# Patient Record
Sex: FEMALE | Race: WHITE | Employment: PART TIME | URBAN - METROPOLITAN AREA
[De-identification: names, ages, dates, MRNs, and addresses within clinical notes are randomized per-mention and may not be internally consistent; named-entity substitution may affect disease eponyms.]

---

## 2022-05-19 ENCOUNTER — APPOINTMENT (OUTPATIENT)
Dept: GENERAL RADIOLOGY | Age: 37
End: 2022-05-19
Payer: MEDICAID

## 2022-05-19 ENCOUNTER — HOSPITAL ENCOUNTER (EMERGENCY)
Age: 37
Discharge: LEFT BEFORE DISCHARGE | End: 2022-05-19
Payer: MEDICAID

## 2022-05-19 VITALS
OXYGEN SATURATION: 96 % | SYSTOLIC BLOOD PRESSURE: 149 MMHG | HEART RATE: 81 BPM | WEIGHT: 144.4 LBS | TEMPERATURE: 98.1 F | RESPIRATION RATE: 18 BRPM | DIASTOLIC BLOOD PRESSURE: 101 MMHG

## 2022-05-19 DIAGNOSIS — R11.2 NON-INTRACTABLE VOMITING WITH NAUSEA, UNSPECIFIED VOMITING TYPE: Primary | ICD-10-CM

## 2022-05-19 DIAGNOSIS — K22.89 IRRITABLE ESOPHAGUS: ICD-10-CM

## 2022-05-19 LAB
EKG ATRIAL RATE: 72 BPM
EKG DIAGNOSIS: NORMAL
EKG P AXIS: 55 DEGREES
EKG P-R INTERVAL: 168 MS
EKG Q-T INTERVAL: 386 MS
EKG QRS DURATION: 86 MS
EKG QTC CALCULATION (BAZETT): 422 MS
EKG R AXIS: 61 DEGREES
EKG T AXIS: 33 DEGREES
EKG VENTRICULAR RATE: 72 BPM
RAPID INFLUENZA  B AGN: NEGATIVE
RAPID INFLUENZA A AGN: NEGATIVE
S PYO AG THROAT QL: NEGATIVE
SARS-COV-2, NAAT: NOT DETECTED

## 2022-05-19 PROCEDURE — 87635 SARS-COV-2 COVID-19 AMP PRB: CPT

## 2022-05-19 PROCEDURE — 96372 THER/PROPH/DIAG INJ SC/IM: CPT

## 2022-05-19 PROCEDURE — 99285 EMERGENCY DEPT VISIT HI MDM: CPT

## 2022-05-19 PROCEDURE — 93010 ELECTROCARDIOGRAM REPORT: CPT | Performed by: INTERNAL MEDICINE

## 2022-05-19 PROCEDURE — 6370000000 HC RX 637 (ALT 250 FOR IP): Performed by: NURSE PRACTITIONER

## 2022-05-19 PROCEDURE — 87081 CULTURE SCREEN ONLY: CPT

## 2022-05-19 PROCEDURE — 87804 INFLUENZA ASSAY W/OPTIC: CPT

## 2022-05-19 PROCEDURE — 87880 STREP A ASSAY W/OPTIC: CPT

## 2022-05-19 PROCEDURE — 71046 X-RAY EXAM CHEST 2 VIEWS: CPT

## 2022-05-19 PROCEDURE — 93005 ELECTROCARDIOGRAM TRACING: CPT | Performed by: NURSE PRACTITIONER

## 2022-05-19 PROCEDURE — 87077 CULTURE AEROBIC IDENTIFY: CPT

## 2022-05-19 PROCEDURE — 6360000002 HC RX W HCPCS: Performed by: NURSE PRACTITIONER

## 2022-05-19 RX ORDER — DEXAMETHASONE SODIUM PHOSPHATE 10 MG/ML
10 INJECTION, SOLUTION INTRAMUSCULAR; INTRAVENOUS ONCE
Status: COMPLETED | OUTPATIENT
Start: 2022-05-19 | End: 2022-05-19

## 2022-05-19 RX ORDER — DROPERIDOL 2.5 MG/ML
1.25 INJECTION, SOLUTION INTRAMUSCULAR; INTRAVENOUS EVERY 6 HOURS PRN
Status: DISCONTINUED | OUTPATIENT
Start: 2022-05-19 | End: 2022-05-19 | Stop reason: HOSPADM

## 2022-05-19 RX ORDER — FAMOTIDINE 20 MG/1
20 TABLET, FILM COATED ORAL 2 TIMES DAILY
Qty: 60 TABLET | Refills: 0 | OUTPATIENT
Start: 2022-05-19 | End: 2022-05-26

## 2022-05-19 RX ORDER — SUCRALFATE 1 G/1
1 TABLET ORAL ONCE
Status: COMPLETED | OUTPATIENT
Start: 2022-05-19 | End: 2022-05-19

## 2022-05-19 RX ORDER — FAMOTIDINE 20 MG/1
20 TABLET, FILM COATED ORAL ONCE
Status: COMPLETED | OUTPATIENT
Start: 2022-05-19 | End: 2022-05-19

## 2022-05-19 RX ORDER — ONDANSETRON 4 MG/1
4-8 TABLET, ORALLY DISINTEGRATING ORAL EVERY 12 HOURS PRN
Qty: 20 TABLET | Refills: 0 | Status: SHIPPED | OUTPATIENT
Start: 2022-05-19

## 2022-05-19 RX ORDER — SUCRALFATE ORAL 1 G/10ML
1 SUSPENSION ORAL 4 TIMES DAILY
Qty: 1200 ML | Refills: 0 | Status: SHIPPED | OUTPATIENT
Start: 2022-05-19

## 2022-05-19 RX ADMIN — SUCRALFATE 1 G: 1 TABLET ORAL at 10:08

## 2022-05-19 RX ADMIN — DEXAMETHASONE SODIUM PHOSPHATE 10 MG: 10 INJECTION INTRAMUSCULAR; INTRAVENOUS at 10:06

## 2022-05-19 RX ADMIN — FAMOTIDINE 20 MG: 20 TABLET ORAL at 10:07

## 2022-05-19 RX ADMIN — DROPERIDOL 1.25 MG: 2.5 INJECTION, SOLUTION INTRAMUSCULAR; INTRAVENOUS at 10:08

## 2022-05-19 ASSESSMENT — ENCOUNTER SYMPTOMS
EYE PAIN: 0
DIARRHEA: 0
WHEEZING: 0
SORE THROAT: 0
NAUSEA: 1
ABDOMINAL PAIN: 0
SHORTNESS OF BREATH: 0
CONSTIPATION: 0
BACK PAIN: 0
RESPIRATORY NEGATIVE: 1
CHEST TIGHTNESS: 0
VOMITING: 1
VOICE CHANGE: 0
COUGH: 0
TROUBLE SWALLOWING: 0

## 2022-05-19 ASSESSMENT — PAIN - FUNCTIONAL ASSESSMENT: PAIN_FUNCTIONAL_ASSESSMENT: NONE - DENIES PAIN

## 2022-05-19 NOTE — Clinical Note
Delmi Miller was seen and treated in our emergency department on 5/19/2022. She may return to work on 05/21/2022. If you have any questions or concerns, please don't hesitate to call.       Monica Ge, PETER - CNP

## 2022-05-19 NOTE — ED PROVIDER NOTES
I did not perform an evaluation of Karli Corbett but was asked to review her EKG. EKG interpreted by myself. Rate: normal  Rhythm: NSR  Axis: normal  Intervals: within normal limits  ST Segments: nonspecific ST changes  T waves: no acute abnormality  Comparison: No prior EKG on file.   Impression: NSR with no acute abnormality         Nicholas Deng MD  05/19/22 7683

## 2022-05-19 NOTE — ED PROVIDER NOTES
[address] [facility]        Pt Name: Katharine Gutierrez  MRN: [id number]  [place] 1985  Date of evaluation: 5/19/2022  Provider: PETER Chang CNP  PCP: No primary care provider on file. Note Started: 10:02 AM EDT       SHILA. I have evaluated this patient. My supervising physician was available for consultation. CHIEF COMPLAINT       Chief Complaint   Patient presents with    Emesis     for 5 days        HISTORY OF PRESENT ILLNESS   (Location, Timing/Onset, Context/Setting, Quality, Duration, Modifying Factors, Severity, Associated Signs and Symptoms)  Note limiting factors. Chief Complaint: Nausea and vomiting    Katharine Gutierrez is a 31 y.o. female who presents to the emergency department with nausea and vomiting. She is in increased belching. She states that her son has similar symptoms. They have been ongoing for the past 5 days. She states that she feels like something is stuck, in the distal part of her throat. She is still able to swallow, and drink fluids without any difficulty. Not drooling. No phonation changes. She states that she has not trialed any over-the-counter medications other than Pepto-Bismol. She states that \"I am drinking that like water. \"  Denies any chest pain, shortness of breath, does endorse anxiety history. She is feeling very anxious about the increased burping and the foreign body sensation. She came to the ED for further evaluation and treatment. Nursing Notes were all reviewed and agreed with or any disagreements were addressed in the HPI. REVIEW OF SYSTEMS    (2-9 systems for level 4, 10 or more for level 5)     Review of Systems   Constitutional: Negative for chills, fatigue and fever. HENT: Negative for congestion, drooling, sore throat, trouble swallowing and voice change. Increased belching   Eyes: Negative for pain and visual disturbance. Respiratory: Negative. Negative for cough, chest tightness, shortness of breath and wheezing. Cardiovascular: Negative. Negative for chest pain, palpitations and leg swelling. Gastrointestinal: Positive for nausea and vomiting. Negative for abdominal pain, constipation and diarrhea. Genitourinary: Negative for dysuria, flank pain and hematuria. Musculoskeletal: Negative for back pain, myalgias, neck pain and neck stiffness. Skin: Negative for rash and wound. Allergic/Immunologic: Negative for immunocompromised state. Neurological: Negative for dizziness and light-headedness. Positives and Pertinent negatives as per HPI. Except as noted above in the ROS, all other systems were reviewed and negative. PAST MEDICAL HISTORY     Past Medical History:   Diagnosis Date    Anxiety     Gastroparesis     Scoliosis          SURGICAL HISTORY   History reviewed. No pertinent surgical history. CURRENTMEDICATIONS       Previous Medications    No medications on file         ALLERGIES     Patient has no known allergies. FAMILYHISTORY     History reviewed. No pertinent family history. SOCIAL HISTORY       Social History     Tobacco Use    Smoking status: Current Every Day Smoker     Packs/day: 1.00     Types: Cigarettes    Smokeless tobacco: Never Used   Substance Use Topics    Alcohol use: Yes    Drug use: Yes     Comment: methadone       SCREENINGS    [place] Coma Scale  Eye Opening: Spontaneous  Best Verbal Response: Oriented  Best Motor Response: Obeys commands  [place] Coma Scale Score: 15        PHYSICAL EXAM    (up to 7 for level 4, 8 or more for level 5)     ED Triage Vitals [05/19/22 0950]   BP Temp Temp Source Pulse Resp SpO2 Height Weight   (!) 149/101 98.1 °F (36.7 °C) Oral 81 18 96 % -- 144 lb 6.4 oz (65.5 kg)       Physical Exam  Vitals and nursing note reviewed. Constitutional:       General: She is not in acute distress. Appearance: Normal appearance.  She is not ill-appearing, toxic-appearing or diaphoretic. HENT:      Head: Normocephalic and atraumatic. No raccoon eyes, Rod's sign, abrasion, contusion, masses, right periorbital erythema, left periorbital erythema or laceration. Hair is normal.      Right Ear: Hearing, tympanic membrane and external ear normal. No decreased hearing noted. No mastoid tenderness. Left Ear: Hearing, tympanic membrane and external ear normal. No decreased hearing noted. No mastoid tenderness. Nose: Nose normal. No nasal deformity, signs of injury, laceration, nasal tenderness, mucosal edema, congestion or rhinorrhea. Right Nostril: No foreign body or epistaxis. Left Nostril: No foreign body or epistaxis. Mouth/Throat:      Lips: Pink. No lesions. Mouth: Mucous membranes are moist. No injury, lacerations, oral lesions or angioedema. Tongue: No lesions. Tongue does not deviate from midline. Palate: No mass. Pharynx: Oropharynx is clear. Uvula midline. No pharyngeal swelling, oropharyngeal exudate, posterior oropharyngeal erythema or uvula swelling. Tonsils: No tonsillar exudate or tonsillar abscesses. Eyes:      General: Lids are normal.         Right eye: No discharge. Left eye: No discharge. Extraocular Movements: Extraocular movements intact. Conjunctiva/sclera: Conjunctivae normal.      Pupils: Pupils are equal, round, and reactive to light. Neck:      Vascular: No carotid bruit. Trachea: Trachea and phonation normal. No tracheal tenderness, abnormal tracheal secretions or tracheal deviation. Comments: No meningismus   Cardiovascular:      Rate and Rhythm: Normal rate and regular rhythm. Pulses: Normal pulses. Heart sounds: Normal heart sounds. No murmur heard. No friction rub. No gallop. Pulmonary:      Effort: Pulmonary effort is normal. No respiratory distress. Breath sounds: Normal breath sounds. No stridor. No wheezing, rhonchi or rales. Abdominal:      General: Bowel sounds are normal. There is no distension. Palpations: Abdomen is soft. Tenderness: There is no abdominal tenderness. There is no guarding or rebound. Musculoskeletal:         General: Normal range of motion. Cervical back: Full passive range of motion without pain, normal range of motion and neck supple. No edema, erythema, signs of trauma, rigidity or tenderness. No spinous process tenderness or muscular tenderness. Right lower leg: No edema. Left lower leg: No edema. Lymphadenopathy:      Cervical: No cervical adenopathy. Skin:     General: Skin is warm and dry. Capillary Refill: Capillary refill takes less than 2 seconds. Neurological:      General: No focal deficit present. Mental Status: She is alert and oriented to person, place, and time. GCS: GCS eye subscore is 4. GCS verbal subscore is 5. GCS motor subscore is 6. Sensory: Sensation is intact. Motor: Motor function is intact. Gait: Gait is intact. Psychiatric:         Attention and Perception: Attention and perception normal.         Mood and Affect: Mood is anxious. Speech: Speech is rapid and pressured. Behavior: Behavior normal. Behavior is cooperative. Thought Content: Thought content normal.         Cognition and Memory: Cognition and memory normal.         DIAGNOSTIC RESULTS   LABS:    Labs Reviewed   RAPID INFLUENZA A/B ANTIGENS   COVID-19, RAPID   STREP SCREEN GROUP A THROAT   CULTURE, BETA STREP CONFIRM PLATES       When ordered only abnormal lab results are displayed. All other labs were within normal range or not returned as of this dictation. EKG: When ordered, EKG's are interpreted by the Emergency Department Physician in the absence of a cardiologist.  Please see their note for interpretation of EKG.     RADIOLOGY:   Non-plain film images such as CT, Ultrasound and MRI are read by the radiologist. Plain radiographic images are visualized and preliminarily interpreted by the ED Provider with the below findings:        Interpretation per the Radiologist below, if available at the time of this note:    XR CHEST (2 VW)   Final Result   No acute process. No results found. PROCEDURES   Unless otherwise noted below, none     Procedures    CRITICAL CARE TIME   CRITICAL CARE NOTE:  There was a high probability of clinically significant life-threatening deterioration of the patient's condition requiring my urgent intervention. Total critical care time was at least 20 minutes. This includes vital sign monitoring, pulse oximetry monitoring, telemetry monitoring, clinical response to the IV medications, reviewing the nursing notes, consultation time, dictation/documentation time, and interpretation of the labwork. This excludes any separately billable procedures performed.       CONSULTS:  None      EMERGENCY DEPARTMENT COURSE and DIFFERENTIAL DIAGNOSIS/MDM:   Vitals:    Vitals:    05/19/22 0950   BP: (!) 149/101   Pulse: 81   Resp: 18   Temp: 98.1 °F (36.7 °C)   TempSrc: Oral   SpO2: 96%   Weight: 144 lb 6.4 oz (65.5 kg)       Patient was given the following medications:  Medications   droperidol (INAPSINE) injection 1.25 mg (1.25 mg IntraMUSCular Given 5/19/22 1008)   aluminum & magnesium hydroxide-simethicone (MAALOX) 30 mL, lidocaine viscous hcl (XYLOCAINE) 5 mL (GI COCKTAIL) (has no administration in time range)   famotidine (PEPCID) tablet 20 mg (20 mg Oral Given 5/19/22 1007)   sucralfate (CARAFATE) tablet 1 g (1 g Oral Given 5/19/22 1008)   dexamethasone (PF) (DECADRON) injection 10 mg (10 mg Oral Given 5/19/22 1006)           MDM: See HPI and above for full presentation and physical exam.  Differential diagnoses include globus sensation, viral illness, bronchitis, URI, pneumonia, esophagitis, gastritis, gastroenteritis, food bolus, other    Patient has no stridor, posterior oropharyngeal inspection is unremarkable with an open airway with no tonsillar hypertrophy, erythema or exudate noted. She is not drooling. No phonation changes. Is still able to tolerate her own secretions as well as swallow multiple medications and liquids here in the emergency department. I have a low suspicion of index for any lodged food bolus at this time. Given her symptoms of nausea and vomiting, that are similar to what her son has she most likely has a viral illness, in conjunction with esophagitis which is further confirmed with her increased belching. I think a lot of her symptoms are also exacerbated by her anxiety. Plain films as read by the radiologist as above. EKG officially interpreted per my attending, sinus rhythm with no acute abnormalities. Pt is PERC negative. Wells criteria negative. No SOB. And her symptoms are very atypical for any ACS involvement    Rapid flu and COVID negative. Rapid strep negative. Given her work-up is grossly unremarkable with the unremarkable physical exam I do believe that patient is stable for discharge home. She is remained afebrile and hemodynamically stable throughout her entire ED course. Again she likely has a viral illness leading to some GERD and esophagitis. I will give her a primary care provider for referral for follow-up. Before I was able to discuss her test results and the discharge plan of care patient had eloped from the emergency department. I did E scribe hers symptomatic medications to the pharmacy on file. Gave her a new PCP referral.    I estimate there is LOW risk for a PULMONARY EMBOLISM, ACUTE CORONARY SYNDROME, OR THORACIC AORTIC DISSECTION, ANAPHYLAXIS, DEEP SPACE INFECTION (e.g., HUMBERTOS ANGINA OR RETROPHARYNGEAL ABSCESS), EPIGLOTTITIS, MENINGITIS, or AIRWAY COMPROMISE, thus I consider the discharge disposition reasonable. Also, there is no evidence or peritonitis, sepsis, or toxicity.  Merline Sheffield and I have discussed the diagnosis and risks, and we agree with discharging home to follow-up with their primary doctor. We also discussed returning to the Emergency Department immediately if new or worsening symptoms occur. We have discussed the symptoms which are most concerning (e.g., changing or worsening pain, trouble swallowing or breathing, neck stiffness or fever) that necessitate immediate return. FINAL IMPRESSION      1. Non-intractable vomiting with nausea, unspecified vomiting type    2. Irritable esophagus          DISPOSITION/PLAN   DISPOSITION Eloped - Left Before Treatment Complete 05/19/2022 12:50:02 PM      PATIENT REFERRED TO:  Alejandro Gould  [phone]  Schedule an appointment as soon as possible for a visit in 3 days      [facility] Emergency Department  [address] [address]  [phone]  Go to   As needed, If symptoms worsen      DISCHARGE MEDICATIONS:  New Prescriptions    FAMOTIDINE (PEPCID) 20 MG TABLET    Take 1 tablet by mouth 2 times daily    ONDANSETRON (ZOFRAN ODT) 4 MG DISINTEGRATING TABLET    Take 1-2 tablets by mouth every 12 hours as needed for Nausea or Vomiting May Sub regular tablet (non-ODT) if insurance does not cover ODT.     SUCRALFATE (CARAFATE) 1 GM/10ML SUSPENSION    Take 10 mLs by mouth 4 times daily       DISCONTINUED MEDICATIONS:  Discontinued Medications    No medications on file              (Please note that portions of this note were completed with a voice recognition program.  Efforts were made to edit the dictations but occasionally words are mis-transcribed.)    PETER Rodriguez CNP (electronically signed)         PETER Rodriguez CNP  05/19/22 1516

## 2022-05-21 LAB
ORGANISM: ABNORMAL
S PYO THROAT QL CULT: ABNORMAL
S PYO THROAT QL CULT: ABNORMAL

## 2022-05-26 ENCOUNTER — HOSPITAL ENCOUNTER (EMERGENCY)
Age: 37
Discharge: HOME | End: 2022-05-26
Admitting: NURSE PRACTITIONER
Payer: MEDICAID

## 2022-05-26 ENCOUNTER — APPOINTMENT (OUTPATIENT)
Dept: CT IMAGING | Age: 37
End: 2022-05-26
Payer: MEDICAID

## 2022-05-26 VITALS
RESPIRATION RATE: 21 BRPM | HEART RATE: 77 BPM | SYSTOLIC BLOOD PRESSURE: 127 MMHG | WEIGHT: 146.16 LBS | TEMPERATURE: 97.7 F | DIASTOLIC BLOOD PRESSURE: 103 MMHG | BODY MASS INDEX: 22.15 KG/M2 | OXYGEN SATURATION: 98 % | HEIGHT: 68 IN

## 2022-05-26 DIAGNOSIS — K22.89 IRRITABLE ESOPHAGUS: Primary | ICD-10-CM

## 2022-05-26 DIAGNOSIS — K52.9 COLITIS WITHOUT COMPLICATION: ICD-10-CM

## 2022-05-26 DIAGNOSIS — R07.9 CHEST PAIN, UNSPECIFIED TYPE: ICD-10-CM

## 2022-05-26 PROBLEM — K70.30 ALCOHOLIC CIRRHOSIS (HCC): Status: ACTIVE | Noted: 2021-04-09

## 2022-05-26 LAB
ALBUMIN SERPL-MCNC: 3.9 G/DL (ref 3.4–5)
ALP BLD-CCNC: 102 U/L (ref 40–129)
ALT SERPL-CCNC: 68 U/L (ref 10–40)
ANION GAP SERPL CALCULATED.3IONS-SCNC: 15 MMOL/L (ref 3–16)
AST SERPL-CCNC: 134 U/L (ref 15–37)
BASOPHILS ABSOLUTE: 0 K/UL (ref 0–0.2)
BASOPHILS RELATIVE PERCENT: 0.4 %
BILIRUB SERPL-MCNC: 0.8 MG/DL (ref 0–1)
BILIRUBIN DIRECT: 0.3 MG/DL (ref 0–0.3)
BILIRUBIN, INDIRECT: 0.5 MG/DL (ref 0–1)
BUN BLDV-MCNC: 5 MG/DL (ref 7–20)
CALCIUM SERPL-MCNC: 9.2 MG/DL (ref 8.3–10.6)
CHLORIDE BLD-SCNC: 98 MMOL/L (ref 99–110)
CO2: 23 MMOL/L (ref 21–32)
CREAT SERPL-MCNC: <0.5 MG/DL (ref 0.6–1.1)
EOSINOPHILS ABSOLUTE: 0.1 K/UL (ref 0–0.6)
EOSINOPHILS RELATIVE PERCENT: 0.5 %
GFR AFRICAN AMERICAN: >60
GFR NON-AFRICAN AMERICAN: >60
GLUCOSE BLD-MCNC: 102 MG/DL (ref 70–99)
HCG QUALITATIVE: NEGATIVE
HCT VFR BLD CALC: 39.5 % (ref 36–48)
HEMOGLOBIN: 13.5 G/DL (ref 12–16)
LIPASE: 52 U/L (ref 13–60)
LYMPHOCYTES ABSOLUTE: 1.6 K/UL (ref 1–5.1)
LYMPHOCYTES RELATIVE PERCENT: 13.9 %
MCH RBC QN AUTO: 31.7 PG (ref 26–34)
MCHC RBC AUTO-ENTMCNC: 34.1 G/DL (ref 31–36)
MCV RBC AUTO: 92.9 FL (ref 80–100)
MONOCYTES ABSOLUTE: 0.6 K/UL (ref 0–1.3)
MONOCYTES RELATIVE PERCENT: 5.5 %
NEUTROPHILS ABSOLUTE: 8.9 K/UL (ref 1.7–7.7)
NEUTROPHILS RELATIVE PERCENT: 79.7 %
PDW BLD-RTO: 15.9 % (ref 12.4–15.4)
PLATELET # BLD: 197 K/UL (ref 135–450)
PMV BLD AUTO: 8.2 FL (ref 5–10.5)
POTASSIUM REFLEX MAGNESIUM: 4.1 MMOL/L (ref 3.5–5.1)
PRO-BNP: 40 PG/ML (ref 0–124)
RBC # BLD: 4.25 M/UL (ref 4–5.2)
SODIUM BLD-SCNC: 136 MMOL/L (ref 136–145)
TOTAL PROTEIN: 7.3 G/DL (ref 6.4–8.2)
TROPONIN: <0.01 NG/ML
WBC # BLD: 11.1 K/UL (ref 4–11)

## 2022-05-26 PROCEDURE — 84484 ASSAY OF TROPONIN QUANT: CPT

## 2022-05-26 PROCEDURE — 6360000004 HC RX CONTRAST MEDICATION: Performed by: NURSE PRACTITIONER

## 2022-05-26 PROCEDURE — 84703 CHORIONIC GONADOTROPIN ASSAY: CPT

## 2022-05-26 PROCEDURE — 36415 COLL VENOUS BLD VENIPUNCTURE: CPT

## 2022-05-26 PROCEDURE — 83880 ASSAY OF NATRIURETIC PEPTIDE: CPT

## 2022-05-26 PROCEDURE — 80048 BASIC METABOLIC PNL TOTAL CA: CPT

## 2022-05-26 PROCEDURE — 80076 HEPATIC FUNCTION PANEL: CPT

## 2022-05-26 PROCEDURE — 74177 CT ABD & PELVIS W/CONTRAST: CPT

## 2022-05-26 PROCEDURE — 99285 EMERGENCY DEPT VISIT HI MDM: CPT

## 2022-05-26 PROCEDURE — 83690 ASSAY OF LIPASE: CPT

## 2022-05-26 PROCEDURE — 85025 COMPLETE CBC W/AUTO DIFF WBC: CPT

## 2022-05-26 PROCEDURE — 6370000000 HC RX 637 (ALT 250 FOR IP): Performed by: NURSE PRACTITIONER

## 2022-05-26 PROCEDURE — 93005 ELECTROCARDIOGRAM TRACING: CPT | Performed by: NURSE PRACTITIONER

## 2022-05-26 PROCEDURE — 71260 CT THORAX DX C+: CPT

## 2022-05-26 RX ORDER — PANTOPRAZOLE SODIUM 20 MG/1
20 TABLET, DELAYED RELEASE ORAL 2 TIMES DAILY
Qty: 30 TABLET | Refills: 0 | Status: SHIPPED | OUTPATIENT
Start: 2022-05-26

## 2022-05-26 RX ADMIN — IOPAMIDOL 75 ML: 755 INJECTION, SOLUTION INTRAVENOUS at 13:38

## 2022-05-26 RX ADMIN — ALUMINA, MAGNESIA, AND SIMETHICONE ORAL SUSPENSION REGULAR STRENGTH: 1200; 1200; 120 SUSPENSION ORAL at 11:55

## 2022-05-26 ASSESSMENT — ENCOUNTER SYMPTOMS
TROUBLE SWALLOWING: 0
WHEEZING: 0
VOICE CHANGE: 0
CONSTIPATION: 0
NAUSEA: 0
COUGH: 0
DIARRHEA: 0
CHEST TIGHTNESS: 0
SHORTNESS OF BREATH: 0
BACK PAIN: 0
EYE PAIN: 0
SORE THROAT: 0
ABDOMINAL PAIN: 0
VOMITING: 0

## 2022-05-26 ASSESSMENT — HEART SCORE: ECG: 1

## 2022-05-26 NOTE — ED PROVIDER NOTES
I did not perform an evaluation of Frank Quinn but was asked to review her EKG. EKG interpreted by myself.    Rate: normal  Rhythm: NSR  Axis: normal  Intervals: within normal limits  ST Segments: no acute abnormality  T waves: nonspecific T wave abnormality  Comparison: Compared to 5/19/22, there is no significant change  Impression: NSR with nonspecific T wave abnormality      Dulce Levine MD  05/26/22 8932

## 2022-05-26 NOTE — ED PROVIDER NOTES
[facility]        Pt Name: Ike Robles  MRN: [id number]  [place] 1985  Date of evaluation: 5/26/2022  Provider: PETER Byrd CNP  PCP: No primary care provider on file. Note Started: 11:24 AM EDT       SHILA. I have evaluated this patient. My supervising physician was available for consultation. CHIEF COMPLAINT       Chief Complaint   Patient presents with    Gastroesophageal Reflux     Patient arrives with c/o GERD like symptoms. States hurts in her chest up into \"esophogus\". States that this has been going on continuously for \"a while\" and was seen for this a week ago. Not improved. HISTORY OF PRESENT ILLNESS   (Location, Timing/Onset, Context/Setting, Quality, Duration, Modifying Factors, Severity, Associated Signs and Symptoms)  Note limiting factors. Chief Complaint: \"It hurts to swallow and I feel like things are getting stuck in my chest\"    Ike Robles is a 31 y.o. female who presents to the emergency department. I saw her last week for the same symptoms. She states that they were progressively worsening. Has been taking the medications that I prescribed her: Pepcid, Carafate and as needed Zofran. She states that these has not been helping. Had followed instructions and called her gastroenterologist.  Has an appointment in 2 weeks. She states that symptoms are worsening and she is very anxious that something else is going on. She denies any further nausea vomiting that she originally was seen for as well. She states that in that aspect she is feeling much better. She states that she feels like food gets caught in the distal end of her esophagus, with pain then in her chest that radiates to her back. Is only when she eats. Is still able to swallow the medications that she was getting. She is not having any difficulty drinking fluids or having any phonation changes.   She is not drooling. She does have a history of esophageal varices. She came to the emergency department for further evaluation and treatment. Nursing Notes were all reviewed and agreed with or any disagreements were addressed in the HPI. REVIEW OF SYSTEMS    (2-9 systems for level 4, 10 or more for level 5)     Review of Systems   Constitutional: Negative for chills, fatigue and fever. HENT: Negative for congestion, drooling, sore throat, trouble swallowing and voice change. Not having any overt trouble swallowing but she feels like \"things get stuck in my chest.\"   Eyes: Negative for pain and visual disturbance. Respiratory: Negative for cough, chest tightness, shortness of breath and wheezing. Cardiovascular: Positive for chest pain. Negative for palpitations and leg swelling. Gastrointestinal: Negative for abdominal pain, constipation, diarrhea, nausea and vomiting. Genitourinary: Negative for dysuria, flank pain and hematuria. Musculoskeletal: Negative for back pain, myalgias, neck pain and neck stiffness. Skin: Negative for rash and wound. Allergic/Immunologic: Negative for immunocompromised state. Neurological: Negative for dizziness and light-headedness. All other systems reviewed and are negative. Positives and Pertinent negatives as per HPI. Except as noted above in the ROS, all other systems were reviewed and negative. PAST MEDICAL HISTORY     Past Medical History:   Diagnosis Date    Anxiety     Gastroparesis     Scoliosis          SURGICAL HISTORY   History reviewed. No pertinent surgical history. CURRENTMEDICATIONS       Previous Medications    ONDANSETRON (ZOFRAN ODT) 4 MG DISINTEGRATING TABLET    Take 1-2 tablets by mouth every 12 hours as needed for Nausea or Vomiting May Sub regular tablet (non-ODT) if insurance does not cover ODT.     SUCRALFATE (CARAFATE) 1 GM/10ML SUSPENSION    Take 10 mLs by mouth 4 times daily         ALLERGIES Iodine    FAMILYHISTORY     History reviewed. No pertinent family history. SOCIAL HISTORY       Social History     Tobacco Use    Smoking status: Current Every Day Smoker     Packs/day: 1.00     Types: Cigarettes    Smokeless tobacco: Never Used   Substance Use Topics    Alcohol use: Yes    Drug use: Yes     Comment: methadone       SCREENINGS    [place] Coma Scale  Eye Opening: Spontaneous  Best Verbal Response: Oriented  Best Motor Response: Obeys commands  Lukas Coma Scale Score: 15        PHYSICAL EXAM    (up to 7 for level 4, 8 or more for level 5)     ED Triage Vitals [05/26/22 1019]   BP Temp Temp src Heart Rate Resp SpO2 Height Weight   (!) 149/93 97.7 °F (36.5 °C) -- 82 16 98 % 5' 8\" (1.727 m) 146 lb 2.6 oz (66.3 kg)       Physical Exam  Vitals and nursing note reviewed. Constitutional:       General: She is not in acute distress. Appearance: Normal appearance. She is not ill-appearing, toxic-appearing or diaphoretic. HENT:      Head: Normocephalic and atraumatic. No raccoon eyes, Rod's sign, abrasion, contusion, masses, right periorbital erythema, left periorbital erythema or laceration. Hair is normal.      Right Ear: Hearing and external ear normal. No decreased hearing noted. Left Ear: Hearing and external ear normal. No decreased hearing noted. Nose: Nose normal. No nasal deformity, signs of injury, laceration, nasal tenderness, mucosal edema, congestion or rhinorrhea. Right Nostril: No foreign body or epistaxis. Left Nostril: No foreign body or epistaxis. Mouth/Throat:      Lips: Pink. No lesions. Mouth: Mucous membranes are moist. No injury, lacerations, oral lesions or angioedema. Tongue: No lesions. Tongue does not deviate from midline. Palate: No mass. Pharynx: Oropharynx is clear. Uvula midline. No pharyngeal swelling, oropharyngeal exudate, posterior oropharyngeal erythema or uvula swelling.       Tonsils: No tonsillar exudate or tonsillar abscesses. Eyes:      General: Lids are normal.         Right eye: No discharge. Left eye: No discharge. Extraocular Movements: Extraocular movements intact. Conjunctiva/sclera: Conjunctivae normal.      Pupils: Pupils are equal, round, and reactive to light. Neck:      Trachea: Phonation normal. No abnormal tracheal secretions or tracheal deviation. Comments: No meningismus   Cardiovascular:      Rate and Rhythm: Normal rate and regular rhythm. Pulses: Normal pulses. Heart sounds: Normal heart sounds. No murmur heard. No friction rub. No gallop. Pulmonary:      Effort: Pulmonary effort is normal. No respiratory distress. Breath sounds: Normal breath sounds. No stridor. No wheezing, rhonchi or rales. Abdominal:      General: Bowel sounds are normal. There is no distension. Palpations: Abdomen is soft. There is no mass. Tenderness: There is no abdominal tenderness. There is no right CVA tenderness, left CVA tenderness, guarding or rebound. Hernia: No hernia is present. Musculoskeletal:         General: Normal range of motion. Cervical back: Full passive range of motion without pain, normal range of motion and neck supple. No rigidity. No spinous process tenderness or muscular tenderness. Lymphadenopathy:      Cervical: No cervical adenopathy. Skin:     General: Skin is warm and dry. Capillary Refill: Capillary refill takes less than 2 seconds. Neurological:      General: No focal deficit present. Mental Status: She is alert and oriented to person, place, and time. GCS: GCS eye subscore is 4. GCS verbal subscore is 5. GCS motor subscore is 6. Cranial Nerves: Cranial nerves are intact. No cranial nerve deficit. Sensory: Sensation is intact. No sensory deficit. Motor: Motor function is intact. No weakness. Coordination: Romberg sign negative. Coordination normal.      Gait: Gait is intact.  Gait normal.   Psychiatric:         Attention and Perception: Attention and perception normal.         Mood and Affect: Mood is anxious. Speech: Speech is rapid and pressured. Behavior: Behavior normal. Behavior is cooperative. Thought Content: Thought content normal.         Cognition and Memory: Cognition and memory normal.         DIAGNOSTIC RESULTS   LABS:    Labs Reviewed   CBC WITH AUTO DIFFERENTIAL - Abnormal; Notable for the following components:       Result Value    WBC 11.1 (*)     RDW 15.9 (*)     Neutrophils Absolute 8.9 (*)     All other components within normal limits   BASIC METABOLIC PANEL W/ REFLEX TO MG FOR LOW K - Abnormal; Notable for the following components:    Chloride 98 (*)     Glucose 102 (*)     BUN 5 (*)     CREATININE <0.5 (*)     All other components within normal limits   HEPATIC FUNCTION PANEL - Abnormal; Notable for the following components:    ALT 68 (*)      (*)     All other components within normal limits   LIPASE   TROPONIN   BRAIN NATRIURETIC PEPTIDE   HCG, SERUM, QUALITATIVE       When ordered only abnormal lab results are displayed. All other labs were within normal range or not returned as of this dictation. EKG: When ordered, EKG's are interpreted by the Emergency Department Physician in the absence of a cardiologist.  Please see their note for interpretation of EKG. RADIOLOGY:   Non-plain film images such as CT, Ultrasound and MRI are read by the radiologist. Plain radiographic images are visualized and preliminarily interpreted by the ED Provider with the below findings:        Interpretation per the Radiologist below, if available at the time of this note:    CT CHEST PULMONARY EMBOLISM W CONTRAST   Final Result   No evidence of pulmonary embolism or acute pulmonary abnormality. RECOMMENDATIONS:   Unavailable         CT ABDOMEN PELVIS W IV CONTRAST Additional Contrast? None   Final Result   1. No evidence of acute appendicitis.   Mild colitis involving the left colon. 2. Fatty liver but no focal disease. No results found. PROCEDURES   Unless otherwise noted below, none     Procedures    CRITICAL CARE TIME   CRITICAL CARE NOTE:  There was a high probability of clinically significant life-threatening deterioration of the patient's condition requiring my urgent intervention. Total critical care time was at least 30 minutes. This includes vital sign monitoring, pulse oximetry monitoring, telemetry monitoring, clinical response to the IV medications, reviewing the nursing notes, consultation time, dictation/documentation time, and interpretation of the labwork. This excludes any separately billable procedures performed. CONSULTS:  None      EMERGENCY DEPARTMENT COURSE and DIFFERENTIAL DIAGNOSIS/MDM:   Vitals:    Vitals:    05/26/22 1145 05/26/22 1200 05/26/22 1300 05/26/22 1415   BP: (!) 129/90  (!) 131/90 (!) 127/103   Pulse:  75 80 77   Resp:  19 24 21   Temp:       SpO2: 98% 97% 97% 98%   Weight:       Height:           Patient was given the following medications:  Medications   aluminum & magnesium hydroxide-simethicone (MAALOX) 30 mL, lidocaine viscous hcl (XYLOCAINE) 5 mL (GI COCKTAIL) ( Oral Given 5/26/22 1155)   iopamidol (ISOVUE-370) 76 % injection 75 mL (75 mLs IntraVENous Given 5/26/22 1338)           MDM: See HPI and above for full presentation and physical exam.  Differential diagnoses include Marshall's esophagus, esophageal varices, dyspepsia, PUD, gastritis, gastroenteritis, malignancy, anxiety, esophagitis, other    Patient has no airway obstruction. Is able to swallow, no drooling or phonation changes. No evidence for deep space infection. She is afebrile, hemodynamically stable and nontoxic in appearance. Blood work shows only a very mild leukocytosis of 11.1. No anemia. No significant electrolyte derangements or RAMONITA. LFTs other than ALT and AST are unremarkable. Lipase within defined limits. hCG qualitative negative. EKG officially interpreted per my attending. Sinus rhythm. Troponin negative. BNP unremarkable. CTs as read by the radiologist as above. Patient is PERC negative, and is low risk for pulmonary embolism: age is <50, HR <100, O2 Sat on RA >95%, no prior history of venous thromboembolism, no trauma or surgery within the past 4 weeks, no hemoptysis, no exogenous estrogen use, and no unilateral leg swelling. Patient is also in the low risk group per the [facility]' Criteria for Pulmonary Embolism: no clinical signs or symptoms of DVT, PE is not the #1 most likely diagnosis, heart rate <100, patient not immobilized for 3 days, no surgery the previous 4 weeks, no previously diagnosed PE or DVT, no hemoptysis, no treated malignancy within 6 months, not in palliative care for malignancy. Patient's HEART score is 2    Reevaluation: Patient is resting comfortably. I believe the patient is safe for discharge at this time. Personal risk evaluation performed together with the patient. I explained to the patient that with an unremarkable EKG and a negative (with s/sx onset >72 hours PTA) troponin, the patient's chest pain is classified as \"low risk\". I explained to the patient that of all \"low-risk\" chest pain patients discharged from the ED, 1 in 100 have a heart attack or heart complication within 30 days. Patient verbalized understanding and is comfortable with discharge. Again I do believe largely patient's complaints are likely anxiety related. She does have a increase risk due to her alcohol abuse for esophageal varices but she is not having any nausea or vomiting or hematemesis at this time. No evidence to suggest any perforated varices. Symptoms are also explained by some GERD/dyspepsia. We will switch her from the Pepcid to the Protonix twice daily.   She again has a follow-up appointment in 2 weeks with her gastroenterologist.  I did explain to her that the colitis is likely inflammatory and not infectious. We will defer treatment for GI recommendations on an outpatient basis. She verbalized understanding of all the above without any further questions or concerns. Remained afebrile and hemodynamically stable throughout her entire ED course and will be discharged home in stable condition    I estimate there is LOW risk for ACUTE APPENDICITIS, BOWEL OBSTRUCTION, CHOLECYSTITIS, DIVERTICULITIS, INCARCERATED HERNIA, PANCREATITIS, or PERFORATED BOWEL or ULCER, PULMONARY EMBOLISM, ACUTE CORONARY SYNDROME, OR THORACIC AORTIC DISSECTION, thus I consider the discharge disposition reasonable. Janice Maza and I have discussed the diagnosis and risks, and we agree with discharging home to follow-up with their primary doctor. We also discussed returning to the Emergency Department immediately if new or worsening symptoms occur. We have discussed the symptoms which are most concerning (e.g., bloody sputum, fever, worsening pain or shortness of breath, vomiting) that necessitate immediate return. FINAL IMPRESSION      1. Irritable esophagus    2. Chest pain, unspecified type    3.  Colitis without complication          DISPOSITION/PLAN   DISPOSITION Decision To Discharge 05/26/2022 02:32:27 PM      PATIENT REFERRED TO:  GI    Go in 2 weeks  as scheduled    Alejandro Garciamoivan  [phone]  Schedule an appointment as soon as possible for a visit in 3 days      [facility] Emergency Department  [address] [address]  [phone]  Go to   As needed, If symptoms worsen      DISCHARGE MEDICATIONS:  New Prescriptions    PANTOPRAZOLE (PROTONIX) 20 MG TABLET    Take 1 tablet by mouth 2 times daily       DISCONTINUED MEDICATIONS:  Discontinued Medications    FAMOTIDINE (PEPCID) 20 MG TABLET    Take 1 tablet by mouth 2 times daily              (Please note that portions of this note were completed with a voice recognition program.  Efforts were made to edit the dictations but occasionally words are mis-transcribed.)    Rossie Cabot, APRN - CNP (electronically signed)            Rossie Cabot, APRN - CNP  05/26/22 2108

## 2022-05-27 LAB
EKG ATRIAL RATE: 76 BPM
EKG DIAGNOSIS: NORMAL
EKG P AXIS: 39 DEGREES
EKG P-R INTERVAL: 166 MS
EKG Q-T INTERVAL: 350 MS
EKG QRS DURATION: 86 MS
EKG QTC CALCULATION (BAZETT): 393 MS
EKG R AXIS: 50 DEGREES
EKG T AXIS: 11 DEGREES
EKG VENTRICULAR RATE: 76 BPM

## 2022-05-27 PROCEDURE — 93010 ELECTROCARDIOGRAM REPORT: CPT | Performed by: INTERNAL MEDICINE

## 2022-12-30 ENCOUNTER — APPOINTMENT (OUTPATIENT)
Dept: CT IMAGING | Age: 37
End: 2022-12-30
Payer: MEDICAID

## 2022-12-30 ENCOUNTER — HOSPITAL ENCOUNTER (INPATIENT)
Age: 37
LOS: 7 days | Discharge: HOME | End: 2023-01-06
Attending: EMERGENCY MEDICINE | Admitting: INTERNAL MEDICINE
Payer: MEDICAID

## 2022-12-30 DIAGNOSIS — K85.20 ALCOHOL-INDUCED ACUTE PANCREATITIS, UNSPECIFIED COMPLICATION STATUS: Primary | ICD-10-CM

## 2022-12-30 PROBLEM — D72.829 LEUKOCYTOSIS: Status: ACTIVE | Noted: 2022-12-30

## 2022-12-30 PROBLEM — K85.80 OTHER ACUTE PANCREATITIS, UNSPECIFIED COMPLICATION STATUS: Status: ACTIVE | Noted: 2022-12-30

## 2022-12-30 PROBLEM — K21.9 GERD (GASTROESOPHAGEAL REFLUX DISEASE): Status: ACTIVE | Noted: 2022-12-30

## 2022-12-30 PROBLEM — F41.9 ANXIETY: Status: ACTIVE | Noted: 2022-12-30

## 2022-12-30 PROBLEM — K31.84 GASTROPARESIS: Status: ACTIVE | Noted: 2022-12-30

## 2022-12-30 PROBLEM — K85.90 ACUTE PANCREATITIS WITHOUT INFECTION OR NECROSIS: Status: ACTIVE | Noted: 2022-12-30

## 2022-12-30 LAB
A/G RATIO: 0.8 (ref 1.1–2.2)
ALBUMIN SERPL-MCNC: 3.9 G/DL (ref 3.4–5)
ALP BLD-CCNC: 143 U/L (ref 40–129)
ALT SERPL-CCNC: 30 U/L (ref 10–40)
AMORPHOUS: ABNORMAL /HPF
ANION GAP SERPL CALCULATED.3IONS-SCNC: 13 MMOL/L (ref 3–16)
AST SERPL-CCNC: 56 U/L (ref 15–37)
BACTERIA: ABNORMAL /HPF
BANDED NEUTROPHILS RELATIVE PERCENT: 5 % (ref 0–7)
BASOPHILS ABSOLUTE: 0.2 K/UL (ref 0–0.2)
BASOPHILS RELATIVE PERCENT: 1 %
BILIRUB SERPL-MCNC: 1 MG/DL (ref 0–1)
BILIRUBIN URINE: ABNORMAL
BLOOD, URINE: NEGATIVE
BUN BLDV-MCNC: 7 MG/DL (ref 7–20)
CALCIUM SERPL-MCNC: 9.6 MG/DL (ref 8.3–10.6)
CHLORIDE BLD-SCNC: 95 MMOL/L (ref 99–110)
CLARITY: CLEAR
CO2: 26 MMOL/L (ref 21–32)
COLOR: YELLOW
CREAT SERPL-MCNC: <0.5 MG/DL (ref 0.6–1.1)
EOSINOPHILS ABSOLUTE: 0.4 K/UL (ref 0–0.6)
EOSINOPHILS RELATIVE PERCENT: 2 %
EPITHELIAL CELLS, UA: ABNORMAL /HPF (ref 0–5)
GFR SERPL CREATININE-BSD FRML MDRD: >60 ML/MIN/{1.73_M2}
GLUCOSE BLD-MCNC: 105 MG/DL (ref 70–99)
GLUCOSE URINE: NEGATIVE MG/DL
HCG(URINE) PREGNANCY TEST: NEGATIVE
HCT VFR BLD CALC: 40.9 % (ref 36–48)
HEMOGLOBIN: 14.1 G/DL (ref 12–16)
KETONES, URINE: ABNORMAL MG/DL
LACTIC ACID: 1.3 MMOL/L (ref 0.4–2)
LEUKOCYTE ESTERASE, URINE: ABNORMAL
LIPASE: 222 U/L (ref 13–60)
LYMPHOCYTES ABSOLUTE: 1.8 K/UL (ref 1–5.1)
LYMPHOCYTES RELATIVE PERCENT: 10 %
MCH RBC QN AUTO: 32.6 PG (ref 26–34)
MCHC RBC AUTO-ENTMCNC: 34.4 G/DL (ref 31–36)
MCV RBC AUTO: 94.8 FL (ref 80–100)
MICROSCOPIC EXAMINATION: YES
MONOCYTES ABSOLUTE: 0.9 K/UL (ref 0–1.3)
MONOCYTES RELATIVE PERCENT: 5 %
MUCUS: ABNORMAL /LPF
NEUTROPHILS ABSOLUTE: 15.1 K/UL (ref 1.7–7.7)
NEUTROPHILS RELATIVE PERCENT: 77 %
NITRITE, URINE: NEGATIVE
PDW BLD-RTO: 15.1 % (ref 12.4–15.4)
PH UA: 7 (ref 5–8)
PLATELET # BLD: 240 K/UL (ref 135–450)
PMV BLD AUTO: 8.9 FL (ref 5–10.5)
POTASSIUM REFLEX MAGNESIUM: 3.9 MMOL/L (ref 3.5–5.1)
PROTEIN UA: ABNORMAL MG/DL
RBC # BLD: 4.31 M/UL (ref 4–5.2)
RBC UA: ABNORMAL /HPF (ref 0–4)
SODIUM BLD-SCNC: 134 MMOL/L (ref 136–145)
SPECIFIC GRAVITY UA: 1.01 (ref 1–1.03)
TOTAL PROTEIN: 8.7 G/DL (ref 6.4–8.2)
TRICHOMONAS: ABNORMAL /HPF
URINE REFLEX TO CULTURE: ABNORMAL
URINE TYPE: ABNORMAL
UROBILINOGEN, URINE: 2 E.U./DL
WBC # BLD: 18.4 K/UL (ref 4–11)
WBC UA: ABNORMAL /HPF (ref 0–5)

## 2022-12-30 PROCEDURE — 99285 EMERGENCY DEPT VISIT HI MDM: CPT

## 2022-12-30 PROCEDURE — 81001 URINALYSIS AUTO W/SCOPE: CPT

## 2022-12-30 PROCEDURE — 96361 HYDRATE IV INFUSION ADD-ON: CPT

## 2022-12-30 PROCEDURE — 96375 TX/PRO/DX INJ NEW DRUG ADDON: CPT

## 2022-12-30 PROCEDURE — 80053 COMPREHEN METABOLIC PANEL: CPT

## 2022-12-30 PROCEDURE — 74176 CT ABD & PELVIS W/O CONTRAST: CPT

## 2022-12-30 PROCEDURE — 6360000002 HC RX W HCPCS: Performed by: INTERNAL MEDICINE

## 2022-12-30 PROCEDURE — 36415 COLL VENOUS BLD VENIPUNCTURE: CPT

## 2022-12-30 PROCEDURE — 84703 CHORIONIC GONADOTROPIN ASSAY: CPT

## 2022-12-30 PROCEDURE — 85025 COMPLETE CBC W/AUTO DIFF WBC: CPT

## 2022-12-30 PROCEDURE — 83690 ASSAY OF LIPASE: CPT

## 2022-12-30 PROCEDURE — 6360000002 HC RX W HCPCS: Performed by: EMERGENCY MEDICINE

## 2022-12-30 PROCEDURE — 2580000003 HC RX 258: Performed by: INTERNAL MEDICINE

## 2022-12-30 PROCEDURE — 96374 THER/PROPH/DIAG INJ IV PUSH: CPT

## 2022-12-30 PROCEDURE — 1200000000 HC SEMI PRIVATE

## 2022-12-30 PROCEDURE — 83605 ASSAY OF LACTIC ACID: CPT

## 2022-12-30 PROCEDURE — 2580000003 HC RX 258: Performed by: EMERGENCY MEDICINE

## 2022-12-30 RX ORDER — SODIUM CHLORIDE, SODIUM LACTATE, POTASSIUM CHLORIDE, CALCIUM CHLORIDE 600; 310; 30; 20 MG/100ML; MG/100ML; MG/100ML; MG/100ML
INJECTION, SOLUTION INTRAVENOUS CONTINUOUS
Status: DISCONTINUED | OUTPATIENT
Start: 2022-12-30 | End: 2022-12-30

## 2022-12-30 RX ORDER — MORPHINE SULFATE 4 MG/ML
4 INJECTION, SOLUTION INTRAMUSCULAR; INTRAVENOUS ONCE
Status: COMPLETED | OUTPATIENT
Start: 2022-12-30 | End: 2022-12-30

## 2022-12-30 RX ORDER — ONDANSETRON 4 MG/1
4 TABLET, ORALLY DISINTEGRATING ORAL EVERY 8 HOURS PRN
Status: DISCONTINUED | OUTPATIENT
Start: 2022-12-30 | End: 2023-01-06 | Stop reason: HOSPADM

## 2022-12-30 RX ORDER — ENOXAPARIN SODIUM 100 MG/ML
40 INJECTION SUBCUTANEOUS DAILY
Status: DISCONTINUED | OUTPATIENT
Start: 2022-12-30 | End: 2023-01-06 | Stop reason: HOSPADM

## 2022-12-30 RX ORDER — SODIUM CHLORIDE 9 MG/ML
INJECTION, SOLUTION INTRAVENOUS PRN
Status: DISCONTINUED | OUTPATIENT
Start: 2022-12-30 | End: 2023-01-06 | Stop reason: HOSPADM

## 2022-12-30 RX ORDER — TRAZODONE HYDROCHLORIDE 100 MG/1
200 TABLET ORAL NIGHTLY
COMMUNITY

## 2022-12-30 RX ORDER — MORPHINE SULFATE 4 MG/ML
4 INJECTION, SOLUTION INTRAMUSCULAR; INTRAVENOUS
Status: DISCONTINUED | OUTPATIENT
Start: 2022-12-30 | End: 2022-12-31

## 2022-12-30 RX ORDER — KETOROLAC TROMETHAMINE 30 MG/ML
30 INJECTION, SOLUTION INTRAMUSCULAR; INTRAVENOUS ONCE
Status: DISCONTINUED | OUTPATIENT
Start: 2022-12-30 | End: 2022-12-30

## 2022-12-30 RX ORDER — MORPHINE SULFATE 4 MG/ML
4 INJECTION, SOLUTION INTRAMUSCULAR; INTRAVENOUS EVERY 4 HOURS PRN
Status: DISCONTINUED | OUTPATIENT
Start: 2022-12-30 | End: 2022-12-30

## 2022-12-30 RX ORDER — KETOROLAC TROMETHAMINE 30 MG/ML
30 INJECTION, SOLUTION INTRAMUSCULAR; INTRAVENOUS ONCE
Status: COMPLETED | OUTPATIENT
Start: 2022-12-30 | End: 2022-12-30

## 2022-12-30 RX ORDER — SODIUM CHLORIDE, SODIUM LACTATE, POTASSIUM CHLORIDE, CALCIUM CHLORIDE 600; 310; 30; 20 MG/100ML; MG/100ML; MG/100ML; MG/100ML
INJECTION, SOLUTION INTRAVENOUS CONTINUOUS
Status: DISCONTINUED | OUTPATIENT
Start: 2022-12-31 | End: 2022-12-30

## 2022-12-30 RX ORDER — 0.9 % SODIUM CHLORIDE 0.9 %
1000 INTRAVENOUS SOLUTION INTRAVENOUS ONCE
Status: DISCONTINUED | OUTPATIENT
Start: 2022-12-30 | End: 2022-12-30

## 2022-12-30 RX ORDER — MORPHINE SULFATE 4 MG/ML
4 INJECTION, SOLUTION INTRAMUSCULAR; INTRAVENOUS ONCE
Status: DISCONTINUED | OUTPATIENT
Start: 2022-12-30 | End: 2022-12-30

## 2022-12-30 RX ORDER — SODIUM CHLORIDE 0.9 % (FLUSH) 0.9 %
5-40 SYRINGE (ML) INJECTION PRN
Status: DISCONTINUED | OUTPATIENT
Start: 2022-12-30 | End: 2023-01-06 | Stop reason: HOSPADM

## 2022-12-30 RX ORDER — 0.9 % SODIUM CHLORIDE 0.9 %
1000 INTRAVENOUS SOLUTION INTRAVENOUS ONCE
Status: COMPLETED | OUTPATIENT
Start: 2022-12-30 | End: 2022-12-30

## 2022-12-30 RX ORDER — ONDANSETRON 2 MG/ML
4 INJECTION INTRAMUSCULAR; INTRAVENOUS ONCE
Status: DISCONTINUED | OUTPATIENT
Start: 2022-12-30 | End: 2022-12-30

## 2022-12-30 RX ORDER — ONDANSETRON 2 MG/ML
4 INJECTION INTRAMUSCULAR; INTRAVENOUS EVERY 6 HOURS PRN
Status: DISCONTINUED | OUTPATIENT
Start: 2022-12-30 | End: 2023-01-06 | Stop reason: HOSPADM

## 2022-12-30 RX ORDER — MORPHINE SULFATE 2 MG/ML
2 INJECTION, SOLUTION INTRAMUSCULAR; INTRAVENOUS
Status: DISCONTINUED | OUTPATIENT
Start: 2022-12-30 | End: 2022-12-31

## 2022-12-30 RX ORDER — ONDANSETRON 2 MG/ML
4 INJECTION INTRAMUSCULAR; INTRAVENOUS ONCE
Status: COMPLETED | OUTPATIENT
Start: 2022-12-30 | End: 2022-12-30

## 2022-12-30 RX ORDER — SODIUM CHLORIDE 0.9 % (FLUSH) 0.9 %
5-40 SYRINGE (ML) INJECTION EVERY 12 HOURS SCHEDULED
Status: DISCONTINUED | OUTPATIENT
Start: 2022-12-30 | End: 2023-01-06 | Stop reason: HOSPADM

## 2022-12-30 RX ORDER — SODIUM CHLORIDE, SODIUM LACTATE, POTASSIUM CHLORIDE, CALCIUM CHLORIDE 600; 310; 30; 20 MG/100ML; MG/100ML; MG/100ML; MG/100ML
INJECTION, SOLUTION INTRAVENOUS CONTINUOUS
Status: ACTIVE | OUTPATIENT
Start: 2022-12-30 | End: 2022-12-31

## 2022-12-30 RX ADMIN — MORPHINE SULFATE 4 MG: 4 INJECTION, SOLUTION INTRAMUSCULAR; INTRAVENOUS at 23:58

## 2022-12-30 RX ADMIN — ENOXAPARIN SODIUM 40 MG: 100 INJECTION SUBCUTANEOUS at 19:01

## 2022-12-30 RX ADMIN — MORPHINE SULFATE 4 MG: 4 INJECTION, SOLUTION INTRAMUSCULAR; INTRAVENOUS at 19:01

## 2022-12-30 RX ADMIN — SODIUM CHLORIDE 1000 ML: 9 INJECTION, SOLUTION INTRAVENOUS at 13:18

## 2022-12-30 RX ADMIN — KETOROLAC TROMETHAMINE 30 MG: 30 INJECTION, SOLUTION INTRAMUSCULAR at 13:20

## 2022-12-30 RX ADMIN — MORPHINE SULFATE 4 MG: 4 INJECTION, SOLUTION INTRAMUSCULAR; INTRAVENOUS at 15:36

## 2022-12-30 RX ADMIN — SODIUM CHLORIDE, POTASSIUM CHLORIDE, SODIUM LACTATE AND CALCIUM CHLORIDE: 600; 310; 30; 20 INJECTION, SOLUTION INTRAVENOUS at 21:17

## 2022-12-30 RX ADMIN — ONDANSETRON 4 MG: 2 INJECTION INTRAMUSCULAR; INTRAVENOUS at 13:18

## 2022-12-30 RX ADMIN — SODIUM CHLORIDE, POTASSIUM CHLORIDE, SODIUM LACTATE AND CALCIUM CHLORIDE: 600; 310; 30; 20 INJECTION, SOLUTION INTRAVENOUS at 18:57

## 2022-12-30 RX ADMIN — SODIUM CHLORIDE 1000 ML: 9 INJECTION, SOLUTION INTRAVENOUS at 15:30

## 2022-12-30 ASSESSMENT — LIFESTYLE VARIABLES
HOW OFTEN DO YOU HAVE A DRINK CONTAINING ALCOHOL: 4 OR MORE TIMES A WEEK
HOW MANY STANDARD DRINKS CONTAINING ALCOHOL DO YOU HAVE ON A TYPICAL DAY: 5 OR 6

## 2022-12-30 ASSESSMENT — PAIN SCALES - GENERAL
PAINLEVEL_OUTOF10: 10
PAINLEVEL_OUTOF10: 8
PAINLEVEL_OUTOF10: 10

## 2022-12-30 ASSESSMENT — PAIN - FUNCTIONAL ASSESSMENT
PAIN_FUNCTIONAL_ASSESSMENT: PREVENTS OR INTERFERES WITH MANY ACTIVE NOT PASSIVE ACTIVITIES
PAIN_FUNCTIONAL_ASSESSMENT: PREVENTS OR INTERFERES SOME ACTIVE ACTIVITIES AND ADLS
PAIN_FUNCTIONAL_ASSESSMENT: PREVENTS OR INTERFERES WITH MANY ACTIVE NOT PASSIVE ACTIVITIES

## 2022-12-30 ASSESSMENT — PAIN DESCRIPTION - ONSET: ONSET: ON-GOING

## 2022-12-30 ASSESSMENT — PAIN DESCRIPTION - FREQUENCY: FREQUENCY: CONTINUOUS

## 2022-12-30 ASSESSMENT — PAIN DESCRIPTION - LOCATION
LOCATION: ABDOMEN

## 2022-12-30 ASSESSMENT — PAIN DESCRIPTION - DESCRIPTORS
DESCRIPTORS: ACHING;DISCOMFORT
DESCRIPTORS: SHARP
DESCRIPTORS: ACHING;DISCOMFORT

## 2022-12-30 ASSESSMENT — PAIN DESCRIPTION - PAIN TYPE
TYPE: ACUTE PAIN

## 2022-12-30 ASSESSMENT — PAIN DESCRIPTION - ORIENTATION: ORIENTATION: LEFT;MID

## 2022-12-30 NOTE — ED NOTES
Pt now states her mom will pick her up in 30 minutes. Remains NPO.         Dana Snow RN  12/30/22 8539

## 2022-12-30 NOTE — LETTER
[address]  Phone: [phone]             January 6, 2023    Patient: Nestor Reddy   YOB: 1985   Date of Visit: 12/30/2022       To Whom It May Concern:    Nestor Reddy was seen and treated in our facility  beginning 12/30/2022 until 1/6/2023 . She may return to work on 01/08/2023.       Sincerely,       Carmencita Urrutia, LEIDA         Signature:__________________________________

## 2022-12-30 NOTE — ED NOTES
Pain down to 5. Still wants to go to [address] [place] [address],[address] [facility] by private car-mom will drive her there.      Ana Paula Rodriguez RN  12/30/22 6349

## 2022-12-30 NOTE — PROGRESS NOTES
Patient arrived to unit. Call light within reach, bed in lowest position. Will get IV placed. Admission started. Will continue to monitor.

## 2022-12-30 NOTE — ED NOTES
Pt's mom here to  pt and take her to [address] at pt request.   abd pain at 7. No vomiting. Belongings with pt. This RN wheeled pt out to her mom's car.    Then called 4th floor desk at [address] to inform that pt has left QC (jeniffer requested this)     Conchis Conner RN  12/30/22 9988

## 2022-12-30 NOTE — ED NOTES
Report called to Jesenia at [facility] 4th floor.    Pt called her mom to pick her up     Shay Morocho RN  12/30/22 1640

## 2022-12-30 NOTE — ED NOTES
Feels better. Pain down to 8. Lights dimmed. Watching tv and talking on phone. No vomiting here in ED.      Talisha Villafuerte RN  12/30/22 1638

## 2022-12-30 NOTE — ED NOTES
Aware and agreeable to admission to [address] [place] [address],[address].   abd pain still at 8.   Pt wants her mom to [facility] because she wants to smoke and go home to see her kids first.  EMD aware of this plan     Mary Pritchard RN  12/30/22 9962

## 2022-12-30 NOTE — ED NOTES
Called Charity Parra RN on 4th floor at [facility].    Charity Parra will call back for report when she is able. Aware that pt is leaving very soon and coming to [address] [place] [address],[address] by private car.      Conrad Sanders RN  12/30/22 9495

## 2022-12-30 NOTE — ED PROVIDER NOTES
Triage Chief Complaint:   Abdominal Pain and Emesis (Epigastric and LUQ abd pain since 12/25/22. Initially had vomiting and then abd pain started 12/28. Pain now constant and a \"15\" on 1 to 10 scale. Vomited once this am.  Drinking water, coke out in lobby.  )      [place]:  Nohelia Mcdermott is a 30 y.o. female that presents to the emergency department with epigastric and left upper quadrant abdominal pain. She states that she feels like this pain in her epigastric region is going straight through to her mid upper back. She has never had pain quite like this before. She states she is an alcoholic but \"was doing good for a while\". She states that about 5 or 6 days ago she started drinking again for 2 days. Since that time her pain has gotten worse. She vomited once this morning. She denies fever chills, chest pain. No previous abdominal surgeries. She states she tried to drink a shot of liquor last night \"to help me sleep\" but it made the pain worse. Susan Pierce Past Medical History:   Diagnosis Date    Anxiety     Gastroparesis     GERD (gastroesophageal reflux disease)     Scoliosis      History reviewed. No pertinent surgical history. History reviewed. No pertinent family history.   Social History     Socioeconomic History    Marital status: Single     Spouse name: Not on file    Number of children: Not on file    Years of education: Not on file    Highest education level: Not on file   Occupational History    Not on file   Tobacco Use    Smoking status: Every Day     Packs/day: 1.00     Types: Cigarettes    Smokeless tobacco: Never   Substance and Sexual Activity    Alcohol use: Yes     Comment: 12/30/22 drank fifth of vodka daily (last time 12/25/22)    Drug use: Yes     Types: Marijuana Champ Cue)     Comment: medical marijuana card    Sexual activity: Not on file   Other Topics Concern    Not on file   Social History Narrative    Not on file     Social Determinants of Health     Financial Resource Strain: Not on file   Food Insecurity: Not on file   Transportation Needs: Not on file   Physical Activity: Not on file   Stress: Not on file   Social Connections: Not on file   Intimate Partner Violence: Not on file   Housing Stability: Not on file     Current Facility-Administered Medications   Medication Dose Route Frequency Provider Last Rate Last Admin    0.9 % sodium chloride bolus  1,000 mL IntraVENous Once Travis Ma .6 mL/hr at 12/30/22 1530 1,000 mL at 12/30/22 1530     Current Outpatient Medications   Medication Sig Dispense Refill    TRAZODONE HCL PO Take 200 mg by mouth nightly      METHADONE HCL PO Take 87 mg by mouth daily       Allergies   Allergen Reactions    Iodine Rash     Nursing Notes Reviewed    ROS:  At least 10 systems reviewed and otherwise negative except as in the [address] Physical Exam:  ED Triage Vitals   Enc Vitals Group      BP 12/30/22 1220 (!) 135/100      Heart Rate 12/30/22 1220 (!) 104      Resp 12/30/22 1220 22      Temp 12/30/22 1220 98.2 °F (36.8 °C)      Temp Source 12/30/22 1220 Oral      SpO2 12/30/22 1220 100 %      Weight 12/30/22 1210 145 lb 11.6 oz (66.1 kg)      Height 12/30/22 1210 5' 8\" (1.727 m)      Head Circumference --       Peak Flow --       Pain Score --       Pain Loc --       Pain Edu? --       Excl. in [address]? --      My pulse oximetry interpretation is which is within the normal range    GENERAL APPEARANCE: Awake and alert. Cooperative. No acute distress. HEAD:  Atraumatic. EYES: EOM's grossly intact. ENT: Mucous membranes are moist.  No trismus. NECK:  Trachea midline. HEART: Regular rate and rhythm  LUNGS: Respirations unlabored. CTAB  ABDOMEN: Soft. Left upper quadrant abdominal tenderness to palpation. No guarding or rebound. EXTREMITIES: No acute deformities. SKIN: Warm and dry. NEUROLOGICAL: Moves all 4 extremities spontaneously. PSYCHIATRIC: Normal mood.     I have reviewed and interpreted all of the currently available lab results from this visit (if applicable):  Results for orders placed or performed during the hospital encounter of 12/30/22   Urinalysis with Reflex to Culture    Specimen: Urine   Result Value Ref Range    Color, UA Yellow Straw/Yellow    Clarity, UA Clear Clear    Glucose, Ur Negative Negative mg/dL    Bilirubin Urine SMALL (A) Negative    Ketones, Urine TRACE (A) Negative mg/dL    Specific Gravity, UA 1.015 1.005 - 1.030    Blood, Urine Negative Negative    pH, UA 7.0 5.0 - 8.0    Protein, UA TRACE (A) Negative mg/dL    Urobilinogen, Urine 2.0 (A) <2.0 E.U./dL    Nitrite, Urine Negative Negative    Leukocyte Esterase, Urine TRACE (A) Negative    Microscopic Examination YES     Urine Type Cleancatch     Urine Reflex to Culture Not Indicated    Pregnancy, Urine   Result Value Ref Range    HCG(Urine) Pregnancy Test Negative Detects HCG level >20 MIU/mL   CMP w/ Reflex to MG   Result Value Ref Range    Sodium 134 (L) 136 - 145 mmol/L    Potassium reflex Magnesium 3.9 3.5 - 5.1 mmol/L    Chloride 95 (L) 99 - 110 mmol/L    CO2 26 21 - 32 mmol/L    Anion Gap 13 3 - 16    Glucose 105 (H) 70 - 99 mg/dL    BUN 7 7 - 20 mg/dL    Creatinine <0.5 (L) 0.6 - 1.1 mg/dL    Est, Glom Filt Rate >60 >60    Calcium 9.6 8.3 - 10.6 mg/dL    Total Protein 8.7 (H) 6.4 - 8.2 g/dL    Albumin 3.9 3.4 - 5.0 g/dL    Albumin/Globulin Ratio 0.8 (L) 1.1 - 2.2    Total Bilirubin 1.0 0.0 - 1.0 mg/dL    Alkaline Phosphatase 143 (H) 40 - 129 U/L    ALT 30 10 - 40 U/L    AST 56 (H) 15 - 37 U/L   CBC with Auto Differential   Result Value Ref Range    WBC 18.4 (H) 4.0 - 11.0 K/uL    RBC 4.31 4.00 - 5.20 M/uL    Hemoglobin 14.1 12.0 - 16.0 g/dL    Hematocrit 40.9 36.0 - 48.0 %    MCV 94.8 80.0 - 100.0 fL    MCH 32.6 26.0 - 34.0 pg    MCHC 34.4 31.0 - 36.0 g/dL    RDW 15.1 12.4 - 15.4 %    Platelets 637 788 - 708 K/uL    MPV 8.9 5.0 - 10.5 fL    Neutrophils % 77.0 %    Lymphocytes % 10.0 %    Monocytes % 5.0 %    Eosinophils % 2.0 %    Basophils % 1.0 %    Neutrophils Absolute 15.1 (H) 1.7 - 7.7 K/uL    Lymphocytes Absolute 1.8 1.0 - 5.1 K/uL    Monocytes Absolute 0.9 0.0 - 1.3 K/uL    Eosinophils Absolute 0.4 0.0 - 0.6 K/uL    Basophils Absolute 0.2 0.0 - 0.2 K/uL    Bands Relative 5 0 - 7 %   Lipase   Result Value Ref Range    Lipase 222.0 (H) 13.0 - 60.0 U/L   Lactic Acid   Result Value Ref Range    Lactic Acid 1.3 0.4 - 2.0 mmol/L   Microscopic Urinalysis   Result Value Ref Range    Mucus, UA Rare (A) None Seen /LPF    WBC, UA 3-5 0 - 5 /HPF    RBC, UA 0-2 0 - 4 /HPF    Epithelial Cells, UA 6-10 (A) 0 - 5 /HPF    Bacteria, UA 1+ (A) None Seen /HPF    Amorphous, UA 1+ /HPF    Trichomonas, UA None Seen None Seen /HPF          EKG: (All EKG's are interpreted by myself in the absence of a cardiologist)      MDM:  Patient's vital signs show that she is hypertensive. Her white count is 18.4. Her chemistry is unremarkable. Her LFTs show an AST of 56 and an alkaline phosphatase of 143. Her lipase is 222. Her lactic acid is normal.  CT of the abdomen pelvis shows pancreatitis with stranding and a small amount of fluid adjacent. Patient feeling better on repeat evaluation though she is still having pain and nausea. I did give her second liter of IV fluid, IV pain medication and IV nausea medication. I discussed with patient admission versus discharge. Patient states she still does not feel well and therefore I have spoken with the hospitalist at [facility] for admission. Clinical Impression:  1. Alcohol-induced acute pancreatitis, unspecified complication status        Disposition Vitals:  [unfilled], [unfilled], [unfilled], [unfilled]    Disposition referral (if applicable):  No follow-up provider specified.     Disposition medications (if applicable):  New Prescriptions    No medications on file         (Please note that portions of this note may have been completed with a voice recognition program. Efforts were made to edit the dictations but occasionally words are mis-transcribed.)    MD Devorah Sanchez MD  12/30/22 [id number]

## 2022-12-31 ENCOUNTER — APPOINTMENT (OUTPATIENT)
Dept: ULTRASOUND IMAGING | Age: 37
End: 2022-12-31
Payer: MEDICAID

## 2022-12-31 LAB
A/G RATIO: 0.9 (ref 1.1–2.2)
ALBUMIN SERPL-MCNC: 3.5 G/DL (ref 3.4–5)
ALP BLD-CCNC: 137 U/L (ref 40–129)
ALT SERPL-CCNC: 30 U/L (ref 10–40)
ANION GAP SERPL CALCULATED.3IONS-SCNC: 12 MMOL/L (ref 3–16)
AST SERPL-CCNC: 61 U/L (ref 15–37)
BASOPHILS ABSOLUTE: 0 K/UL (ref 0–0.2)
BASOPHILS RELATIVE PERCENT: 0.4 %
BILIRUB SERPL-MCNC: 0.8 MG/DL (ref 0–1)
BUN BLDV-MCNC: 6 MG/DL (ref 7–20)
CALCIUM SERPL-MCNC: 9.4 MG/DL (ref 8.3–10.6)
CHLORIDE BLD-SCNC: 99 MMOL/L (ref 99–110)
CO2: 27 MMOL/L (ref 21–32)
CREAT SERPL-MCNC: 0.6 MG/DL (ref 0.6–1.1)
EOSINOPHILS ABSOLUTE: 0.1 K/UL (ref 0–0.6)
EOSINOPHILS RELATIVE PERCENT: 1.3 %
GFR SERPL CREATININE-BSD FRML MDRD: >60 ML/MIN/{1.73_M2}
GLUCOSE BLD-MCNC: 73 MG/DL (ref 70–99)
HCT VFR BLD CALC: 39.8 % (ref 36–48)
HEMOGLOBIN: 13.4 G/DL (ref 12–16)
LIPASE: 164 U/L (ref 13–60)
LYMPHOCYTES ABSOLUTE: 2 K/UL (ref 1–5.1)
LYMPHOCYTES RELATIVE PERCENT: 19.6 %
MAGNESIUM: 1.8 MG/DL (ref 1.8–2.4)
MCH RBC QN AUTO: 32.7 PG (ref 26–34)
MCHC RBC AUTO-ENTMCNC: 33.6 G/DL (ref 31–36)
MCV RBC AUTO: 97.5 FL (ref 80–100)
MONOCYTES ABSOLUTE: 0.9 K/UL (ref 0–1.3)
MONOCYTES RELATIVE PERCENT: 8.5 %
NEUTROPHILS ABSOLUTE: 7.2 K/UL (ref 1.7–7.7)
NEUTROPHILS RELATIVE PERCENT: 70.2 %
PDW BLD-RTO: 15.4 % (ref 12.4–15.4)
PHOSPHORUS: 3.3 MG/DL (ref 2.5–4.9)
PLATELET # BLD: 188 K/UL (ref 135–450)
PMV BLD AUTO: 9.6 FL (ref 5–10.5)
POTASSIUM SERPL-SCNC: 4.4 MMOL/L (ref 3.5–5.1)
RBC # BLD: 4.08 M/UL (ref 4–5.2)
SODIUM BLD-SCNC: 138 MMOL/L (ref 136–145)
TOTAL PROTEIN: 7.5 G/DL (ref 6.4–8.2)
TRIGL SERPL-MCNC: 251 MG/DL (ref 0–150)
WBC # BLD: 10.3 K/UL (ref 4–11)

## 2022-12-31 PROCEDURE — 84478 ASSAY OF TRIGLYCERIDES: CPT

## 2022-12-31 PROCEDURE — 6370000000 HC RX 637 (ALT 250 FOR IP): Performed by: NURSE PRACTITIONER

## 2022-12-31 PROCEDURE — 36415 COLL VENOUS BLD VENIPUNCTURE: CPT

## 2022-12-31 PROCEDURE — 83735 ASSAY OF MAGNESIUM: CPT

## 2022-12-31 PROCEDURE — 80053 COMPREHEN METABOLIC PANEL: CPT

## 2022-12-31 PROCEDURE — 76705 ECHO EXAM OF ABDOMEN: CPT

## 2022-12-31 PROCEDURE — 94760 N-INVAS EAR/PLS OXIMETRY 1: CPT

## 2022-12-31 PROCEDURE — 6360000002 HC RX W HCPCS: Performed by: INTERNAL MEDICINE

## 2022-12-31 PROCEDURE — 85025 COMPLETE CBC W/AUTO DIFF WBC: CPT

## 2022-12-31 PROCEDURE — 1200000000 HC SEMI PRIVATE

## 2022-12-31 PROCEDURE — 6370000000 HC RX 637 (ALT 250 FOR IP): Performed by: INTERNAL MEDICINE

## 2022-12-31 PROCEDURE — 2580000003 HC RX 258: Performed by: INTERNAL MEDICINE

## 2022-12-31 PROCEDURE — 84100 ASSAY OF PHOSPHORUS: CPT

## 2022-12-31 PROCEDURE — 83690 ASSAY OF LIPASE: CPT

## 2022-12-31 RX ORDER — SODIUM CHLORIDE 0.9 % (FLUSH) 0.9 %
10 SYRINGE (ML) INJECTION EVERY 12 HOURS SCHEDULED
Status: DISCONTINUED | OUTPATIENT
Start: 2022-12-31 | End: 2023-01-06 | Stop reason: HOSPADM

## 2022-12-31 RX ORDER — PANTOPRAZOLE SODIUM 20 MG/1
20 TABLET, DELAYED RELEASE ORAL
Status: DISCONTINUED | OUTPATIENT
Start: 2022-12-31 | End: 2023-01-06 | Stop reason: HOSPADM

## 2022-12-31 RX ORDER — LORAZEPAM 2 MG/ML
4 INJECTION INTRAMUSCULAR
Status: DISCONTINUED | OUTPATIENT
Start: 2022-12-31 | End: 2023-01-06 | Stop reason: HOSPADM

## 2022-12-31 RX ORDER — SODIUM CHLORIDE 9 MG/ML
INJECTION, SOLUTION INTRAVENOUS PRN
Status: DISCONTINUED | OUTPATIENT
Start: 2022-12-31 | End: 2023-01-06 | Stop reason: HOSPADM

## 2022-12-31 RX ORDER — LORAZEPAM 1 MG/1
3 TABLET ORAL
Status: DISCONTINUED | OUTPATIENT
Start: 2022-12-31 | End: 2023-01-06 | Stop reason: HOSPADM

## 2022-12-31 RX ORDER — MORPHINE SULFATE 4 MG/ML
4 INJECTION, SOLUTION INTRAMUSCULAR; INTRAVENOUS
Status: DISCONTINUED | OUTPATIENT
Start: 2022-12-31 | End: 2023-01-01

## 2022-12-31 RX ORDER — SODIUM CHLORIDE 0.9 % (FLUSH) 0.9 %
10 SYRINGE (ML) INJECTION PRN
Status: DISCONTINUED | OUTPATIENT
Start: 2022-12-31 | End: 2023-01-06 | Stop reason: HOSPADM

## 2022-12-31 RX ORDER — METHADONE HYDROCHLORIDE 10 MG/1
85 TABLET ORAL DAILY
Status: DISCONTINUED | OUTPATIENT
Start: 2023-01-01 | End: 2022-12-31 | Stop reason: DRUGHIGH

## 2022-12-31 RX ORDER — LORAZEPAM 2 MG/ML
1 INJECTION INTRAMUSCULAR
Status: DISCONTINUED | OUTPATIENT
Start: 2022-12-31 | End: 2023-01-06 | Stop reason: HOSPADM

## 2022-12-31 RX ORDER — GAUZE BANDAGE 2" X 2"
100 BANDAGE TOPICAL DAILY
Status: DISCONTINUED | OUTPATIENT
Start: 2022-12-31 | End: 2023-01-06 | Stop reason: HOSPADM

## 2022-12-31 RX ORDER — LORAZEPAM 1 MG/1
2 TABLET ORAL
Status: DISCONTINUED | OUTPATIENT
Start: 2022-12-31 | End: 2023-01-06 | Stop reason: HOSPADM

## 2022-12-31 RX ORDER — PANTOPRAZOLE SODIUM 20 MG/1
20 TABLET, DELAYED RELEASE ORAL 2 TIMES DAILY
COMMUNITY

## 2022-12-31 RX ORDER — SODIUM CHLORIDE, SODIUM LACTATE, POTASSIUM CHLORIDE, CALCIUM CHLORIDE 600; 310; 30; 20 MG/100ML; MG/100ML; MG/100ML; MG/100ML
INJECTION, SOLUTION INTRAVENOUS CONTINUOUS
Status: ACTIVE | OUTPATIENT
Start: 2022-12-31 | End: 2023-01-02

## 2022-12-31 RX ORDER — METHADONE HYDROCHLORIDE 10 MG/ML
87 CONCENTRATE ORAL DAILY
Status: DISCONTINUED | OUTPATIENT
Start: 2023-01-01 | End: 2023-01-03

## 2022-12-31 RX ORDER — LORAZEPAM 2 MG/ML
2 INJECTION INTRAMUSCULAR
Status: DISCONTINUED | OUTPATIENT
Start: 2022-12-31 | End: 2023-01-06 | Stop reason: HOSPADM

## 2022-12-31 RX ORDER — MORPHINE SULFATE 4 MG/ML
3 INJECTION, SOLUTION INTRAMUSCULAR; INTRAVENOUS
Status: DISCONTINUED | OUTPATIENT
Start: 2022-12-31 | End: 2023-01-01

## 2022-12-31 RX ORDER — LORAZEPAM 2 MG/ML
3 INJECTION INTRAMUSCULAR
Status: DISCONTINUED | OUTPATIENT
Start: 2022-12-31 | End: 2023-01-06 | Stop reason: HOSPADM

## 2022-12-31 RX ORDER — LORAZEPAM 1 MG/1
4 TABLET ORAL
Status: DISCONTINUED | OUTPATIENT
Start: 2022-12-31 | End: 2023-01-06 | Stop reason: HOSPADM

## 2022-12-31 RX ORDER — TRAZODONE HYDROCHLORIDE 100 MG/1
200 TABLET ORAL NIGHTLY
Status: DISCONTINUED | OUTPATIENT
Start: 2022-12-31 | End: 2023-01-06 | Stop reason: HOSPADM

## 2022-12-31 RX ORDER — HYDRALAZINE HYDROCHLORIDE 20 MG/ML
10 INJECTION INTRAMUSCULAR; INTRAVENOUS EVERY 6 HOURS PRN
Status: DISCONTINUED | OUTPATIENT
Start: 2022-12-31 | End: 2023-01-06 | Stop reason: HOSPADM

## 2022-12-31 RX ORDER — LORAZEPAM 1 MG/1
1 TABLET ORAL
Status: DISCONTINUED | OUTPATIENT
Start: 2022-12-31 | End: 2023-01-06 | Stop reason: HOSPADM

## 2022-12-31 RX ADMIN — SODIUM CHLORIDE, POTASSIUM CHLORIDE, SODIUM LACTATE AND CALCIUM CHLORIDE: 600; 310; 30; 20 INJECTION, SOLUTION INTRAVENOUS at 00:01

## 2022-12-31 RX ADMIN — SODIUM CHLORIDE, POTASSIUM CHLORIDE, SODIUM LACTATE AND CALCIUM CHLORIDE: 600; 310; 30; 20 INJECTION, SOLUTION INTRAVENOUS at 09:49

## 2022-12-31 RX ADMIN — MORPHINE SULFATE 4 MG: 4 INJECTION, SOLUTION INTRAMUSCULAR; INTRAVENOUS at 04:46

## 2022-12-31 RX ADMIN — MORPHINE SULFATE 4 MG: 4 INJECTION, SOLUTION INTRAMUSCULAR; INTRAVENOUS at 15:47

## 2022-12-31 RX ADMIN — Medication 100 MG: at 19:01

## 2022-12-31 RX ADMIN — MORPHINE SULFATE 4 MG: 4 INJECTION, SOLUTION INTRAMUSCULAR; INTRAVENOUS at 08:24

## 2022-12-31 RX ADMIN — MORPHINE SULFATE 4 MG: 4 INJECTION, SOLUTION INTRAMUSCULAR; INTRAVENOUS at 12:29

## 2022-12-31 RX ADMIN — TRAZODONE HYDROCHLORIDE 200 MG: 100 TABLET ORAL at 20:24

## 2022-12-31 RX ADMIN — MORPHINE SULFATE 4 MG: 4 INJECTION, SOLUTION INTRAMUSCULAR; INTRAVENOUS at 20:24

## 2022-12-31 RX ADMIN — SODIUM CHLORIDE, PRESERVATIVE FREE 10 ML: 5 INJECTION INTRAVENOUS at 20:24

## 2022-12-31 RX ADMIN — PANTOPRAZOLE SODIUM 20 MG: 20 TABLET, DELAYED RELEASE ORAL at 15:48

## 2022-12-31 RX ADMIN — SODIUM CHLORIDE, PRESERVATIVE FREE 10 ML: 5 INJECTION INTRAVENOUS at 09:32

## 2022-12-31 RX ADMIN — TRAZODONE HYDROCHLORIDE 200 MG: 100 TABLET ORAL at 01:10

## 2022-12-31 RX ADMIN — HYDRALAZINE HYDROCHLORIDE 10 MG: 20 INJECTION INTRAMUSCULAR; INTRAVENOUS at 19:01

## 2022-12-31 RX ADMIN — ENOXAPARIN SODIUM 40 MG: 100 INJECTION SUBCUTANEOUS at 09:32

## 2022-12-31 RX ADMIN — SODIUM CHLORIDE, POTASSIUM CHLORIDE, SODIUM LACTATE AND CALCIUM CHLORIDE: 600; 310; 30; 20 INJECTION, SOLUTION INTRAVENOUS at 16:37

## 2022-12-31 ASSESSMENT — PAIN DESCRIPTION - LOCATION
LOCATION: ABDOMEN;BACK
LOCATION: ABDOMEN;BACK
LOCATION: ABDOMEN
LOCATION: ABDOMEN;BACK
LOCATION: ABDOMEN
LOCATION: ABDOMEN;BACK
LOCATION: ABDOMEN

## 2022-12-31 ASSESSMENT — PAIN SCALES - GENERAL
PAINLEVEL_OUTOF10: 9
PAINLEVEL_OUTOF10: 8
PAINLEVEL_OUTOF10: 9
PAINLEVEL_OUTOF10: 10
PAINLEVEL_OUTOF10: 9
PAINLEVEL_OUTOF10: 9
PAINLEVEL_OUTOF10: 8
PAINLEVEL_OUTOF10: 10
PAINLEVEL_OUTOF10: 9
PAINLEVEL_OUTOF10: 9
PAINLEVEL_OUTOF10: 10
PAINLEVEL_OUTOF10: 9

## 2022-12-31 ASSESSMENT — PAIN DESCRIPTION - ONSET
ONSET: ON-GOING

## 2022-12-31 ASSESSMENT — PAIN DESCRIPTION - PAIN TYPE
TYPE: ACUTE PAIN

## 2022-12-31 ASSESSMENT — PAIN DESCRIPTION - DESCRIPTORS
DESCRIPTORS: SQUEEZING;PRESSURE
DESCRIPTORS: ACHING;DISCOMFORT
DESCRIPTORS: ACHING;DISCOMFORT
DESCRIPTORS: DISCOMFORT
DESCRIPTORS: SQUEEZING
DESCRIPTORS: DISCOMFORT
DESCRIPTORS: SHARP;SQUEEZING
DESCRIPTORS: DISCOMFORT
DESCRIPTORS: SHARP
DESCRIPTORS: SHARP;SQUEEZING
DESCRIPTORS: SHARP;SQUEEZING
DESCRIPTORS: SQUEEZING;SHARP

## 2022-12-31 ASSESSMENT — PAIN DESCRIPTION - FREQUENCY
FREQUENCY: CONTINUOUS
FREQUENCY: CONTINUOUS
FREQUENCY: INTERMITTENT
FREQUENCY: INTERMITTENT
FREQUENCY: CONTINUOUS
FREQUENCY: INTERMITTENT
FREQUENCY: CONTINUOUS

## 2022-12-31 ASSESSMENT — PAIN DESCRIPTION - ORIENTATION
ORIENTATION: MID
ORIENTATION: MID
ORIENTATION: RIGHT;LEFT
ORIENTATION: MID

## 2022-12-31 ASSESSMENT — PAIN - FUNCTIONAL ASSESSMENT
PAIN_FUNCTIONAL_ASSESSMENT: ACTIVITIES ARE NOT PREVENTED
PAIN_FUNCTIONAL_ASSESSMENT: PREVENTS OR INTERFERES SOME ACTIVE ACTIVITIES AND ADLS
PAIN_FUNCTIONAL_ASSESSMENT: ACTIVITIES ARE NOT PREVENTED
PAIN_FUNCTIONAL_ASSESSMENT: PREVENTS OR INTERFERES SOME ACTIVE ACTIVITIES AND ADLS
PAIN_FUNCTIONAL_ASSESSMENT: ACTIVITIES ARE NOT PREVENTED
PAIN_FUNCTIONAL_ASSESSMENT: PREVENTS OR INTERFERES SOME ACTIVE ACTIVITIES AND ADLS

## 2022-12-31 NOTE — PROGRESS NOTES
Hospitalist Progress Note      PCP: No primary care provider on file. Chief Complaint. Presented to hospital for Abd pain    Date of Admission: 12/30/2022    Subjective:   denies chest pain, nausea, vomiting, shortness of breath, fever or chills. mention feels overall better    Medications:  Reviewed    Infusion Medications    lactated ringers 150 mL/hr at 12/31/22 0949    sodium chloride       Scheduled Medications    traZODone  200 mg Oral Nightly    [START ON 1/1/2023] methadone  85 mg Oral Daily    pantoprazole  20 mg Oral BID AC    sodium chloride flush  5-40 mL IntraVENous 2 times per day    enoxaparin  40 mg SubCUTAneous Daily     PRN Meds: morphine **OR** morphine, sodium chloride flush, sodium chloride, ondansetron **OR** ondansetron      Intake/Output Summary (Last 24 hours) at 12/31/2022 1535  Last data filed at 12/31/2022 1500  Gross per 24 hour   Intake 320 ml   Output --   Net 320 ml       Physical Exam Performed:    BP (!) 162/106   Pulse 77   Temp 98.1 °F (36.7 °C) (Oral)   Resp 18   Ht 5' 8\" (1.727 m)   Wt 153 lb 3.5 oz (69.5 kg)   SpO2 98%   BMI 23.30 kg/m²     General appearance: No apparent distress,   HEENT:  Conjunctivae/corneas clear. Neck: Supple, with full range of motion. Respiratory:  Normal respiratory effort. Clear to auscultation, bilaterally without Rales/Wheezes/Rhonchi. Cardiovascular: Regular rate and rhythm with normal S1/S2 without murmurs or rubs  Abdomen: Soft, non-tender, non-distended, normal bowel sounds. Musculoskeletal: No cyanosis or edema bilaterally  Neurologic:  without any focal sensory/motor deficits.  grossly non-focal.  Psychiatric: Alert and oriented, Normal mood  Peripheral Pulses: +2 palpable, equal bilaterally       Labs:   Recent Labs     12/30/22  1230 12/31/22  0853   WBC 18.4* 10.3   HGB 14.1 13.4   HCT 40.9 39.8    188     Recent Labs     12/30/22  1230 12/31/22  0853   * 138   K 3.9 4.4   CL 95* 99   CO2 26 27   BUN 7 6* CREATININE <0.5* 0.6   CALCIUM 9.6 9.4   PHOS  --  3.3     Recent Labs     12/30/22  1230 12/31/22  0853   AST 56* 61*   ALT 30 30   BILITOT 1.0 0.8   ALKPHOS 143* 137*     No results for input(s): INR in the last 72 hours. No results for input(s): Kalbrandyn Hope in the last 72 hours. Urinalysis:      Lab Results   Component Value Date/Time    NITRU Negative 12/30/2022 12:15 PM    [address] 12/30/2022 12:15 PM    BACTERIA 1+ 12/30/2022 12:15 PM    RBCUA 0-2 12/30/2022 12:15 PM    BLOODU Negative 12/30/2022 12:15 PM    SPECGRAV 1.015 12/30/2022 12:15 PM    GLUCOSEU Negative 12/30/2022 12:15 PM       Radiology:  US GALLBLADDER RUQ   Final Result   1. Hepatic steatosis. 2. Limited evaluation of the pancreas with no obvious inflammation on current   study. CT ABDOMEN PELVIS WO CONTRAST Additional Contrast? None   Final Result   Findings are compatible with pancreatitis, with moderate peripancreatic   stranding and small amount of adjacent fluid. Pancreatic ductal dilatation   is seen within the distal pancreatic body for which an obstructing pancreatic   lesion cannot be excluded. After treatment and resolution of any acute   symptoms, pancreatic protocol CT is suggested for further evaluation. Fatty liver. No obstructive uropathy. 2 mm right lower lobe pulmonary nodule, similar to prior. Follow-up   recommendations are as below. RECOMMENDATIONS:   Guidelines for follow-up and management of pulmonary nodules found on abdomen   CT:      <6 mm - No follow up recommend on the basis of the estimated low risk of   malignancy. Radiology 2017 http://pubs. rsna.org/doi/full/10.1148/radiol. 0136469417               Assessment/Plan:    Active Hospital Problems    Diagnosis     Acute pancreatitis without infection or necrosis [K85.90]      Priority: Medium    Anxiety [F41.9]      Priority: Medium    Gastroparesis [K31.84]      Priority: Medium    GERD (gastroesophageal reflux disease) [K21.9]      Priority: Medium    Leukocytosis [D72.829]      Priority: Medium    Alcoholic cirrhosis (HCC) [G66.18]      Priority: Medium     Acute alcohol induced pancreatitis  Leukocytosis  Likely reactive  History of alcohol abuse  Alcoholic liver cirrhosis  Abnormal CT of the abdomen  Pancreatic ductal dilatation concerning for obstructive pancreatic mass  Anxiety disorder  Gastroparesis  GERD        PLAN:    Start on Clear liquid diet  Continue IV fluids  Pain control  GI consulted  IV morphine PRN for pain control  Repeat labs in the morning  CIWA assessment with PRN ativan  Consult GI for evaluation of pancreatic mass, pancreatitis and liver cirrhosis  She will need CT pancreatic protocol in the near future once acute pancreatitis improved  Daily labs ordered. Trend lipase level  Diet: ADULT DIET;  Clear Liquid  Code Status: Full Code    PT/OT Eval Status: ordered    Dispo/Plan of care - advance diet    Paula Landers MD

## 2022-12-31 NOTE — PROGRESS NOTES
Patient had Methadone oral (liquid) 3 bottle but 1 empty and 2 have approx. 10 ml each from home. Return empty bottle to patient. Called pharmacy and notified. Pharmacy tech picked up the 2 bottle from the floor.

## 2022-12-31 NOTE — PROGRESS NOTES
Pharmacy Medication Reconciliation Note     List of medications patient is currently taking is complete. Source of information:   1. Conversation with patient   2. EMR    Notes regarding home medications:   1. Patient taking methadone 87 mg daily which she gets from [employer]. Patient had empty medication bottle in her purse which I was able to confirm dose from. Bottle dated with yesterday's date.    2. Patient states she is also taking pantoprazole 20 mg BID - added to list      Julissa Elizondo PharmELBA  12/31/2022 2:24 PM

## 2022-12-31 NOTE — PROGRESS NOTES
Pt with h/o ETOH abuse with prior h/o pancreatitis, apparently presented to the ER with c/o abd pain/nausea/vomiting, felt like it was one her pancreatitis spell. She stated she cut back ETOH, drank for hiwot and was sober, but had a shot of vodka yesterday and presented with pancreatitis. CT abd with suspected pancreatic ductal dilatation.  GI consulted

## 2022-12-31 NOTE — H&P
Hospital Medicine History & Physical      PCP: No primary care provider on file. Date of Admission: 12/30/2022    Date of Service: Pt seen/examined on 12/30/22 and Admitted to Inpatient with expected LOS greater than two midnights due to medical therapy. Chief Complaint: Abdominal pain      History Of Present Illness:      Nestor Reddy is 30 y.o. female with history of alcohol abuse and alcoholic cirrhosis  She now presents to the ER with complaint of epigastric and left upper quadrant abdominal pain . Abdominal pain radiates to the back, constant and better and worse with nothing  She also has nausea and vomiting and unable to tolerate oral intake  It felt it was one of her pancreatitis. She has not been drinking for a while but restarted drinking about 5 to 6 days ago  She had a shot of vodka yesterday and since then her abdominal pain has intensified  In the ED lipase is elevated  CT of the abdomen with suspected pancreatitis ductal dilation  She is admitted for further management of acute alcohol induced pancreatitis      Past Medical History:          Diagnosis Date    Anxiety     Gastroparesis     GERD (gastroesophageal reflux disease)     Scoliosis        Past Surgical History: She denies past surgical history    Medications Prior to Admission:      Prior to Admission medications    Medication Sig Start Date End Date Taking? Authorizing Provider   TRAZODONE HCL PO Take 200 mg by mouth nightly   Yes Historical Provider, MD   METHADONE HCL PO Take 87 mg by mouth daily   Yes Historical Provider, MD   famotidine (PEPCID) 20 MG tablet Take 1 tablet by mouth 2 times daily 5/19/22 5/26/22  PETER Quach - CNP       Allergies:  Iodine    Social History:      The patient currently lives at home    TOBACCO:   reports that she has been smoking cigarettes. She has been smoking an average of 1 pack per day.  She has never used smokeless tobacco.  ETOH:   reports current alcohol use.  E-cigarette/Vaping       Questions Responses    E-cigarette/Vaping Use     Start Date     Passive Exposure     Quit Date     Counseling Given     Comments               Family History:      Reviewed and negative in regards to presenting illness/complaint. REVIEW OF SYSTEMS COMPLETED:   Pertinent positives as noted in the HPI. All other systems reviewed and negative. PHYSICAL EXAM PERFORMED:    BP (!) 149/96   Pulse 81   Temp 98.6 °F (37 °C) (Oral)   Resp 16   Ht 5' 8\" (1.727 m)   Wt 147 lb 11.3 oz (67 kg)   SpO2 96%   BMI 22.46 kg/m²     General appearance:  No apparent distress, appears stated age and cooperative. HEENT:  Normal cephalic, atraumatic without obvious deformity. Pupils equal, round, and reactive to light. Extra ocular muscles intact. Conjunctivae/corneas clear. Neck: Supple, with full range of motion. No jugular venous distention. Trachea midline. Respiratory:  Normal respiratory effort. Clear to auscultation, bilaterally without Rales/Wheezes/Rhonchi. Cardiovascular:  Regular rate and rhythm with normal S1/S2 without murmurs, rubs or gallops. Abdomen: Soft, non-tender, non-distended with normal bowel sounds. Musculoskeletal:  No clubbing, cyanosis or edema bilaterally. Full range of motion without deformity. Skin: Skin color, texture, turgor normal.  No rashes or lesions. Neurologic:  Neurovascularly intact without any focal sensory/motor deficits.  Cranial nerves: II-XII intact, grossly non-focal.  Psychiatric:  Alert and oriented, thought content appropriate, normal insight  Capillary Refill: Brisk,3 seconds, normal  Peripheral Pulses: +2 palpable, equal bilaterally       Labs:     Recent Labs     12/30/22  1230   WBC 18.4*   HGB 14.1   HCT 40.9        Recent Labs     12/30/22  1230   *   K 3.9   CL 95*   CO2 26   BUN 7   CREATININE <0.5*   CALCIUM 9.6     Recent Labs     12/30/22  1230   AST 56*   ALT 30   BILITOT 1.0   ALKPHOS 143*     No results for input(s): INR in the last 72 hours. No results for input(s): Beba Lorenzo in the last 72 hours. Urinalysis:      Lab Results   Component Value Date/Time    NITRU Negative 12/30/2022 12:15 PM    [address] 12/30/2022 12:15 PM    BACTERIA 1+ 12/30/2022 12:15 PM    RBCUA 0-2 12/30/2022 12:15 PM    BLOODU Negative 12/30/2022 12:15 PM    SPECGRAV 1.015 12/30/2022 12:15 PM    GLUCOSEU Negative 12/30/2022 12:15 PM       Radiology:     CXR: I have reviewed the CXR with the following interpretation:   EKG:  I have reviewed the EKG with the following interpretation:     CT ABDOMEN PELVIS WO CONTRAST Additional Contrast? None   Final Result   Findings are compatible with pancreatitis, with moderate peripancreatic   stranding and small amount of adjacent fluid. Pancreatic ductal dilatation   is seen within the distal pancreatic body for which an obstructing pancreatic   lesion cannot be excluded. After treatment and resolution of any acute   symptoms, pancreatic protocol CT is suggested for further evaluation. Fatty liver. No obstructive uropathy. 2 mm right lower lobe pulmonary nodule, similar to prior. Follow-up   recommendations are as below. RECOMMENDATIONS:   Guidelines for follow-up and management of pulmonary nodules found on abdomen   CT:      <6 mm - No follow up recommend on the basis of the estimated low risk of   malignancy. Radiology 2017 http://pubs. rsna.org/doi/full/10.1148/radiol. 0469525719         YO GALLBLADDER RUQ    (Results Pending)       Consults:    IP CONSULT TO GI    ASSESSMENT:    Acute alcohol induced pancreatitis  Leukocytosis  Likely reactive  History of alcohol abuse  Alcoholic liver cirrhosis  Abnormal CT of the abdomen  Pancreatic ductal dilatation concerning for obstructive pancreatic mass  Anxiety disorder  Gastroparesis  GERD      PLAN:    [facility]  NPO for bowel rest  IV lactated Ringer infusion at 125 mL/h for reliable hydration  IV morphine PRN for pain control  Repeat labs in the morning  Consult GI for evaluation of pancreatic mass, pancreatitis and liver cirrhosis  She will need CT pancreatic protocol in the near future once acute pancreatitis improved  Daily labs ordered. Trend lipase level    DVT Prophylaxis: Lovenox  Diet: Diet NPO Exceptions are: Ice Chips  Code Status: Full Code    PT/OT Eval Status: PT/OT consult is not ordered    Dispo -admit as inpatient       Mike Foley MD    Thank you No primary care provider on file. for the opportunity to be involved in this patient's care. If you have any questions or concerns please feel free to contact me at [phone].

## 2022-12-31 NOTE — CONSULTS
GASTROENTEROLOGY INPATIENT CONSULTATION:        IDENTIFYING DATA/REASON FOR CONSULTATION   PATIENT:  Dione Ulrich  MRN:  [id number]  ADMIT DATE: 12/30/2022  TIME OF EVALUATION: 12/31/2022 10:40 AM  HOSPITAL STAY:   LOS: 1 day     REASON FOR CONSULTATION: Abdominal pain, acute pancreatitis    HISTORY OF PRESENT ILLNESS   Dione Ulrich is a 30 y.o. female with alcohol use disorder, gastroparesis, GERD, anxiety, who presented with sudden onset abdominal pain starting 2 days ago. Symptoms are associated with nausea and vomiting\, and a general fullness in the abdomen. She notes a history of alcohol use, and drinks about 1/5 of vodka daily. Her last drink was on Wednesday. She had trouble keeping down liquids at this time. She denies tobacco use or other illicit drugs. She reports having had endoscopy in the past at which time she was told she had a lesion on her pancreas. It seems she had this done in May 2021 at [facility], with a 1.3 cm pancreatic body lesion seen on MRI. She had an EGD during that admission with mild gastritis and esophagitis. There is no evidence of H. pylori. PAST MEDICAL, SURGICAL, FAMILY, and SOCIAL HISTORY     Past Medical History:   Diagnosis Date    Anxiety     Gastroparesis     GERD (gastroesophageal reflux disease)     Scoliosis      History reviewed. No pertinent surgical history. History reviewed. No pertinent family history.   Social History     Socioeconomic History    Marital status: Single     Spouse name: None    Number of children: None    Years of education: None    Highest education level: None   Tobacco Use    Smoking status: Every Day     Packs/day: 1.00     Types: Cigarettes    Smokeless tobacco: Never   Substance and Sexual Activity    Alcohol use: Yes     Comment: 12/30/22 drank fifth of vodka daily (last time 12/25/22)    Drug use: Yes     Types: Marijuana Ko Lio)     Comment: medical marijuana card       MEDICATIONS   SCHEDULED:  traZODone, 200 mg, Nightly  sodium chloride flush, 5-40 mL, 2 times per day  enoxaparin, 40 mg, Daily      FLUIDS/DRIPS:     lactated ringers 150 mL/hr at 12/31/22 0949    sodium chloride       PRNs: sodium chloride flush, 5-40 mL, PRN  sodium chloride, , PRN  ondansetron, 4 mg, Q8H PRN   Or  ondansetron, 4 mg, Q6H PRN  morphine, 2 mg, Q2H PRN   Or  morphine, 4 mg, Q2H PRN      ALLERGIES:    Allergies   Allergen Reactions    Iodine Rash         REVIEW OF SYSTEMS   A full 12 pt ROS is negative other than noted in HPI    PHYSICAL EXAM     Wt Readings from Last 3 Encounters:   12/31/22 153 lb 3.5 oz (69.5 kg)   05/26/22 146 lb 2.6 oz (66.3 kg)   05/19/22 144 lb 6.4 oz (65.5 kg)     Temp Readings from Last 3 Encounters:   12/31/22 98.1 °F (36.7 °C) (Oral)   05/26/22 97.7 °F (36.5 °C)   05/19/22 98.1 °F (36.7 °C) (Oral)     BP Readings from Last 3 Encounters:   12/31/22 (!) 177/116   05/26/22 (!) 127/103   05/19/22 (!) 149/101     Pulse Readings from Last 3 Encounters:   12/31/22 81   05/26/22 77   05/19/22 81      No intake/output data recorded. Physical Exam:  Gen: Alert, well appearing, well nourished, appears stated age, and in no acute distress. HEENT: Normocephalic, atraumatic, no scleral icterus   CV: Regular rate and rhythm, no murmurs, rubs, or gallops. Pul: clear to auscultation bilaterally, without wheezes, rhonchi, or rales    Abd:, Soft, tender to palpation diffusely especially in the epigastrium, nondistended. Ext: No edema, moves all 4 extremities. Neuro: Moves all four extremities, no gross deficits, follows commands.  Gait is normal.   Skin: No jaundice, spider angiomas, palmar erythema    LABS AND IMAGING     Recent Results (from the past 24 hour(s))   Urinalysis with Reflex to Culture    Collection Time: 12/30/22 12:15 PM    Specimen: Urine   Result Value Ref Range    Color, UA Yellow Straw/Yellow    Clarity, UA Clear Clear    Glucose, Ur Negative Negative mg/dL    Bilirubin Urine SMALL (A) Negative    Ketones, Urine TRACE (A) Negative mg/dL    Specific Gravity, UA 1.015 1.005 - 1.030    Blood, Urine Negative Negative    pH, UA 7.0 5.0 - 8.0    Protein, UA TRACE (A) Negative mg/dL    Urobilinogen, Urine 2.0 (A) <2.0 E.U./dL    Nitrite, Urine Negative Negative    Leukocyte Esterase, Urine TRACE (A) Negative    Microscopic Examination YES     Urine Type Cleancatch     Urine Reflex to Culture Not Indicated    Pregnancy, Urine    Collection Time: 12/30/22 12:15 PM   Result Value Ref Range    HCG(Urine) Pregnancy Test Negative Detects HCG level >20 MIU/mL   Microscopic Urinalysis    Collection Time: 12/30/22 12:15 PM   Result Value Ref Range    Mucus, UA Rare (A) None Seen /LPF    WBC, UA 3-5 0 - 5 /HPF    RBC, UA 0-2 0 - 4 /HPF    Epithelial Cells, UA 6-10 (A) 0 - 5 /HPF    Bacteria, UA 1+ (A) None Seen /HPF    Amorphous, UA 1+ /HPF    Trichomonas, UA None Seen None Seen /HPF   CMP w/ Reflex to MG    Collection Time: 12/30/22 12:30 PM   Result Value Ref Range    Sodium 134 (L) 136 - 145 mmol/L    Potassium reflex Magnesium 3.9 3.5 - 5.1 mmol/L    Chloride 95 (L) 99 - 110 mmol/L    CO2 26 21 - 32 mmol/L    Anion Gap 13 3 - 16    Glucose 105 (H) 70 - 99 mg/dL    BUN 7 7 - 20 mg/dL    Creatinine <0.5 (L) 0.6 - 1.1 mg/dL    Est, Glom Filt Rate >60 >60    Calcium 9.6 8.3 - 10.6 mg/dL    Total Protein 8.7 (H) 6.4 - 8.2 g/dL    Albumin 3.9 3.4 - 5.0 g/dL    Albumin/Globulin Ratio 0.8 (L) 1.1 - 2.2    Total Bilirubin 1.0 0.0 - 1.0 mg/dL    Alkaline Phosphatase 143 (H) 40 - 129 U/L    ALT 30 10 - 40 U/L    AST 56 (H) 15 - 37 U/L   CBC with Auto Differential    Collection Time: 12/30/22 12:30 PM   Result Value Ref Range    WBC 18.4 (H) 4.0 - 11.0 K/uL    RBC 4.31 4.00 - 5.20 M/uL    Hemoglobin 14.1 12.0 - 16.0 g/dL    Hematocrit 40.9 36.0 - 48.0 %    MCV 94.8 80.0 - 100.0 fL    MCH 32.6 26.0 - 34.0 pg    MCHC 34.4 31.0 - 36.0 g/dL    RDW 15.1 12.4 - 15.4 %    Platelets 267 185 - 656 K/uL    MPV 8.9 5.0 - 10.5 fL    Neutrophils % 77.0 % Lymphocytes % 10.0 %    Monocytes % 5.0 %    Eosinophils % 2.0 %    Basophils % 1.0 %    Neutrophils Absolute 15.1 (H) 1.7 - 7.7 K/uL    Lymphocytes Absolute 1.8 1.0 - 5.1 K/uL    Monocytes Absolute 0.9 0.0 - 1.3 K/uL    Eosinophils Absolute 0.4 0.0 - 0.6 K/uL    Basophils Absolute 0.2 0.0 - 0.2 K/uL    Bands Relative 5 0 - 7 %   Lipase    Collection Time: 12/30/22 12:30 PM   Result Value Ref Range    Lipase 222.0 (H) 13.0 - 60.0 U/L   Lactic Acid    Collection Time: 12/30/22 12:30 PM   Result Value Ref Range    Lactic Acid 1.3 0.4 - 2.0 mmol/L   Triglyceride    Collection Time: 12/31/22  8:53 AM   Result Value Ref Range    Triglycerides 251 (H) 0 - 150 mg/dL   Lipase    Collection Time: 12/31/22  8:53 AM   Result Value Ref Range    Lipase 164.0 (H) 13.0 - 60.0 U/L   CBC with Auto Differential    Collection Time: 12/31/22  8:53 AM   Result Value Ref Range    WBC 10.3 4.0 - 11.0 K/uL    RBC 4.08 4.00 - 5.20 M/uL    Hemoglobin 13.4 12.0 - 16.0 g/dL    Hematocrit 39.8 36.0 - 48.0 %    MCV 97.5 80.0 - 100.0 fL    MCH 32.7 26.0 - 34.0 pg    MCHC 33.6 31.0 - 36.0 g/dL    RDW 15.4 12.4 - 15.4 %    Platelets 343 761 - 562 K/uL    MPV 9.6 5.0 - 10.5 fL    Neutrophils % 70.2 %    Lymphocytes % 19.6 %    Monocytes % 8.5 %    Eosinophils % 1.3 %    Basophils % 0.4 %    Neutrophils Absolute 7.2 1.7 - 7.7 K/uL    Lymphocytes Absolute 2.0 1.0 - 5.1 K/uL    Monocytes Absolute 0.9 0.0 - 1.3 K/uL    Eosinophils Absolute 0.1 0.0 - 0.6 K/uL    Basophils Absolute 0.0 0.0 - 0.2 K/uL   Comprehensive Metabolic Panel    Collection Time: 12/31/22  8:53 AM   Result Value Ref Range    Sodium 138 136 - 145 mmol/L    Chloride 99 99 - 110 mmol/L    CO2 27 21 - 32 mmol/L    Anion Gap 12 3 - 16    Glucose 73 70 - 99 mg/dL    BUN 6 (L) 7 - 20 mg/dL    Creatinine 0.6 0.6 - 1.1 mg/dL    Est, Glom Filt Rate >60 >60    Calcium 9.4 8.3 - 10.6 mg/dL    Potassium 4.4 3.5 - 5.1 mmol/L    Total Protein 7.5 6.4 - 8.2 g/dL    Albumin 3.5 3.4 - 5.0 g/dL Albumin/Globulin Ratio 0.9 (L) 1.1 - 2.2    Total Bilirubin 0.8 0.0 - 1.0 mg/dL    Alkaline Phosphatase 137 (H) 40 - 129 U/L    ALT 30 10 - 40 U/L    AST 61 (H) 15 - 37 U/L   Magnesium    Collection Time: 12/31/22  8:53 AM   Result Value Ref Range    Magnesium 1.80 1.80 - 2.40 mg/dL   Phosphorus    Collection Time: 12/31/22  8:53 AM   Result Value Ref Range    Phosphorus 3.3 2.5 - 4.9 mg/dL     Other Labs      Imaging  US GALLBLADDER RUQ   Final Result   1. Hepatic steatosis. 2. Limited evaluation of the pancreas with no obvious inflammation on current   study. CT ABDOMEN PELVIS WO CONTRAST Additional Contrast? None   Final Result   Findings are compatible with pancreatitis, with moderate peripancreatic   stranding and small amount of adjacent fluid. Pancreatic ductal dilatation   is seen within the distal pancreatic body for which an obstructing pancreatic   lesion cannot be excluded. After treatment and resolution of any acute   symptoms, pancreatic protocol CT is suggested for further evaluation. Fatty liver. No obstructive uropathy. 2 mm right lower lobe pulmonary nodule, similar to prior. Follow-up   recommendations are as below. RECOMMENDATIONS:   Guidelines for follow-up and management of pulmonary nodules found on abdomen   CT:      <6 mm - No follow up recommend on the basis of the estimated low risk of   malignancy. Radiology 2017 http://pubs. rsna.org/doi/full/10.1148/radiol. 4289761044              ASSESSMENT AND RECOMMENDATIONS   Izetta Lombard is a 30 y.o. female w alcohol use disorder, gastroparesis, GERD, anxiety, who presented with sudden onset abdominal pain starting 2 days ago. IMPRESSION:    Acute pancreatitis. With peripancreatic inflammation on CT scan, and question of a fullness in the pancreatic body and mild dilation of the pancreatic duct. She will need follow-up imaging of this lesion, either with MRI or pancreatic protocol CT.   Lipase is up to 222 at admission. Suspect that her current episode of acute pancreatitis is probably due to alcohol given her heavy alcohol use. We will check triglycerides and calcium, and continue with supportive therapy. Alcohol use disorder. Drinks about 1/5 of alcohol/vodka daily, last drink was Wednesday. Hepatic steatosis likely due to alcohol use    RECOMMENDATIONS:    Continue supportive care  IV fluid with lactated Ringer's at 150 mL/h  Pain and nausea control per primary team  Advise alcohol abstinence  She will need repeat imaging with either pancreas protocol CT or MRI after resolution of pancreatitis, or we will arrange for endoscopic ultrasound after pancreatitis resolves. If you have any questions or need any further information, please feel free to contact our consult team.  Thank you for allowing us to participate in the care of [facility].     Landon Hicks MD  [id number] [facility]

## 2022-12-31 NOTE — PLAN OF CARE
Problem: Discharge Planning  Goal: Discharge to home or other facility with appropriate resources  Outcome: Progressing  Flowsheets (Taken 12/30/2022 1955)  Discharge to home or other facility with appropriate resources:   Identify barriers to discharge with patient and caregiver   Arrange for needed discharge resources and transportation as appropriate   Identify discharge learning needs (meds, wound care, etc)     Problem: Pain  Goal: Verbalizes/displays adequate comfort level or baseline comfort level  Outcome: Progressing  Flowsheets (Taken 12/30/2022 1955)  Verbalizes/displays adequate comfort level or baseline comfort level:   Encourage patient to monitor pain and request assistance   Assess pain using appropriate pain scale   Administer analgesics based on type and severity of pain and evaluate response   Implement non-pharmacological measures as appropriate and evaluate response   Consider cultural and social influences on pain and pain management     Problem: Gastrointestinal - Adult  Goal: Minimal or absence of nausea and vomiting  Outcome: Progressing  Flowsheets (Taken 12/30/2022 1955)  Minimal or absence of nausea and vomiting:   Administer IV fluids as ordered to ensure adequate hydration   Maintain NPO status until nausea and vomiting are resolved   Nasogastric tube to low intermittent suction as ordered   Provide nonpharmacologic comfort measures as appropriate   Administer ordered antiemetic medications as needed   Advance diet as tolerated, if ordered  Goal: Maintains adequate nutritional intake  Outcome: Progressing  Flowsheets (Taken 12/30/2022 1955)  Maintains adequate nutritional intake:   Monitor percentage of each meal consumed   Identify factors contributing to decreased intake, treat as appropriate   Assist with meals as needed   Monitor intake and output, weight and lab values     Problem: Metabolic/Fluid and Electrolytes - Adult  Goal: Electrolytes maintained within normal limits  Outcome: Progressing  Flowsheets (Taken 12/30/2022 1955)  Electrolytes maintained within normal limits:   Monitor labs and assess patient for signs and symptoms of electrolyte imbalances   Administer electrolyte replacement as ordered   Monitor response to electrolyte replacements, including repeat lab results as appropriate  Goal: Hemodynamic stability and optimal renal function maintained  Outcome: Progressing  Flowsheets (Taken 12/30/2022 1955)  Hemodynamic stability and optimal renal function maintained:   Monitor labs and assess for signs and symptoms of volume excess or deficit   Monitor intake, output and patient weight   Monitor urine specific gravity, serum osmolarity and serum sodium as indicated or ordered

## 2023-01-01 LAB
A/G RATIO: 0.9 (ref 1.1–2.2)
ALBUMIN SERPL-MCNC: 3.3 G/DL (ref 3.4–5)
ALP BLD-CCNC: 119 U/L (ref 40–129)
ALT SERPL-CCNC: 28 U/L (ref 10–40)
ANION GAP SERPL CALCULATED.3IONS-SCNC: 7 MMOL/L (ref 3–16)
AST SERPL-CCNC: 55 U/L (ref 15–37)
BASOPHILS ABSOLUTE: 0.1 K/UL (ref 0–0.2)
BASOPHILS RELATIVE PERCENT: 0.7 %
BILIRUB SERPL-MCNC: 0.7 MG/DL (ref 0–1)
BUN BLDV-MCNC: 5 MG/DL (ref 7–20)
CALCIUM SERPL-MCNC: 8.8 MG/DL (ref 8.3–10.6)
CHLORIDE BLD-SCNC: 97 MMOL/L (ref 99–110)
CO2: 29 MMOL/L (ref 21–32)
CREAT SERPL-MCNC: <0.5 MG/DL (ref 0.6–1.1)
EOSINOPHILS ABSOLUTE: 0.1 K/UL (ref 0–0.6)
EOSINOPHILS RELATIVE PERCENT: 1.4 %
GFR SERPL CREATININE-BSD FRML MDRD: >60 ML/MIN/{1.73_M2}
GLUCOSE BLD-MCNC: 86 MG/DL (ref 70–99)
HCT VFR BLD CALC: 36.1 % (ref 36–48)
HEMOGLOBIN: 12.2 G/DL (ref 12–16)
LIPASE: 103 U/L (ref 13–60)
LYMPHOCYTES ABSOLUTE: 2.1 K/UL (ref 1–5.1)
LYMPHOCYTES RELATIVE PERCENT: 23 %
MAGNESIUM: 1.6 MG/DL (ref 1.8–2.4)
MCH RBC QN AUTO: 33 PG (ref 26–34)
MCHC RBC AUTO-ENTMCNC: 33.9 G/DL (ref 31–36)
MCV RBC AUTO: 97.2 FL (ref 80–100)
MONOCYTES ABSOLUTE: 0.9 K/UL (ref 0–1.3)
MONOCYTES RELATIVE PERCENT: 9.7 %
NEUTROPHILS ABSOLUTE: 5.8 K/UL (ref 1.7–7.7)
NEUTROPHILS RELATIVE PERCENT: 65.2 %
PDW BLD-RTO: 15.2 % (ref 12.4–15.4)
PLATELET # BLD: 165 K/UL (ref 135–450)
PMV BLD AUTO: 9.4 FL (ref 5–10.5)
POTASSIUM SERPL-SCNC: 3.8 MMOL/L (ref 3.5–5.1)
RBC # BLD: 3.71 M/UL (ref 4–5.2)
SODIUM BLD-SCNC: 133 MMOL/L (ref 136–145)
TOTAL PROTEIN: 6.9 G/DL (ref 6.4–8.2)
WBC # BLD: 8.9 K/UL (ref 4–11)

## 2023-01-01 PROCEDURE — 6360000002 HC RX W HCPCS: Performed by: INTERNAL MEDICINE

## 2023-01-01 PROCEDURE — 36415 COLL VENOUS BLD VENIPUNCTURE: CPT

## 2023-01-01 PROCEDURE — 2500000003 HC RX 250 WO HCPCS: Performed by: INTERNAL MEDICINE

## 2023-01-01 PROCEDURE — 83735 ASSAY OF MAGNESIUM: CPT

## 2023-01-01 PROCEDURE — 6370000000 HC RX 637 (ALT 250 FOR IP): Performed by: NURSE PRACTITIONER

## 2023-01-01 PROCEDURE — 2580000003 HC RX 258: Performed by: INTERNAL MEDICINE

## 2023-01-01 PROCEDURE — 83690 ASSAY OF LIPASE: CPT

## 2023-01-01 PROCEDURE — 1200000000 HC SEMI PRIVATE

## 2023-01-01 PROCEDURE — 80053 COMPREHEN METABOLIC PANEL: CPT

## 2023-01-01 PROCEDURE — 85025 COMPLETE CBC W/AUTO DIFF WBC: CPT

## 2023-01-01 PROCEDURE — 6370000000 HC RX 637 (ALT 250 FOR IP): Performed by: INTERNAL MEDICINE

## 2023-01-01 RX ORDER — POLYETHYLENE GLYCOL 3350 17 G/17G
17 POWDER, FOR SOLUTION ORAL DAILY
Status: DISCONTINUED | OUTPATIENT
Start: 2023-01-01 | End: 2023-01-06 | Stop reason: HOSPADM

## 2023-01-01 RX ORDER — LABETALOL HYDROCHLORIDE 5 MG/ML
10 INJECTION, SOLUTION INTRAVENOUS EVERY 4 HOURS PRN
Status: DISCONTINUED | OUTPATIENT
Start: 2023-01-01 | End: 2023-01-06 | Stop reason: HOSPADM

## 2023-01-01 RX ADMIN — SODIUM CHLORIDE, POTASSIUM CHLORIDE, SODIUM LACTATE AND CALCIUM CHLORIDE: 600; 310; 30; 20 INJECTION, SOLUTION INTRAVENOUS at 15:21

## 2023-01-01 RX ADMIN — SODIUM CHLORIDE, POTASSIUM CHLORIDE, SODIUM LACTATE AND CALCIUM CHLORIDE: 600; 310; 30; 20 INJECTION, SOLUTION INTRAVENOUS at 22:22

## 2023-01-01 RX ADMIN — ENOXAPARIN SODIUM 40 MG: 100 INJECTION SUBCUTANEOUS at 08:32

## 2023-01-01 RX ADMIN — MORPHINE SULFATE 4 MG: 4 INJECTION, SOLUTION INTRAMUSCULAR; INTRAVENOUS at 00:13

## 2023-01-01 RX ADMIN — SODIUM CHLORIDE, PRESERVATIVE FREE 10 ML: 5 INJECTION INTRAVENOUS at 20:47

## 2023-01-01 RX ADMIN — HYDRALAZINE HYDROCHLORIDE 10 MG: 20 INJECTION INTRAMUSCULAR; INTRAVENOUS at 08:32

## 2023-01-01 RX ADMIN — POLYETHYLENE GLYCOL 3350 17 G: 17 POWDER, FOR SOLUTION ORAL at 14:30

## 2023-01-01 RX ADMIN — HYDROMORPHONE HYDROCHLORIDE 0.5 MG: 1 INJECTION, SOLUTION INTRAMUSCULAR; INTRAVENOUS; SUBCUTANEOUS at 13:23

## 2023-01-01 RX ADMIN — PANTOPRAZOLE SODIUM 20 MG: 20 TABLET, DELAYED RELEASE ORAL at 08:32

## 2023-01-01 RX ADMIN — PANTOPRAZOLE SODIUM 20 MG: 20 TABLET, DELAYED RELEASE ORAL at 16:36

## 2023-01-01 RX ADMIN — MORPHINE SULFATE 4 MG: 4 INJECTION, SOLUTION INTRAMUSCULAR; INTRAVENOUS at 08:32

## 2023-01-01 RX ADMIN — TRAZODONE HYDROCHLORIDE 200 MG: 100 TABLET ORAL at 20:47

## 2023-01-01 RX ADMIN — SODIUM CHLORIDE, PRESERVATIVE FREE 10 ML: 5 INJECTION INTRAVENOUS at 08:39

## 2023-01-01 RX ADMIN — HYDROMORPHONE HYDROCHLORIDE 0.5 MG: 1 INJECTION, SOLUTION INTRAMUSCULAR; INTRAVENOUS; SUBCUTANEOUS at 18:20

## 2023-01-01 RX ADMIN — LABETALOL HYDROCHLORIDE 10 MG: 5 INJECTION INTRAVENOUS at 20:48

## 2023-01-01 RX ADMIN — Medication 100 MG: at 08:32

## 2023-01-01 RX ADMIN — METHADONE HYDROCHLORIDE 87 MG: 10 CONCENTRATE ORAL at 08:53

## 2023-01-01 RX ADMIN — HYDROMORPHONE HYDROCHLORIDE 0.5 MG: 1 INJECTION, SOLUTION INTRAMUSCULAR; INTRAVENOUS; SUBCUTANEOUS at 22:22

## 2023-01-01 RX ADMIN — SODIUM CHLORIDE, POTASSIUM CHLORIDE, SODIUM LACTATE AND CALCIUM CHLORIDE: 600; 310; 30; 20 INJECTION, SOLUTION INTRAVENOUS at 00:11

## 2023-01-01 RX ADMIN — SODIUM CHLORIDE, POTASSIUM CHLORIDE, SODIUM LACTATE AND CALCIUM CHLORIDE: 600; 310; 30; 20 INJECTION, SOLUTION INTRAVENOUS at 08:35

## 2023-01-01 ASSESSMENT — PAIN DESCRIPTION - LOCATION
LOCATION: ABDOMEN

## 2023-01-01 ASSESSMENT — PAIN DESCRIPTION - DESCRIPTORS
DESCRIPTORS: DISCOMFORT
DESCRIPTORS: DISCOMFORT

## 2023-01-01 ASSESSMENT — PAIN DESCRIPTION - PAIN TYPE
TYPE: ACUTE PAIN

## 2023-01-01 ASSESSMENT — PAIN SCALES - GENERAL
PAINLEVEL_OUTOF10: 0
PAINLEVEL_OUTOF10: 9
PAINLEVEL_OUTOF10: 10
PAINLEVEL_OUTOF10: 9
PAINLEVEL_OUTOF10: 9
PAINLEVEL_OUTOF10: 8
PAINLEVEL_OUTOF10: 9

## 2023-01-01 ASSESSMENT — PAIN DESCRIPTION - ONSET
ONSET: ON-GOING

## 2023-01-01 ASSESSMENT — PAIN DESCRIPTION - FREQUENCY
FREQUENCY: CONTINUOUS
FREQUENCY: CONTINUOUS

## 2023-01-01 ASSESSMENT — PAIN DESCRIPTION - ORIENTATION: ORIENTATION: MID

## 2023-01-01 ASSESSMENT — PAIN - FUNCTIONAL ASSESSMENT
PAIN_FUNCTIONAL_ASSESSMENT: PREVENTS OR INTERFERES SOME ACTIVE ACTIVITIES AND ADLS
PAIN_FUNCTIONAL_ASSESSMENT: ACTIVITIES ARE NOT PREVENTED

## 2023-01-01 NOTE — PROGRESS NOTES
INPATIENT CONSULTATION:    IDENTIFYING DATA/REASON FOR CONSULTATION   PATIENT:  Ulysses Hennessy  MRN:  [id number]  ADMIT DATE: 12/30/2022  TIME OF EVALUATION: 1/1/2023 8:20 AM  HOSPITAL STAY:   LOS: 2 days   CONSULTING PHYSICIAN: Tristin Walker MD    REASON FOR CONSULTATION acute pancreatitis     Subjective:    No acute events overnight  Continues to have abdominal pain. She was barely able to tolerate clear liquids yesterday. MEDICATIONS   SCHEDULED:  traZODone, 200 mg, Nightly  pantoprazole, 20 mg, BID AC  methadone, 87 mg, Daily  sodium chloride flush, 10 mL, 2 times per day  thiamine, 100 mg, Daily  sodium chloride flush, 5-40 mL, 2 times per day  enoxaparin, 40 mg, Daily    FLUIDS/DRIPS:     lactated ringers 150 mL/hr at 01/01/23 0011    sodium chloride      sodium chloride       PRNs: morphine, 3 mg, Q2H PRN   Or  morphine, 4 mg, Q2H PRN  hydrALAZINE, 10 mg, Q6H PRN  sodium chloride flush, 10 mL, PRN  sodium chloride, , PRN  LORazepam, 1 mg, Q1H PRN   Or  LORazepam, 1 mg, Q1H PRN   Or  LORazepam, 2 mg, Q1H PRN   Or  LORazepam, 2 mg, Q1H PRN   Or  LORazepam, 3 mg, Q1H PRN   Or  LORazepam, 3 mg, Q1H PRN   Or  LORazepam, 4 mg, Q1H PRN   Or  LORazepam, 4 mg, Q1H PRN  sodium chloride flush, 5-40 mL, PRN  sodium chloride, , PRN  ondansetron, 4 mg, Q8H PRN   Or  ondansetron, 4 mg, Q6H PRN    ALLERGIES:  She [unfilled]      PHYSICAL EXAM     Wt Readings from Last 3 Encounters:   01/01/23 152 lb 8.9 oz (69.2 kg)   05/26/22 146 lb 2.6 oz (66.3 kg)   05/19/22 144 lb 6.4 oz (65.5 kg)     Temp Readings from Last 3 Encounters:   01/01/23 98.2 °F (36.8 °C) (Oral)   05/26/22 97.7 °F (36.5 °C)   05/19/22 98.1 °F (36.7 °C) (Oral)     BP Readings from Last 3 Encounters:   01/01/23 (!) 158/101   05/26/22 (!) 127/103   05/19/22 (!) 149/101     Pulse Readings from Last 3 Encounters:   01/01/23 77   05/26/22 77   05/19/22 81      I/O last 3 completed shifts:   In: 320 [P.O.:320]  Out: -     Physical Exam:  Gen: Resting in bed, NAD CV: RRR no MRG   Pul: CTAB   Abd; Soft, TTP diffusely. Non distended.    Ext: No edema   Neuro: Normal speech  Skin: No jaundice, spider angiomas, ruiz erythema     LABS AND IMAGING     Recent Results (from the past 24 hour(s))   Triglyceride    Collection Time: 12/31/22  8:53 AM   Result Value Ref Range    Triglycerides 251 (H) 0 - 150 mg/dL   Lipase    Collection Time: 12/31/22  8:53 AM   Result Value Ref Range    Lipase 164.0 (H) 13.0 - 60.0 U/L   CBC with Auto Differential    Collection Time: 12/31/22  8:53 AM   Result Value Ref Range    WBC 10.3 4.0 - 11.0 K/uL    RBC 4.08 4.00 - 5.20 M/uL    Hemoglobin 13.4 12.0 - 16.0 g/dL    Hematocrit 39.8 36.0 - 48.0 %    MCV 97.5 80.0 - 100.0 fL    MCH 32.7 26.0 - 34.0 pg    MCHC 33.6 31.0 - 36.0 g/dL    RDW 15.4 12.4 - 15.4 %    Platelets 032 368 - 422 K/uL    MPV 9.6 5.0 - 10.5 fL    Neutrophils % 70.2 %    Lymphocytes % 19.6 %    Monocytes % 8.5 %    Eosinophils % 1.3 %    Basophils % 0.4 %    Neutrophils Absolute 7.2 1.7 - 7.7 K/uL    Lymphocytes Absolute 2.0 1.0 - 5.1 K/uL    Monocytes Absolute 0.9 0.0 - 1.3 K/uL    Eosinophils Absolute 0.1 0.0 - 0.6 K/uL    Basophils Absolute 0.0 0.0 - 0.2 K/uL   Comprehensive Metabolic Panel    Collection Time: 12/31/22  8:53 AM   Result Value Ref Range    Sodium 138 136 - 145 mmol/L    Chloride 99 99 - 110 mmol/L    CO2 27 21 - 32 mmol/L    Anion Gap 12 3 - 16    Glucose 73 70 - 99 mg/dL    BUN 6 (L) 7 - 20 mg/dL    Creatinine 0.6 0.6 - 1.1 mg/dL    Est, Glom Filt Rate >60 >60    Calcium 9.4 8.3 - 10.6 mg/dL    Potassium 4.4 3.5 - 5.1 mmol/L    Total Protein 7.5 6.4 - 8.2 g/dL    Albumin 3.5 3.4 - 5.0 g/dL    Albumin/Globulin Ratio 0.9 (L) 1.1 - 2.2    Total Bilirubin 0.8 0.0 - 1.0 mg/dL    Alkaline Phosphatase 137 (H) 40 - 129 U/L    ALT 30 10 - 40 U/L    AST 61 (H) 15 - 37 U/L   Magnesium    Collection Time: 12/31/22  8:53 AM   Result Value Ref Range    Magnesium 1.80 1.80 - 2.40 mg/dL   Phosphorus    Collection Time: 12/31/22  8:53 AM   Result Value Ref Range    Phosphorus 3.3 2.5 - 4.9 mg/dL   Lipase    Collection Time: 01/01/23  5:59 AM   Result Value Ref Range    Lipase 103.0 (H) 13.0 - 60.0 U/L   CBC with Auto Differential    Collection Time: 01/01/23  5:59 AM   Result Value Ref Range    WBC 8.9 4.0 - 11.0 K/uL    RBC 3.71 (L) 4.00 - 5.20 M/uL    Hemoglobin 12.2 12.0 - 16.0 g/dL    Hematocrit 36.1 36.0 - 48.0 %    MCV 97.2 80.0 - 100.0 fL    MCH 33.0 26.0 - 34.0 pg    MCHC 33.9 31.0 - 36.0 g/dL    RDW 15.2 12.4 - 15.4 %    Platelets 747 795 - 148 K/uL    MPV 9.4 5.0 - 10.5 fL    Neutrophils % 65.2 %    Lymphocytes % 23.0 %    Monocytes % 9.7 %    Eosinophils % 1.4 %    Basophils % 0.7 %    Neutrophils Absolute 5.8 1.7 - 7.7 K/uL    Lymphocytes Absolute 2.1 1.0 - 5.1 K/uL    Monocytes Absolute 0.9 0.0 - 1.3 K/uL    Eosinophils Absolute 0.1 0.0 - 0.6 K/uL    Basophils Absolute 0.1 0.0 - 0.2 K/uL   Comprehensive Metabolic Panel    Collection Time: 01/01/23  5:59 AM   Result Value Ref Range    Sodium 133 (L) 136 - 145 mmol/L    Potassium 3.8 3.5 - 5.1 mmol/L    Chloride 97 (L) 99 - 110 mmol/L    CO2 29 21 - 32 mmol/L    Anion Gap 7 3 - 16    Glucose 86 70 - 99 mg/dL    BUN 5 (L) 7 - 20 mg/dL    Creatinine <0.5 (L) 0.6 - 1.1 mg/dL    Est, Glom Filt Rate >60 >60    Calcium 8.8 8.3 - 10.6 mg/dL    Total Protein 6.9 6.4 - 8.2 g/dL    Albumin 3.3 (L) 3.4 - 5.0 g/dL    Albumin/Globulin Ratio 0.9 (L) 1.1 - 2.2    Total Bilirubin 0.7 0.0 - 1.0 mg/dL    Alkaline Phosphatase 119 40 - 129 U/L    ALT 28 10 - 40 U/L    AST 55 (H) 15 - 37 U/L   Magnesium    Collection Time: 01/01/23  5:59 AM   Result Value Ref Range    Magnesium 1.60 (L) 1.80 - 2.40 mg/dL     Other Labs    Imaging  US GALLBLADDER RUQ   Final Result   1. Hepatic steatosis. 2. Limited evaluation of the pancreas with no obvious inflammation on current   study.          CT ABDOMEN PELVIS WO CONTRAST Additional Contrast? None   Final Result   Findings are compatible with pancreatitis, with moderate peripancreatic   stranding and small amount of adjacent fluid. Pancreatic ductal dilatation   is seen within the distal pancreatic body for which an obstructing pancreatic   lesion cannot be excluded. After treatment and resolution of any acute   symptoms, pancreatic protocol CT is suggested for further evaluation. Fatty liver. No obstructive uropathy. 2 mm right lower lobe pulmonary nodule, similar to prior. Follow-up   recommendations are as below. RECOMMENDATIONS:   Guidelines for follow-up and management of pulmonary nodules found on abdomen   CT:      <6 mm - No follow up recommend on the basis of the estimated low risk of   malignancy. Radiology 2017 http://pubs. rsna.org/doi/full/10.1148/radiol. [id number]            ASSESSMENT AND RECOMMENDATIONS   Chong Vogt is a 30 y.o. female with alcohol use disorder, gastroparesis, GERD, anxiety, who presented with sudden onset abdominal pain starting 2 days ago. IMPRESSION:     Acute pancreatitis. With peripancreatic inflammation on CT scan, and question of a fullness in the pancreatic body and mild dilation of the pancreatic duct. She will need follow-up imaging of this lesion, either with MRI or pancreatic protocol CT. Lipase is up to 222 at admission. Suspect that her current episode of acute pancreatitis is probably due to alcohol given her heavy alcohol use. TG of 251. Calcium WNL. No e/o cholelithiasis. Alcohol use disorder. Drinks about 1/5 of alcohol/vodka daily, last drink was Wednesday. Hepatic steatosis likely due to alcohol use  Acute liver injury. Minimal elevation of AST to 55, ALT of 28, ALP and bili are normal. Consistent with very mild alcohol induced liver injury. RECOMMENDATIONS:    Continue supportive care  IV fluid with lactated Ringer's at 150 mL/h  Pain and nausea control per primary team  Advise alcohol abstinence  Advance to low fat diet as toleraeted.   She will need repeat imaging with either pancreas protocol CT or MRI after resolution of pancreatitis, or we will arrange for endoscopic ultrasound after pancreatitis resolves. If pain is still persistent tomorrow would consider repeat CT to assess for interval change. If you have any questions or need any further information, please feel free to contact anyone on our consult team.  Thank you for allowing us to participate in the care of [facility].     Zenia Benavidez MD   [id number] [facility]

## 2023-01-01 NOTE — PLAN OF CARE
Problem: Discharge Planning  Goal: Discharge to home or other facility with appropriate resources  12/31/2022 1941 by Lawrence Mejia RN  Outcome: Progressing  12/31/2022 0807 by Mike Gonzales RN  Outcome: Progressing  Flowsheets (Taken 12/30/2022 1955)  Discharge to home or other facility with appropriate resources:   Identify barriers to discharge with patient and caregiver   Arrange for needed discharge resources and transportation as appropriate   Identify discharge learning needs (meds, wound care, etc)     Problem: Pain  Goal: Verbalizes/displays adequate comfort level or baseline comfort level  12/31/2022 1941 by Lawrence Mejia RN  Outcome: Progressing  12/31/2022 0807 by Mike Gonzales RN  Outcome: Progressing  Flowsheets (Taken 12/30/2022 1955)  Verbalizes/displays adequate comfort level or baseline comfort level:   Encourage patient to monitor pain and request assistance   Assess pain using appropriate pain scale   Administer analgesics based on type and severity of pain and evaluate response   Implement non-pharmacological measures as appropriate and evaluate response   Consider cultural and social influences on pain and pain management     Problem: Gastrointestinal - Adult  Goal: Minimal or absence of nausea and vomiting  12/31/2022 1941 by Lawrence Mejia RN  Outcome: Progressing  12/31/2022 0807 by Mike Gonzales RN  Outcome: Progressing  Flowsheets (Taken 12/30/2022 1955)  Minimal or absence of nausea and vomiting:   Administer IV fluids as ordered to ensure adequate hydration   Maintain NPO status until nausea and vomiting are resolved   Nasogastric tube to low intermittent suction as ordered   Provide nonpharmacologic comfort measures as appropriate   Administer ordered antiemetic medications as needed   Advance diet as tolerated, if ordered  Goal: Maintains adequate nutritional intake  12/31/2022 1941 by Lawrence Mejia RN  Outcome: Progressing  12/31/2022 0807 by Mike Gonzales RN  Outcome: Progressing  Flowsheets (Taken 12/30/2022 1955)  Maintains adequate nutritional intake:   Monitor percentage of each meal consumed   Identify factors contributing to decreased intake, treat as appropriate   Assist with meals as needed   Monitor intake and output, weight and lab values     Problem: Metabolic/Fluid and Electrolytes - Adult  Goal: Electrolytes maintained within normal limits  12/31/2022 1941 by Ratna Senior RN  Outcome: Progressing  12/31/2022 0807 by Asad Mcneill RN  Outcome: Progressing  4 H Harmeet Alvarez (Taken 12/30/2022 1955)  Electrolytes maintained within normal limits:   Monitor labs and assess patient for signs and symptoms of electrolyte imbalances   Administer electrolyte replacement as ordered   Monitor response to electrolyte replacements, including repeat lab results as appropriate  Goal: Hemodynamic stability and optimal renal function maintained  12/31/2022 1941 by Ratna Senior RN  Outcome: Progressing  12/31/2022 0807 by Asad Mcneill RN  Outcome: Progressing  Flowsheets (Taken 12/30/2022 1955)  Hemodynamic stability and optimal renal function maintained:   Monitor labs and assess for signs and symptoms of volume excess or deficit   Monitor intake, output and patient weight   Monitor urine specific gravity, serum osmolarity and serum sodium as indicated or ordered

## 2023-01-01 NOTE — PROGRESS NOTES
Pt noted to have a diffuse rash on upper extremities, abdomen and back . Possibly from Hydalazine as it is the only new medication introduced.

## 2023-01-01 NOTE — PROGRESS NOTES
Hospitalist Progress Note      PCP: No primary care provider on file. Chief Complaint. Presented to hospital for Abd pain    Date of Admission: 12/30/2022    Subjective:   complains of abdominal pain,asking for dilaudid for pain but tolerating diet and no N/V, suspect drug seeking behavior, denies chest pain, nausea, vomiting, shortness of breath, fever or chills. mention feels overall better    Medications:  Reviewed    Infusion Medications    lactated ringers 150 mL/hr at 01/01/23 1521    sodium chloride      sodium chloride       Scheduled Medications    polyethylene glycol  17 g Oral Daily    traZODone  200 mg Oral Nightly    pantoprazole  20 mg Oral BID AC    methadone  87 mg Oral Daily    sodium chloride flush  10 mL IntraVENous 2 times per day    thiamine  100 mg Oral Daily    sodium chloride flush  5-40 mL IntraVENous 2 times per day    enoxaparin  40 mg SubCUTAneous Daily     PRN Meds: HYDROmorphone, hydrALAZINE, sodium chloride flush, sodium chloride, LORazepam **OR** LORazepam **OR** LORazepam **OR** LORazepam **OR** LORazepam **OR** LORazepam **OR** LORazepam **OR** LORazepam, sodium chloride flush, sodium chloride, ondansetron **OR** ondansetron      Intake/Output Summary (Last 24 hours) at 1/1/2023 1730  Last data filed at 1/1/2023 1323  Gross per 24 hour   Intake 1160 ml   Output --   Net 1160 ml         Physical Exam Performed:    BP (!) 159/103   Pulse 62   Temp 98.2 °F (36.8 °C) (Oral)   Resp 18   Ht 5' 8\" (1.727 m)   Wt 152 lb 8.9 oz (69.2 kg)   SpO2 98%   BMI 23.20 kg/m²     General appearance: NAD  HEENT:  Conjunctivae/corneas clear. Neck: Supple, with full range of motion. Respiratory:  Normal respiratory effort. Clear to auscultation, bilaterally without Rales/Wheezes/Rhonchi. Cardiovascular: Regular rate and rhythm with normal S1/S2 without murmurs or rubs  Abdomen: Soft, non-tender, non-distended, normal bowel sounds.   Musculoskeletal: No cyanosis or edema bilaterally  Neurologic:  without any focal sensory/motor deficits. grossly non-focal.  Psychiatric: Alert and oriented, Normal mood  Peripheral Pulses: +2 palpable, equal bilaterally       Labs:   Recent Labs     12/30/22  1230 12/31/22  0853 01/01/23  0559   WBC 18.4* 10.3 8.9   HGB 14.1 13.4 12.2   HCT 40.9 39.8 36.1    188 165       Recent Labs     12/30/22  1230 12/31/22  0853 01/01/23  0559   * 138 133*   K 3.9 4.4 3.8   CL 95* 99 97*   CO2 26 27 29   BUN 7 6* 5*   CREATININE <0.5* 0.6 <0.5*   CALCIUM 9.6 9.4 8.8   PHOS  --  3.3  --        Recent Labs     12/30/22  1230 12/31/22  0853 01/01/23  0559   AST 56* 61* 55*   ALT 30 30 28   BILITOT 1.0 0.8 0.7   ALKPHOS 143* 137* 119       No results for input(s): INR in the last 72 hours. No results for input(s): [place] in the last 72 hours. Urinalysis:      Lab Results   Component Value Date/Time    NITRU Negative 12/30/2022 12:15 PM    [address] 12/30/2022 12:15 PM    BACTERIA 1+ 12/30/2022 12:15 PM    RBCUA 0-2 12/30/2022 12:15 PM    BLOODU Negative 12/30/2022 12:15 PM    SPECGRAV 1.015 12/30/2022 12:15 PM    GLUCOSEU Negative 12/30/2022 12:15 PM       Radiology:  US GALLBLADDER RUQ   Final Result   1. Hepatic steatosis. 2. Limited evaluation of the pancreas with no obvious inflammation on current   study. CT ABDOMEN PELVIS WO CONTRAST Additional Contrast? None   Final Result   Findings are compatible with pancreatitis, with moderate peripancreatic   stranding and small amount of adjacent fluid. Pancreatic ductal dilatation   is seen within the distal pancreatic body for which an obstructing pancreatic   lesion cannot be excluded. After treatment and resolution of any acute   symptoms, pancreatic protocol CT is suggested for further evaluation. Fatty liver. No obstructive uropathy. 2 mm right lower lobe pulmonary nodule, similar to prior. Follow-up   recommendations are as below.       RECOMMENDATIONS: Guidelines for follow-up and management of pulmonary nodules found on abdomen   CT:      <6 mm - No follow up recommend on the basis of the estimated low risk of   malignancy. Radiology 2017 http://pubs. rsna.org/doi/full/10.1148/radiol. 1513645597               Assessment/Plan:    Active Hospital Problems    Diagnosis     Acute pancreatitis without infection or necrosis [K85.90]      Priority: Medium    Anxiety [F41.9]      Priority: Medium    Gastroparesis [K31.84]      Priority: Medium    GERD (gastroesophageal reflux disease) [K21.9]      Priority: Medium    Leukocytosis [D72.829]      Priority: Medium    Alcoholic cirrhosis ([address] [Z87.31]      Priority: Medium     Acute alcohol induced pancreatitis  Leukocytosis  Likely reactive  History of alcohol abuse  Alcoholic liver cirrhosis  Abnormal CT of the abdomen  Pancreatic ductal dilatation concerning for obstructive pancreatic mass  Anxiety disorder  Gastroparesis  GERD        PLAN:  Advance diet to FLD  Continue IV fluids  Pain control  GI consulted - possible CT abd tomorrow  IV morphine PRN for pain control  Repeat labs in the morning  CIWA assessment with PRN ativan  Consult GI for evaluation of pancreatic mass, pancreatitis and liver cirrhosis  She will need CT pancreatic protocol in the near future once acute pancreatitis improved  Daily labs ordered. Trend lipase level  Diet: ADULT DIET;  Clear Liquid  Code Status: Full Code    PT/OT Eval Status: ordered    Dispo/Plan of care - advance diet, f/u with GI    Niki Calvert MD

## 2023-01-01 NOTE — PLAN OF CARE
Problem: Discharge Planning  Goal: Discharge to home or other facility with appropriate resources  1/1/2023 0258 by Eder Campbell RN  Outcome: Progressing  Flowsheets (Taken 12/31/2022 1950)  Discharge to home or other facility with appropriate resources:   Identify barriers to discharge with patient and caregiver   Arrange for needed discharge resources and transportation as appropriate   Identify discharge learning needs (meds, wound care, etc)  12/31/2022 1941 by Lincoln White RN  Outcome: Progressing     Problem: Pain  Goal: Verbalizes/displays adequate comfort level or baseline comfort level  1/1/2023 0258 by Eder Campbell RN  Outcome: Progressing  12/31/2022 1941 by Lincoln White RN  Outcome: Progressing     Problem: Gastrointestinal - Adult  Goal: Minimal or absence of nausea and vomiting  1/1/2023 0258 by Eder Campbell RN  Outcome: Progressing  Flowsheets (Taken 12/31/2022 1950)  Minimal or absence of nausea and vomiting:   Administer IV fluids as ordered to ensure adequate hydration   Administer ordered antiemetic medications as needed   Provide nonpharmacologic comfort measures as appropriate   Advance diet as tolerated, if ordered  12/31/2022 1941 by Lincoln White RN  Outcome: Progressing  Goal: Maintains adequate nutritional intake  1/1/2023 0258 by Eder Campbell RN  Outcome: Progressing  Flowsheets (Taken 12/31/2022 1950)  Maintains adequate nutritional intake: Monitor percentage of each meal consumed  12/31/2022 1941 by Lincoln White RN  Outcome: Progressing     Problem: Metabolic/Fluid and Electrolytes - Adult  Goal: Electrolytes maintained within normal limits  1/1/2023 0258 by Eder Campbell RN  Outcome: Progressing  Flowsheets (Taken 12/31/2022 1950)  Electrolytes maintained within normal limits:   Administer electrolyte replacement as ordered   Monitor labs and assess patient for signs and symptoms of electrolyte imbalances   Monitor response to electrolyte replacements, including repeat lab results as appropriate  12/31/2022 1941 by Barbara Mercer RN  Outcome: Progressing  Goal: Hemodynamic stability and optimal renal function maintained  1/1/2023 0258 by Kanu Luna RN  Outcome: Progressing  Flowsheets (Taken 12/31/2022 1950)  Hemodynamic stability and optimal renal function maintained:   Monitor labs and assess for signs and symptoms of volume excess or deficit   Monitor intake, output and patient weight  12/31/2022 1941 by Barbara Mercer RN  Outcome: Progressing

## 2023-01-01 NOTE — PLAN OF CARE
Problem: Discharge Planning  Goal: Discharge to home or other facility with appropriate resources  1/1/2023 1408 by Lalo Traore RN  Outcome: Progressing     Problem: Pain  Goal: Verbalizes/displays adequate comfort level or baseline comfort level  1/1/2023 1408 by Lalo Traore RN  Outcome: Progressing     Problem: Gastrointestinal - Adult  Goal: Minimal or absence of nausea and vomiting  1/1/2023 1408 by Lalo Traore RN  Outcome: Progressing     Problem: Gastrointestinal - Adult  Goal: Maintains adequate nutritional intake  1/1/2023 1408 by Lalo Traore RN  Outcome: Progressing

## 2023-01-02 LAB
A/G RATIO: 0.8 (ref 1.1–2.2)
ALBUMIN SERPL-MCNC: 3.1 G/DL (ref 3.4–5)
ALP BLD-CCNC: 119 U/L (ref 40–129)
ALT SERPL-CCNC: 35 U/L (ref 10–40)
ANION GAP SERPL CALCULATED.3IONS-SCNC: 10 MMOL/L (ref 3–16)
AST SERPL-CCNC: 74 U/L (ref 15–37)
BASOPHILS ABSOLUTE: 0 K/UL (ref 0–0.2)
BASOPHILS RELATIVE PERCENT: 0.4 %
BILIRUB SERPL-MCNC: 0.6 MG/DL (ref 0–1)
BUN BLDV-MCNC: 4 MG/DL (ref 7–20)
CALCIUM SERPL-MCNC: 9.1 MG/DL (ref 8.3–10.6)
CHLORIDE BLD-SCNC: 100 MMOL/L (ref 99–110)
CO2: 26 MMOL/L (ref 21–32)
CREAT SERPL-MCNC: 0.6 MG/DL (ref 0.6–1.1)
EOSINOPHILS ABSOLUTE: 0.1 K/UL (ref 0–0.6)
EOSINOPHILS RELATIVE PERCENT: 1.3 %
GFR SERPL CREATININE-BSD FRML MDRD: >60 ML/MIN/{1.73_M2}
GLUCOSE BLD-MCNC: 114 MG/DL (ref 70–99)
HCT VFR BLD CALC: 36 % (ref 36–48)
HEMOGLOBIN: 11.9 G/DL (ref 12–16)
LIPASE: 93 U/L (ref 13–60)
LYMPHOCYTES ABSOLUTE: 1.4 K/UL (ref 1–5.1)
LYMPHOCYTES RELATIVE PERCENT: 21 %
MAGNESIUM: 1.7 MG/DL (ref 1.8–2.4)
MCH RBC QN AUTO: 32.4 PG (ref 26–34)
MCHC RBC AUTO-ENTMCNC: 33 G/DL (ref 31–36)
MCV RBC AUTO: 98.2 FL (ref 80–100)
MONOCYTES ABSOLUTE: 0.6 K/UL (ref 0–1.3)
MONOCYTES RELATIVE PERCENT: 9.2 %
NEUTROPHILS ABSOLUTE: 4.6 K/UL (ref 1.7–7.7)
NEUTROPHILS RELATIVE PERCENT: 68.1 %
PDW BLD-RTO: 15.2 % (ref 12.4–15.4)
PLATELET # BLD: 154 K/UL (ref 135–450)
PMV BLD AUTO: 9.3 FL (ref 5–10.5)
POTASSIUM SERPL-SCNC: 4.3 MMOL/L (ref 3.5–5.1)
RBC # BLD: 3.66 M/UL (ref 4–5.2)
SODIUM BLD-SCNC: 136 MMOL/L (ref 136–145)
TOTAL PROTEIN: 6.8 G/DL (ref 6.4–8.2)
WBC # BLD: 6.7 K/UL (ref 4–11)

## 2023-01-02 PROCEDURE — 94760 N-INVAS EAR/PLS OXIMETRY 1: CPT

## 2023-01-02 PROCEDURE — 85025 COMPLETE CBC W/AUTO DIFF WBC: CPT

## 2023-01-02 PROCEDURE — 2500000003 HC RX 250 WO HCPCS: Performed by: INTERNAL MEDICINE

## 2023-01-02 PROCEDURE — 80053 COMPREHEN METABOLIC PANEL: CPT

## 2023-01-02 PROCEDURE — 6370000000 HC RX 637 (ALT 250 FOR IP): Performed by: NURSE PRACTITIONER

## 2023-01-02 PROCEDURE — 1200000000 HC SEMI PRIVATE

## 2023-01-02 PROCEDURE — 6360000002 HC RX W HCPCS: Performed by: INTERNAL MEDICINE

## 2023-01-02 PROCEDURE — 6370000000 HC RX 637 (ALT 250 FOR IP): Performed by: INTERNAL MEDICINE

## 2023-01-02 PROCEDURE — 83690 ASSAY OF LIPASE: CPT

## 2023-01-02 PROCEDURE — 2580000003 HC RX 258: Performed by: INTERNAL MEDICINE

## 2023-01-02 PROCEDURE — 36415 COLL VENOUS BLD VENIPUNCTURE: CPT

## 2023-01-02 PROCEDURE — 83735 ASSAY OF MAGNESIUM: CPT

## 2023-01-02 RX ADMIN — PANTOPRAZOLE SODIUM 20 MG: 20 TABLET, DELAYED RELEASE ORAL at 06:18

## 2023-01-02 RX ADMIN — SODIUM CHLORIDE, PRESERVATIVE FREE 10 ML: 5 INJECTION INTRAVENOUS at 22:32

## 2023-01-02 RX ADMIN — HYDROMORPHONE HYDROCHLORIDE 0.5 MG: 1 INJECTION, SOLUTION INTRAMUSCULAR; INTRAVENOUS; SUBCUTANEOUS at 22:32

## 2023-01-02 RX ADMIN — HYDROMORPHONE HYDROCHLORIDE 0.5 MG: 1 INJECTION, SOLUTION INTRAMUSCULAR; INTRAVENOUS; SUBCUTANEOUS at 10:53

## 2023-01-02 RX ADMIN — HYDROMORPHONE HYDROCHLORIDE 0.5 MG: 1 INJECTION, SOLUTION INTRAMUSCULAR; INTRAVENOUS; SUBCUTANEOUS at 19:38

## 2023-01-02 RX ADMIN — METHADONE HYDROCHLORIDE 87 MG: 10 CONCENTRATE ORAL at 09:43

## 2023-01-02 RX ADMIN — LABETALOL HYDROCHLORIDE 10 MG: 5 INJECTION INTRAVENOUS at 09:44

## 2023-01-02 RX ADMIN — SODIUM CHLORIDE, POTASSIUM CHLORIDE, SODIUM LACTATE AND CALCIUM CHLORIDE: 600; 310; 30; 20 INJECTION, SOLUTION INTRAVENOUS at 06:19

## 2023-01-02 RX ADMIN — Medication 100 MG: at 09:43

## 2023-01-02 RX ADMIN — TRAZODONE HYDROCHLORIDE 200 MG: 100 TABLET ORAL at 21:42

## 2023-01-02 RX ADMIN — PANTOPRAZOLE SODIUM 20 MG: 20 TABLET, DELAYED RELEASE ORAL at 15:28

## 2023-01-02 RX ADMIN — HYDROMORPHONE HYDROCHLORIDE 0.5 MG: 1 INJECTION, SOLUTION INTRAMUSCULAR; INTRAVENOUS; SUBCUTANEOUS at 06:20

## 2023-01-02 RX ADMIN — HYDROMORPHONE HYDROCHLORIDE 0.5 MG: 1 INJECTION, SOLUTION INTRAMUSCULAR; INTRAVENOUS; SUBCUTANEOUS at 15:28

## 2023-01-02 RX ADMIN — ENOXAPARIN SODIUM 40 MG: 100 INJECTION SUBCUTANEOUS at 09:43

## 2023-01-02 RX ADMIN — HYDROMORPHONE HYDROCHLORIDE 0.5 MG: 1 INJECTION, SOLUTION INTRAMUSCULAR; INTRAVENOUS; SUBCUTANEOUS at 02:26

## 2023-01-02 ASSESSMENT — PAIN - FUNCTIONAL ASSESSMENT
PAIN_FUNCTIONAL_ASSESSMENT: ACTIVITIES ARE NOT PREVENTED
PAIN_FUNCTIONAL_ASSESSMENT: ACTIVITIES ARE NOT PREVENTED

## 2023-01-02 ASSESSMENT — PAIN SCALES - GENERAL
PAINLEVEL_OUTOF10: 9

## 2023-01-02 ASSESSMENT — PAIN DESCRIPTION - ORIENTATION
ORIENTATION: LEFT
ORIENTATION: LOWER;MID
ORIENTATION: LOWER

## 2023-01-02 ASSESSMENT — PAIN DESCRIPTION - LOCATION
LOCATION: ABDOMEN

## 2023-01-02 ASSESSMENT — PAIN DESCRIPTION - DESCRIPTORS
DESCRIPTORS: ACHING;DISCOMFORT;THROBBING
DESCRIPTORS: SHARP;DISCOMFORT

## 2023-01-02 NOTE — PROGRESS NOTES
Patient requested pain medication. Patient stated 9 out of 10 abdomen pain. Followed PRN pain management protocol. See MAR.  Electronically signed by Gordon Trejo RN on 1/2/2023 at 2:31 AM

## 2023-01-02 NOTE — PROGRESS NOTES
Patient alert and oriented X4. Patients blood pressure was 162/107. Followed PRN Labetalol protocol. See MAR. Patient tolerated nightly medications well. Patient verbalizes understanding of education. Patient vital signs recorded. Fall precautions in place. Bed in lowest position, side rails X2. Bed locks on. Non slip socks on. Needed items within reach. Call light within reach.  Electronically signed by Dawit Elizabeth RN on 1/1/2023 at 8:54 PM

## 2023-01-02 NOTE — PROGRESS NOTES
Patient requested pain medicine. Patient sweating, bent over in bed in pain. Patient stated 9 out of 10 abdomen pain. Followed PRN pain management protocol. See MAR.  Electronically signed by Domingo Obrien RN on 1/2/2023 at 6:31 AM

## 2023-01-02 NOTE — PLAN OF CARE
Problem: Discharge Planning  Goal: Discharge to home or other facility with appropriate resources  1/1/2023 2100 by Chloe Gunderson RN  Outcome: Progressing     Problem: Pain  Goal: Verbalizes/displays adequate comfort level or baseline comfort level  1/1/2023 2100 by Chloe Gunderson RN  Outcome: Progressing     Problem: Gastrointestinal - Adult  Goal: Minimal or absence of nausea and vomiting  1/1/2023 2100 by Chloe Gunderson RN  Outcome: Progressing  Flowsheets (Taken 1/1/2023 2017)  Minimal or absence of nausea and vomiting: Administer IV fluids as ordered to ensure adequate hydration     Problem: Metabolic/Fluid and Electrolytes - Adult  Goal: Electrolytes maintained within normal limits  1/1/2023 2100 by Chloe Gunderson RN  Outcome: Progressing  Flowsheets (Taken 1/1/2023 2017)  Electrolytes maintained within normal limits: Monitor labs and assess patient for signs and symptoms of electrolyte imbalances     Problem: Metabolic/Fluid and Electrolytes - Adult  Goal: Hemodynamic stability and optimal renal function maintained  1/1/2023 2100 by Chloe Gunderson RN  Outcome: Progressing  Flowsheets (Taken 1/1/2023 2017)  Hemodynamic stability and optimal renal function maintained: Monitor labs and assess for signs and symptoms of volume excess or deficit

## 2023-01-02 NOTE — PROGRESS NOTES
Patient requested pain medication. Patient stated 9 out of 10 abdomen pain. Followed PRN pain management protocol. See MAR. Re-evaluated patient blood pressure after PRN labetalol. Blood pressure now 156/96.  Electronically signed by Nikki Aquino RN on 1/1/2023 at 10:29 PM

## 2023-01-02 NOTE — PLAN OF CARE
Problem: Discharge Planning  Goal: Discharge to home or other facility with appropriate resources  Outcome: Progressing     Problem: Pain  Goal: Verbalizes/displays adequate comfort level or baseline comfort level  Outcome: Progressing     Problem: Gastrointestinal - Adult  Goal: Minimal or absence of nausea and vomiting  Outcome: Progressing  Goal: Maintains adequate nutritional intake  Outcome: Progressing     Problem: Metabolic/Fluid and Electrolytes - Adult  Goal: Electrolytes maintained within normal limits  Outcome: Progressing  Goal: Hemodynamic stability and optimal renal function maintained  Outcome: Progressing

## 2023-01-02 NOTE — CARE COORDINATION
INITIAL CASE MANAGEMENT ASSESSMENT    Reviewed chart, met with patient to assess possible discharge needs. Explained Case Management role/services. Patient is not interested in ETOH Rehab at this time. Living Situation: Patient lives with her 2 sons (12 and 25) in a 2 family with 7 steps toenter. ADLs: Independent     DME: None    PT/OT Recs: None     Active Services: Goes to [employer]: Drives/Mother or Uncle can transport     Medications: [facility] on [address]/No barriers    PCP: No PCP/gave PCP list      HD/PD: N/A    PLAN/COMMENTS: Plan is home with sons. SW/CM provided contact information for patient or family to call with any questions. SW/CM will follow and assist as needed.    Electronically signed by Ca Plunkett RN on 1/2/2023 at 11:37 AM

## 2023-01-02 NOTE — PROGRESS NOTES
Cardiac tele monitor placed and confirmed with [facility].  Electronically signed by Queta John RN on 1/1/2023 at 8:54 PM

## 2023-01-02 NOTE — PROGRESS NOTES
INPATIENT CONSULTATION:    IDENTIFYING DATA/REASON FOR CONSULTATION   PATIENT:  Nestor Reddy  MRN:  [id number]  ADMIT DATE: 12/30/2022  TIME OF EVALUATION: 1/2/2023 11:48 AM  HOSPITAL STAY:   LOS: 3 days   CONSULTING PHYSICIAN: Clayton Latham MD    REASON FOR CONSULTATION acute pancreatitis     Subjective:    No acute events overnight  Continues to have abdominal pain. Tolerated liquids. MEDICATIONS   SCHEDULED:  polyethylene glycol, 17 g, Daily  traZODone, 200 mg, Nightly  pantoprazole, 20 mg, BID AC  methadone, 87 mg, Daily  sodium chloride flush, 10 mL, 2 times per day  thiamine, 100 mg, Daily  sodium chloride flush, 5-40 mL, 2 times per day  enoxaparin, 40 mg, Daily    FLUIDS/DRIPS:     sodium chloride      sodium chloride       PRNs: HYDROmorphone, 0.5 mg, Q4H PRN  labetalol, 10 mg, Q4H PRN  hydrALAZINE, 10 mg, Q6H PRN  sodium chloride flush, 10 mL, PRN  sodium chloride, , PRN  LORazepam, 1 mg, Q1H PRN   Or  LORazepam, 1 mg, Q1H PRN   Or  LORazepam, 2 mg, Q1H PRN   Or  LORazepam, 2 mg, Q1H PRN   Or  LORazepam, 3 mg, Q1H PRN   Or  LORazepam, 3 mg, Q1H PRN   Or  LORazepam, 4 mg, Q1H PRN   Or  LORazepam, 4 mg, Q1H PRN  sodium chloride flush, 5-40 mL, PRN  sodium chloride, , PRN  ondansetron, 4 mg, Q8H PRN   Or  ondansetron, 4 mg, Q6H PRN    ALLERGIES:  She [unfilled]      PHYSICAL EXAM     Wt Readings from Last 3 Encounters:   01/02/23 152 lb 1.6 oz (69 kg)   05/26/22 146 lb 2.6 oz (66.3 kg)   05/19/22 144 lb 6.4 oz (65.5 kg)     Temp Readings from Last 3 Encounters:   01/02/23 98 °F (36.7 °C) (Oral)   05/26/22 97.7 °F (36.5 °C)   05/19/22 98.1 °F (36.7 °C) (Oral)     BP Readings from Last 3 Encounters:   01/02/23 (!) 153/99   05/26/22 (!) 127/103   05/19/22 (!) 149/101     Pulse Readings from Last 3 Encounters:   01/02/23 78   05/26/22 77   05/19/22 81      I/O last 3 completed shifts: In: 7090.5 [P.O.:1160;  I.V.:5930.5]  Out: -     Physical Exam:  Gen: Resting in bed, NAD   CV: RRR no MRG   Pul: CTAB Abd; Soft, TTP diffusely. Non distended. Ext: No edema   Neuro: Normal speech  Skin: No jaundice, spider angiomas, ruiz erythema     LABS AND IMAGING     Recent Results (from the past 24 hour(s))   Lipase    Collection Time: 01/02/23  8:07 AM   Result Value Ref Range    Lipase 93.0 (H) 13.0 - 60.0 U/L   CBC with Auto Differential    Collection Time: 01/02/23  8:07 AM   Result Value Ref Range    WBC 6.7 4.0 - 11.0 K/uL    RBC 3.66 (L) 4.00 - 5.20 M/uL    Hemoglobin 11.9 (L) 12.0 - 16.0 g/dL    Hematocrit 36.0 36.0 - 48.0 %    MCV 98.2 80.0 - 100.0 fL    MCH 32.4 26.0 - 34.0 pg    MCHC 33.0 31.0 - 36.0 g/dL    RDW 15.2 12.4 - 15.4 %    Platelets 043 941 - 930 K/uL    MPV 9.3 5.0 - 10.5 fL    Neutrophils % 68.1 %    Lymphocytes % 21.0 %    Monocytes % 9.2 %    Eosinophils % 1.3 %    Basophils % 0.4 %    Neutrophils Absolute 4.6 1.7 - 7.7 K/uL    Lymphocytes Absolute 1.4 1.0 - 5.1 K/uL    Monocytes Absolute 0.6 0.0 - 1.3 K/uL    Eosinophils Absolute 0.1 0.0 - 0.6 K/uL    Basophils Absolute 0.0 0.0 - 0.2 K/uL   Comprehensive Metabolic Panel    Collection Time: 01/02/23  8:07 AM   Result Value Ref Range    Sodium 136 136 - 145 mmol/L    Potassium 4.3 3.5 - 5.1 mmol/L    Chloride 100 99 - 110 mmol/L    CO2 26 21 - 32 mmol/L    Anion Gap 10 3 - 16    Glucose 114 (H) 70 - 99 mg/dL    BUN 4 (L) 7 - 20 mg/dL    Creatinine 0.6 0.6 - 1.1 mg/dL    Est, Glom Filt Rate >60 >60    Calcium 9.1 8.3 - 10.6 mg/dL    Total Protein 6.8 6.4 - 8.2 g/dL    Albumin 3.1 (L) 3.4 - 5.0 g/dL    Albumin/Globulin Ratio 0.8 (L) 1.1 - 2.2    Total Bilirubin 0.6 0.0 - 1.0 mg/dL    Alkaline Phosphatase 119 40 - 129 U/L    ALT 35 10 - 40 U/L    AST 74 (H) 15 - 37 U/L   Magnesium    Collection Time: 01/02/23  8:07 AM   Result Value Ref Range    Magnesium 1.70 (L) 1.80 - 2.40 mg/dL     Other Labs    Imaging  US GALLBLADDER RUQ   Final Result   1. Hepatic steatosis.    2. Limited evaluation of the pancreas with no obvious inflammation on current study.         CT ABDOMEN PELVIS WO CONTRAST Additional Contrast? None   Final Result   Findings are compatible with pancreatitis, with moderate peripancreatic   stranding and small amount of adjacent fluid. Pancreatic ductal dilatation   is seen within the distal pancreatic body for which an obstructing pancreatic   lesion cannot be excluded. After treatment and resolution of any acute   symptoms, pancreatic protocol CT is suggested for further evaluation. Fatty liver. No obstructive uropathy. 2 mm right lower lobe pulmonary nodule, similar to prior. Follow-up   recommendations are as below. RECOMMENDATIONS:   Guidelines for follow-up and management of pulmonary nodules found on abdomen   CT:      <6 mm - No follow up recommend on the basis of the estimated low risk of   malignancy. Radiology 2017 http://pubs. rsna.org/doi/full/10.1148/radiol. 4238481143         MRI ABDOMEN W WO CONTRAST    (Results Pending)      ASSESSMENT AND RECOMMENDATIONS   Karely Harrison is a 30 y.o. female with alcohol use disorder, gastroparesis, GERD, anxiety, who presented with sudden onset abdominal pain starting 2 days ago. IMPRESSION:     Acute pancreatitis. With peripancreatic inflammation on CT scan, and question of a fullness in the pancreatic body and mild dilation of the pancreatic duct. She will need follow-up imaging of this lesion, either with MRI or pancreatic protocol CT. Lipase is up to 222 at admission. Suspect that her current episode of acute pancreatitis is probably due to alcohol given her heavy alcohol use. TG of 251. Calcium WNL. No e/o cholelithiasis. Alcohol use disorder. Drinks about 1/5 of alcohol/vodka daily, last drink was Wednesday. Hepatic steatosis likely due to alcohol use  Acute liver injury. Minimal elevation of AST to 55, ALT of 28, ALP and bili are normal. Consistent with very mild alcohol induced liver injury.       RECOMMENDATIONS:    Continue supportive care  IV fluid with lactated Ringer's at 150 mL/h  Pain and nausea control per primary team  Advise alcohol abstinence  Advance to low fat diet as toleraeted. [facility] protocol   MVI, thiamine, folic acid. As she is having ongoing abdominal pain and not really tolerating diet well, will obtain MRI to further evaluate pancreas with contrast. She has tolerated MRI with contrast previously (noted contrast allergy) in 2021. She will need repeat imaging with either pancreas protocol CT or MRI after resolution of pancreatitis, or we will arrange for endoscopic ultrasound after pancreatitis resolves. If you have any questions or need any further information, please feel free to contact anyone on our consult team.  Thank you for allowing us to participate in the care of [facility].     Virgil Nam MD   [id number] [facility]

## 2023-01-02 NOTE — PROGRESS NOTES
Hospitalist Progress Note      PCP: No primary care provider on file. Chief Complaint. Presented to hospital for Abd pain    Date of Admission: 12/30/2022    Subjective:  complains of abdominal pain, asking for dilaudid for pain but tolerating diet and no N/V, suspect drug seeking behavior, denies chest pain, nausea, vomiting, shortness of breath, fever or chills. Medications:  Reviewed    Infusion Medications    sodium chloride      sodium chloride       Scheduled Medications    polyethylene glycol  17 g Oral Daily    traZODone  200 mg Oral Nightly    pantoprazole  20 mg Oral BID AC    methadone  87 mg Oral Daily    sodium chloride flush  10 mL IntraVENous 2 times per day    thiamine  100 mg Oral Daily    sodium chloride flush  5-40 mL IntraVENous 2 times per day    enoxaparin  40 mg SubCUTAneous Daily     PRN Meds: HYDROmorphone, labetalol, hydrALAZINE, sodium chloride flush, sodium chloride, LORazepam **OR** LORazepam **OR** LORazepam **OR** LORazepam **OR** LORazepam **OR** LORazepam **OR** LORazepam **OR** LORazepam, sodium chloride flush, sodium chloride, ondansetron **OR** ondansetron      Intake/Output Summary (Last 24 hours) at 1/2/2023 1730  Last data filed at 1/2/2023 0857  Gross per 24 hour   Intake 6490.53 ml   Output --   Net 6490.53 ml         Physical Exam Performed:    BP (!) 155/100   Pulse 79   Temp 97.6 °F (36.4 °C) (Oral)   Resp 17   Ht 5' 8\" (1.727 m)   Wt 152 lb 1.6 oz (69 kg)   SpO2 98%   BMI 23.13 kg/m²     General appearance: NAD, on RA  HEENT:  Conjunctivae/corneas clear. Neck: Supple, with full range of motion. Respiratory:  Normal respiratory effort. Clear to auscultation, bilaterally without Rales/Wheezes/Rhonchi. Cardiovascular: Regular rate and rhythm with normal S1/S2 without murmurs or rubs  Abdomen: Soft, non-tender, non-distended, normal bowel sounds.   Musculoskeletal: No cyanosis or edema bilaterally  Neurologic:  without any focal sensory/motor deficits. grossly non-focal.  Psychiatric: Alert and oriented, Normal mood  Peripheral Pulses: +2 palpable, equal bilaterally       Labs:   Recent Labs     12/31/22  0853 01/01/23  0559 01/02/23  0807   WBC 10.3 8.9 6.7   HGB 13.4 12.2 11.9*   HCT 39.8 36.1 36.0    165 154       Recent Labs     12/31/22  0853 01/01/23  0559 01/02/23  0807    133* 136   K 4.4 3.8 4.3   CL 99 97* 100   CO2 27 29 26   BUN 6* 5* 4*   CREATININE 0.6 <0.5* 0.6   CALCIUM 9.4 8.8 9.1   PHOS 3.3  --   --        Recent Labs     12/31/22  0853 01/01/23  0559 01/02/23  0807   AST 61* 55* 74*   ALT 30 28 35   BILITOT 0.8 0.7 0.6   ALKPHOS 137* 119 119       No results for input(s): INR in the last 72 hours. No results for input(s): Nanette Pizarro in the last 72 hours. Urinalysis:      Lab Results   Component Value Date/Time    NITRU Negative 12/30/2022 12:15 PM    [address] 12/30/2022 12:15 PM    BACTERIA 1+ 12/30/2022 12:15 PM    RBCUA 0-2 12/30/2022 12:15 PM    BLOODU Negative 12/30/2022 12:15 PM    SPECGRAV 1.015 12/30/2022 12:15 PM    GLUCOSEU Negative 12/30/2022 12:15 PM       Radiology:  US GALLBLADDER RUQ   Final Result   1. Hepatic steatosis. 2. Limited evaluation of the pancreas with no obvious inflammation on current   study. CT ABDOMEN PELVIS WO CONTRAST Additional Contrast? None   Final Result   Findings are compatible with pancreatitis, with moderate peripancreatic   stranding and small amount of adjacent fluid. Pancreatic ductal dilatation   is seen within the distal pancreatic body for which an obstructing pancreatic   lesion cannot be excluded. After treatment and resolution of any acute   symptoms, pancreatic protocol CT is suggested for further evaluation. Fatty liver. No obstructive uropathy. 2 mm right lower lobe pulmonary nodule, similar to prior. Follow-up   recommendations are as below.       RECOMMENDATIONS:   Guidelines for follow-up and management of pulmonary nodules found on abdomen   CT:      <6 mm - No follow up recommend on the basis of the estimated low risk of   malignancy. Radiology 2017 http://pubs. rsna.org/doi/full/10.1148/radiol. 4608267882         MRI ABDOMEN W WO CONTRAST    (Results Pending)         Assessment/Plan:    Active Hospital Problems    Diagnosis     Acute pancreatitis without infection or necrosis [K85.90]      Priority: Medium    Anxiety [F41.9]      Priority: Medium    Gastroparesis [K31.84]      Priority: Medium    GERD (gastroesophageal reflux disease) [K21.9]      Priority: Medium    Leukocytosis [D72.829]      Priority: Medium    Alcoholic cirrhosis ([address] [V29.62]      Priority: Medium     Acute alcohol induced pancreatitis  Leukocytosis  Likely reactive  History of alcohol abuse  Alcoholic liver cirrhosis  Abnormal CT of the abdomen  Pancreatic ductal dilatation concerning for obstructive pancreatic mass  Anxiety disorder  Gastroparesis  GERD        PLAN:  Advance diet to FLD  Continue IV fluids  Pain control  GI consulted - possible CT abd tomorrow  IV morphine PRN for pain control  Repeat labs in the morning  CIWA assessment with PRN ativan  Consult GI for evaluation of pancreatic mass, pancreatitis and liver cirrhosis  She will need CT pancreatic protocol in the near future once acute pancreatitis improved  Daily labs ordered. Trend lipase level  Diet: Diet NPO Exceptions are: Ice Chips  ADULT DIET;  Regular; Low Fat (less than or equal to 50 gm/day)  Code Status: Full Code    PT/OT Eval Status: ordered    Dispo/Plan of care - advance diet, f/u with GI, MRI abd per GI, continue iV fluids, Diet advanced to regular diet    Yair Santos MD

## 2023-01-03 ENCOUNTER — APPOINTMENT (OUTPATIENT)
Dept: MRI IMAGING | Age: 38
End: 2023-01-03
Payer: MEDICAID

## 2023-01-03 PROCEDURE — 6360000002 HC RX W HCPCS: Performed by: INTERNAL MEDICINE

## 2023-01-03 PROCEDURE — 6360000004 HC RX CONTRAST MEDICATION: Performed by: INTERNAL MEDICINE

## 2023-01-03 PROCEDURE — 1200000000 HC SEMI PRIVATE

## 2023-01-03 PROCEDURE — 2580000003 HC RX 258: Performed by: INTERNAL MEDICINE

## 2023-01-03 PROCEDURE — 6370000000 HC RX 637 (ALT 250 FOR IP): Performed by: INTERNAL MEDICINE

## 2023-01-03 PROCEDURE — 6370000000 HC RX 637 (ALT 250 FOR IP): Performed by: NURSE PRACTITIONER

## 2023-01-03 PROCEDURE — A9577 INJ MULTIHANCE: HCPCS | Performed by: INTERNAL MEDICINE

## 2023-01-03 PROCEDURE — 74183 MRI ABD W/O CNTR FLWD CNTR: CPT

## 2023-01-03 RX ORDER — METHADONE HYDROCHLORIDE 5 MG/1
7.5 TABLET ORAL DAILY
Status: DISCONTINUED | OUTPATIENT
Start: 2023-01-03 | End: 2023-01-06 | Stop reason: HOSPADM

## 2023-01-03 RX ORDER — METHADONE HYDROCHLORIDE 10 MG/1
80 TABLET ORAL DAILY
Status: DISCONTINUED | OUTPATIENT
Start: 2023-01-03 | End: 2023-01-06 | Stop reason: HOSPADM

## 2023-01-03 RX ADMIN — METHADONE HYDROCHLORIDE 80 MG: 10 TABLET ORAL at 12:35

## 2023-01-03 RX ADMIN — PANTOPRAZOLE SODIUM 20 MG: 20 TABLET, DELAYED RELEASE ORAL at 15:01

## 2023-01-03 RX ADMIN — HYDROMORPHONE HYDROCHLORIDE 0.5 MG: 1 INJECTION, SOLUTION INTRAMUSCULAR; INTRAVENOUS; SUBCUTANEOUS at 07:12

## 2023-01-03 RX ADMIN — POLYETHYLENE GLYCOL 3350 17 G: 17 POWDER, FOR SOLUTION ORAL at 14:29

## 2023-01-03 RX ADMIN — METHADONE HYDROCHLORIDE 7.5 MG: 5 TABLET ORAL at 12:36

## 2023-01-03 RX ADMIN — ENOXAPARIN SODIUM 40 MG: 100 INJECTION SUBCUTANEOUS at 10:59

## 2023-01-03 RX ADMIN — PANTOPRAZOLE SODIUM 20 MG: 20 TABLET, DELAYED RELEASE ORAL at 07:02

## 2023-01-03 RX ADMIN — HYDROMORPHONE HYDROCHLORIDE 0.5 MG: 1 INJECTION, SOLUTION INTRAMUSCULAR; INTRAVENOUS; SUBCUTANEOUS at 20:47

## 2023-01-03 RX ADMIN — GADOBENATE DIMEGLUMINE 14 ML: 529 INJECTION, SOLUTION INTRAVENOUS at 10:18

## 2023-01-03 RX ADMIN — SODIUM CHLORIDE, PRESERVATIVE FREE 10 ML: 5 INJECTION INTRAVENOUS at 11:00

## 2023-01-03 RX ADMIN — HYDROMORPHONE HYDROCHLORIDE 0.5 MG: 1 INJECTION, SOLUTION INTRAMUSCULAR; INTRAVENOUS; SUBCUTANEOUS at 10:59

## 2023-01-03 RX ADMIN — Medication 100 MG: at 10:46

## 2023-01-03 RX ADMIN — HYDROMORPHONE HYDROCHLORIDE 0.5 MG: 1 INJECTION, SOLUTION INTRAMUSCULAR; INTRAVENOUS; SUBCUTANEOUS at 01:25

## 2023-01-03 RX ADMIN — HYDROMORPHONE HYDROCHLORIDE 0.5 MG: 1 INJECTION, SOLUTION INTRAMUSCULAR; INTRAVENOUS; SUBCUTANEOUS at 14:20

## 2023-01-03 RX ADMIN — SODIUM CHLORIDE, PRESERVATIVE FREE 10 ML: 5 INJECTION INTRAVENOUS at 20:47

## 2023-01-03 RX ADMIN — HYDROMORPHONE HYDROCHLORIDE 0.5 MG: 1 INJECTION, SOLUTION INTRAMUSCULAR; INTRAVENOUS; SUBCUTANEOUS at 17:22

## 2023-01-03 RX ADMIN — TRAZODONE HYDROCHLORIDE 200 MG: 100 TABLET ORAL at 20:47

## 2023-01-03 ASSESSMENT — PAIN DESCRIPTION - ONSET
ONSET: ON-GOING
ONSET: ON-GOING

## 2023-01-03 ASSESSMENT — PAIN SCALES - GENERAL
PAINLEVEL_OUTOF10: 9
PAINLEVEL_OUTOF10: 8
PAINLEVEL_OUTOF10: 5
PAINLEVEL_OUTOF10: 5
PAINLEVEL_OUTOF10: 9
PAINLEVEL_OUTOF10: 9
PAINLEVEL_OUTOF10: 8
PAINLEVEL_OUTOF10: 6
PAINLEVEL_OUTOF10: 9

## 2023-01-03 ASSESSMENT — PAIN - FUNCTIONAL ASSESSMENT
PAIN_FUNCTIONAL_ASSESSMENT: ACTIVITIES ARE NOT PREVENTED
PAIN_FUNCTIONAL_ASSESSMENT: PREVENTS OR INTERFERES SOME ACTIVE ACTIVITIES AND ADLS
PAIN_FUNCTIONAL_ASSESSMENT: PREVENTS OR INTERFERES SOME ACTIVE ACTIVITIES AND ADLS

## 2023-01-03 ASSESSMENT — PAIN DESCRIPTION - LOCATION
LOCATION: ABDOMEN

## 2023-01-03 ASSESSMENT — PAIN DESCRIPTION - ORIENTATION
ORIENTATION: LEFT
ORIENTATION: LEFT;MID
ORIENTATION: LEFT

## 2023-01-03 ASSESSMENT — PAIN DESCRIPTION - FREQUENCY
FREQUENCY: CONTINUOUS
FREQUENCY: CONTINUOUS

## 2023-01-03 ASSESSMENT — PAIN DESCRIPTION - DESCRIPTORS
DESCRIPTORS: BURNING;CRAMPING
DESCRIPTORS: ACHING;DISCOMFORT
DESCRIPTORS: BURNING;CRAMPING

## 2023-01-03 ASSESSMENT — PAIN DESCRIPTION - PAIN TYPE
TYPE: ACUTE PAIN
TYPE: ACUTE PAIN

## 2023-01-03 NOTE — PLAN OF CARE
Problem: Pain  Goal: Verbalizes/displays adequate comfort level or baseline comfort level  1/3/2023 0012 by Sheba William RN  Outcome: Not Progressing  1/2/2023 1339 by Laurie Luevano RN  Outcome: Progressing

## 2023-01-03 NOTE — PROGRESS NOTES
INPATIENT PROGRESS NOTE        IDENTIFYING DATA/REASON FOR CONSULTATION   PATIENT:  Sheba Joel  MRN:  [id number]  ADMIT DATE: 2022  TIME OF EVALUATION: 1/3/2023 8:42 AM  HOSPITAL STAY:   LOS: 4 days   CONSULTING PHYSICIAN: Ruddy Martin MD   REASON FOR CONSULTATION: alcohol pancreatitis     Subjective:    Patient seen in follow up   She reports she still has significant abdominal pain but slightly better since admission. She rates her pain currently a 8 out of 10  No vomiting yesterday  She tolerated OJ and water yesterday afternoon. She ate pot roast for dinner and had some pain afterwards but not worse than the pain she has been experiencing  Last BM was 2 days ago. She is passing flatus    MEDICATIONS   SCHEDULED:  polyethylene glycol, 17 g, Daily  traZODone, 200 mg, Nightly  pantoprazole, 20 mg, BID AC  methadone, 87 mg, Daily  sodium chloride flush, 10 mL, 2 times per day  thiamine, 100 mg, Daily  sodium chloride flush, 5-40 mL, 2 times per day  enoxaparin, 40 mg, Daily      FLUIDS/DRIPS:     sodium chloride      sodium chloride       PRNs: HYDROmorphone, 0.5 mg, Q3H PRN  labetalol, 10 mg, Q4H PRN  hydrALAZINE, 10 mg, Q6H PRN  sodium chloride flush, 10 mL, PRN  sodium chloride, , PRN  LORazepam, 1 mg, Q1H PRN   Or  LORazepam, 1 mg, Q1H PRN   Or  LORazepam, 2 mg, Q1H PRN   Or  LORazepam, 2 mg, Q1H PRN   Or  LORazepam, 3 mg, Q1H PRN   Or  LORazepam, 3 mg, Q1H PRN   Or  LORazepam, 4 mg, Q1H PRN   Or  LORazepam, 4 mg, Q1H PRN  sodium chloride flush, 5-40 mL, PRN  sodium chloride, , PRN  ondansetron, 4 mg, Q8H PRN   Or  ondansetron, 4 mg, Q6H PRN      ALLERGIES:    Allergies   Allergen Reactions    Iodine Rash         PHYSICAL EXAM   VITALS:  BP (!) 136/92   Pulse 69   Temp 98.2 °F (36.8 °C) (Oral)   Resp 17   Ht 5' 8\" (1.727 m)   Wt 148 lb 2.4 oz (67.2 kg)   SpO2 97%   BMI 22.53 kg/m²   TEMPERATURE:  Current - Temp: 98.2 °F (36.8 °C);  Max - Temp  Av.1 °F (36.7 °C)  Min: 97.6 °F (36.4 °C) Max: 98.6 °F (37 °C)    Physical Exam:  General appearance: alert, cooperative, no distress, appears stated age  Eyes: Anicteric  Head: Normocephalic, without obvious abnormality  Lungs: clear to auscultation bilaterally, Normal Effort  Heart: regular rate and rhythm, normal S1 and S2, no murmurs or rubs  Abdomen: soft, non-distended, non-tender. Bowel sounds normal.   Extremities: atraumatic, no cyanosis or edema  Skin: warm and dry, no jaundice  Neuro: Grossly intact, A&OX3    LABS AND IMAGING   Laboratory   Recent Labs     12/31/22  0853 01/01/23  0559 01/02/23  0807   WBC 10.3 8.9 6.7   HGB 13.4 12.2 11.9*   HCT 39.8 36.1 36.0   MCV 97.5 97.2 98.2    165 154     Recent Labs     12/31/22  0853 01/01/23  0559 01/02/23  0807    133* 136   K 4.4 3.8 4.3   CL 99 97* 100   CO2 27 29 26   PHOS 3.3  --   --    BUN 6* 5* 4*   CREATININE 0.6 <0.5* 0.6     Recent Labs     12/31/22  0853 01/01/23  0559 01/02/23  0807   AST 61* 55* 74*   ALT 30 28 35   BILITOT 0.8 0.7 0.6   ALKPHOS 137* 119 119     Recent Labs     12/31/22  0853 01/01/23  0559 01/02/23  0807   LIPASE 164.0* 103.0* 93.0*     No results for input(s): PROTIME, INR in the last 72 hours.      Imaging  US GALLBLADDER RUQ   Final Result   1. Hepatic steatosis.   2. Limited evaluation of the pancreas with no obvious inflammation on current   study.         CT ABDOMEN PELVIS WO CONTRAST Additional Contrast? None   Final Result   Findings are compatible with pancreatitis, with moderate peripancreatic   stranding and small amount of adjacent fluid.  Pancreatic ductal dilatation   is seen within the distal pancreatic body for which an obstructing pancreatic   lesion cannot be excluded.  After treatment and resolution of any acute   symptoms, pancreatic protocol CT is suggested for further evaluation.      Fatty liver.      No obstructive uropathy.      2 mm right lower lobe pulmonary nodule, similar to prior.  Follow-up   recommendations are as below.     RECOMMENDATIONS:   Guidelines for follow-up and management of pulmonary nodules found on abdomen   CT:      <6 mm - No follow up recommend on the basis of the estimated low risk of   malignancy. Radiology 2017 http://pubs. rsna.org/doi/full/10.1148/radiol. 5942435422         MRI ABDOMEN W WO CONTRAST    (Results Pending)       Endoscopy      ASSESSMENT AND RECOMMENDATIONS   Oswaldo Borden is a 30 y.o. female with PMH of  alcohol use disorder, gastroparesis, GERD, anxiety, who presented with sudden onset abdominal pain starting 2 days ago. Acute pancreatitis. With peripancreatic inflammation on CT scan, and question of a fullness in the pancreatic body and mild dilation of the pancreatic duct. MRI ordered. Lipase is up to 222 at admission. Suspect that her current episode of acute pancreatitis is probably due to alcohol given her heavy alcohol use. TG of 251. Calcium WNL. No e/o cholelithiasis. On IVFs. Tolerated solid food yesterday, currently NPO for MRI. Resume low fat diet after study, continue supportive care  Alcohol use disorder. Drinks about 1/5 of alcohol/vodka daily, last drink was Wednesday. On CIWA. Hepatic steatosis likely due to alcohol use  Acute liver injury. Minimal elevation of AST to 55, ALT of 28, ALP and bili are normal. Consistent with very mild alcohol induced liver injury. RECOMMENDATIONS:    Follow up MRI  Ok to resume low fat diet after MRI  Continue supportive care with antiemetics and analgesics as needed      If you have any questions or need any further information, please feel free to contact us [phone]. Thank you for allowing us to participate in the care of Oswaldo Borden. The note was completed using Dragon voice recognition transcription. Every effort was made to ensure accuracy; however, inadvertent transcription errors may be present despite my best efforts to edit errors.     Kylie RASMUSSEN

## 2023-01-04 PROCEDURE — 6360000002 HC RX W HCPCS: Performed by: INTERNAL MEDICINE

## 2023-01-04 PROCEDURE — 6360000002 HC RX W HCPCS: Performed by: PHYSICIAN ASSISTANT

## 2023-01-04 PROCEDURE — 2580000003 HC RX 258: Performed by: INTERNAL MEDICINE

## 2023-01-04 PROCEDURE — 6370000000 HC RX 637 (ALT 250 FOR IP): Performed by: INTERNAL MEDICINE

## 2023-01-04 PROCEDURE — 6370000000 HC RX 637 (ALT 250 FOR IP): Performed by: NURSE PRACTITIONER

## 2023-01-04 PROCEDURE — 1200000000 HC SEMI PRIVATE

## 2023-01-04 RX ADMIN — HYDROMORPHONE HYDROCHLORIDE 0.5 MG: 1 INJECTION, SOLUTION INTRAMUSCULAR; INTRAVENOUS; SUBCUTANEOUS at 08:58

## 2023-01-04 RX ADMIN — SODIUM CHLORIDE, PRESERVATIVE FREE 10 ML: 5 INJECTION INTRAVENOUS at 09:00

## 2023-01-04 RX ADMIN — SODIUM CHLORIDE, PRESERVATIVE FREE 10 ML: 5 INJECTION INTRAVENOUS at 21:20

## 2023-01-04 RX ADMIN — PANTOPRAZOLE SODIUM 20 MG: 20 TABLET, DELAYED RELEASE ORAL at 15:52

## 2023-01-04 RX ADMIN — METHADONE HYDROCHLORIDE 7.5 MG: 5 TABLET ORAL at 08:59

## 2023-01-04 RX ADMIN — Medication 100 MG: at 08:58

## 2023-01-04 RX ADMIN — HYDROMORPHONE HYDROCHLORIDE 0.5 MG: 1 INJECTION, SOLUTION INTRAMUSCULAR; INTRAVENOUS; SUBCUTANEOUS at 12:39

## 2023-01-04 RX ADMIN — HYDROMORPHONE HYDROCHLORIDE 0.5 MG: 1 INJECTION, SOLUTION INTRAMUSCULAR; INTRAVENOUS; SUBCUTANEOUS at 02:20

## 2023-01-04 RX ADMIN — HYDROMORPHONE HYDROCHLORIDE 0.5 MG: 1 INJECTION, SOLUTION INTRAMUSCULAR; INTRAVENOUS; SUBCUTANEOUS at 15:52

## 2023-01-04 RX ADMIN — HYDROMORPHONE HYDROCHLORIDE 0.5 MG: 1 INJECTION, SOLUTION INTRAMUSCULAR; INTRAVENOUS; SUBCUTANEOUS at 22:06

## 2023-01-04 RX ADMIN — PANTOPRAZOLE SODIUM 20 MG: 20 TABLET, DELAYED RELEASE ORAL at 05:15

## 2023-01-04 RX ADMIN — TRAZODONE HYDROCHLORIDE 200 MG: 100 TABLET ORAL at 21:20

## 2023-01-04 RX ADMIN — HYDROMORPHONE HYDROCHLORIDE 0.5 MG: 1 INJECTION, SOLUTION INTRAMUSCULAR; INTRAVENOUS; SUBCUTANEOUS at 19:06

## 2023-01-04 RX ADMIN — POLYETHYLENE GLYCOL 3350 17 G: 17 POWDER, FOR SOLUTION ORAL at 08:58

## 2023-01-04 RX ADMIN — ENOXAPARIN SODIUM 40 MG: 100 INJECTION SUBCUTANEOUS at 09:03

## 2023-01-04 RX ADMIN — HYDROMORPHONE HYDROCHLORIDE 0.5 MG: 1 INJECTION, SOLUTION INTRAMUSCULAR; INTRAVENOUS; SUBCUTANEOUS at 05:15

## 2023-01-04 RX ADMIN — METHADONE HYDROCHLORIDE 80 MG: 10 TABLET ORAL at 08:59

## 2023-01-04 RX ADMIN — METHYLNALTREXONE BROMIDE 12 MG: 12 INJECTION, SOLUTION SUBCUTANEOUS at 10:30

## 2023-01-04 RX ADMIN — SODIUM CHLORIDE, PRESERVATIVE FREE 10 ML: 5 INJECTION INTRAVENOUS at 09:04

## 2023-01-04 ASSESSMENT — PAIN SCALES - GENERAL
PAINLEVEL_OUTOF10: 8
PAINLEVEL_OUTOF10: 9
PAINLEVEL_OUTOF10: 8
PAINLEVEL_OUTOF10: 9
PAINLEVEL_OUTOF10: 8
PAINLEVEL_OUTOF10: 8

## 2023-01-04 ASSESSMENT — PAIN DESCRIPTION - DESCRIPTORS: DESCRIPTORS: ACHING;DISCOMFORT;STABBING

## 2023-01-04 ASSESSMENT — PAIN DESCRIPTION - LOCATION
LOCATION: ABDOMEN
LOCATION: ABDOMEN;BACK
LOCATION: ABDOMEN

## 2023-01-04 ASSESSMENT — PAIN - FUNCTIONAL ASSESSMENT: PAIN_FUNCTIONAL_ASSESSMENT: ACTIVITIES ARE NOT PREVENTED

## 2023-01-04 NOTE — PROGRESS NOTES
INPATIENT CONSULTATION:    IDENTIFYING DATA/REASON FOR CONSULTATION   PATIENT:  Scar Da Silva  MRN:  [id number]  ADMIT DATE: 12/30/2022  TIME OF EVALUATION: 1/4/2023 8:35 AM  HOSPITAL STAY:   LOS: 5 days   CONSULTING PHYSICIAN: Dimitry Mandel MD   REASON FOR CONSULTATION: Pancreatitis    Subjective:    Patient seen and examined in follow-up. Patient reports ongoing abdominal pain located in epigastrium as well as left lower quadrant. She reports her epigastric abdominal pain is slightly improved. She is passing flatus but has not had a bowel movement in 3 days and feels constipated. She tolerated solid food yesterday evening without vomiting.     MEDICATIONS   SCHEDULED:  methylnaltrexone, 12 mg, Once  methadone, 80 mg, Daily   And  methadone, 7.5 mg, Daily  polyethylene glycol, 17 g, Daily  traZODone, 200 mg, Nightly  pantoprazole, 20 mg, BID AC  sodium chloride flush, 10 mL, 2 times per day  thiamine, 100 mg, Daily  sodium chloride flush, 5-40 mL, 2 times per day  enoxaparin, 40 mg, Daily      FLUIDS/DRIPS:     sodium chloride      sodium chloride       PRNs: HYDROmorphone, 0.5 mg, Q3H PRN  labetalol, 10 mg, Q4H PRN  hydrALAZINE, 10 mg, Q6H PRN  sodium chloride flush, 10 mL, PRN  sodium chloride, , PRN  LORazepam, 1 mg, Q1H PRN   Or  LORazepam, 1 mg, Q1H PRN   Or  LORazepam, 2 mg, Q1H PRN   Or  LORazepam, 2 mg, Q1H PRN   Or  LORazepam, 3 mg, Q1H PRN   Or  LORazepam, 3 mg, Q1H PRN   Or  LORazepam, 4 mg, Q1H PRN   Or  LORazepam, 4 mg, Q1H PRN  sodium chloride flush, 5-40 mL, PRN  sodium chloride, , PRN  ondansetron, 4 mg, Q8H PRN   Or  ondansetron, 4 mg, Q6H PRN      ALLERGIES:    Allergies   Allergen Reactions    Iodine Rash         PHYSICAL EXAM     Vitals:    01/04/23 0440 01/04/23 0515 01/04/23 0545 01/04/23 0740   BP:    130/86   Pulse:    79   Resp:  18 18 16   Temp:    97.9 °F (36.6 °C)   TempSrc:    Oral   SpO2:    96%   Weight: 150 lb 2.1 oz (68.1 kg)      Height:           I/O last 3 completed shifts: In: 5184 [P.O.:1026; I.V.:30]  Out: -     Physical Exam:  Gen: Resting in bed, NAD  HEENT: Normocephalic, atraumatic, no scleral icterus   CV: RRR no MRG   Pul: CTAB, normal work of breathing without wheezing  Abd: Good bowel sounds throughout, soft, tender to palpation epigastrium without rebound or guarding, ND, no masses, no HSM   Ext: No edema, moves all 4 extremities. Neuro: Moves all four extremities, no gross deficits, follows commands   Skin: No jaundice, spider angiomas, palmar erythema    LABS AND IMAGING   No results found for this or any previous visit (from the past 24 hour(s)). Other Labs    Imaging:  MRI ABDOMEN W WO CONTRAST   Final Result   Poor visualization of the pancreatic duct, proximal to an area pancreatic   ductal dilatation. No measurable nodule is seen. This poor visualization of   the duct could be due to occult pancreatic nodule, compression of the   pancreatic duct secondary to edema from pancreatitis, or inflammatory   stricture in the pancreatic duct. Recommend continued clinical follow-up and   possible endoscopic ultrasound if indicated clinically      Inflammatory stranding surround the pancreas, compatible with acute   pancreatitis      Fatty liver      De pendant filling defects seen in the gallbladder either sludge or stones         US GALLBLADDER RUQ   Final Result   1. Hepatic steatosis. 2. Limited evaluation of the pancreas with no obvious inflammation on current   study. CT ABDOMEN PELVIS WO CONTRAST Additional Contrast? None   Final Result   Findings are compatible with pancreatitis, with moderate peripancreatic   stranding and small amount of adjacent fluid. Pancreatic ductal dilatation   is seen within the distal pancreatic body for which an obstructing pancreatic   lesion cannot be excluded. After treatment and resolution of any acute   symptoms, pancreatic protocol CT is suggested for further evaluation. Fatty liver.       No obstructive uropathy. 2 mm right lower lobe pulmonary nodule, similar to prior. Follow-up   recommendations are as below. RECOMMENDATIONS:   Guidelines for follow-up and management of pulmonary nodules found on abdomen   CT:      <6 mm - No follow up recommend on the basis of the estimated low risk of   malignancy. Radiology 2017 http://pubs. rsna.org/doi/full/10.1148/radiol. 0262547011              ASSESSMENT AND RECOMMENDATIONS   James Sandoval is a 44-year-old female with past medical history of alcohol abuse, gastroparesis, GERD, anxiety and pancreatitis who presented to [facility] [place] 12/30/2022 with abdominal pain 2/2 pancreatitis     Acute pancreatitis: Suspect alcohol induced given history of abuse. Triglycerides elevated to 251, and imaging without evidence of cholelithiasis. Patient advised to completely avoid alcohol and continue low-fat diet as tolerated. Pancreatic duct dilation: CT abdomen/pelvis with nonspecific pancreatic ductal dilation without visible mass. MRCP ordered without evidence of obstructing mass. Patient would benefit from outpatient EUS versus CT pancreas protocol in 4-6 weeks. Alcohol hepatitis: DF <32. Patient counseled on complete cessation. Alcohol abuse: Patient counseled on complete cessation of alcohol use. Opioid induced constipation: Will dose Relistor. RECOMMENDATIONS:    -Relistor  -Low fat diet  -Outpatient EUS in 6 weeks. If you have any questions or need any further information, please feel free to contact anyone on our consult team.  Thank you for allowing us to participate in the care of James Sandoval. Juni Cho.  [address]

## 2023-01-04 NOTE — PROGRESS NOTES
Hospitalist Progress Note      PCP: No primary care provider on file. Chief Complaint. Presented to hospital for Abd pain    Date of Admission: 12/30/2022    Subjective:  no acute events overnight    Medications:  Reviewed    Infusion Medications    sodium chloride      sodium chloride       Scheduled Medications    methadone  80 mg Oral Daily    And    methadone  7.5 mg Oral Daily    polyethylene glycol  17 g Oral Daily    traZODone  200 mg Oral Nightly    pantoprazole  20 mg Oral BID AC    sodium chloride flush  10 mL IntraVENous 2 times per day    thiamine  100 mg Oral Daily    sodium chloride flush  5-40 mL IntraVENous 2 times per day    enoxaparin  40 mg SubCUTAneous Daily     PRN Meds: HYDROmorphone, labetalol, hydrALAZINE, sodium chloride flush, sodium chloride, LORazepam **OR** LORazepam **OR** LORazepam **OR** LORazepam **OR** LORazepam **OR** LORazepam **OR** LORazepam **OR** LORazepam, sodium chloride flush, sodium chloride, ondansetron **OR** ondansetron      Intake/Output Summary (Last 24 hours) at 1/4/2023 1821  Last data filed at 1/4/2023 0858  Gross per 24 hour   Intake 896 ml   Output --   Net 896 ml         Physical Exam Performed:    /78   Pulse 87   Temp 98.2 °F (36.8 °C) (Oral)   Resp 18   Ht 5' 8\" (1.727 m)   Wt 150 lb 2.1 oz (68.1 kg)   SpO2 98%   BMI 22.83 kg/m²     General appearance: appears comfortable in bed  HEENT:  Conjunctivae/corneas clear. Neck: Supple, with full range of motion. Respiratory:  Normal respiratory effort. Clear to auscultation, bilaterally without Rales/Wheezes/Rhonchi. Cardiovascular: Regular rate and rhythm with normal S1/S2 without murmurs or rubs  Abdomen: Soft, non-tender, non-distended, normal bowel sounds. Musculoskeletal: No cyanosis or edema bilaterally  Neurologic:  without any focal sensory/motor deficits.  grossly non-focal.  Psychiatric: Alert and oriented, Normal mood  Peripheral Pulses: +2 palpable, equal bilaterally Labs:   Recent Labs     01/02/23  0807   WBC 6.7   HGB 11.9*   HCT 36.0          Recent Labs     01/02/23  0807      K 4.3      CO2 26   BUN 4*   CREATININE 0.6   CALCIUM 9.1       Recent Labs     01/02/23  0807   AST 74*   ALT 35   BILITOT 0.6   ALKPHOS 119       No results for input(s): INR in the last 72 hours. No results for input(s): Peggy Highman in the last 72 hours. Urinalysis:      Lab Results   Component Value Date/Time    NITRU Negative 12/30/2022 12:15 PM    [address] 12/30/2022 12:15 PM    BACTERIA 1+ 12/30/2022 12:15 PM    RBCUA 0-2 12/30/2022 12:15 PM    BLOODU Negative 12/30/2022 12:15 PM    SPECGRAV 1.015 12/30/2022 12:15 PM    GLUCOSEU Negative 12/30/2022 12:15 PM       Radiology:  MRI ABDOMEN W WO CONTRAST   Final Result   Poor visualization of the pancreatic duct, proximal to an area pancreatic   ductal dilatation. No measurable nodule is seen. This poor visualization of   the duct could be due to occult pancreatic nodule, compression of the   pancreatic duct secondary to edema from pancreatitis, or inflammatory   stricture in the pancreatic duct. Recommend continued clinical follow-up and   possible endoscopic ultrasound if indicated clinically      Inflammatory stranding surround the pancreas, compatible with acute   pancreatitis      Fatty liver      De pendant filling defects seen in the gallbladder either sludge or stones         US GALLBLADDER RUQ   Final Result   1. Hepatic steatosis. 2. Limited evaluation of the pancreas with no obvious inflammation on current   study. CT ABDOMEN PELVIS WO CONTRAST Additional Contrast? None   Final Result   Findings are compatible with pancreatitis, with moderate peripancreatic   stranding and small amount of adjacent fluid. Pancreatic ductal dilatation   is seen within the distal pancreatic body for which an obstructing pancreatic   lesion cannot be excluded.   After treatment and resolution of any acute symptoms, pancreatic protocol CT is suggested for further evaluation. Fatty liver. No obstructive uropathy. 2 mm right lower lobe pulmonary nodule, similar to prior. Follow-up   recommendations are as below. RECOMMENDATIONS:   Guidelines for follow-up and management of pulmonary nodules found on abdomen   CT:      <6 mm - No follow up recommend on the basis of the estimated low risk of   malignancy. Radiology 2017 http://pubs. rsna.org/doi/full/10.1148/radiol. 1055465622               Assessment/Plan:    Active Hospital Problems    Diagnosis     Acute pancreatitis without infection or necrosis [K85.90]      Priority: Medium    Anxiety [F41.9]      Priority: Medium    Gastroparesis [K31.84]      Priority: Medium    GERD (gastroesophageal reflux disease) [K21.9]      Priority: Medium    Leukocytosis [D72.829]      Priority: Medium    Alcoholic cirrhosis ([address] [Q85.67]      Priority: Medium     Acute alcohol induced pancreatitis  Leukocytosis  Likely reactive  History of alcohol abuse  Alcoholic liver cirrhosis  Abnormal CT of the abdomen  Pancreatic ductal dilatation concerning for obstructive pancreatic mass  Anxiety disorder  Gastroparesis  GERD        PLAN:   IV fluids d/c  Pain control  GI consulted -MRI positive for Acute pancreatitis  IV diluadid PRN for pain control  Repeat labs in the morning  CIWA assessment with PRN ativan  Consult GI for evaluation of pancreatic mass, pancreatitis and liver cirrhosis  She will need CT pancreatic protocol in the near future once acute pancreatitis improved  Daily labs ordered. Trend lipase level  Diet: ADULT DIET;  Regular; Low Fat/Low Chol/High Fiber/RYLAND  Code Status: Full Code    PT/OT Eval Status: ordered    Dispo/Plan of care - advance diet, f/u with GI, MRI abd per GI - showed acute pancreatitis, continue iV fluids, Diet advanced to regular diet, Magda Najera MD

## 2023-01-04 NOTE — PROGRESS NOTES
Hospitalist Progress Note      PCP: No primary care provider on file. Chief Complaint. Presented to hospital for Abd pain    Date of Admission: 12/30/2022    Subjective:  complains of abdominal pain, asking for dilaudid for pain but tolerating diet and no N/V, suspect drug seeking behavior, denies chest pain, nausea, vomiting, shortness of breath, fever or chills. She had BM today      Medications:  Reviewed    Infusion Medications    sodium chloride      sodium chloride       Scheduled Medications    methadone  80 mg Oral Daily    And    methadone  7.5 mg Oral Daily    polyethylene glycol  17 g Oral Daily    traZODone  200 mg Oral Nightly    pantoprazole  20 mg Oral BID AC    sodium chloride flush  10 mL IntraVENous 2 times per day    thiamine  100 mg Oral Daily    sodium chloride flush  5-40 mL IntraVENous 2 times per day    enoxaparin  40 mg SubCUTAneous Daily     PRN Meds: HYDROmorphone, labetalol, hydrALAZINE, sodium chloride flush, sodium chloride, LORazepam **OR** LORazepam **OR** LORazepam **OR** LORazepam **OR** LORazepam **OR** LORazepam **OR** LORazepam **OR** LORazepam, sodium chloride flush, sodium chloride, ondansetron **OR** ondansetron      Intake/Output Summary (Last 24 hours) at 1/4/2023 1820  Last data filed at 1/4/2023 0858  Gross per 24 hour   Intake 896 ml   Output --   Net 896 ml         Physical Exam Performed:    /78   Pulse 87   Temp 98.2 °F (36.8 °C) (Oral)   Resp 18   Ht 5' 8\" (1.727 m)   Wt 150 lb 2.1 oz (68.1 kg)   SpO2 98%   BMI 22.83 kg/m²     General appearance: NAD, on RA, sitting in bed  HEENT:  Conjunctivae/corneas clear. Neck: Supple, with full range of motion. Respiratory:  Normal respiratory effort. Clear to auscultation, bilaterally without Rales/Wheezes/Rhonchi. Cardiovascular: Regular rate and rhythm with normal S1/S2 without murmurs or rubs  Abdomen: Soft, non-tender, non-distended, normal bowel sounds.   Musculoskeletal: No cyanosis or edema bilaterally  Neurologic:  without any focal sensory/motor deficits. grossly non-focal.  Psychiatric: Alert and oriented, Normal mood  Peripheral Pulses: +2 palpable, equal bilaterally       Labs:   Recent Labs     01/02/23  0807   WBC 6.7   HGB 11.9*   HCT 36.0          Recent Labs     01/02/23  0807      K 4.3      CO2 26   BUN 4*   CREATININE 0.6   CALCIUM 9.1       Recent Labs     01/02/23  0807   AST 74*   ALT 35   BILITOT 0.6   ALKPHOS 119       No results for input(s): INR in the last 72 hours. No results for input(s): Jac Shorts in the last 72 hours. Urinalysis:      Lab Results   Component Value Date/Time    NITRU Negative 12/30/2022 12:15 PM    [address] 12/30/2022 12:15 PM    BACTERIA 1+ 12/30/2022 12:15 PM    RBCUA 0-2 12/30/2022 12:15 PM    BLOODU Negative 12/30/2022 12:15 PM    SPECGRAV 1.015 12/30/2022 12:15 PM    GLUCOSEU Negative 12/30/2022 12:15 PM       Radiology:  MRI ABDOMEN W WO CONTRAST   Final Result   Poor visualization of the pancreatic duct, proximal to an area pancreatic   ductal dilatation. No measurable nodule is seen. This poor visualization of   the duct could be due to occult pancreatic nodule, compression of the   pancreatic duct secondary to edema from pancreatitis, or inflammatory   stricture in the pancreatic duct. Recommend continued clinical follow-up and   possible endoscopic ultrasound if indicated clinically      Inflammatory stranding surround the pancreas, compatible with acute   pancreatitis      Fatty liver      De pendant filling defects seen in the gallbladder either sludge or stones         US GALLBLADDER RUQ   Final Result   1. Hepatic steatosis. 2. Limited evaluation of the pancreas with no obvious inflammation on current   study. CT ABDOMEN PELVIS WO CONTRAST Additional Contrast? None   Final Result   Findings are compatible with pancreatitis, with moderate peripancreatic   stranding and small amount of adjacent fluid. Pancreatic ductal dilatation   is seen within the distal pancreatic body for which an obstructing pancreatic   lesion cannot be excluded. After treatment and resolution of any acute   symptoms, pancreatic protocol CT is suggested for further evaluation. Fatty liver. No obstructive uropathy. 2 mm right lower lobe pulmonary nodule, similar to prior. Follow-up   recommendations are as below. RECOMMENDATIONS:   Guidelines for follow-up and management of pulmonary nodules found on abdomen   CT:      <6 mm - No follow up recommend on the basis of the estimated low risk of   malignancy. Radiology 2017 http://pubs. rsna.org/doi/full/10.1148/radiol. 5353021416               Assessment/Plan:    Active Hospital Problems    Diagnosis     Acute pancreatitis without infection or necrosis [K85.90]      Priority: Medium    Anxiety [F41.9]      Priority: Medium    Gastroparesis [K31.84]      Priority: Medium    GERD (gastroesophageal reflux disease) [K21.9]      Priority: Medium    Leukocytosis [D72.829]      Priority: Medium    Alcoholic cirrhosis ([address] [Z79.81]      Priority: Medium     Acute alcohol induced pancreatitis  Leukocytosis  Likely reactive  History of alcohol abuse  Alcoholic liver cirrhosis  Abnormal CT of the abdomen  Pancreatic ductal dilatation concerning for obstructive pancreatic mass  Anxiety disorder  Gastroparesis  GERD        PLAN:  Advance diet to FLD  Continue IV fluids  Pain control  GI consulted - possible CT abd tomorrow  IV morphine PRN for pain control  Repeat labs in the morning  CIWA assessment with PRN ativan  Consult GI for evaluation of pancreatic mass, pancreatitis and liver cirrhosis  She will need CT pancreatic protocol in the near future once acute pancreatitis improved  Daily labs ordered. Trend lipase level  Diet: ADULT DIET;  Regular; Low Fat/Low Chol/High Fiber/RYLAND  Code Status: Full Code    PT/OT Eval Status: ordered    Dispo/Plan of care - advance diet, f/u with GI, MRI abd per GI - showed acute pancreatitis, continue iV fluids, Diet advanced to regular diet    Marlen Puga MD

## 2023-01-04 NOTE — PLAN OF CARE
Problem: Gastrointestinal - Adult  Goal: Minimal or absence of nausea and vomiting  Outcome: Progressing

## 2023-01-04 NOTE — PLAN OF CARE
Considering discharge 1/5/23.  Patient will need to no longer be on IV dilauded    Problem: Discharge Planning  Goal: Discharge to home or other facility with appropriate resources  Outcome: Adequate for Discharge     Problem: Pain  Goal: Verbalizes/displays adequate comfort level or baseline comfort level  Outcome: Adequate for Discharge     Problem: Gastrointestinal - Adult  Goal: Minimal or absence of nausea and vomiting  Outcome: Adequate for Discharge  Flowsheets (Taken 1/4/2023 0908)  Minimal or absence of nausea and vomiting: (administer Miralax per [facility]) --  Goal: Maintains adequate nutritional intake  Outcome: Adequate for Discharge     Problem: Metabolic/Fluid and Electrolytes - Adult  Goal: Electrolytes maintained within normal limits  Outcome: Adequate for Discharge  Goal: Hemodynamic stability and optimal renal function maintained  Outcome: Adequate for Discharge

## 2023-01-05 PROCEDURE — 6360000002 HC RX W HCPCS: Performed by: NURSE PRACTITIONER

## 2023-01-05 PROCEDURE — 6360000002 HC RX W HCPCS: Performed by: INTERNAL MEDICINE

## 2023-01-05 PROCEDURE — 94760 N-INVAS EAR/PLS OXIMETRY 1: CPT

## 2023-01-05 PROCEDURE — 6370000000 HC RX 637 (ALT 250 FOR IP): Performed by: INTERNAL MEDICINE

## 2023-01-05 PROCEDURE — 1200000000 HC SEMI PRIVATE

## 2023-01-05 PROCEDURE — 6370000000 HC RX 637 (ALT 250 FOR IP): Performed by: NURSE PRACTITIONER

## 2023-01-05 PROCEDURE — 2580000003 HC RX 258: Performed by: INTERNAL MEDICINE

## 2023-01-05 RX ADMIN — METHADONE HYDROCHLORIDE 80 MG: 10 TABLET ORAL at 09:03

## 2023-01-05 RX ADMIN — SODIUM CHLORIDE, PRESERVATIVE FREE 10 ML: 5 INJECTION INTRAVENOUS at 22:23

## 2023-01-05 RX ADMIN — SODIUM CHLORIDE, PRESERVATIVE FREE 10 ML: 5 INJECTION INTRAVENOUS at 09:05

## 2023-01-05 RX ADMIN — Medication 100 MG: at 08:07

## 2023-01-05 RX ADMIN — HYDROMORPHONE HYDROCHLORIDE 0.5 MG: 1 INJECTION, SOLUTION INTRAMUSCULAR; INTRAVENOUS; SUBCUTANEOUS at 14:17

## 2023-01-05 RX ADMIN — SODIUM CHLORIDE, PRESERVATIVE FREE 10 ML: 5 INJECTION INTRAVENOUS at 08:06

## 2023-01-05 RX ADMIN — TRAZODONE HYDROCHLORIDE 200 MG: 100 TABLET ORAL at 22:21

## 2023-01-05 RX ADMIN — HYDROMORPHONE HYDROCHLORIDE 0.5 MG: 1 INJECTION, SOLUTION INTRAMUSCULAR; INTRAVENOUS; SUBCUTANEOUS at 01:02

## 2023-01-05 RX ADMIN — HYDROMORPHONE HYDROCHLORIDE 0.5 MG: 1 INJECTION, SOLUTION INTRAMUSCULAR; INTRAVENOUS; SUBCUTANEOUS at 22:21

## 2023-01-05 RX ADMIN — PANTOPRAZOLE SODIUM 20 MG: 20 TABLET, DELAYED RELEASE ORAL at 08:07

## 2023-01-05 RX ADMIN — ENOXAPARIN SODIUM 40 MG: 100 INJECTION SUBCUTANEOUS at 08:07

## 2023-01-05 RX ADMIN — PANTOPRAZOLE SODIUM 20 MG: 20 TABLET, DELAYED RELEASE ORAL at 17:32

## 2023-01-05 RX ADMIN — HYDROMORPHONE HYDROCHLORIDE 0.5 MG: 1 INJECTION, SOLUTION INTRAMUSCULAR; INTRAVENOUS; SUBCUTANEOUS at 08:07

## 2023-01-05 RX ADMIN — METHADONE HYDROCHLORIDE 7.5 MG: 5 TABLET ORAL at 09:02

## 2023-01-05 RX ADMIN — HYDROMORPHONE HYDROCHLORIDE 0.5 MG: 1 INJECTION, SOLUTION INTRAMUSCULAR; INTRAVENOUS; SUBCUTANEOUS at 18:38

## 2023-01-05 RX ADMIN — HYDROMORPHONE HYDROCHLORIDE 0.5 MG: 1 INJECTION, SOLUTION INTRAMUSCULAR; INTRAVENOUS; SUBCUTANEOUS at 04:25

## 2023-01-05 ASSESSMENT — PAIN DESCRIPTION - ORIENTATION
ORIENTATION: MID
ORIENTATION: MID

## 2023-01-05 ASSESSMENT — PAIN SCALES - GENERAL
PAINLEVEL_OUTOF10: 8
PAINLEVEL_OUTOF10: 9

## 2023-01-05 ASSESSMENT — PAIN - FUNCTIONAL ASSESSMENT
PAIN_FUNCTIONAL_ASSESSMENT: PREVENTS OR INTERFERES SOME ACTIVE ACTIVITIES AND ADLS

## 2023-01-05 ASSESSMENT — PAIN DESCRIPTION - DESCRIPTORS
DESCRIPTORS: SQUEEZING
DESCRIPTORS: SQUEEZING;SHARP
DESCRIPTORS: TIGHTNESS;SQUEEZING;SHARP

## 2023-01-05 ASSESSMENT — PAIN DESCRIPTION - LOCATION
LOCATION: ABDOMEN

## 2023-01-05 NOTE — PROGRESS NOTES
INPATIENT CONSULTATION:    IDENTIFYING DATA/REASON FOR CONSULTATION   PATIENT:  Robbin Perdue  MRN:  [id number]  ADMIT DATE: 12/30/2022  TIME OF EVALUATION: 1/5/2023 6:58 AM  HOSPITAL STAY:   LOS: 6 days   CONSULTING PHYSICIAN: Ayden Bender MD   REASON FOR CONSULTATION: Pancreatitis    Subjective:    Patient seen and examined in follow-up. Patient reports abdominal pain slightly improved but still ongoing. She tolerated diet yesterday without nausea or vomiting. Following dose of Relistor, she reports large stool output which was dark green without melena or hematochezia. MEDICATIONS   SCHEDULED:  methadone, 80 mg, Daily   And  methadone, 7.5 mg, Daily  polyethylene glycol, 17 g, Daily  traZODone, 200 mg, Nightly  pantoprazole, 20 mg, BID AC  sodium chloride flush, 10 mL, 2 times per day  thiamine, 100 mg, Daily  sodium chloride flush, 5-40 mL, 2 times per day  enoxaparin, 40 mg, Daily    FLUIDS/DRIPS:     sodium chloride      sodium chloride       PRNs: HYDROmorphone, 0.5 mg, Q3H PRN  labetalol, 10 mg, Q4H PRN  hydrALAZINE, 10 mg, Q6H PRN  sodium chloride flush, 10 mL, PRN  sodium chloride, , PRN  LORazepam, 1 mg, Q1H PRN   Or  LORazepam, 1 mg, Q1H PRN   Or  LORazepam, 2 mg, Q1H PRN   Or  LORazepam, 2 mg, Q1H PRN   Or  LORazepam, 3 mg, Q1H PRN   Or  LORazepam, 3 mg, Q1H PRN   Or  LORazepam, 4 mg, Q1H PRN   Or  LORazepam, 4 mg, Q1H PRN  sodium chloride flush, 5-40 mL, PRN  sodium chloride, , PRN  ondansetron, 4 mg, Q8H PRN   Or  ondansetron, 4 mg, Q6H PRN    ALLERGIES:    Allergies   Allergen Reactions    Iodine Rash         PHYSICAL EXAM     Vitals:    01/05/23 0421 01/05/23 0425 01/05/23 0455 01/05/23 0549   BP: 103/71      Pulse: 72      Resp: 20 18 16    Temp: 97.7 °F (36.5 °C)      TempSrc: Oral      SpO2: 94%      Weight:    150 lb 12.7 oz (68.4 kg)   Height:           I/O last 3 completed shifts:   In: 6215 [P.O.:1086; I.V.:10]  Out: -     Physical Exam:  Gen: Resting in bed, NAD  HEENT: Normocephalic, atraumatic, no scleral icterus   CV: RRR no MRG   Pul: CTAB, normal work of breathing without wheezing  Abd: Good bowel sounds throughout, soft, tender to palpation epigastrium without rebound or guarding, ND, no masses, no HSM   Ext: No edema, moves all 4 extremities. Neuro: Moves all four extremities, no gross deficits, follows commands   Skin: No jaundice, + spider angiomas, no palmar erythema    LABS AND IMAGING   No results found for this or any previous visit (from the past 24 hour(s)). Other Labs    Imaging:  MRI ABDOMEN W WO CONTRAST   Final Result   Poor visualization of the pancreatic duct, proximal to an area pancreatic   ductal dilatation. No measurable nodule is seen. This poor visualization of   the duct could be due to occult pancreatic nodule, compression of the   pancreatic duct secondary to edema from pancreatitis, or inflammatory   stricture in the pancreatic duct. Recommend continued clinical follow-up and   possible endoscopic ultrasound if indicated clinically      Inflammatory stranding surround the pancreas, compatible with acute   pancreatitis      Fatty liver      De pendant filling defects seen in the gallbladder either sludge or stones         US GALLBLADDER RUQ   Final Result   1. Hepatic steatosis. 2. Limited evaluation of the pancreas with no obvious inflammation on current   study. CT ABDOMEN PELVIS WO CONTRAST Additional Contrast? None   Final Result   Findings are compatible with pancreatitis, with moderate peripancreatic   stranding and small amount of adjacent fluid. Pancreatic ductal dilatation   is seen within the distal pancreatic body for which an obstructing pancreatic   lesion cannot be excluded. After treatment and resolution of any acute   symptoms, pancreatic protocol CT is suggested for further evaluation. Fatty liver. No obstructive uropathy. 2 mm right lower lobe pulmonary nodule, similar to prior.   Follow-up   recommendations are as below.      RECOMMENDATIONS:   Guidelines for follow-up and management of pulmonary nodules found on abdomen   CT:      <6 mm - No follow up recommend on the basis of the estimated low risk of   malignancy. Radiology 2017 http://pubs. rsna.org/doi/full/10.1148/radiol. 8449548027              ASSESSMENT AND RECOMMENDATIONS   Karely Harrison is a 79-year-old female with past medical history of alcohol abuse, gastroparesis, GERD, anxiety and pancreatitis who presented to [facility] [place] 12/30/2022 with abdominal pain 2/2 pancreatitis     Acute pancreatitis: Suspect alcohol induced given history of abuse. Triglycerides elevated to 251, and imaging without evidence of cholelithiasis. Patient advised to completely avoid alcohol and continue low-fat diet as tolerated. Pancreatic duct dilation: CT abdomen/pelvis with nonspecific pancreatic ductal dilation without visible mass. MRCP ordered without evidence of obstructing mass. Patient would benefit from outpatient EUS versus CT pancreas protocol in 4-6 weeks. Alcohol hepatitis: DF <32. Patient counseled on complete cessation. Alcohol abuse: Patient counseled on complete cessation of alcohol use. Opioid induced constipation: Will dose Relistor. RECOMMENDATIONS:    -Low fat diet  -Outpatient EUS in 6 weeks.  -Okay to discharge from GI standpoint when pain is controlled. If you have any questions or need any further information, please feel free to contact anyone on our consult team.  Thank you for allowing us to participate in the care of Karely Harrison. Glenis Izquierdo.  [address]

## 2023-01-05 NOTE — PLAN OF CARE
Problem: Discharge Planning  Goal: Discharge to home or other facility with appropriate resources  Outcome: Progressing  Flowsheets (Taken 1/5/2023 0745)  Discharge to home or other facility with appropriate resources:   Identify barriers to discharge with patient and caregiver   Arrange for needed discharge resources and transportation as appropriate   Identify discharge learning needs (meds, wound care, etc)     Problem: Pain  Goal: Verbalizes/displays adequate comfort level or baseline comfort level  Outcome: Progressing     Problem: Gastrointestinal - Adult  Goal: Minimal or absence of nausea and vomiting  Outcome: Progressing  Flowsheets (Taken 1/5/2023 0745)  Minimal or absence of nausea and vomiting:   Administer IV fluids as ordered to ensure adequate hydration   Provide nonpharmacologic comfort measures as appropriate   Administer ordered antiemetic medications as needed  Goal: Maintains adequate nutritional intake  Outcome: Progressing     Problem: Metabolic/Fluid and Electrolytes - Adult  Goal: Electrolytes maintained within normal limits  Outcome: Progressing  Flowsheets (Taken 1/5/2023 0745)  Electrolytes maintained within normal limits:   Monitor labs and assess patient for signs and symptoms of electrolyte imbalances   Administer electrolyte replacement as ordered   Monitor response to electrolyte replacements, including repeat lab results as appropriate  Goal: Hemodynamic stability and optimal renal function maintained  Outcome: Progressing  Flowsheets (Taken 1/5/2023 0745)  Hemodynamic stability and optimal renal function maintained:   Monitor labs and assess for signs and symptoms of volume excess or deficit   Monitor intake, output and patient weight

## 2023-01-05 NOTE — PLAN OF CARE
Problem: Discharge Planning  Goal: Discharge to home or other facility with appropriate resources  1/5/2023 0147 by Alberto Rich RN  Outcome: Progressing  1/4/2023 1620 by Ernesto Conner RN  Outcome: Adequate for Discharge

## 2023-01-05 NOTE — PROGRESS NOTES
Hospital Medicine Progress Note      Admit Date: 12/30/2022       CC: F/U for abd pain     HPI:    Michelle Rom is 30 y.o. female with history of alcohol abuse and alcoholic cirrhosis  She now presents to the ER with complaint of epigastric and left upper quadrant abdominal pain . Abdominal pain radiates to the back, constant and better and worse with nothing  She also has nausea and vomiting and unable to tolerate oral intake  It felt it was one of her pancreatitis. She has not been drinking for a while but restarted drinking about 5 to 6 days ago  She had a shot of vodka yesterday and since then her abdominal pain has intensified  In the ED lipase is elevated  CT of the abdomen with suspected pancreatitis ductal dilation  She is admitted for further management of acute alcohol induced pancreatitis    Interval History/Subjective: patient feeling somewhat better. Titrating IV dilaudid down as able. Tolerating PO. Had bm yesterday after relistor given. Review of Systems:       The patient denied headaches, visual changes, LOC, SOB, CP, ABD pain, N/V/D, skin changes, new or worsening weakness or neuromuscular deficits. Comprehensive ROS negative except as mentioned above. Past Medical History:        Diagnosis Date    Anxiety     Gastroparesis     GERD (gastroesophageal reflux disease)     Scoliosis        Past Surgical History:    History reviewed. No pertinent surgical history. Allergies:  Iodine    Past medical and surgical history reviewed. Any changes have been noted. PHYSICAL EXAM:  /74   Pulse 80   Temp 98 °F (36.7 °C) (Oral)   Resp 18   Ht 5' 8\" (1.727 m)   Wt 150 lb 12.7 oz (68.4 kg)   SpO2 96%   BMI 22.93 kg/m²       Intake/Output Summary (Last 24 hours) at 1/5/2023 1034  Last data filed at 1/4/2023 2208  Gross per 24 hour   Intake 610 ml   Output --   Net 610 ml        General appearance:   No apparent distress, appears stated age. Cooperative.   HEENT:  Normocephalic, atraumatic. PERRLA. EOMi. Conjunctivae/corneas clear, no icterus, non-injected. Neck: Supple, with full range of motion. No jugular venous distention. Trachea midline. Respiratory:  Normal respiratory effort. Clear to auscultation, bilaterally without Rales/Wheezes/Rhonchi. Cardiovascular:  Regular rate and rhythm without murmurs, rubs or gallops. Abdomen: Soft, non-tender, non-distended, without rebound or guarding. Normal bowel sounds. Musculoskeletal:  No clubbing, cyanosis or edema bilaterally. Full range of motion without deformity. Skin: Skin color, texture, turgor normal.  No rashes or lesions. Neurologic:  Neurovascularly intact without any focal sensory/motor deficits. Cranial nerves: II-XII intact, grossly intact. No facial asymmetry, tongue midline. Psychiatric:  Alert and oriented, thought content appropriate  Capillary Refill: Brisk,< 3 seconds   Peripheral Pulses: +2 palpable, equal bilaterally       LABS:    Lab Results   Component Value Date    WBC 6.7 01/02/2023    HGB 11.9 (L) 01/02/2023    HCT 36.0 01/02/2023    MCV 98.2 01/02/2023     01/02/2023    LYMPHOPCT 21.0 01/02/2023    RBC 3.66 (L) 01/02/2023    MCH 32.4 01/02/2023    MCHC 33.0 01/02/2023    RDW 15.2 01/02/2023       Lab Results   Component Value Date    CREATININE 0.6 01/02/2023    BUN 4 (L) 01/02/2023     01/02/2023    K 4.3 01/02/2023     01/02/2023    CO2 26 01/02/2023       Lab Results   Component Value Date/Time    MG 1.70 01/02/2023 08:07 AM       Lab Results   Component Value Date    ALT 35 01/02/2023    AST 74 (H) 01/02/2023    ALKPHOS 119 01/02/2023    BILITOT 0.6 01/02/2023        No flowsheet data found. No results found for: LABA1C    Imaging:  MRI ABDOMEN W WO CONTRAST   Final Result   Poor visualization of the pancreatic duct, proximal to an area pancreatic   ductal dilatation. No measurable nodule is seen.   This poor visualization of   the duct could be due to occult pancreatic nodule, compression of the   pancreatic duct secondary to edema from pancreatitis, or inflammatory   stricture in the pancreatic duct. Recommend continued clinical follow-up and   possible endoscopic ultrasound if indicated clinically      Inflammatory stranding surround the pancreas, compatible with acute   pancreatitis      Fatty liver      De pendant filling defects seen in the gallbladder either sludge or stones         US GALLBLADDER RUQ   Final Result   1. Hepatic steatosis. 2. Limited evaluation of the pancreas with no obvious inflammation on current   study. CT ABDOMEN PELVIS WO CONTRAST Additional Contrast? None   Final Result   Findings are compatible with pancreatitis, with moderate peripancreatic   stranding and small amount of adjacent fluid. Pancreatic ductal dilatation   is seen within the distal pancreatic body for which an obstructing pancreatic   lesion cannot be excluded. After treatment and resolution of any acute   symptoms, pancreatic protocol CT is suggested for further evaluation. Fatty liver. No obstructive uropathy. 2 mm right lower lobe pulmonary nodule, similar to prior. Follow-up   recommendations are as below. RECOMMENDATIONS:   Guidelines for follow-up and management of pulmonary nodules found on abdomen   CT:      <6 mm - No follow up recommend on the basis of the estimated low risk of   malignancy. Radiology 2017 http://pubs. rsna.org/doi/full/10.1148/radiol. 0229477062             Scheduled and prn Medications:    Scheduled Meds:   methadone  80 mg Oral Daily    And    methadone  7.5 mg Oral Daily    polyethylene glycol  17 g Oral Daily    traZODone  200 mg Oral Nightly    pantoprazole  20 mg Oral BID AC    sodium chloride flush  10 mL IntraVENous 2 times per day    thiamine  100 mg Oral Daily    sodium chloride flush  5-40 mL IntraVENous 2 times per day    enoxaparin  40 mg SubCUTAneous Daily     Continuous Infusions:   sodium chloride      sodium chloride       PRN Meds:. HYDROmorphone, labetalol, hydrALAZINE, sodium chloride flush, sodium chloride, LORazepam **OR** LORazepam **OR** LORazepam **OR** LORazepam **OR** LORazepam **OR** LORazepam **OR** LORazepam **OR** LORazepam, sodium chloride flush, sodium chloride, ondansetron **OR** ondansetron    Assessment & Plan:         Acute pancreatitis without infection or necrosis [K85.90]         Priority: Medium    Anxiety [F41.9]         Priority: Medium    Gastroparesis [K31.84]         Priority: Medium    GERD (gastroesophageal reflux disease) [K21.9]         Priority: Medium    Leukocytosis [D72.829]         Priority: Medium    Alcoholic cirrhosis ([facility] [address] [E67.42]         Priority: Medium      Acute alcohol induced pancreatitis  Leukocytosis  Likely reactive  History of alcohol abuse  Alcoholic liver cirrhosis  Abnormal CT of the abdomen  Pancreatic ductal dilatation concerning for obstructive pancreatic mass  Anxiety disorder  Gastroparesis  GERD        PLAN:   IV fluids d/c  Pain control  GI consulted -MRI positive for Acute pancreatitis  IV diluadid PRN for pain control  Repeat labs in the morning  CIWA assessment with PRN ativan  Consult GI for evaluation of pancreatic mass, pancreatitis and liver cirrhosis  She will need CT pancreatic protocol in the near future once acute pancreatitis improved  - ok for d/c per GI when pain is controlled   - outpatient EUS in 6 wks. - low fat diet     Continue current regimen/therapies. Monitor. Adjust medical regimen as appropriate. Body mass index is 22.93 kg/m². The patient and / or the family were informed of the results of any tests, a time was given to answer questions, a plan was proposed and they agreed with plan. DVT ppx: lovenox  GI ppx: protonix    Diet: ADULT DIET;  Regular; Low Fat/Low Chol/High Fiber/RYLAND    Consults:  IP CONSULT TO GI  IP CONSULT TO SOCIAL WORK    DISPO/placement plan: pending    Code Status: Full Code      PETER Yanes - CNP  01/05/23

## 2023-01-06 VITALS
BODY MASS INDEX: 22.82 KG/M2 | TEMPERATURE: 98.4 F | HEIGHT: 68 IN | RESPIRATION RATE: 16 BRPM | OXYGEN SATURATION: 98 % | WEIGHT: 150.57 LBS | SYSTOLIC BLOOD PRESSURE: 98 MMHG | DIASTOLIC BLOOD PRESSURE: 61 MMHG | HEART RATE: 75 BPM

## 2023-01-06 PROCEDURE — 2580000003 HC RX 258: Performed by: INTERNAL MEDICINE

## 2023-01-06 PROCEDURE — 6370000000 HC RX 637 (ALT 250 FOR IP): Performed by: INTERNAL MEDICINE

## 2023-01-06 PROCEDURE — 94760 N-INVAS EAR/PLS OXIMETRY 1: CPT

## 2023-01-06 PROCEDURE — 6360000002 HC RX W HCPCS: Performed by: INTERNAL MEDICINE

## 2023-01-06 PROCEDURE — 6360000002 HC RX W HCPCS: Performed by: NURSE PRACTITIONER

## 2023-01-06 RX ORDER — PANTOPRAZOLE SODIUM 20 MG/1
20 TABLET, DELAYED RELEASE ORAL
Qty: 30 TABLET | Refills: 3 | Status: SHIPPED | OUTPATIENT
Start: 2023-01-06

## 2023-01-06 RX ORDER — DICYCLOMINE HYDROCHLORIDE 10 MG/1
10 CAPSULE ORAL 4 TIMES DAILY
Qty: 30 CAPSULE | Refills: 1 | Status: SHIPPED | OUTPATIENT
Start: 2023-01-06

## 2023-01-06 RX ORDER — METHOCARBAMOL 500 MG/1
500 TABLET, FILM COATED ORAL 4 TIMES DAILY
Qty: 40 TABLET | Refills: 0 | Status: SHIPPED | OUTPATIENT
Start: 2023-01-06 | End: 2023-01-16

## 2023-01-06 RX ADMIN — METHADONE HYDROCHLORIDE 7.5 MG: 5 TABLET ORAL at 08:59

## 2023-01-06 RX ADMIN — PANTOPRAZOLE SODIUM 20 MG: 20 TABLET, DELAYED RELEASE ORAL at 05:49

## 2023-01-06 RX ADMIN — HYDROMORPHONE HYDROCHLORIDE 0.5 MG: 1 INJECTION, SOLUTION INTRAMUSCULAR; INTRAVENOUS; SUBCUTANEOUS at 14:51

## 2023-01-06 RX ADMIN — HYDROMORPHONE HYDROCHLORIDE 0.5 MG: 1 INJECTION, SOLUTION INTRAMUSCULAR; INTRAVENOUS; SUBCUTANEOUS at 10:02

## 2023-01-06 RX ADMIN — HYDROMORPHONE HYDROCHLORIDE 0.5 MG: 1 INJECTION, SOLUTION INTRAMUSCULAR; INTRAVENOUS; SUBCUTANEOUS at 01:46

## 2023-01-06 RX ADMIN — HYDROMORPHONE HYDROCHLORIDE 0.5 MG: 1 INJECTION, SOLUTION INTRAMUSCULAR; INTRAVENOUS; SUBCUTANEOUS at 05:49

## 2023-01-06 RX ADMIN — PANTOPRAZOLE SODIUM 20 MG: 20 TABLET, DELAYED RELEASE ORAL at 14:54

## 2023-01-06 RX ADMIN — SODIUM CHLORIDE, PRESERVATIVE FREE 10 ML: 5 INJECTION INTRAVENOUS at 09:00

## 2023-01-06 RX ADMIN — METHADONE HYDROCHLORIDE 80 MG: 10 TABLET ORAL at 08:58

## 2023-01-06 RX ADMIN — ENOXAPARIN SODIUM 40 MG: 100 INJECTION SUBCUTANEOUS at 08:59

## 2023-01-06 RX ADMIN — Medication 100 MG: at 08:59

## 2023-01-06 ASSESSMENT — PAIN DESCRIPTION - LOCATION
LOCATION: ABDOMEN

## 2023-01-06 ASSESSMENT — PAIN DESCRIPTION - DESCRIPTORS
DESCRIPTORS: SQUEEZING;SHARP
DESCRIPTORS: CRAMPING

## 2023-01-06 ASSESSMENT — PAIN SCALES - GENERAL
PAINLEVEL_OUTOF10: 7
PAINLEVEL_OUTOF10: 8
PAINLEVEL_OUTOF10: 8
PAINLEVEL_OUTOF10: 7

## 2023-01-06 ASSESSMENT — PAIN DESCRIPTION - ORIENTATION: ORIENTATION: MID

## 2023-01-06 ASSESSMENT — PAIN - FUNCTIONAL ASSESSMENT
PAIN_FUNCTIONAL_ASSESSMENT: PREVENTS OR INTERFERES SOME ACTIVE ACTIVITIES AND ADLS
PAIN_FUNCTIONAL_ASSESSMENT: PREVENTS OR INTERFERES SOME ACTIVE ACTIVITIES AND ADLS

## 2023-01-06 NOTE — PROGRESS NOTES
INPATIENT CONSULTATION:    IDENTIFYING DATA/REASON FOR CONSULTATION   PATIENT:  Sheba Joel  MRN:  [id number]  ADMIT DATE: 12/30/2022  TIME OF EVALUATION: 1/6/2023 6:49 AM  HOSPITAL STAY:   LOS: 7 days   CONSULTING PHYSICIAN: Srini Cain MD   REASON FOR CONSULTATION: Pancreatitis    Subjective:    Patient seen and examined in follow-up. Patient reports abdominal pain improved . She tolerated diet yesterday without nausea or vomiting. She is okay to go home today. MEDICATIONS   SCHEDULED:  methadone, 80 mg, Daily   And  methadone, 7.5 mg, Daily  polyethylene glycol, 17 g, Daily  traZODone, 200 mg, Nightly  pantoprazole, 20 mg, BID AC  sodium chloride flush, 10 mL, 2 times per day  thiamine, 100 mg, Daily  sodium chloride flush, 5-40 mL, 2 times per day  enoxaparin, 40 mg, Daily    FLUIDS/DRIPS:     sodium chloride      sodium chloride       PRNs: HYDROmorphone, 0.5 mg, Q4H PRN  labetalol, 10 mg, Q4H PRN  hydrALAZINE, 10 mg, Q6H PRN  sodium chloride flush, 10 mL, PRN  sodium chloride, , PRN  LORazepam, 1 mg, Q1H PRN   Or  LORazepam, 1 mg, Q1H PRN   Or  LORazepam, 2 mg, Q1H PRN   Or  LORazepam, 2 mg, Q1H PRN   Or  LORazepam, 3 mg, Q1H PRN   Or  LORazepam, 3 mg, Q1H PRN   Or  LORazepam, 4 mg, Q1H PRN   Or  LORazepam, 4 mg, Q1H PRN  sodium chloride flush, 5-40 mL, PRN  sodium chloride, , PRN  ondansetron, 4 mg, Q8H PRN   Or  ondansetron, 4 mg, Q6H PRN    ALLERGIES:    Allergies   Allergen Reactions    Iodine Rash         PHYSICAL EXAM     Vitals:    01/05/23 2251 01/06/23 0216 01/06/23 0446 01/06/23 0542   BP:   107/72    Pulse:   71    Resp: 18 16 19    Temp:   97.8 °F (36.6 °C)    TempSrc:   Oral    SpO2:   95%    Weight:    150 lb 9.2 oz (68.3 kg)   Height:           I/O last 3 completed shifts: In: 4617 [P.O.:1800;  I.V.:10]  Out: -     Physical Exam:  Gen: Resting in bed, NAD  HEENT: Normocephalic, atraumatic, no scleral icterus   CV: RRR no MRG   Pul: CTAB, normal work of breathing without wheezing  Abd: Good bowel sounds throughout, soft, tender to palpation epigastrium without rebound or guarding, ND, no masses, no HSM   Ext: No edema, moves all 4 extremities. Neuro: Moves all four extremities, no gross deficits, follows commands   Skin: No jaundice, + spider angiomas, no palmar erythema    LABS AND IMAGING   No results found for this or any previous visit (from the past 24 hour(s)). Other Labs    Imaging:  MRI ABDOMEN W WO CONTRAST   Final Result   Poor visualization of the pancreatic duct, proximal to an area pancreatic   ductal dilatation. No measurable nodule is seen. This poor visualization of   the duct could be due to occult pancreatic nodule, compression of the   pancreatic duct secondary to edema from pancreatitis, or inflammatory   stricture in the pancreatic duct. Recommend continued clinical follow-up and   possible endoscopic ultrasound if indicated clinically      Inflammatory stranding surround the pancreas, compatible with acute   pancreatitis      Fatty liver      De pendant filling defects seen in the gallbladder either sludge or stones         US GALLBLADDER RUQ   Final Result   1. Hepatic steatosis. 2. Limited evaluation of the pancreas with no obvious inflammation on current   study. CT ABDOMEN PELVIS WO CONTRAST Additional Contrast? None   Final Result   Findings are compatible with pancreatitis, with moderate peripancreatic   stranding and small amount of adjacent fluid. Pancreatic ductal dilatation   is seen within the distal pancreatic body for which an obstructing pancreatic   lesion cannot be excluded. After treatment and resolution of any acute   symptoms, pancreatic protocol CT is suggested for further evaluation. Fatty liver. No obstructive uropathy. 2 mm right lower lobe pulmonary nodule, similar to prior. Follow-up   recommendations are as below.       RECOMMENDATIONS:   Guidelines for follow-up and management of pulmonary nodules found on abdomen   CT:      <6 mm - No follow up recommend on the basis of the estimated low risk of   malignancy. Radiology 2017 http://pubs. rsna.org/doi/full/10.1148/radiol. 7857500419              ASSESSMENT AND RECOMMENDATIONS   Michelle Curran is a 30-year-old female with past medical history of alcohol abuse, gastroparesis, GERD, anxiety and pancreatitis who presented to [facility] [place] 12/30/2022 with abdominal pain 2/2 pancreatitis     Acute pancreatitis: Suspect alcohol induced given history of abuse. Triglycerides elevated to 251, and imaging without evidence of cholelithiasis. Patient advised to completely avoid alcohol and continue low-fat diet as tolerated. Pancreatic duct dilation: CT abdomen/pelvis with nonspecific pancreatic ductal dilation without visible mass. MRCP ordered without evidence of obstructing mass. Patient would benefit from outpatient EUS versus CT pancreas protocol in 4-6 weeks. Alcohol hepatitis: DF <32. Patient counseled on complete cessation. Alcohol abuse: Patient counseled on complete cessation of alcohol use. Opioid induced constipation: Will dose Relistor. RECOMMENDATIONS:    -Low fat diet  -Outpatient EUS in 6 weeks.  -Okay to discharge from GI standpoint when pain is controlled. Thank you for allowing me to participate in this patient's care. No further GI work-up needed at this time. We will sign off, however please contact us with any additional questions at [phone]. Loreto Hanson.  [address]

## 2023-01-06 NOTE — PROGRESS NOTES
Patient discharged as per order. Discharge instructions, follow-ups and  prescriptions reviewed with patient. Patient informed the importance of medication compliance and medication pick-up location. Patient verbalized understanding. IV removed, catheter intact, site unremarkable. Cardiac monitor removed     Patient left ambulating, refused wheelchair offered. Patient left with all her belongings. All questions were answered.

## 2023-01-06 NOTE — CARE COORDINATION
1/6 Plan: Home with 2 sons. Not Int. in ETOH Rehab. Goes to [place] [address]. Still on IV Dilaudid Q4h. Has transport.  Electronically signed by Michele Jorgensen RN on 1/6/2023 at 9:40 AM

## 2023-01-06 NOTE — PROGRESS NOTES
Patient alert and oriented x 4  Vitals stable on room air  No sign of distress noted     Ambulatory  Gait steady  Able to make need known     PRN pain medication given     Patient updated on plan of care     Call light within reach

## 2023-01-06 NOTE — PLAN OF CARE
Problem: Discharge Planning  Goal: Discharge to home or other facility with appropriate resources  Outcome: Progressing  Flowsheets (Taken 1/6/2023 1008)  Discharge to home or other facility with appropriate resources:   Identify barriers to discharge with patient and caregiver   Arrange for needed discharge resources and transportation as appropriate   Identify discharge learning needs (meds, wound care, etc)     Problem: Pain  Goal: Verbalizes/displays adequate comfort level or baseline comfort level  Outcome: Progressing     Problem: Gastrointestinal - Adult  Goal: Minimal or absence of nausea and vomiting  Outcome: Progressing  Goal: Maintains adequate nutritional intake  Outcome: Progressing     Problem: Metabolic/Fluid and Electrolytes - Adult  Goal: Electrolytes maintained within normal limits  Outcome: Progressing  Flowsheets (Taken 1/6/2023 1008)  Electrolytes maintained within normal limits: Monitor labs and assess patient for signs and symptoms of electrolyte imbalances  Goal: Hemodynamic stability and optimal renal function maintained  Outcome: Progressing     Problem: Safety - Adult  Goal: Free from fall injury  Outcome: Progressing

## 2023-01-06 NOTE — DISCHARGE SUMMARY
Hospital Medicine Discharge Summary    Patient ID: Agustin Mariscal      Patient's PCP: No primary care provider on file. Admit Date: 12/30/2022     Discharge Date:   1/6/23    Admitting Physician: Toney Martinez MD     Discharge Physician: PETER Sharp CNP       Discharge Diagnoses: Active Hospital Problems    Diagnosis Date Noted    Acute pancreatitis without infection or necrosis [K85.90] 12/30/2022     Priority: Medium    Anxiety [F41.9] 12/30/2022     Priority: Medium    Gastroparesis [K31.84] 12/30/2022     Priority: Medium    GERD (gastroesophageal reflux disease) [K21.9] 12/30/2022     Priority: Medium    Leukocytosis [D72.829] 12/30/2022     Priority: Medium    Alcoholic cirrhosis ([address] [L10.97] 04/09/2021     Priority: Medium       The patient was seen and examined on day of discharge and this discharge summary is in conjunction with any daily progress note from day of discharge. Disposition:  [x] Home  [] Home with home health [] Rehab [] Psych [] SNF  [] LTAC  [] Long term nursing home or group home [] Transfer to ICU  [] Transfer to PCU [] Other:    Hospital Course: Agustin Mariscal is 30 y.o. female with history of alcohol abuse and alcoholic cirrhosis  She now presents to the ER with complaint of epigastric and left upper quadrant abdominal pain . Abdominal pain radiates to the back, constant and better and worse with nothing  She also has nausea and vomiting and unable to tolerate oral intake  It felt it was one of her pancreatitis.   She has not been drinking for a while but restarted drinking about 5 to 6 days ago  She had a shot of vodka yesterday and since then her abdominal pain has intensified  In the ED lipase is elevated  CT of the abdomen with suspected pancreatitis ductal dilation  She is admitted for further management of acute alcohol induced pancreatitis                      Acute pancreatitis without infection or necrosis [K85.90]         Priority: Medium    Anxiety [F41.9]         Priority: Medium    Gastroparesis [K31.84]         Priority: Medium    GERD (gastroesophageal reflux disease) [K21.9]         Priority: Medium    Leukocytosis [D72.829]         Priority: Medium    Alcoholic cirrhosis (HCC) [J86.81]         Priority: Medium      Acute alcohol induced pancreatitis  Leukocytosis  Likely reactive  History of alcohol abuse  Alcoholic liver cirrhosis  Abnormal CT of the abdomen  Pancreatic ductal dilatation concerning for obstructive pancreatic mass  Anxiety disorder  Gastroparesis  GERD  Methadone use        PLAN:   IV fluids d/c  Pain control  GI consulted -MRI positive for Acute pancreatitis  IV diluadid PRN for pain control  Repeat labs in the morning  CIWA assessment with PRN ativan  Consult GI for evaluation of pancreatic mass, pancreatitis and liver cirrhosis  She will need CT pancreatic protocol in the near future once acute pancreatitis improved  - ok for d/c per GI when pain is controlled   - outpatient EUS in 6 wks. - low fat diet   - ok for discharge. Cont methadone on d/c. Unable to give narcotic pain meds on d/c, but can take robaxin, bentyl, protonix for pain with methadone. Refill sent to pharm for trazedone per request.   F/u GI outpatient as needed           Exam:     BP 98/61   Pulse 75   Temp 98.4 °F (36.9 °C) (Oral)   Resp 18   Ht 5' 8\" (1.727 m)   Wt 150 lb 9.2 oz (68.3 kg)   SpO2 98%   BMI 22.89 kg/m²   General appearance: No apparent distress, appears stated age and cooperative. HEENT: Pupils equal, round, and reactive to light. Conjunctivae/corneas clear. Neck: Supple, with full range of motion. No jugular venous distention. Trachea midline. Respiratory:  Normal respiratory effort. Clear to auscultation, bilaterally without Rales/Wheezes/Rhonchi. Cardiovascular: Regular rate and rhythm with normal S1/S2 without murmurs, rubs or gallops.   Abdomen: Soft, non-tender, non-distended with normal bowel sounds. Musculoskelatal: No clubbing, cyanosis or edema bilaterally. Full range of motion without deformity. Skin: Skin color, texture, turgor normal.  No rashes or lesions. Neurologic:  Neurovascularly intact without any focal sensory/motor deficits. Cranial nerves: II-XII intact, grossly non-focal.  Psychiatric: Alert and oriented, thought content appropriate, normal insight      Consults:     IP CONSULT TO GI  IP CONSULT TO SOCIAL WORK    Diagnostic tests:    Interval History/Subjective:    Imaging:  MRI ABDOMEN W WO CONTRAST   Final Result   Poor visualization of the pancreatic duct, proximal to an area pancreatic   ductal dilatation. No measurable nodule is seen. This poor visualization of   the duct could be due to occult pancreatic nodule, compression of the   pancreatic duct secondary to edema from pancreatitis, or inflammatory   stricture in the pancreatic duct. Recommend continued clinical follow-up and   possible endoscopic ultrasound if indicated clinically       Inflammatory stranding surround the pancreas, compatible with acute   pancreatitis       Fatty liver       De pendant filling defects seen in the gallbladder either sludge or stones           US GALLBLADDER RUQ   Final Result   1. Hepatic steatosis. 2. Limited evaluation of the pancreas with no obvious inflammation on current   study. CT ABDOMEN PELVIS WO CONTRAST Additional Contrast? None   Final Result   Findings are compatible with pancreatitis, with moderate peripancreatic   stranding and small amount of adjacent fluid. Pancreatic ductal dilatation   is seen within the distal pancreatic body for which an obstructing pancreatic   lesion cannot be excluded. After treatment and resolution of any acute   symptoms, pancreatic protocol CT is suggested for further evaluation. Fatty liver. No obstructive uropathy. 2 mm right lower lobe pulmonary nodule, similar to prior.   Follow-up   recommendations are as below.       RECOMMENDATIONS:   Guidelines for follow-up and management of pulmonary nodules found on abdomen   CT:       <6 mm - No follow up recommend on the basis of the estimated low risk of   malignancy. Radiology 2017 http://pubs. rsna.org/doi/full/10.1148/radiol. 8371489088              Labs: For convenience and continuity at follow-up the following most recent labs are provided:      CBC:    Lab Results   Component Value Date/Time    WBC 6.7 01/02/2023 08:07 AM    HGB 11.9 01/02/2023 08:07 AM    HCT 36.0 01/02/2023 08:07 AM     01/02/2023 08:07 AM       Renal:    Lab Results   Component Value Date/Time     01/02/2023 08:07 AM    K 4.3 01/02/2023 08:07 AM    K 3.9 12/30/2022 12:30 PM     01/02/2023 08:07 AM    CO2 26 01/02/2023 08:07 AM    BUN 4 01/02/2023 08:07 AM    CREATININE 0.6 01/02/2023 08:07 AM    CALCIUM 9.1 01/02/2023 08:07 AM    PHOS 3.3 12/31/2022 08:53 AM           Discharge Instructions/Follow-up:  refilled meds as above. Rx given for robaxin and bentyl for abd pain. Take with your methadone. Unable to give narcotics on discharge     PCP/SNF to follow up: pcp 1 wk/ f/u with GI as needed. D/C condition: stable     Code status: full      Discharge Medications:     Current Discharge Medication List             Details   ! ! pantoprazole (PROTONIX) 20 MG tablet Take 1 tablet by mouth 2 times daily (before meals)  Qty: 30 tablet, Refills: 3      methocarbamol (ROBAXIN) 500 MG tablet Take 1 tablet by mouth 4 times daily for 10 days  Qty: 40 tablet, Refills: 0      dicyclomine (BENTYL) 10 MG capsule Take 1 capsule by mouth 4 times daily  Qty: 30 capsule, Refills: 1       !! - Potential duplicate medications found. Please discuss with provider. Details   ! ! pantoprazole (PROTONIX) 20 MG tablet Take 20 mg by mouth 2 times daily      traZODone (DESYREL) 100 MG tablet Take 200 mg by mouth nightly      METHADONE HCL PO Take 87 mg by mouth daily       ! ! - Potential duplicate medications found. Please discuss with provider. Time Spent on discharge is more than 30 minutes in the examination, evaluation, counseling and review of medications and discharge plan. Signed:    PETER Lr CNP   1/6/2023      Thank you No primary care provider on file. for the opportunity to be involved in this patient's care. If you have any questions or concerns please feel free to contact me at [phone].

## 2023-08-05 ENCOUNTER — HOSPITAL ENCOUNTER (EMERGENCY)
Age: 38
Discharge: LEFT BEFORE DISCHARGE | End: 2023-08-05
Attending: EMERGENCY MEDICINE

## 2023-08-05 VITALS
WEIGHT: 167.99 LBS | SYSTOLIC BLOOD PRESSURE: 142 MMHG | TEMPERATURE: 98.2 F | HEART RATE: 83 BPM | DIASTOLIC BLOOD PRESSURE: 98 MMHG | RESPIRATION RATE: 18 BRPM | BODY MASS INDEX: 25.54 KG/M2 | OXYGEN SATURATION: 98 %

## 2023-08-05 DIAGNOSIS — Z53.21 PATIENT LEFT WITHOUT BEING SEEN: ICD-10-CM

## 2023-08-05 DIAGNOSIS — K92.0 HEMATEMESIS, UNSPECIFIED WHETHER NAUSEA PRESENT: Primary | ICD-10-CM

## 2023-08-05 DIAGNOSIS — K76.9 CHRONIC LIVER DISEASE: ICD-10-CM

## 2023-08-05 RX ORDER — PANTOPRAZOLE SODIUM 40 MG/10ML
80 INJECTION, POWDER, LYOPHILIZED, FOR SOLUTION INTRAVENOUS ONCE
Status: DISCONTINUED | OUTPATIENT
Start: 2023-08-05 | End: 2023-08-05

## 2023-08-05 ASSESSMENT — PAIN DESCRIPTION - PAIN TYPE: TYPE: ACUTE PAIN

## 2023-08-05 ASSESSMENT — PAIN - FUNCTIONAL ASSESSMENT: PAIN_FUNCTIONAL_ASSESSMENT: 0-10

## 2023-08-05 ASSESSMENT — PAIN DESCRIPTION - LOCATION: LOCATION: ABDOMEN

## 2023-08-05 ASSESSMENT — PAIN SCALES - GENERAL: PAINLEVEL_OUTOF10: 8

## 2023-08-05 NOTE — ED NOTES
Patient appears to have eloped from the department moments after being triaged without notifying ED staff. Called [phone] without answer or response.      Sepideh Mercado MD  08/05/23 6437

## 2023-08-05 NOTE — ED NOTES
Rn and MD to [facility].  Pt not in room, searched waiting room not in waiting room. MD with call to pt.       Kingston Jeffery RN  08/05/23 3561

## 2023-08-05 NOTE — ED NOTES
RN called for patient in waiting room and not there x3 . MD aware. Pt eloped.       Jennifer Hagen RN  08/05/23 0003

## 2023-08-06 ENCOUNTER — HOSPITAL ENCOUNTER (INPATIENT)
Age: 38
LOS: 4 days | Discharge: HOME | DRG: 381 | End: 2023-08-10
Attending: EMERGENCY MEDICINE | Admitting: INTERNAL MEDICINE
Payer: COMMERCIAL

## 2023-08-06 ENCOUNTER — APPOINTMENT (OUTPATIENT)
Dept: CT IMAGING | Age: 38
DRG: 381 | End: 2023-08-06
Payer: COMMERCIAL

## 2023-08-06 DIAGNOSIS — K92.2 UGIB (UPPER GASTROINTESTINAL BLEED): Primary | ICD-10-CM

## 2023-08-06 DIAGNOSIS — I86.4 VARICES, GASTRIC: ICD-10-CM

## 2023-08-06 DIAGNOSIS — K92.0 HEMATEMESIS, UNSPECIFIED WHETHER NAUSEA PRESENT: ICD-10-CM

## 2023-08-06 LAB
ABO + RH BLD: NORMAL
ALBUMIN SERPL-MCNC: 3.1 G/DL (ref 3.4–5)
ALBUMIN/GLOB SERPL: 0.5 {RATIO} (ref 1.1–2.2)
ALP SERPL-CCNC: 280 U/L (ref 40–129)
ALT SERPL-CCNC: 25 U/L (ref 10–40)
ANION GAP SERPL CALCULATED.3IONS-SCNC: 18 MMOL/L (ref 3–16)
AST SERPL-CCNC: 107 U/L (ref 15–37)
B-HCG SERPL EIA 3RD IS-ACNC: <5 MIU/ML
BASOPHILS # BLD: 0.1 K/UL (ref 0–0.2)
BASOPHILS NFR BLD: 0.7 %
BILIRUB SERPL-MCNC: 2.8 MG/DL (ref 0–1)
BLD GP AB SCN SERPL QL: NORMAL
BUN SERPL-MCNC: 7 MG/DL (ref 7–20)
CALCIUM SERPL-MCNC: 9.2 MG/DL (ref 8.3–10.6)
CHLORIDE SERPL-SCNC: 87 MMOL/L (ref 99–110)
CO2 SERPL-SCNC: 26 MMOL/L (ref 21–32)
CREAT SERPL-MCNC: <0.5 MG/DL (ref 0.6–1.1)
DEPRECATED RDW RBC AUTO: 21.5 % (ref 12.4–15.4)
EOSINOPHIL # BLD: 0 K/UL (ref 0–0.6)
EOSINOPHIL NFR BLD: 0.1 %
ETHANOLAMINE SERPL-MCNC: 25 MG/DL (ref 0–0.08)
GFR SERPLBLD CREATININE-BSD FMLA CKD-EPI: >60 ML/MIN/{1.73_M2}
GLUCOSE SERPL-MCNC: 107 MG/DL (ref 70–99)
HCT VFR BLD AUTO: 39.2 % (ref 36–48)
HEMOCCULT STL QL: NORMAL
HGB BLD-MCNC: 13.5 G/DL (ref 12–16)
LIPASE SERPL-CCNC: 39 U/L (ref 13–60)
LYMPHOCYTES # BLD: 1.8 K/UL (ref 1–5.1)
LYMPHOCYTES NFR BLD: 9.5 %
MAGNESIUM SERPL-MCNC: 1.9 MG/DL (ref 1.8–2.4)
MCH RBC QN AUTO: 33.5 PG (ref 26–34)
MCHC RBC AUTO-ENTMCNC: 34.3 G/DL (ref 31–36)
MCV RBC AUTO: 97.5 FL (ref 80–100)
MONOCYTES # BLD: 1.5 K/UL (ref 0–1.3)
MONOCYTES NFR BLD: 7.5 %
NEUTROPHILS # BLD: 15.9 K/UL (ref 1.7–7.7)
NEUTROPHILS NFR BLD: 82.2 %
PLATELET # BLD AUTO: 305 K/UL (ref 135–450)
PMV BLD AUTO: 9.5 FL (ref 5–10.5)
POTASSIUM SERPL-SCNC: 3.1 MMOL/L (ref 3.5–5.1)
POTASSIUM SERPL-SCNC: 5.2 MMOL/L (ref 3.5–5.1)
PROT SERPL-MCNC: 8.8 G/DL (ref 6.4–8.2)
RBC # BLD AUTO: 4.03 M/UL (ref 4–5.2)
SODIUM SERPL-SCNC: 131 MMOL/L (ref 136–145)
WBC # BLD AUTO: 19.3 K/UL (ref 4–11)

## 2023-08-06 PROCEDURE — 96375 TX/PRO/DX INJ NEW DRUG ADDON: CPT

## 2023-08-06 PROCEDURE — G0378 HOSPITAL OBSERVATION PER HR: HCPCS

## 2023-08-06 PROCEDURE — 96368 THER/DIAG CONCURRENT INF: CPT

## 2023-08-06 PROCEDURE — 96376 TX/PRO/DX INJ SAME DRUG ADON: CPT

## 2023-08-06 PROCEDURE — 2060000000 HC ICU INTERMEDIATE R&B

## 2023-08-06 PROCEDURE — 83690 ASSAY OF LIPASE: CPT

## 2023-08-06 PROCEDURE — 2580000003 HC RX 258: Performed by: EMERGENCY MEDICINE

## 2023-08-06 PROCEDURE — 96365 THER/PROPH/DIAG IV INF INIT: CPT

## 2023-08-06 PROCEDURE — 84132 ASSAY OF SERUM POTASSIUM: CPT

## 2023-08-06 PROCEDURE — 96374 THER/PROPH/DIAG INJ IV PUSH: CPT

## 2023-08-06 PROCEDURE — 93005 ELECTROCARDIOGRAM TRACING: CPT | Performed by: EMERGENCY MEDICINE

## 2023-08-06 PROCEDURE — 05HY33Z INSERTION OF INFUSION DEVICE INTO UPPER VEIN, PERCUTANEOUS APPROACH: ICD-10-PCS | Performed by: INTERNAL MEDICINE

## 2023-08-06 PROCEDURE — 84702 CHORIONIC GONADOTROPIN TEST: CPT

## 2023-08-06 PROCEDURE — 85025 COMPLETE CBC W/AUTO DIFF WBC: CPT

## 2023-08-06 PROCEDURE — 99285 EMERGENCY DEPT VISIT HI MDM: CPT

## 2023-08-06 PROCEDURE — 82270 OCCULT BLOOD FECES: CPT

## 2023-08-06 PROCEDURE — C1751 CATH, INF, PER/CENT/MIDLINE: HCPCS

## 2023-08-06 PROCEDURE — C9113 INJ PANTOPRAZOLE SODIUM, VIA: HCPCS | Performed by: EMERGENCY MEDICINE

## 2023-08-06 PROCEDURE — 80053 COMPREHEN METABOLIC PANEL: CPT

## 2023-08-06 PROCEDURE — 74176 CT ABD & PELVIS W/O CONTRAST: CPT

## 2023-08-06 PROCEDURE — A4216 STERILE WATER/SALINE, 10 ML: HCPCS | Performed by: INTERNAL MEDICINE

## 2023-08-06 PROCEDURE — 86901 BLOOD TYPING SEROLOGIC RH(D): CPT

## 2023-08-06 PROCEDURE — 96361 HYDRATE IV INFUSION ADD-ON: CPT

## 2023-08-06 PROCEDURE — 86900 BLOOD TYPING SEROLOGIC ABO: CPT

## 2023-08-06 PROCEDURE — 36569 INSJ PICC 5 YR+ W/O IMAGING: CPT

## 2023-08-06 PROCEDURE — 82077 ASSAY SPEC XCP UR&BREATH IA: CPT

## 2023-08-06 PROCEDURE — 2580000003 HC RX 258: Performed by: INTERNAL MEDICINE

## 2023-08-06 PROCEDURE — 96366 THER/PROPH/DIAG IV INF ADDON: CPT

## 2023-08-06 PROCEDURE — 6360000002 HC RX W HCPCS: Performed by: INTERNAL MEDICINE

## 2023-08-06 PROCEDURE — 6360000002 HC RX W HCPCS: Performed by: EMERGENCY MEDICINE

## 2023-08-06 PROCEDURE — 36415 COLL VENOUS BLD VENIPUNCTURE: CPT

## 2023-08-06 PROCEDURE — 83735 ASSAY OF MAGNESIUM: CPT

## 2023-08-06 PROCEDURE — 86850 RBC ANTIBODY SCREEN: CPT

## 2023-08-06 PROCEDURE — C9113 INJ PANTOPRAZOLE SODIUM, VIA: HCPCS | Performed by: INTERNAL MEDICINE

## 2023-08-06 RX ORDER — POTASSIUM CHLORIDE 7.45 MG/ML
10 INJECTION INTRAVENOUS
Status: DISCONTINUED | OUTPATIENT
Start: 2023-08-06 | End: 2023-08-06 | Stop reason: SDUPTHER

## 2023-08-06 RX ORDER — POTASSIUM CHLORIDE 7.45 MG/ML
10 INJECTION INTRAVENOUS
Status: DISCONTINUED | OUTPATIENT
Start: 2023-08-06 | End: 2023-08-06

## 2023-08-06 RX ORDER — LORAZEPAM 2 MG/ML
2 INJECTION INTRAMUSCULAR
Status: DISCONTINUED | OUTPATIENT
Start: 2023-08-06 | End: 2023-08-10 | Stop reason: HOSPADM

## 2023-08-06 RX ORDER — MORPHINE SULFATE 2 MG/ML
1 INJECTION, SOLUTION INTRAMUSCULAR; INTRAVENOUS EVERY 4 HOURS PRN
Status: DISPENSED | OUTPATIENT
Start: 2023-08-06 | End: 2023-08-07

## 2023-08-06 RX ORDER — SODIUM CHLORIDE 0.9 % (FLUSH) 0.9 %
5-40 SYRINGE (ML) INJECTION PRN
Status: DISCONTINUED | OUTPATIENT
Start: 2023-08-06 | End: 2023-08-10 | Stop reason: HOSPADM

## 2023-08-06 RX ORDER — ACETAMINOPHEN 650 MG/1
650 SUPPOSITORY RECTAL EVERY 6 HOURS PRN
Status: DISCONTINUED | OUTPATIENT
Start: 2023-08-06 | End: 2023-08-10 | Stop reason: HOSPADM

## 2023-08-06 RX ORDER — 0.9 % SODIUM CHLORIDE 0.9 %
1000 INTRAVENOUS SOLUTION INTRAVENOUS ONCE
Status: COMPLETED | OUTPATIENT
Start: 2023-08-06 | End: 2023-08-06

## 2023-08-06 RX ORDER — ONDANSETRON 2 MG/ML
4 INJECTION INTRAMUSCULAR; INTRAVENOUS EVERY 6 HOURS PRN
Status: DISCONTINUED | OUTPATIENT
Start: 2023-08-06 | End: 2023-08-10 | Stop reason: HOSPADM

## 2023-08-06 RX ORDER — LORAZEPAM 2 MG/ML
3 INJECTION INTRAMUSCULAR
Status: DISCONTINUED | OUTPATIENT
Start: 2023-08-06 | End: 2023-08-10 | Stop reason: HOSPADM

## 2023-08-06 RX ORDER — SODIUM CHLORIDE 0.9 % (FLUSH) 0.9 %
5-40 SYRINGE (ML) INJECTION EVERY 12 HOURS SCHEDULED
Status: DISCONTINUED | OUTPATIENT
Start: 2023-08-06 | End: 2023-08-06 | Stop reason: SDUPTHER

## 2023-08-06 RX ORDER — ACETAMINOPHEN 325 MG/1
650 TABLET ORAL EVERY 6 HOURS PRN
Status: DISCONTINUED | OUTPATIENT
Start: 2023-08-06 | End: 2023-08-10 | Stop reason: HOSPADM

## 2023-08-06 RX ORDER — ONDANSETRON 4 MG/1
4 TABLET, ORALLY DISINTEGRATING ORAL EVERY 8 HOURS PRN
Status: DISCONTINUED | OUTPATIENT
Start: 2023-08-06 | End: 2023-08-10 | Stop reason: HOSPADM

## 2023-08-06 RX ORDER — GAUZE BANDAGE 2" X 2"
100 BANDAGE TOPICAL DAILY
Status: DISCONTINUED | OUTPATIENT
Start: 2023-08-07 | End: 2023-08-10 | Stop reason: HOSPADM

## 2023-08-06 RX ORDER — FENTANYL CITRATE 50 UG/ML
25 INJECTION, SOLUTION INTRAMUSCULAR; INTRAVENOUS ONCE
Status: COMPLETED | OUTPATIENT
Start: 2023-08-06 | End: 2023-08-06

## 2023-08-06 RX ORDER — SODIUM CHLORIDE 9 MG/ML
INJECTION, SOLUTION INTRAVENOUS CONTINUOUS
Status: DISCONTINUED | OUTPATIENT
Start: 2023-08-06 | End: 2023-08-10 | Stop reason: HOSPADM

## 2023-08-06 RX ORDER — LORAZEPAM 2 MG/ML
4 INJECTION INTRAMUSCULAR
Status: DISCONTINUED | OUTPATIENT
Start: 2023-08-06 | End: 2023-08-10 | Stop reason: HOSPADM

## 2023-08-06 RX ORDER — LORAZEPAM 2 MG/ML
1 INJECTION INTRAMUSCULAR
Status: DISCONTINUED | OUTPATIENT
Start: 2023-08-06 | End: 2023-08-10 | Stop reason: HOSPADM

## 2023-08-06 RX ORDER — LORAZEPAM 1 MG/1
3 TABLET ORAL
Status: DISCONTINUED | OUTPATIENT
Start: 2023-08-06 | End: 2023-08-10 | Stop reason: HOSPADM

## 2023-08-06 RX ORDER — ONDANSETRON 2 MG/ML
4 INJECTION INTRAMUSCULAR; INTRAVENOUS ONCE
Status: COMPLETED | OUTPATIENT
Start: 2023-08-06 | End: 2023-08-06

## 2023-08-06 RX ORDER — LORAZEPAM 1 MG/1
4 TABLET ORAL
Status: DISCONTINUED | OUTPATIENT
Start: 2023-08-06 | End: 2023-08-10 | Stop reason: HOSPADM

## 2023-08-06 RX ORDER — SODIUM CHLORIDE 0.9 % (FLUSH) 0.9 %
5-40 SYRINGE (ML) INJECTION PRN
Status: DISCONTINUED | OUTPATIENT
Start: 2023-08-06 | End: 2023-08-06 | Stop reason: SDUPTHER

## 2023-08-06 RX ORDER — SODIUM CHLORIDE 0.9 % (FLUSH) 0.9 %
5-40 SYRINGE (ML) INJECTION EVERY 12 HOURS SCHEDULED
Status: DISCONTINUED | OUTPATIENT
Start: 2023-08-06 | End: 2023-08-10 | Stop reason: HOSPADM

## 2023-08-06 RX ORDER — THIAMINE HYDROCHLORIDE 100 MG/ML
100 INJECTION, SOLUTION INTRAMUSCULAR; INTRAVENOUS DAILY
Status: DISCONTINUED | OUTPATIENT
Start: 2023-08-06 | End: 2023-08-08 | Stop reason: SDUPTHER

## 2023-08-06 RX ORDER — LORAZEPAM 1 MG/1
2 TABLET ORAL
Status: DISCONTINUED | OUTPATIENT
Start: 2023-08-06 | End: 2023-08-10 | Stop reason: HOSPADM

## 2023-08-06 RX ORDER — POTASSIUM CHLORIDE 29.8 MG/ML
20 INJECTION INTRAVENOUS PRN
Status: DISCONTINUED | OUTPATIENT
Start: 2023-08-06 | End: 2023-08-10 | Stop reason: HOSPADM

## 2023-08-06 RX ORDER — SODIUM CHLORIDE 9 MG/ML
INJECTION, SOLUTION INTRAVENOUS PRN
Status: DISCONTINUED | OUTPATIENT
Start: 2023-08-06 | End: 2023-08-10 | Stop reason: HOSPADM

## 2023-08-06 RX ORDER — SODIUM CHLORIDE 9 MG/ML
INJECTION, SOLUTION INTRAVENOUS PRN
Status: DISCONTINUED | OUTPATIENT
Start: 2023-08-06 | End: 2023-08-06 | Stop reason: SDUPTHER

## 2023-08-06 RX ORDER — MULTIVITAMIN WITH IRON
1 TABLET ORAL DAILY
Status: DISCONTINUED | OUTPATIENT
Start: 2023-08-07 | End: 2023-08-10 | Stop reason: HOSPADM

## 2023-08-06 RX ORDER — 0.9 % SODIUM CHLORIDE 0.9 %
500 INTRAVENOUS SOLUTION INTRAVENOUS ONCE
Status: COMPLETED | OUTPATIENT
Start: 2023-08-06 | End: 2023-08-06

## 2023-08-06 RX ORDER — LORAZEPAM 1 MG/1
1 TABLET ORAL
Status: DISCONTINUED | OUTPATIENT
Start: 2023-08-06 | End: 2023-08-10 | Stop reason: HOSPADM

## 2023-08-06 RX ORDER — SODIUM CHLORIDE 9 MG/ML
25 INJECTION, SOLUTION INTRAVENOUS PRN
Status: DISCONTINUED | OUTPATIENT
Start: 2023-08-06 | End: 2023-08-10 | Stop reason: HOSPADM

## 2023-08-06 RX ORDER — LIDOCAINE HYDROCHLORIDE 10 MG/ML
5 INJECTION, SOLUTION EPIDURAL; INFILTRATION; INTRACAUDAL; PERINEURAL ONCE
Status: DISCONTINUED | OUTPATIENT
Start: 2023-08-06 | End: 2023-08-10

## 2023-08-06 RX ADMIN — MORPHINE SULFATE 1 MG: 2 INJECTION, SOLUTION INTRAMUSCULAR; INTRAVENOUS at 19:17

## 2023-08-06 RX ADMIN — ONDANSETRON 4 MG: 2 INJECTION INTRAMUSCULAR; INTRAVENOUS at 19:16

## 2023-08-06 RX ADMIN — FENTANYL CITRATE 25 MCG: 50 INJECTION, SOLUTION INTRAMUSCULAR; INTRAVENOUS at 15:49

## 2023-08-06 RX ADMIN — POTASSIUM CHLORIDE 20 MEQ: 29.8 INJECTION, SOLUTION INTRAVENOUS at 22:34

## 2023-08-06 RX ADMIN — THIAMINE HYDROCHLORIDE 100 MG: 100 INJECTION, SOLUTION INTRAMUSCULAR; INTRAVENOUS at 21:33

## 2023-08-06 RX ADMIN — ONDANSETRON 4 MG: 2 INJECTION INTRAMUSCULAR; INTRAVENOUS at 17:12

## 2023-08-06 RX ADMIN — ONDANSETRON 4 MG: 2 INJECTION INTRAMUSCULAR; INTRAVENOUS at 16:36

## 2023-08-06 RX ADMIN — LORAZEPAM 2 MG: 2 INJECTION INTRAMUSCULAR; INTRAVENOUS at 21:29

## 2023-08-06 RX ADMIN — POTASSIUM CHLORIDE 20 MEQ: 29.8 INJECTION, SOLUTION INTRAVENOUS at 23:42

## 2023-08-06 RX ADMIN — CEFTRIAXONE 1000 MG: 1 INJECTION, POWDER, FOR SOLUTION INTRAMUSCULAR; INTRAVENOUS at 21:45

## 2023-08-06 RX ADMIN — SODIUM CHLORIDE 500 ML: 9 INJECTION, SOLUTION INTRAVENOUS at 15:45

## 2023-08-06 RX ADMIN — SODIUM CHLORIDE 8 MG/HR: 9 INJECTION, SOLUTION INTRAVENOUS at 18:02

## 2023-08-06 RX ADMIN — OCTREOTIDE ACETATE 25 MCG/HR: 500 INJECTION, SOLUTION INTRAVENOUS; SUBCUTANEOUS at 21:52

## 2023-08-06 RX ADMIN — SODIUM CHLORIDE, PRESERVATIVE FREE 10 ML: 5 INJECTION INTRAVENOUS at 21:46

## 2023-08-06 RX ADMIN — LORAZEPAM 3 MG: 2 INJECTION INTRAMUSCULAR; INTRAVENOUS at 19:17

## 2023-08-06 RX ADMIN — SODIUM CHLORIDE 80 MG: 9 INJECTION INTRAMUSCULAR; INTRAVENOUS; SUBCUTANEOUS at 17:56

## 2023-08-06 RX ADMIN — SODIUM CHLORIDE 1000 ML: 9 INJECTION, SOLUTION INTRAVENOUS at 18:04

## 2023-08-06 RX ADMIN — SODIUM CHLORIDE: 9 INJECTION, SOLUTION INTRAVENOUS at 21:39

## 2023-08-06 ASSESSMENT — PAIN DESCRIPTION - LOCATION
LOCATION: ABDOMEN

## 2023-08-06 ASSESSMENT — PAIN SCALES - GENERAL
PAINLEVEL_OUTOF10: 10
PAINLEVEL_OUTOF10: 9
PAINLEVEL_OUTOF10: 10
PAINLEVEL_OUTOF10: 8
PAINLEVEL_OUTOF10: 0
PAINLEVEL_OUTOF10: 10

## 2023-08-06 ASSESSMENT — ENCOUNTER SYMPTOMS
VOMITING: 1
ABDOMINAL PAIN: 1
BLOOD IN STOOL: 0
SHORTNESS OF BREATH: 0
NAUSEA: 1

## 2023-08-06 ASSESSMENT — PAIN DESCRIPTION - DESCRIPTORS
DESCRIPTORS: ACHING;CRAMPING

## 2023-08-06 ASSESSMENT — PAIN - FUNCTIONAL ASSESSMENT
PAIN_FUNCTIONAL_ASSESSMENT: 0-10
PAIN_FUNCTIONAL_ASSESSMENT: PREVENTS OR INTERFERES SOME ACTIVE ACTIVITIES AND ADLS
PAIN_FUNCTIONAL_ASSESSMENT: 0-10

## 2023-08-06 ASSESSMENT — PAIN DESCRIPTION - PAIN TYPE: TYPE: ACUTE PAIN

## 2023-08-06 NOTE — ED PROVIDER NOTES
[address] [facility] [address] [address]      Pt Name: Swati Lowe  MRN: [id number]  [address] [facility] 1985  Date of evaluation: 8/6/2023  Provider: Alondra Walker MD    [address]       Chief Complaint   Patient presents with    Hematemesis     X  2 days, started bright red and now dark brown, hx of drinking, pt vomited 32 times since yesterday          HISTORY OF PRESENT ILLNESS   (Location/Symptom, Timing/Onset, Context/Setting, Quality, Duration, Modifying Factors, Severity)  Note limiting factors. Swati Lowe is a 30 y.o. female who presents to the emergency department with  hematemesis    HPI    Is a 44-year-old  female who drinks about 1/5 of vodka a day according to her who presents with several episode of hematemesis over the past few days. Started with bright red blood but is now more coffee grounds. Denies any rectal bleeding melena or dark tarry stools. Does have some abdominal pain which is epigastric crampy associate with nausea and vomiting more than anything else. No fevers or chills no previous episodes. Review of her chart reveals a visit yesterday but she eloped prior to completion of her evaluation. Nursing Notes were reviewed. REVIEW OF SYSTEMS    (2-9 systems for level 4, 10 or more for level 5)     Review of Systems   Constitutional:  Negative for chills and fever. Respiratory:  Negative for shortness of breath. Cardiovascular:  Negative for chest pain. Gastrointestinal:  Positive for abdominal pain, nausea and vomiting. Negative for blood in stool. All other systems reviewed and are negative. Except as noted above the remainder of the review of systems was reviewed and negative.        PAST MEDICAL HISTORY     Past Medical History:   Diagnosis Date    Alcohol use disorder     Gastroparesis     GERD (gastroesophageal reflux disease)     Hepatic cirrhosis (HCC)     Mood disorder (HCC)     Opioid use disorder

## 2023-08-06 NOTE — ED TRIAGE NOTES
Sandra Wright is a 30 y.o. female was brought by squad for eval of hematemesis. The patient states that she started having  vomiting with bright red blood in it two days ago, the patient states that the blood is now dark brown. The patient is also complaining of abd pain throughout her abd. The patient is vomiting upon arrival. The patient was given 4mg IV of Zofran en route. The patient states that her pain is a 9/10. The patient states that she drinks 1/5 of vodka a day and last drink was yesterday. The patient is alert and oriented with an open and patent airway.

## 2023-08-07 ENCOUNTER — ANESTHESIA EVENT (OUTPATIENT)
Dept: ENDOSCOPY | Age: 38
DRG: 381 | End: 2023-08-07
Payer: COMMERCIAL

## 2023-08-07 ENCOUNTER — ANESTHESIA (OUTPATIENT)
Dept: ENDOSCOPY | Age: 38
DRG: 381 | End: 2023-08-07
Payer: COMMERCIAL

## 2023-08-07 PROBLEM — I85.00: Status: ACTIVE | Noted: 2023-08-07

## 2023-08-07 LAB
APTT BLD: 32.5 SEC (ref 22.7–35.9)
EKG ATRIAL RATE: 74 BPM
EKG DIAGNOSIS: NORMAL
EKG P AXIS: 57 DEGREES
EKG P-R INTERVAL: 174 MS
EKG Q-T INTERVAL: 380 MS
EKG QRS DURATION: 90 MS
EKG QTC CALCULATION (BAZETT): 421 MS
EKG R AXIS: 39 DEGREES
EKG T AXIS: 25 DEGREES
EKG VENTRICULAR RATE: 74 BPM
HCG UR QL: NEGATIVE
HCT VFR BLD AUTO: 34.3 % (ref 36–48)
HCT VFR BLD AUTO: 34.3 % (ref 36–48)
HCT VFR BLD AUTO: 35 % (ref 36–48)
HGB BLD-MCNC: 11.6 G/DL (ref 12–16)
HGB BLD-MCNC: 11.6 G/DL (ref 12–16)
HGB BLD-MCNC: 11.9 G/DL (ref 12–16)
INR PPP: 1.28 (ref 0.84–1.16)
IRON SATN MFR SERPL: ABNORMAL % (ref 15–50)
IRON SERPL-MCNC: 266 UG/DL (ref 37–145)
MAGNESIUM SERPL-MCNC: 2.1 MG/DL (ref 1.8–2.4)
POTASSIUM SERPL-SCNC: 4 MMOL/L (ref 3.5–5.1)
PROTHROMBIN TIME: 16 SEC (ref 11.5–14.8)
REASON FOR REJECTION: NORMAL
REJECTED TEST: NORMAL
TIBC SERPL-MCNC: ABNORMAL UG/DL (ref 260–445)

## 2023-08-07 PROCEDURE — 85018 HEMOGLOBIN: CPT

## 2023-08-07 PROCEDURE — 83550 IRON BINDING TEST: CPT

## 2023-08-07 PROCEDURE — 6360000002 HC RX W HCPCS: Performed by: INTERNAL MEDICINE

## 2023-08-07 PROCEDURE — 83540 ASSAY OF IRON: CPT

## 2023-08-07 PROCEDURE — 85730 THROMBOPLASTIN TIME PARTIAL: CPT

## 2023-08-07 PROCEDURE — 7100000000 HC PACU RECOVERY - FIRST 15 MIN: Performed by: INTERNAL MEDICINE

## 2023-08-07 PROCEDURE — 6370000000 HC RX 637 (ALT 250 FOR IP): Performed by: STUDENT IN AN ORGANIZED HEALTH CARE EDUCATION/TRAINING PROGRAM

## 2023-08-07 PROCEDURE — 96368 THER/DIAG CONCURRENT INF: CPT

## 2023-08-07 PROCEDURE — 2060000000 HC ICU INTERMEDIATE R&B

## 2023-08-07 PROCEDURE — 96376 TX/PRO/DX INJ SAME DRUG ADON: CPT

## 2023-08-07 PROCEDURE — 85610 PROTHROMBIN TIME: CPT

## 2023-08-07 PROCEDURE — 2580000003 HC RX 258: Performed by: INTERNAL MEDICINE

## 2023-08-07 PROCEDURE — 85014 HEMATOCRIT: CPT

## 2023-08-07 PROCEDURE — 6360000002 HC RX W HCPCS: Performed by: STUDENT IN AN ORGANIZED HEALTH CARE EDUCATION/TRAINING PROGRAM

## 2023-08-07 PROCEDURE — 94760 N-INVAS EAR/PLS OXIMETRY 1: CPT

## 2023-08-07 PROCEDURE — 3700000001 HC ADD 15 MINUTES (ANESTHESIA): Performed by: INTERNAL MEDICINE

## 2023-08-07 PROCEDURE — 6370000000 HC RX 637 (ALT 250 FOR IP): Performed by: INTERNAL MEDICINE

## 2023-08-07 PROCEDURE — C9113 INJ PANTOPRAZOLE SODIUM, VIA: HCPCS | Performed by: INTERNAL MEDICINE

## 2023-08-07 PROCEDURE — 0DB68ZX EXCISION OF STOMACH, VIA NATURAL OR ARTIFICIAL OPENING ENDOSCOPIC, DIAGNOSTIC: ICD-10-PCS | Performed by: INTERNAL MEDICINE

## 2023-08-07 PROCEDURE — G0378 HOSPITAL OBSERVATION PER HR: HCPCS

## 2023-08-07 PROCEDURE — 2500000003 HC RX 250 WO HCPCS

## 2023-08-07 PROCEDURE — 6360000002 HC RX W HCPCS

## 2023-08-07 PROCEDURE — 84132 ASSAY OF SERUM POTASSIUM: CPT

## 2023-08-07 PROCEDURE — 3609012400 HC EGD TRANSORAL BIOPSY SINGLE/MULTIPLE: Performed by: INTERNAL MEDICINE

## 2023-08-07 PROCEDURE — 6370000000 HC RX 637 (ALT 250 FOR IP): Performed by: NURSE PRACTITIONER

## 2023-08-07 PROCEDURE — 7100000001 HC PACU RECOVERY - ADDTL 15 MIN: Performed by: INTERNAL MEDICINE

## 2023-08-07 PROCEDURE — 3700000000 HC ANESTHESIA ATTENDED CARE: Performed by: INTERNAL MEDICINE

## 2023-08-07 PROCEDURE — 84703 CHORIONIC GONADOTROPIN ASSAY: CPT

## 2023-08-07 PROCEDURE — 93010 ELECTROCARDIOGRAM REPORT: CPT | Performed by: INTERNAL MEDICINE

## 2023-08-07 PROCEDURE — 2709999900 HC NON-CHARGEABLE SUPPLY: Performed by: INTERNAL MEDICINE

## 2023-08-07 PROCEDURE — 2580000003 HC RX 258: Performed by: ANESTHESIOLOGY

## 2023-08-07 PROCEDURE — 83735 ASSAY OF MAGNESIUM: CPT

## 2023-08-07 PROCEDURE — 88305 TISSUE EXAM BY PATHOLOGIST: CPT

## 2023-08-07 PROCEDURE — 96366 THER/PROPH/DIAG IV INF ADDON: CPT

## 2023-08-07 PROCEDURE — HZ2ZZZZ DETOXIFICATION SERVICES FOR SUBSTANCE ABUSE TREATMENT: ICD-10-PCS | Performed by: INTERNAL MEDICINE

## 2023-08-07 RX ORDER — SODIUM CHLORIDE 9 MG/ML
INJECTION, SOLUTION INTRAVENOUS PRN
Status: DISCONTINUED | OUTPATIENT
Start: 2023-08-07 | End: 2023-08-10 | Stop reason: HOSPADM

## 2023-08-07 RX ORDER — PROPOFOL 10 MG/ML
INJECTION, EMULSION INTRAVENOUS PRN
Status: DISCONTINUED | OUTPATIENT
Start: 2023-08-07 | End: 2023-08-07 | Stop reason: SDUPTHER

## 2023-08-07 RX ORDER — PROCHLORPERAZINE EDISYLATE 5 MG/ML
10 INJECTION INTRAMUSCULAR; INTRAVENOUS EVERY 6 HOURS PRN
Status: DISCONTINUED | OUTPATIENT
Start: 2023-08-07 | End: 2023-08-10 | Stop reason: HOSPADM

## 2023-08-07 RX ORDER — SUCCINYLCHOLINE/SOD CL,ISO/PF 200MG/10ML
SYRINGE (ML) INTRAVENOUS PRN
Status: DISCONTINUED | OUTPATIENT
Start: 2023-08-07 | End: 2023-08-07 | Stop reason: SDUPTHER

## 2023-08-07 RX ORDER — TRAZODONE HYDROCHLORIDE 50 MG/1
50 TABLET ORAL NIGHTLY
Status: DISCONTINUED | OUTPATIENT
Start: 2023-08-07 | End: 2023-08-09

## 2023-08-07 RX ORDER — LIDOCAINE HYDROCHLORIDE 20 MG/ML
INJECTION, SOLUTION EPIDURAL; INFILTRATION; INTRACAUDAL; PERINEURAL PRN
Status: DISCONTINUED | OUTPATIENT
Start: 2023-08-07 | End: 2023-08-07 | Stop reason: SDUPTHER

## 2023-08-07 RX ORDER — MORPHINE SULFATE 2 MG/ML
2 INJECTION, SOLUTION INTRAMUSCULAR; INTRAVENOUS ONCE
Status: COMPLETED | OUTPATIENT
Start: 2023-08-08 | End: 2023-08-08

## 2023-08-07 RX ORDER — DEXAMETHASONE SODIUM PHOSPHATE 4 MG/ML
INJECTION, SOLUTION INTRA-ARTICULAR; INTRALESIONAL; INTRAMUSCULAR; INTRAVENOUS; SOFT TISSUE PRN
Status: DISCONTINUED | OUTPATIENT
Start: 2023-08-07 | End: 2023-08-07 | Stop reason: SDUPTHER

## 2023-08-07 RX ORDER — SODIUM CHLORIDE 0.9 % (FLUSH) 0.9 %
5-40 SYRINGE (ML) INJECTION EVERY 12 HOURS SCHEDULED
Status: DISCONTINUED | OUTPATIENT
Start: 2023-08-07 | End: 2023-08-10 | Stop reason: HOSPADM

## 2023-08-07 RX ORDER — SODIUM CHLORIDE 0.9 % (FLUSH) 0.9 %
5-40 SYRINGE (ML) INJECTION PRN
Status: DISCONTINUED | OUTPATIENT
Start: 2023-08-07 | End: 2023-08-10 | Stop reason: HOSPADM

## 2023-08-07 RX ADMIN — SODIUM CHLORIDE: 9 INJECTION, SOLUTION INTRAVENOUS at 06:25

## 2023-08-07 RX ADMIN — ONDANSETRON 4 MG: 2 INJECTION INTRAMUSCULAR; INTRAVENOUS at 21:16

## 2023-08-07 RX ADMIN — SODIUM CHLORIDE: 9 INJECTION, SOLUTION INTRAVENOUS at 12:30

## 2023-08-07 RX ADMIN — LORAZEPAM 2 MG: 1 TABLET ORAL at 15:06

## 2023-08-07 RX ADMIN — Medication 140 MG: at 13:40

## 2023-08-07 RX ADMIN — THERA TABS 1 TABLET: TAB at 07:56

## 2023-08-07 RX ADMIN — THIAMINE HYDROCHLORIDE 100 MG: 100 INJECTION, SOLUTION INTRAMUSCULAR; INTRAVENOUS at 07:58

## 2023-08-07 RX ADMIN — MORPHINE SULFATE 1 MG: 2 INJECTION, SOLUTION INTRAMUSCULAR; INTRAVENOUS at 09:38

## 2023-08-07 RX ADMIN — SODIUM CHLORIDE, PRESERVATIVE FREE 10 ML: 5 INJECTION INTRAVENOUS at 08:03

## 2023-08-07 RX ADMIN — SODIUM CHLORIDE 8 MG/HR: 9 INJECTION, SOLUTION INTRAVENOUS at 12:41

## 2023-08-07 RX ADMIN — LORAZEPAM 2 MG: 2 INJECTION INTRAMUSCULAR; INTRAVENOUS at 00:54

## 2023-08-07 RX ADMIN — LORAZEPAM 2 MG: 2 INJECTION INTRAMUSCULAR; INTRAVENOUS at 21:16

## 2023-08-07 RX ADMIN — METHADONE HYDROCHLORIDE 87.5 MG: 10 TABLET ORAL at 11:47

## 2023-08-07 RX ADMIN — ONDANSETRON 4 MG: 2 INJECTION INTRAMUSCULAR; INTRAVENOUS at 00:54

## 2023-08-07 RX ADMIN — MORPHINE SULFATE 1 MG: 2 INJECTION, SOLUTION INTRAMUSCULAR; INTRAVENOUS at 15:18

## 2023-08-07 RX ADMIN — CEFTRIAXONE 1000 MG: 1 INJECTION, POWDER, FOR SOLUTION INTRAMUSCULAR; INTRAVENOUS at 21:26

## 2023-08-07 RX ADMIN — BENZOCAINE AND MENTHOL 1 LOZENGE: 15; 3.6 LOZENGE ORAL at 06:25

## 2023-08-07 RX ADMIN — PROCHLORPERAZINE EDISYLATE 10 MG: 5 INJECTION INTRAMUSCULAR; INTRAVENOUS at 15:06

## 2023-08-07 RX ADMIN — LORAZEPAM 2 MG: 2 INJECTION INTRAMUSCULAR; INTRAVENOUS at 04:34

## 2023-08-07 RX ADMIN — SODIUM CHLORIDE 8 MG/HR: 9 INJECTION, SOLUTION INTRAVENOUS at 02:42

## 2023-08-07 RX ADMIN — DEXAMETHASONE SODIUM PHOSPHATE 8 MG: 4 INJECTION, SOLUTION INTRAMUSCULAR; INTRAVENOUS at 13:49

## 2023-08-07 RX ADMIN — MORPHINE SULFATE 1 MG: 2 INJECTION, SOLUTION INTRAMUSCULAR; INTRAVENOUS at 00:54

## 2023-08-07 RX ADMIN — SODIUM CHLORIDE, PRESERVATIVE FREE 10 ML: 5 INJECTION INTRAVENOUS at 21:28

## 2023-08-07 RX ADMIN — Medication 40 MG: at 21:05

## 2023-08-07 RX ADMIN — LIDOCAINE HYDROCHLORIDE 100 MG: 20 INJECTION, SOLUTION EPIDURAL; INFILTRATION; INTRACAUDAL; PERINEURAL at 13:40

## 2023-08-07 RX ADMIN — LORAZEPAM 2 MG: 1 TABLET ORAL at 07:56

## 2023-08-07 RX ADMIN — ONDANSETRON 4 MG: 4 TABLET, ORALLY DISINTEGRATING ORAL at 09:38

## 2023-08-07 RX ADMIN — Medication 100 MG: at 07:56

## 2023-08-07 RX ADMIN — SODIUM CHLORIDE, PRESERVATIVE FREE 10 ML: 5 INJECTION INTRAVENOUS at 21:19

## 2023-08-07 RX ADMIN — TRAZODONE HYDROCHLORIDE 50 MG: 50 TABLET ORAL at 23:34

## 2023-08-07 RX ADMIN — LORAZEPAM 2 MG: 1 TABLET ORAL at 18:36

## 2023-08-07 RX ADMIN — PROPOFOL 200 MG: 10 INJECTION, EMULSION INTRAVENOUS at 13:40

## 2023-08-07 RX ADMIN — PROPOFOL 50 MG: 10 INJECTION, EMULSION INTRAVENOUS at 13:43

## 2023-08-07 RX ADMIN — MORPHINE SULFATE 1 MG: 2 INJECTION, SOLUTION INTRAMUSCULAR; INTRAVENOUS at 04:35

## 2023-08-07 RX ADMIN — LORAZEPAM 2 MG: 1 TABLET ORAL at 10:40

## 2023-08-07 ASSESSMENT — PAIN SCALES - GENERAL
PAINLEVEL_OUTOF10: 0
PAINLEVEL_OUTOF10: 10
PAINLEVEL_OUTOF10: 9
PAINLEVEL_OUTOF10: 9
PAINLEVEL_OUTOF10: 10
PAINLEVEL_OUTOF10: 0
PAINLEVEL_OUTOF10: 9
PAINLEVEL_OUTOF10: 9
PAINLEVEL_OUTOF10: 8
PAINLEVEL_OUTOF10: 3

## 2023-08-07 ASSESSMENT — PAIN DESCRIPTION - ORIENTATION
ORIENTATION: MID;UPPER;LEFT
ORIENTATION: RIGHT;UPPER
ORIENTATION: LEFT;UPPER
ORIENTATION: MID;LEFT;RIGHT

## 2023-08-07 ASSESSMENT — PAIN - FUNCTIONAL ASSESSMENT
PAIN_FUNCTIONAL_ASSESSMENT: ACTIVITIES ARE NOT PREVENTED
PAIN_FUNCTIONAL_ASSESSMENT: 0-10
PAIN_FUNCTIONAL_ASSESSMENT: ACTIVITIES ARE NOT PREVENTED
PAIN_FUNCTIONAL_ASSESSMENT: PREVENTS OR INTERFERES SOME ACTIVE ACTIVITIES AND ADLS
PAIN_FUNCTIONAL_ASSESSMENT: ACTIVITIES ARE NOT PREVENTED
PAIN_FUNCTIONAL_ASSESSMENT: ACTIVITIES ARE NOT PREVENTED

## 2023-08-07 ASSESSMENT — PAIN DESCRIPTION - LOCATION
LOCATION: ABDOMEN

## 2023-08-07 ASSESSMENT — PAIN DESCRIPTION - DESCRIPTORS
DESCRIPTORS: ACHING;THROBBING
DESCRIPTORS: THROBBING
DESCRIPTORS: CRAMPING;ACHING;SHOOTING
DESCRIPTORS: ACHING;CRAMPING
DESCRIPTORS: CRAMPING

## 2023-08-07 ASSESSMENT — PAIN SCALES - WONG BAKER
WONGBAKER_NUMERICALRESPONSE: 0
WONGBAKER_NUMERICALRESPONSE: NO HURT
WONGBAKER_NUMERICALRESPONSE: NO HURT
WONGBAKER_NUMERICALRESPONSE: 0

## 2023-08-07 ASSESSMENT — PAIN DESCRIPTION - PAIN TYPE
TYPE: ACUTE PAIN
TYPE: ACUTE PAIN

## 2023-08-07 ASSESSMENT — PAIN DESCRIPTION - DIRECTION: RADIATING_TOWARDS: BACK

## 2023-08-07 ASSESSMENT — PAIN DESCRIPTION - FREQUENCY: FREQUENCY: CONTINUOUS

## 2023-08-07 ASSESSMENT — PAIN DESCRIPTION - ONSET: ONSET: ON-GOING

## 2023-08-07 NOTE — PLAN OF CARE
Problem: Discharge Planning  Goal: Discharge to home or other facility with appropriate resources  Outcome: Progressing     Problem: Pain  Goal: Verbalizes/displays adequate comfort level or baseline comfort level  Outcome: Progressing     Problem: ABCDS Injury Assessment  Goal: Absence of physical injury  Outcome: Progressing     Problem: Safety - Adult  Goal: Free from fall injury  Outcome: Progressing   Alert and oriented x4 Resp. Easy and even Sats. WNL on RA Lungs with some wheezes that clear with cough and deep breathing exercises cont.  Per bathroom with SBA Free from fall/injury Turns and repositions self

## 2023-08-07 NOTE — ANESTHESIA POSTPROCEDURE EVALUATION
Department of Anesthesiology  Postprocedure Note    Patient: Garcia Valdez  MRN: [id number]  YOB: 1985  Date of evaluation: 8/7/2023      Procedure Summary     Date: 08/07/23 Room / Location: [address] [place] [address]    Anesthesia Start: 4275 Anesthesia Stop: 6699    Procedure: EGD BIOPSY Diagnosis:       Hematemesis, unspecified whether nausea present      (Hematemesis, unspecified whether nausea present [K92.0])    Surgeons:  Fernando Patel MD Responsible Provider: Estrella Caraballo MD    Anesthesia Type: General ASA Status: 3          Anesthesia Type: General    Florentino Phase I: Florentino Score: 8    Florentino Phase II:        Anesthesia Post Evaluation    Patient location during evaluation: PACU  Patient participation: complete - patient participated  Level of consciousness: awake and alert  Pain score: 0  Airway patency: patent  Nausea & Vomiting: no nausea and no vomiting  Complications: no  Cardiovascular status: blood pressure returned to baseline  Respiratory status: acceptable  Hydration status: euvolemic  Pain management: adequate

## 2023-08-07 NOTE — CARE COORDINATION
DISCHARGE PLANNING:    SW consult noted. Spoke to patient at bedside regarding discharge needs and possible outpatient ETOH treatment. Patient denied wanting treatment at this time. Patient denied any discharge needs. Mother will transport patient home. Patient does not have SW/CM needs at this time. If any needs arise, please consult SW/CM again.       #[phone]  Electronically signed by Satish Renae RN on 8/7/2023 at 3:36 PM

## 2023-08-07 NOTE — OP NOTE
Follow-up on biopsy results. Proton pump inhibitor therapy. Stop the octreotide drip. Start with clear liquids and advance diet as tolerated. Antiemetics as needed. Nabil Beal MD, MD  [facility]  [phone]    Please note that some or all of this record was generated using voice recognition software. If there are any questions about the content of this document, please contact the author as some errors in translation may have occurred.

## 2023-08-07 NOTE — PLAN OF CARE
Problem: Discharge Planning  Goal: Discharge to home or other facility with appropriate resources  8/7/2023 1348 by Pedro Luis Lara RN  Outcome: Progressing     Problem: Pain  Goal: Verbalizes/displays adequate comfort level or baseline comfort level  8/7/2023 1348 by Pedro Luis Lara RN  Outcome: Progressing     Problem: ABCDS Injury Assessment  Goal: Absence of physical injury  8/7/2023 1348 by Pedro Luis Lara RN  Outcome: Progressing     Problem: Safety - Adult  Goal: Free from fall injury  8/7/2023 1348 by Pedro Luis Lara RN  Outcome: Progressing

## 2023-08-08 LAB
ALBUMIN SERPL-MCNC: 3.1 G/DL (ref 3.4–5)
ALP SERPL-CCNC: 220 U/L (ref 40–129)
ALT SERPL-CCNC: 16 U/L (ref 10–40)
ANION GAP SERPL CALCULATED.3IONS-SCNC: 7 MMOL/L (ref 3–16)
AST SERPL-CCNC: 76 U/L (ref 15–37)
BASOPHILS # BLD: 0.1 K/UL (ref 0–0.2)
BASOPHILS NFR BLD: 0.4 %
BILIRUB DIRECT SERPL-MCNC: 1.5 MG/DL (ref 0–0.3)
BILIRUB INDIRECT SERPL-MCNC: 0.7 MG/DL (ref 0–1)
BILIRUB SERPL-MCNC: 2.2 MG/DL (ref 0–1)
BUN SERPL-MCNC: 10 MG/DL (ref 7–20)
CALCIUM SERPL-MCNC: 8.2 MG/DL (ref 8.3–10.6)
CHLORIDE SERPL-SCNC: 104 MMOL/L (ref 99–110)
CO2 SERPL-SCNC: 26 MMOL/L (ref 21–32)
CREAT SERPL-MCNC: 0.6 MG/DL (ref 0.6–1.1)
DEPRECATED RDW RBC AUTO: 20.3 % (ref 12.4–15.4)
EOSINOPHIL # BLD: 0 K/UL (ref 0–0.6)
EOSINOPHIL NFR BLD: 0.1 %
GFR SERPLBLD CREATININE-BSD FMLA CKD-EPI: >60 ML/MIN/{1.73_M2}
GLUCOSE SERPL-MCNC: 94 MG/DL (ref 70–99)
HCT VFR BLD AUTO: 32.3 % (ref 36–48)
HCT VFR BLD AUTO: 35.6 % (ref 36–48)
HGB BLD-MCNC: 11.2 G/DL (ref 12–16)
HGB BLD-MCNC: 11.8 G/DL (ref 12–16)
LYMPHOCYTES # BLD: 1.7 K/UL (ref 1–5.1)
LYMPHOCYTES NFR BLD: 12.3 %
MCH RBC QN AUTO: 34.4 PG (ref 26–34)
MCHC RBC AUTO-ENTMCNC: 34.7 G/DL (ref 31–36)
MCV RBC AUTO: 99 FL (ref 80–100)
MONOCYTES # BLD: 0.8 K/UL (ref 0–1.3)
MONOCYTES NFR BLD: 6 %
NEUTROPHILS # BLD: 10.9 K/UL (ref 1.7–7.7)
NEUTROPHILS NFR BLD: 81.2 %
PLATELET # BLD AUTO: 165 K/UL (ref 135–450)
PMV BLD AUTO: 8.8 FL (ref 5–10.5)
POTASSIUM SERPL-SCNC: 4.1 MMOL/L (ref 3.5–5.1)
PROT SERPL-MCNC: 7.4 G/DL (ref 6.4–8.2)
RBC # BLD AUTO: 3.26 M/UL (ref 4–5.2)
SODIUM SERPL-SCNC: 137 MMOL/L (ref 136–145)
WBC # BLD AUTO: 13.4 K/UL (ref 4–11)

## 2023-08-08 PROCEDURE — 6370000000 HC RX 637 (ALT 250 FOR IP): Performed by: STUDENT IN AN ORGANIZED HEALTH CARE EDUCATION/TRAINING PROGRAM

## 2023-08-08 PROCEDURE — 2580000003 HC RX 258: Performed by: ANESTHESIOLOGY

## 2023-08-08 PROCEDURE — 96361 HYDRATE IV INFUSION ADD-ON: CPT

## 2023-08-08 PROCEDURE — C9113 INJ PANTOPRAZOLE SODIUM, VIA: HCPCS | Performed by: INTERNAL MEDICINE

## 2023-08-08 PROCEDURE — 80076 HEPATIC FUNCTION PANEL: CPT

## 2023-08-08 PROCEDURE — 6360000002 HC RX W HCPCS: Performed by: INTERNAL MEDICINE

## 2023-08-08 PROCEDURE — 6360000002 HC RX W HCPCS: Performed by: NURSE PRACTITIONER

## 2023-08-08 PROCEDURE — 6370000000 HC RX 637 (ALT 250 FOR IP): Performed by: NURSE PRACTITIONER

## 2023-08-08 PROCEDURE — 85025 COMPLETE CBC W/AUTO DIFF WBC: CPT

## 2023-08-08 PROCEDURE — 96375 TX/PRO/DX INJ NEW DRUG ADDON: CPT

## 2023-08-08 PROCEDURE — 96376 TX/PRO/DX INJ SAME DRUG ADON: CPT

## 2023-08-08 PROCEDURE — 2060000000 HC ICU INTERMEDIATE R&B

## 2023-08-08 PROCEDURE — 2580000003 HC RX 258: Performed by: INTERNAL MEDICINE

## 2023-08-08 PROCEDURE — 80048 BASIC METABOLIC PNL TOTAL CA: CPT

## 2023-08-08 PROCEDURE — 6360000002 HC RX W HCPCS: Performed by: STUDENT IN AN ORGANIZED HEALTH CARE EDUCATION/TRAINING PROGRAM

## 2023-08-08 PROCEDURE — 6370000000 HC RX 637 (ALT 250 FOR IP): Performed by: INTERNAL MEDICINE

## 2023-08-08 PROCEDURE — G0378 HOSPITAL OBSERVATION PER HR: HCPCS

## 2023-08-08 RX ORDER — METHADONE HYDROCHLORIDE 5 MG/1
7.5 TABLET ORAL ONCE
Status: COMPLETED | OUTPATIENT
Start: 2023-08-08 | End: 2023-08-08

## 2023-08-08 RX ORDER — METHADONE HYDROCHLORIDE 10 MG/1
80 TABLET ORAL ONCE
Status: COMPLETED | OUTPATIENT
Start: 2023-08-08 | End: 2023-08-08

## 2023-08-08 RX ORDER — METHADONE HYDROCHLORIDE 5 MG/1
7.5 TABLET ORAL DAILY
Status: DISCONTINUED | OUTPATIENT
Start: 2023-08-09 | End: 2023-08-10 | Stop reason: HOSPADM

## 2023-08-08 RX ORDER — METHADONE HYDROCHLORIDE 10 MG/1
80 TABLET ORAL DAILY
Status: DISCONTINUED | OUTPATIENT
Start: 2023-08-09 | End: 2023-08-10 | Stop reason: HOSPADM

## 2023-08-08 RX ORDER — MORPHINE SULFATE 2 MG/ML
2 INJECTION, SOLUTION INTRAMUSCULAR; INTRAVENOUS ONCE
Status: COMPLETED | OUTPATIENT
Start: 2023-08-08 | End: 2023-08-08

## 2023-08-08 RX ORDER — MORPHINE SULFATE 2 MG/ML
1 INJECTION, SOLUTION INTRAMUSCULAR; INTRAVENOUS EVERY 6 HOURS PRN
Status: COMPLETED | OUTPATIENT
Start: 2023-08-08 | End: 2023-08-09

## 2023-08-08 RX ADMIN — Medication 40 MG: at 09:18

## 2023-08-08 RX ADMIN — Medication 100 MG: at 09:07

## 2023-08-08 RX ADMIN — SODIUM CHLORIDE, PRESERVATIVE FREE 10 ML: 5 INJECTION INTRAVENOUS at 09:19

## 2023-08-08 RX ADMIN — TRAZODONE HYDROCHLORIDE 50 MG: 50 TABLET ORAL at 21:00

## 2023-08-08 RX ADMIN — SODIUM CHLORIDE: 9 INJECTION, SOLUTION INTRAVENOUS at 11:45

## 2023-08-08 RX ADMIN — LORAZEPAM 3 MG: 1 TABLET ORAL at 11:50

## 2023-08-08 RX ADMIN — METHADONE HYDROCHLORIDE 80 MG: 10 TABLET ORAL at 09:19

## 2023-08-08 RX ADMIN — MORPHINE SULFATE 2 MG: 2 INJECTION, SOLUTION INTRAMUSCULAR; INTRAVENOUS at 00:08

## 2023-08-08 RX ADMIN — METHADONE HYDROCHLORIDE 7.5 MG: 5 TABLET ORAL at 09:20

## 2023-08-08 RX ADMIN — PROCHLORPERAZINE EDISYLATE 10 MG: 5 INJECTION INTRAMUSCULAR; INTRAVENOUS at 00:02

## 2023-08-08 RX ADMIN — SODIUM CHLORIDE, PRESERVATIVE FREE 10 ML: 5 INJECTION INTRAVENOUS at 09:20

## 2023-08-08 RX ADMIN — SODIUM CHLORIDE, PRESERVATIVE FREE 10 ML: 5 INJECTION INTRAVENOUS at 21:00

## 2023-08-08 RX ADMIN — LORAZEPAM 4 MG: 2 INJECTION INTRAMUSCULAR; INTRAVENOUS at 09:18

## 2023-08-08 RX ADMIN — Medication 40 MG: at 20:59

## 2023-08-08 RX ADMIN — ONDANSETRON 4 MG: 2 INJECTION INTRAMUSCULAR; INTRAVENOUS at 06:29

## 2023-08-08 RX ADMIN — ONDANSETRON 4 MG: 2 INJECTION INTRAMUSCULAR; INTRAVENOUS at 20:58

## 2023-08-08 RX ADMIN — MORPHINE SULFATE 1 MG: 2 INJECTION, SOLUTION INTRAMUSCULAR; INTRAVENOUS at 20:59

## 2023-08-08 RX ADMIN — LORAZEPAM 2 MG: 2 INJECTION INTRAMUSCULAR; INTRAVENOUS at 00:02

## 2023-08-08 RX ADMIN — SODIUM CHLORIDE, PRESERVATIVE FREE 10 ML: 5 INJECTION INTRAVENOUS at 21:01

## 2023-08-08 RX ADMIN — THERA TABS 1 TABLET: TAB at 09:06

## 2023-08-08 RX ADMIN — LORAZEPAM 3 MG: 2 INJECTION INTRAMUSCULAR; INTRAVENOUS at 20:58

## 2023-08-08 RX ADMIN — MORPHINE SULFATE 1 MG: 2 INJECTION, SOLUTION INTRAMUSCULAR; INTRAVENOUS at 13:44

## 2023-08-08 RX ADMIN — LORAZEPAM 2 MG: 2 INJECTION INTRAMUSCULAR; INTRAVENOUS at 06:29

## 2023-08-08 RX ADMIN — MORPHINE SULFATE 2 MG: 2 INJECTION, SOLUTION INTRAMUSCULAR; INTRAVENOUS at 06:39

## 2023-08-08 ASSESSMENT — PAIN DESCRIPTION - LOCATION
LOCATION: ABDOMEN
LOCATION: ABDOMEN
LOCATION: ABDOMEN;BACK
LOCATION: ABDOMEN
LOCATION: ABDOMEN
LOCATION: ABDOMEN;BACK

## 2023-08-08 ASSESSMENT — PAIN SCALES - WONG BAKER
WONGBAKER_NUMERICALRESPONSE: NO HURT
WONGBAKER_NUMERICALRESPONSE: 0
WONGBAKER_NUMERICALRESPONSE: 0
WONGBAKER_NUMERICALRESPONSE: NO HURT

## 2023-08-08 ASSESSMENT — PAIN DESCRIPTION - ORIENTATION: ORIENTATION: UPPER

## 2023-08-08 ASSESSMENT — PAIN SCALES - GENERAL
PAINLEVEL_OUTOF10: 8
PAINLEVEL_OUTOF10: 0
PAINLEVEL_OUTOF10: 9

## 2023-08-08 ASSESSMENT — PAIN DESCRIPTION - DESCRIPTORS
DESCRIPTORS: ACHING;SHARP
DESCRIPTORS: SHARP

## 2023-08-08 NOTE — CARE COORDINATION
A quick chart review reveals that a full assessment is not required by case management at this time. She is from home, fairly independent but in need of treatment resources. She declined to be referred yesterday but this writer placed info on the [facility] and [address] [place] [address] on her discharge paperwork. She can refer herself when she is ready. Today she is waiting on GI clearance, advanced diet toleration and pain management. We will continue to follow along. Respectfully submitted,    Genoveva Avilez [facility], [facility]   [phone]    Electronically signed by MYA Arguelles, LSW on 8/8/2023 at 9:51 AM
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
## 2023-08-09 LAB
ALBUMIN SERPL-MCNC: 3.1 G/DL (ref 3.4–5)
ALP SERPL-CCNC: 228 U/L (ref 40–129)
ALT SERPL-CCNC: 26 U/L (ref 10–40)
ANION GAP SERPL CALCULATED.3IONS-SCNC: 10 MMOL/L (ref 3–16)
AST SERPL-CCNC: 108 U/L (ref 15–37)
BASOPHILS # BLD: 0.1 K/UL (ref 0–0.2)
BASOPHILS NFR BLD: 1 %
BILIRUB DIRECT SERPL-MCNC: 1.6 MG/DL (ref 0–0.3)
BILIRUB INDIRECT SERPL-MCNC: 0.8 MG/DL (ref 0–1)
BILIRUB SERPL-MCNC: 2.4 MG/DL (ref 0–1)
BUN SERPL-MCNC: 7 MG/DL (ref 7–20)
CALCIUM SERPL-MCNC: 8.5 MG/DL (ref 8.3–10.6)
CHLORIDE SERPL-SCNC: 101 MMOL/L (ref 99–110)
CO2 SERPL-SCNC: 27 MMOL/L (ref 21–32)
CREAT SERPL-MCNC: 0.7 MG/DL (ref 0.6–1.1)
DEPRECATED RDW RBC AUTO: 20 % (ref 12.4–15.4)
EOSINOPHIL # BLD: 0.1 K/UL (ref 0–0.6)
EOSINOPHIL NFR BLD: 0.5 %
GFR SERPLBLD CREATININE-BSD FMLA CKD-EPI: >60 ML/MIN/{1.73_M2}
GLUCOSE SERPL-MCNC: 83 MG/DL (ref 70–99)
HCT VFR BLD AUTO: 34.7 % (ref 36–48)
HGB BLD-MCNC: 11.6 G/DL (ref 12–16)
LYMPHOCYTES # BLD: 2.3 K/UL (ref 1–5.1)
LYMPHOCYTES NFR BLD: 16.8 %
MCH RBC QN AUTO: 33.3 PG (ref 26–34)
MCHC RBC AUTO-ENTMCNC: 33.4 G/DL (ref 31–36)
MCV RBC AUTO: 99.6 FL (ref 80–100)
MONOCYTES # BLD: 0.9 K/UL (ref 0–1.3)
MONOCYTES NFR BLD: 6.5 %
NEUTROPHILS # BLD: 10.4 K/UL (ref 1.7–7.7)
NEUTROPHILS NFR BLD: 75.2 %
PLATELET # BLD AUTO: 185 K/UL (ref 135–450)
PMV BLD AUTO: 8.9 FL (ref 5–10.5)
POTASSIUM SERPL-SCNC: 3.1 MMOL/L (ref 3.5–5.1)
PROT SERPL-MCNC: 7.7 G/DL (ref 6.4–8.2)
RBC # BLD AUTO: 3.49 M/UL (ref 4–5.2)
SODIUM SERPL-SCNC: 138 MMOL/L (ref 136–145)
WBC # BLD AUTO: 13.8 K/UL (ref 4–11)

## 2023-08-09 PROCEDURE — 2580000003 HC RX 258: Performed by: INTERNAL MEDICINE

## 2023-08-09 PROCEDURE — 80048 BASIC METABOLIC PNL TOTAL CA: CPT

## 2023-08-09 PROCEDURE — 6360000002 HC RX W HCPCS: Performed by: INTERNAL MEDICINE

## 2023-08-09 PROCEDURE — 96361 HYDRATE IV INFUSION ADD-ON: CPT

## 2023-08-09 PROCEDURE — 94760 N-INVAS EAR/PLS OXIMETRY 1: CPT

## 2023-08-09 PROCEDURE — G0378 HOSPITAL OBSERVATION PER HR: HCPCS

## 2023-08-09 PROCEDURE — 2060000000 HC ICU INTERMEDIATE R&B

## 2023-08-09 PROCEDURE — 80076 HEPATIC FUNCTION PANEL: CPT

## 2023-08-09 PROCEDURE — 96376 TX/PRO/DX INJ SAME DRUG ADON: CPT

## 2023-08-09 PROCEDURE — 6370000000 HC RX 637 (ALT 250 FOR IP): Performed by: INTERNAL MEDICINE

## 2023-08-09 PROCEDURE — 6370000000 HC RX 637 (ALT 250 FOR IP): Performed by: STUDENT IN AN ORGANIZED HEALTH CARE EDUCATION/TRAINING PROGRAM

## 2023-08-09 PROCEDURE — 6370000000 HC RX 637 (ALT 250 FOR IP): Performed by: NURSE PRACTITIONER

## 2023-08-09 PROCEDURE — 2580000003 HC RX 258: Performed by: ANESTHESIOLOGY

## 2023-08-09 PROCEDURE — C9113 INJ PANTOPRAZOLE SODIUM, VIA: HCPCS | Performed by: INTERNAL MEDICINE

## 2023-08-09 PROCEDURE — 85025 COMPLETE CBC W/AUTO DIFF WBC: CPT

## 2023-08-09 PROCEDURE — A4216 STERILE WATER/SALINE, 10 ML: HCPCS | Performed by: INTERNAL MEDICINE

## 2023-08-09 PROCEDURE — 6360000002 HC RX W HCPCS: Performed by: STUDENT IN AN ORGANIZED HEALTH CARE EDUCATION/TRAINING PROGRAM

## 2023-08-09 RX ORDER — TRAZODONE HYDROCHLORIDE 100 MG/1
200 TABLET ORAL NIGHTLY
Status: DISCONTINUED | OUTPATIENT
Start: 2023-08-10 | End: 2023-08-10 | Stop reason: HOSPADM

## 2023-08-09 RX ADMIN — SODIUM CHLORIDE: 9 INJECTION, SOLUTION INTRAVENOUS at 12:44

## 2023-08-09 RX ADMIN — Medication 100 MG: at 09:35

## 2023-08-09 RX ADMIN — SODIUM CHLORIDE, PRESERVATIVE FREE 10 ML: 5 INJECTION INTRAVENOUS at 09:40

## 2023-08-09 RX ADMIN — TRAZODONE HYDROCHLORIDE 150 MG: 50 TABLET ORAL at 20:57

## 2023-08-09 RX ADMIN — LORAZEPAM 2 MG: 2 INJECTION INTRAMUSCULAR; INTRAVENOUS at 01:14

## 2023-08-09 RX ADMIN — POTASSIUM CHLORIDE 20 MEQ: 29.8 INJECTION, SOLUTION INTRAVENOUS at 11:04

## 2023-08-09 RX ADMIN — ACETAMINOPHEN 650 MG: 325 TABLET ORAL at 11:04

## 2023-08-09 RX ADMIN — LORAZEPAM 2 MG: 2 INJECTION INTRAMUSCULAR; INTRAVENOUS at 20:56

## 2023-08-09 RX ADMIN — TRAZODONE HYDROCHLORIDE 150 MG: 50 TABLET ORAL at 01:13

## 2023-08-09 RX ADMIN — POTASSIUM CHLORIDE 20 MEQ: 29.8 INJECTION, SOLUTION INTRAVENOUS at 12:45

## 2023-08-09 RX ADMIN — LORAZEPAM 2 MG: 1 TABLET ORAL at 16:26

## 2023-08-09 RX ADMIN — THERA TABS 1 TABLET: TAB at 09:35

## 2023-08-09 RX ADMIN — ONDANSETRON 4 MG: 4 TABLET, ORALLY DISINTEGRATING ORAL at 17:34

## 2023-08-09 RX ADMIN — Medication 40 MG: at 22:19

## 2023-08-09 RX ADMIN — MORPHINE SULFATE 1 MG: 2 INJECTION, SOLUTION INTRAMUSCULAR; INTRAVENOUS at 03:23

## 2023-08-09 RX ADMIN — SODIUM CHLORIDE, PRESERVATIVE FREE 10 ML: 5 INJECTION INTRAVENOUS at 22:20

## 2023-08-09 RX ADMIN — LORAZEPAM 1 MG: 1 TABLET ORAL at 12:42

## 2023-08-09 RX ADMIN — SODIUM CHLORIDE, PRESERVATIVE FREE 10 ML: 5 INJECTION INTRAVENOUS at 22:19

## 2023-08-09 RX ADMIN — METHADONE HYDROCHLORIDE 80 MG: 10 TABLET ORAL at 09:36

## 2023-08-09 RX ADMIN — METHADONE HYDROCHLORIDE 7.5 MG: 5 TABLET ORAL at 09:35

## 2023-08-09 RX ADMIN — Medication 40 MG: at 09:39

## 2023-08-09 ASSESSMENT — PAIN SCALES - WONG BAKER
WONGBAKER_NUMERICALRESPONSE: 0
WONGBAKER_NUMERICALRESPONSE: NO HURT

## 2023-08-09 ASSESSMENT — PAIN DESCRIPTION - LOCATION
LOCATION: ABDOMEN;BACK
LOCATION: ABDOMEN
LOCATION: ABDOMEN

## 2023-08-09 ASSESSMENT — PAIN SCALES - GENERAL
PAINLEVEL_OUTOF10: 9
PAINLEVEL_OUTOF10: 3
PAINLEVEL_OUTOF10: 3

## 2023-08-09 ASSESSMENT — PAIN DESCRIPTION - DESCRIPTORS: DESCRIPTORS: ACHING

## 2023-08-09 ASSESSMENT — PAIN DESCRIPTION - ORIENTATION: ORIENTATION: MID

## 2023-08-09 ASSESSMENT — PAIN - FUNCTIONAL ASSESSMENT: PAIN_FUNCTIONAL_ASSESSMENT: ACTIVITIES ARE NOT PREVENTED

## 2023-08-09 NOTE — PLAN OF CARE
Problem: Discharge Planning  Goal: Discharge to home or other facility with appropriate resources  Outcome: Progressing  Flowsheets (Taken 8/8/2023 2004 by Mj Phillips RN)  Discharge to home or other facility with appropriate resources: Identify barriers to discharge with patient and caregiver     Problem: Pain  Goal: Verbalizes/displays adequate comfort level or baseline comfort level  Outcome: Progressing     Problem: ABCDS Injury Assessment  Goal: Absence of physical injury  Outcome: Progressing     Problem: Safety - Adult  Goal: Free from fall injury  Outcome: Progressing     Problem: Neurosensory - Adult  Goal: Achieves stable or improved neurological status  Outcome: Progressing  Flowsheets (Taken 8/8/2023 2004 by Mj Phillips RN)  Achieves stable or improved neurological status: Assess for and report changes in neurological status     Problem: Neurosensory - Adult  Goal: Achieves maximal functionality and self care  Outcome: Progressing  Flowsheets (Taken 8/8/2023 2004 by Mj Phillips RN)  Achieves maximal functionality and self care: Monitor swallowing and airway patency with patient fatigue and changes in neurological status     Problem: Respiratory - Adult  Goal: Achieves optimal ventilation and oxygenation  Outcome: Progressing  Flowsheets (Taken 8/8/2023 2004 by Mj Phillips RN)  Achieves optimal ventilation and oxygenation: Assess for changes in respiratory status     Problem: Cardiovascular - Adult  Goal: Maintains optimal cardiac output and hemodynamic stability  Outcome: Progressing  Flowsheets (Taken 8/8/2023 2004 by Mj Phillips RN)  Maintains optimal cardiac output and hemodynamic stability: Monitor blood pressure and heart rate     Problem: Skin/Tissue Integrity - Adult  Goal: Skin integrity remains intact  Outcome: Progressing  Flowsheets (Taken 8/8/2023 2004 by Mj Phillips RN)  Skin Integrity Remains Intact: Monitor for areas of redness and/or skin breakdown

## 2023-08-10 VITALS
WEIGHT: 169.53 LBS | RESPIRATION RATE: 12 BRPM | SYSTOLIC BLOOD PRESSURE: 152 MMHG | BODY MASS INDEX: 25.69 KG/M2 | TEMPERATURE: 99.1 F | DIASTOLIC BLOOD PRESSURE: 75 MMHG | HEIGHT: 68 IN | OXYGEN SATURATION: 96 % | HEART RATE: 71 BPM

## 2023-08-10 LAB
ALBUMIN SERPL-MCNC: 3 G/DL (ref 3.4–5)
ALP SERPL-CCNC: 230 U/L (ref 40–129)
ALT SERPL-CCNC: 35 U/L (ref 10–40)
ANION GAP SERPL CALCULATED.3IONS-SCNC: 8 MMOL/L (ref 3–16)
AST SERPL-CCNC: 112 U/L (ref 15–37)
BILIRUB DIRECT SERPL-MCNC: 2 MG/DL (ref 0–0.3)
BILIRUB INDIRECT SERPL-MCNC: 0.6 MG/DL (ref 0–1)
BILIRUB SERPL-MCNC: 2.6 MG/DL (ref 0–1)
BUN SERPL-MCNC: 5 MG/DL (ref 7–20)
CALCIUM SERPL-MCNC: 8.3 MG/DL (ref 8.3–10.6)
CHLORIDE SERPL-SCNC: 105 MMOL/L (ref 99–110)
CO2 SERPL-SCNC: 26 MMOL/L (ref 21–32)
CREAT SERPL-MCNC: 0.5 MG/DL (ref 0.6–1.1)
DEPRECATED RDW RBC AUTO: 20 % (ref 12.4–15.4)
GFR SERPLBLD CREATININE-BSD FMLA CKD-EPI: >60 ML/MIN/{1.73_M2}
GLUCOSE SERPL-MCNC: 94 MG/DL (ref 70–99)
HCT VFR BLD AUTO: 32.6 % (ref 36–48)
HGB BLD-MCNC: 11.1 G/DL (ref 12–16)
MCH RBC QN AUTO: 33.8 PG (ref 26–34)
MCHC RBC AUTO-ENTMCNC: 34 G/DL (ref 31–36)
MCV RBC AUTO: 99.4 FL (ref 80–100)
PLATELET # BLD AUTO: 123 K/UL (ref 135–450)
PMV BLD AUTO: 8.8 FL (ref 5–10.5)
POTASSIUM SERPL-SCNC: 3.8 MMOL/L (ref 3.5–5.1)
PROT SERPL-MCNC: 7.1 G/DL (ref 6.4–8.2)
RBC # BLD AUTO: 3.28 M/UL (ref 4–5.2)
SODIUM SERPL-SCNC: 139 MMOL/L (ref 136–145)
WBC # BLD AUTO: 10.1 K/UL (ref 4–11)

## 2023-08-10 PROCEDURE — C9113 INJ PANTOPRAZOLE SODIUM, VIA: HCPCS | Performed by: INTERNAL MEDICINE

## 2023-08-10 PROCEDURE — 80048 BASIC METABOLIC PNL TOTAL CA: CPT

## 2023-08-10 PROCEDURE — 6360000002 HC RX W HCPCS: Performed by: INTERNAL MEDICINE

## 2023-08-10 PROCEDURE — 94760 N-INVAS EAR/PLS OXIMETRY 1: CPT

## 2023-08-10 PROCEDURE — 96376 TX/PRO/DX INJ SAME DRUG ADON: CPT

## 2023-08-10 PROCEDURE — 85027 COMPLETE CBC AUTOMATED: CPT

## 2023-08-10 PROCEDURE — 2580000003 HC RX 258: Performed by: INTERNAL MEDICINE

## 2023-08-10 PROCEDURE — G0378 HOSPITAL OBSERVATION PER HR: HCPCS

## 2023-08-10 PROCEDURE — 96361 HYDRATE IV INFUSION ADD-ON: CPT

## 2023-08-10 PROCEDURE — 6370000000 HC RX 637 (ALT 250 FOR IP): Performed by: NURSE PRACTITIONER

## 2023-08-10 PROCEDURE — 6370000000 HC RX 637 (ALT 250 FOR IP): Performed by: INTERNAL MEDICINE

## 2023-08-10 PROCEDURE — 6370000000 HC RX 637 (ALT 250 FOR IP): Performed by: STUDENT IN AN ORGANIZED HEALTH CARE EDUCATION/TRAINING PROGRAM

## 2023-08-10 PROCEDURE — 80076 HEPATIC FUNCTION PANEL: CPT

## 2023-08-10 RX ORDER — PANTOPRAZOLE SODIUM 40 MG/1
40 TABLET, DELAYED RELEASE ORAL
Qty: 180 TABLET | Refills: 1 | Status: SHIPPED | OUTPATIENT
Start: 2023-08-10

## 2023-08-10 RX ORDER — TRAZODONE HYDROCHLORIDE 100 MG/1
200 TABLET ORAL NIGHTLY
Qty: 30 TABLET | Refills: 2 | Status: SHIPPED | OUTPATIENT
Start: 2023-08-10

## 2023-08-10 RX ORDER — ONDANSETRON 4 MG/1
4 TABLET, ORALLY DISINTEGRATING ORAL EVERY 8 HOURS PRN
Qty: 30 TABLET | Refills: 1 | Status: SHIPPED | OUTPATIENT
Start: 2023-08-10

## 2023-08-10 RX ORDER — TRAZODONE HYDROCHLORIDE 50 MG/1
50 TABLET ORAL ONCE
Status: COMPLETED | OUTPATIENT
Start: 2023-08-10 | End: 2023-08-10

## 2023-08-10 RX ADMIN — Medication 40 MG: at 09:24

## 2023-08-10 RX ADMIN — METHADONE HYDROCHLORIDE 80 MG: 10 TABLET ORAL at 09:24

## 2023-08-10 RX ADMIN — METHADONE HYDROCHLORIDE 7.5 MG: 5 TABLET ORAL at 09:25

## 2023-08-10 RX ADMIN — LORAZEPAM 2 MG: 2 INJECTION INTRAMUSCULAR; INTRAVENOUS at 09:37

## 2023-08-10 RX ADMIN — THERA TABS 1 TABLET: TAB at 09:24

## 2023-08-10 RX ADMIN — SODIUM CHLORIDE, PRESERVATIVE FREE 10 ML: 5 INJECTION INTRAVENOUS at 09:41

## 2023-08-10 RX ADMIN — Medication 100 MG: at 09:24

## 2023-08-10 RX ADMIN — TRAZODONE HYDROCHLORIDE 50 MG: 50 TABLET ORAL at 01:05

## 2023-08-10 NOTE — DISCHARGE SUMMARY
Hospitalist Discharge Summary - [facility]    Karely Fontana  :  1985  MRN:  [id number]    Admit date:  2023  Discharge date:  8/10/2023    Admitting Physician:  Rosalva Parker MD  Primary Care Physician:  No primary care provider on file. Hospital Course:   Improved. Karely Fontana is a 30 y.o. female who presents with a past medical history of alcohol abuse, GERD, opoid use disorder on methadone, cirrhosis, smoker. presented to [facility] with Chief Complaint of hematemesis and abdominal pain for the last 2 days, worsening, gradual onset, initially started as red blood when it became more black-colored emesis, associated with very poor appetite nausea vomiting and abdominal pain. Underwent EGD  with Erosive esophagitis with linear erosions,There was no evidence of significant varices, Moderate portal hypertensive gastropathy      Improved and medically stable for DC on PPI BID    Discharge Diagnoses:      Upper GI bleeding/ hematemesis. Liver cirrhosis. Pancreatitis ruled out after a test  Patient presented to emergency with hematemesis, CT abdomen with hepatomegaly and varices . Underwent EGD  with Erosive esophagitis with linear erosions,There was no evidence of significant varices, Moderate portal hypertensive gastropathy   Plan. -Appreciate gastroenterology input. -DC ceftriaxone.  - DC Octreotide infusion. -pantoprazole IV BID.   - daily CBC.   -No additional narcotics     Alcoholic hepatitis. Liver cirrhosis. Appreciate Gi input, maddrey score below 32. No indication for steroid use. Alcohol abuse. Patient consumes a pint of voka daily, currently withdrawing. Continue on CIWA protocol. Thiamine PO ordered. Continue multivitamin. History of opoid abuse. Continue on home methadone  No additional narcotics     #DVT prophylaxis  -SCDs       ADULT DIET;  Regular; Low Fat (less than or equal to

## 2023-11-14 ENCOUNTER — HOSPITAL ENCOUNTER (INPATIENT)
Age: 38
LOS: 7 days | Discharge: HOME | DRG: 280 | End: 2023-11-21
Attending: STUDENT IN AN ORGANIZED HEALTH CARE EDUCATION/TRAINING PROGRAM | Admitting: HOSPITALIST
Payer: COMMERCIAL

## 2023-11-14 ENCOUNTER — APPOINTMENT (OUTPATIENT)
Dept: GENERAL RADIOLOGY | Age: 38
DRG: 280 | End: 2023-11-14
Payer: COMMERCIAL

## 2023-11-14 ENCOUNTER — APPOINTMENT (OUTPATIENT)
Dept: CT IMAGING | Age: 38
DRG: 280 | End: 2023-11-14
Payer: COMMERCIAL

## 2023-11-14 DIAGNOSIS — A41.9 SEPTICEMIA (HCC): ICD-10-CM

## 2023-11-14 DIAGNOSIS — K92.0 HEMATEMESIS WITH NAUSEA: Primary | ICD-10-CM

## 2023-11-14 DIAGNOSIS — I86.4 GASTRIC VARICES: ICD-10-CM

## 2023-11-14 PROBLEM — F10.939 ALCOHOL WITHDRAWAL SYNDROME, WITH UNSPECIFIED COMPLICATION (HCC): Status: ACTIVE | Noted: 2023-11-14

## 2023-11-14 LAB
ABO + RH BLD: NORMAL
ALBUMIN SERPL-MCNC: 4 G/DL (ref 3.4–5)
ALBUMIN/GLOB SERPL: 0.7 {RATIO} (ref 1.1–2.2)
ALP SERPL-CCNC: 193 U/L (ref 40–129)
ALT SERPL-CCNC: 43 U/L (ref 10–40)
ANION GAP SERPL CALCULATED.3IONS-SCNC: 16 MMOL/L (ref 3–16)
APTT BLD: 32 SEC (ref 22.7–35.9)
AST SERPL-CCNC: 110 U/L (ref 15–37)
BACTERIA URNS QL MICRO: ABNORMAL /HPF
BASOPHILS # BLD: 0.2 K/UL (ref 0–0.2)
BASOPHILS NFR BLD: 0.9 %
BILIRUB SERPL-MCNC: 1.1 MG/DL (ref 0–1)
BILIRUB UR QL STRIP.AUTO: NEGATIVE
BLD GP AB SCN SERPL QL: NORMAL
BUN SERPL-MCNC: 9 MG/DL (ref 7–20)
CALCIUM SERPL-MCNC: 9 MG/DL (ref 8.3–10.6)
CHARACTER UR: ABNORMAL
CHLORIDE SERPL-SCNC: 94 MMOL/L (ref 99–110)
CLARITY UR: ABNORMAL
CO2 SERPL-SCNC: 25 MMOL/L (ref 21–32)
COLOR UR: YELLOW
CREAT SERPL-MCNC: 1 MG/DL (ref 0.6–1.1)
DEPRECATED RDW RBC AUTO: 21.5 % (ref 12.4–15.4)
EOSINOPHIL # BLD: 0 K/UL (ref 0–0.6)
EOSINOPHIL NFR BLD: 0.1 %
EPI CELLS #/AREA URNS AUTO: 2 /HPF (ref 0–5)
GFR SERPLBLD CREATININE-BSD FMLA CKD-EPI: >60 ML/MIN/{1.73_M2}
GLUCOSE SERPL-MCNC: 130 MG/DL (ref 70–99)
GLUCOSE UR STRIP.AUTO-MCNC: NEGATIVE MG/DL
HCG SERPL QL: NEGATIVE
HCT VFR BLD AUTO: 44.3 % (ref 36–48)
HGB BLD-MCNC: 15 G/DL (ref 12–16)
HGB UR QL STRIP.AUTO: NEGATIVE
HYALINE CASTS #/AREA URNS AUTO: 3 /LPF (ref 0–8)
INR PPP: 1.22 (ref 0.84–1.16)
KETONES UR STRIP.AUTO-MCNC: NEGATIVE MG/DL
LACTATE BLDV-SCNC: 3.2 MMOL/L (ref 0.4–1.9)
LACTATE BLDV-SCNC: 5.6 MMOL/L (ref 0.4–1.9)
LEUKOCYTE ESTERASE UR QL STRIP.AUTO: NEGATIVE
LIPASE SERPL-CCNC: 36 U/L (ref 13–60)
LYMPHOCYTES # BLD: 2.1 K/UL (ref 1–5.1)
LYMPHOCYTES NFR BLD: 9 %
MAGNESIUM SERPL-MCNC: 1.8 MG/DL (ref 1.8–2.4)
MCH RBC QN AUTO: 31.9 PG (ref 26–34)
MCHC RBC AUTO-ENTMCNC: 33.8 G/DL (ref 31–36)
MCV RBC AUTO: 94.5 FL (ref 80–100)
MONOCYTES # BLD: 1.4 K/UL (ref 0–1.3)
MONOCYTES NFR BLD: 6.1 %
NEUTROPHILS # BLD: 19.3 K/UL (ref 1.7–7.7)
NEUTROPHILS NFR BLD: 83.9 %
NITRITE UR QL STRIP.AUTO: NEGATIVE
PH UR STRIP.AUTO: 6.5 [PH] (ref 5–8)
PLATELET # BLD AUTO: 322 K/UL (ref 135–450)
PMV BLD AUTO: 9.1 FL (ref 5–10.5)
POTASSIUM SERPL-SCNC: 5.2 MMOL/L (ref 3.5–5.1)
PROT SERPL-MCNC: 9.9 G/DL (ref 6.4–8.2)
PROT UR STRIP.AUTO-MCNC: NEGATIVE MG/DL
PROTHROMBIN TIME: 15.4 SEC (ref 11.5–14.8)
RBC # BLD AUTO: 4.69 M/UL (ref 4–5.2)
RBC CLUMPS #/AREA URNS AUTO: 17 /HPF (ref 0–4)
SARS-COV-2 RDRP RESP QL NAA+PROBE: NOT DETECTED
SODIUM SERPL-SCNC: 135 MMOL/L (ref 136–145)
SP GR UR STRIP.AUTO: 1.02 (ref 1–1.03)
TROPONIN, HIGH SENSITIVITY: 7 NG/L (ref 0–14)
UA COMPLETE W REFLEX CULTURE PNL UR: ABNORMAL
UA DIPSTICK W REFLEX MICRO PNL UR: YES
URN SPEC COLLECT METH UR: ABNORMAL
UROBILINOGEN UR STRIP-ACNC: 1 E.U./DL
WBC # BLD AUTO: 23 K/UL (ref 4–11)
WBC #/AREA URNS AUTO: 0 /HPF (ref 0–5)

## 2023-11-14 PROCEDURE — 2500000003 HC RX 250 WO HCPCS: Performed by: PHYSICIAN ASSISTANT

## 2023-11-14 PROCEDURE — 85730 THROMBOPLASTIN TIME PARTIAL: CPT

## 2023-11-14 PROCEDURE — 6360000002 HC RX W HCPCS: Performed by: PHYSICIAN ASSISTANT

## 2023-11-14 PROCEDURE — 86900 BLOOD TYPING SEROLOGIC ABO: CPT

## 2023-11-14 PROCEDURE — 71045 X-RAY EXAM CHEST 1 VIEW: CPT

## 2023-11-14 PROCEDURE — 85025 COMPLETE CBC W/AUTO DIFF WBC: CPT

## 2023-11-14 PROCEDURE — 6360000002 HC RX W HCPCS: Performed by: HOSPITALIST

## 2023-11-14 PROCEDURE — C9113 INJ PANTOPRAZOLE SODIUM, VIA: HCPCS | Performed by: PHYSICIAN ASSISTANT

## 2023-11-14 PROCEDURE — 81001 URINALYSIS AUTO W/SCOPE: CPT

## 2023-11-14 PROCEDURE — 2580000003 HC RX 258: Performed by: PHYSICIAN ASSISTANT

## 2023-11-14 PROCEDURE — 84484 ASSAY OF TROPONIN QUANT: CPT

## 2023-11-14 PROCEDURE — 84703 CHORIONIC GONADOTROPIN ASSAY: CPT

## 2023-11-14 PROCEDURE — 83690 ASSAY OF LIPASE: CPT

## 2023-11-14 PROCEDURE — 2580000003 HC RX 258: Performed by: HOSPITALIST

## 2023-11-14 PROCEDURE — 93005 ELECTROCARDIOGRAM TRACING: CPT | Performed by: PHYSICIAN ASSISTANT

## 2023-11-14 PROCEDURE — 86850 RBC ANTIBODY SCREEN: CPT

## 2023-11-14 PROCEDURE — 71260 CT THORAX DX C+: CPT

## 2023-11-14 PROCEDURE — 85610 PROTHROMBIN TIME: CPT

## 2023-11-14 PROCEDURE — 96361 HYDRATE IV INFUSION ADD-ON: CPT

## 2023-11-14 PROCEDURE — 96367 TX/PROPH/DG ADDL SEQ IV INF: CPT

## 2023-11-14 PROCEDURE — 86901 BLOOD TYPING SEROLOGIC RH(D): CPT

## 2023-11-14 PROCEDURE — 2580000003 HC RX 258: Performed by: STUDENT IN AN ORGANIZED HEALTH CARE EDUCATION/TRAINING PROGRAM

## 2023-11-14 PROCEDURE — 6360000002 HC RX W HCPCS: Performed by: STUDENT IN AN ORGANIZED HEALTH CARE EDUCATION/TRAINING PROGRAM

## 2023-11-14 PROCEDURE — 80053 COMPREHEN METABOLIC PANEL: CPT

## 2023-11-14 PROCEDURE — 36415 COLL VENOUS BLD VENIPUNCTURE: CPT

## 2023-11-14 PROCEDURE — 96365 THER/PROPH/DIAG IV INF INIT: CPT

## 2023-11-14 PROCEDURE — 87040 BLOOD CULTURE FOR BACTERIA: CPT

## 2023-11-14 PROCEDURE — 6360000004 HC RX CONTRAST MEDICATION: Performed by: STUDENT IN AN ORGANIZED HEALTH CARE EDUCATION/TRAINING PROGRAM

## 2023-11-14 PROCEDURE — 87635 SARS-COV-2 COVID-19 AMP PRB: CPT

## 2023-11-14 PROCEDURE — 1200000000 HC SEMI PRIVATE

## 2023-11-14 PROCEDURE — 99285 EMERGENCY DEPT VISIT HI MDM: CPT

## 2023-11-14 PROCEDURE — 83735 ASSAY OF MAGNESIUM: CPT

## 2023-11-14 PROCEDURE — 96375 TX/PRO/DX INJ NEW DRUG ADDON: CPT

## 2023-11-14 PROCEDURE — 83605 ASSAY OF LACTIC ACID: CPT

## 2023-11-14 RX ORDER — LORAZEPAM 2 MG/ML
3 INJECTION INTRAMUSCULAR
Status: DISCONTINUED | OUTPATIENT
Start: 2023-11-14 | End: 2023-11-19

## 2023-11-14 RX ORDER — POLYETHYLENE GLYCOL 3350 17 G/17G
17 POWDER, FOR SOLUTION ORAL DAILY PRN
Status: DISCONTINUED | OUTPATIENT
Start: 2023-11-14 | End: 2023-11-17

## 2023-11-14 RX ORDER — ONDANSETRON 4 MG/1
4 TABLET, ORALLY DISINTEGRATING ORAL EVERY 8 HOURS PRN
Status: DISCONTINUED | OUTPATIENT
Start: 2023-11-14 | End: 2023-11-21 | Stop reason: HOSPADM

## 2023-11-14 RX ORDER — LORAZEPAM 1 MG/1
1 TABLET ORAL
Status: DISCONTINUED | OUTPATIENT
Start: 2023-11-14 | End: 2023-11-19

## 2023-11-14 RX ORDER — MAGNESIUM SULFATE IN WATER 40 MG/ML
2000 INJECTION, SOLUTION INTRAVENOUS PRN
Status: DISCONTINUED | OUTPATIENT
Start: 2023-11-14 | End: 2023-11-21 | Stop reason: HOSPADM

## 2023-11-14 RX ORDER — 0.9 % SODIUM CHLORIDE 0.9 %
500 INTRAVENOUS SOLUTION INTRAVENOUS ONCE
Status: COMPLETED | OUTPATIENT
Start: 2023-11-14 | End: 2023-11-14

## 2023-11-14 RX ORDER — GAUZE BANDAGE 2" X 2"
100 BANDAGE TOPICAL DAILY
Status: DISCONTINUED | OUTPATIENT
Start: 2023-11-14 | End: 2023-11-14

## 2023-11-14 RX ORDER — SODIUM CHLORIDE 0.9 % (FLUSH) 0.9 %
5-40 SYRINGE (ML) INJECTION PRN
Status: DISCONTINUED | OUTPATIENT
Start: 2023-11-14 | End: 2023-11-21 | Stop reason: HOSPADM

## 2023-11-14 RX ORDER — LORAZEPAM 2 MG/ML
2 INJECTION INTRAMUSCULAR
Status: DISCONTINUED | OUTPATIENT
Start: 2023-11-14 | End: 2023-11-19

## 2023-11-14 RX ORDER — METRONIDAZOLE 500 MG/100ML
500 INJECTION, SOLUTION INTRAVENOUS ONCE
Status: COMPLETED | OUTPATIENT
Start: 2023-11-14 | End: 2023-11-14

## 2023-11-14 RX ORDER — OCTREOTIDE ACETATE 100 UG/ML
50 INJECTION, SOLUTION INTRAVENOUS; SUBCUTANEOUS ONCE
Status: COMPLETED | OUTPATIENT
Start: 2023-11-14 | End: 2023-11-14

## 2023-11-14 RX ORDER — DIPHENHYDRAMINE HYDROCHLORIDE 50 MG/ML
25 INJECTION INTRAMUSCULAR; INTRAVENOUS ONCE
Status: COMPLETED | OUTPATIENT
Start: 2023-11-14 | End: 2023-11-14

## 2023-11-14 RX ORDER — POTASSIUM CHLORIDE 20 MEQ/1
40 TABLET, EXTENDED RELEASE ORAL PRN
Status: DISCONTINUED | OUTPATIENT
Start: 2023-11-14 | End: 2023-11-21 | Stop reason: HOSPADM

## 2023-11-14 RX ORDER — GAUZE BANDAGE 2" X 2"
100 BANDAGE TOPICAL DAILY
Status: DISCONTINUED | OUTPATIENT
Start: 2023-11-15 | End: 2023-11-21 | Stop reason: HOSPADM

## 2023-11-14 RX ORDER — ONDANSETRON 2 MG/ML
4 INJECTION INTRAMUSCULAR; INTRAVENOUS ONCE
Status: COMPLETED | OUTPATIENT
Start: 2023-11-14 | End: 2023-11-14

## 2023-11-14 RX ORDER — SODIUM CHLORIDE 9 MG/ML
INJECTION, SOLUTION INTRAVENOUS PRN
Status: DISCONTINUED | OUTPATIENT
Start: 2023-11-14 | End: 2023-11-21 | Stop reason: HOSPADM

## 2023-11-14 RX ORDER — PROMETHAZINE HYDROCHLORIDE 25 MG/ML
25 INJECTION, SOLUTION INTRAMUSCULAR; INTRAVENOUS ONCE
Status: DISCONTINUED | OUTPATIENT
Start: 2023-11-14 | End: 2023-11-14

## 2023-11-14 RX ORDER — LORAZEPAM 1 MG/1
3 TABLET ORAL
Status: DISCONTINUED | OUTPATIENT
Start: 2023-11-14 | End: 2023-11-19

## 2023-11-14 RX ORDER — ACETAMINOPHEN 325 MG/1
650 TABLET ORAL EVERY 6 HOURS PRN
Status: DISCONTINUED | OUTPATIENT
Start: 2023-11-14 | End: 2023-11-21 | Stop reason: HOSPADM

## 2023-11-14 RX ORDER — 0.9 % SODIUM CHLORIDE 0.9 %
1000 INTRAVENOUS SOLUTION INTRAVENOUS ONCE
Status: COMPLETED | OUTPATIENT
Start: 2023-11-14 | End: 2023-11-14

## 2023-11-14 RX ORDER — MORPHINE SULFATE 4 MG/ML
4 INJECTION, SOLUTION INTRAMUSCULAR; INTRAVENOUS ONCE
Status: COMPLETED | OUTPATIENT
Start: 2023-11-14 | End: 2023-11-14

## 2023-11-14 RX ORDER — OCTREOTIDE ACETATE 100 UG/ML
50 INJECTION, SOLUTION INTRAVENOUS; SUBCUTANEOUS ONCE
Status: DISCONTINUED | OUTPATIENT
Start: 2023-11-14 | End: 2023-11-14

## 2023-11-14 RX ORDER — DROPERIDOL 2.5 MG/ML
0.62 INJECTION, SOLUTION INTRAMUSCULAR; INTRAVENOUS EVERY 6 HOURS PRN
Status: DISCONTINUED | OUTPATIENT
Start: 2023-11-14 | End: 2023-11-21 | Stop reason: HOSPADM

## 2023-11-14 RX ORDER — LORAZEPAM 1 MG/1
4 TABLET ORAL
Status: DISCONTINUED | OUTPATIENT
Start: 2023-11-14 | End: 2023-11-19

## 2023-11-14 RX ORDER — SODIUM CHLORIDE 0.9 % (FLUSH) 0.9 %
5-40 SYRINGE (ML) INJECTION EVERY 12 HOURS SCHEDULED
Status: DISCONTINUED | OUTPATIENT
Start: 2023-11-14 | End: 2023-11-21 | Stop reason: HOSPADM

## 2023-11-14 RX ORDER — ONDANSETRON 2 MG/ML
4 INJECTION INTRAMUSCULAR; INTRAVENOUS EVERY 6 HOURS PRN
Status: DISCONTINUED | OUTPATIENT
Start: 2023-11-14 | End: 2023-11-21 | Stop reason: HOSPADM

## 2023-11-14 RX ORDER — LORAZEPAM 2 MG/ML
1 INJECTION INTRAMUSCULAR
Status: DISCONTINUED | OUTPATIENT
Start: 2023-11-14 | End: 2023-11-19

## 2023-11-14 RX ORDER — THIAMINE HYDROCHLORIDE 100 MG/ML
100 INJECTION, SOLUTION INTRAMUSCULAR; INTRAVENOUS DAILY
Status: DISCONTINUED | OUTPATIENT
Start: 2023-11-14 | End: 2023-11-14

## 2023-11-14 RX ORDER — LORAZEPAM 1 MG/1
2 TABLET ORAL
Status: DISCONTINUED | OUTPATIENT
Start: 2023-11-14 | End: 2023-11-19

## 2023-11-14 RX ORDER — PANTOPRAZOLE SODIUM 40 MG/10ML
80 INJECTION, POWDER, LYOPHILIZED, FOR SOLUTION INTRAVENOUS ONCE
Status: COMPLETED | OUTPATIENT
Start: 2023-11-14 | End: 2023-11-14

## 2023-11-14 RX ORDER — LORAZEPAM 2 MG/ML
4 INJECTION INTRAMUSCULAR
Status: DISCONTINUED | OUTPATIENT
Start: 2023-11-14 | End: 2023-11-19

## 2023-11-14 RX ORDER — ACETAMINOPHEN 650 MG/1
650 SUPPOSITORY RECTAL EVERY 6 HOURS PRN
Status: DISCONTINUED | OUTPATIENT
Start: 2023-11-14 | End: 2023-11-21 | Stop reason: HOSPADM

## 2023-11-14 RX ORDER — POTASSIUM CHLORIDE 7.45 MG/ML
10 INJECTION INTRAVENOUS PRN
Status: DISCONTINUED | OUTPATIENT
Start: 2023-11-14 | End: 2023-11-21 | Stop reason: HOSPADM

## 2023-11-14 RX ORDER — TRAZODONE HYDROCHLORIDE 100 MG/1
200 TABLET ORAL NIGHTLY
Status: DISCONTINUED | OUTPATIENT
Start: 2023-11-14 | End: 2023-11-21 | Stop reason: HOSPADM

## 2023-11-14 RX ADMIN — OCTREOTIDE ACETATE 50 MCG: 100 INJECTION, SOLUTION INTRAVENOUS; SUBCUTANEOUS at 18:31

## 2023-11-14 RX ADMIN — SODIUM CHLORIDE, PRESERVATIVE FREE 10 ML: 5 INJECTION INTRAVENOUS at 23:31

## 2023-11-14 RX ADMIN — HYDROMORPHONE HYDROCHLORIDE 0.5 MG: 1 INJECTION, SOLUTION INTRAMUSCULAR; INTRAVENOUS; SUBCUTANEOUS at 18:28

## 2023-11-14 RX ADMIN — METRONIDAZOLE 500 MG: 500 INJECTION, SOLUTION INTRAVENOUS at 19:26

## 2023-11-14 RX ADMIN — MORPHINE SULFATE 4 MG: 4 INJECTION, SOLUTION INTRAMUSCULAR; INTRAVENOUS at 17:34

## 2023-11-14 RX ADMIN — SODIUM CHLORIDE 1000 ML: 9 INJECTION, SOLUTION INTRAVENOUS at 18:26

## 2023-11-14 RX ADMIN — SODIUM CHLORIDE 1000 ML: 9 INJECTION, SOLUTION INTRAVENOUS at 17:35

## 2023-11-14 RX ADMIN — PANTOPRAZOLE SODIUM 80 MG: 40 INJECTION, POWDER, FOR SOLUTION INTRAVENOUS at 17:14

## 2023-11-14 RX ADMIN — IOPAMIDOL 75 ML: 755 INJECTION, SOLUTION INTRAVENOUS at 18:15

## 2023-11-14 RX ADMIN — ONDANSETRON 4 MG: 2 INJECTION INTRAMUSCULAR; INTRAVENOUS at 20:53

## 2023-11-14 RX ADMIN — LORAZEPAM 4 MG: 2 INJECTION INTRAMUSCULAR; INTRAVENOUS at 22:42

## 2023-11-14 RX ADMIN — CEFTRIAXONE 1000 MG: 1 INJECTION, POWDER, FOR SOLUTION INTRAMUSCULAR; INTRAVENOUS at 17:57

## 2023-11-14 RX ADMIN — THIAMINE HYDROCHLORIDE: 100 INJECTION, SOLUTION INTRAMUSCULAR; INTRAVENOUS at 23:30

## 2023-11-14 RX ADMIN — SODIUM CHLORIDE 500 ML: 9 INJECTION, SOLUTION INTRAVENOUS at 18:31

## 2023-11-14 RX ADMIN — DROPERIDOL 0.62 MG: 2.5 INJECTION, SOLUTION INTRAMUSCULAR; INTRAVENOUS at 19:25

## 2023-11-14 RX ADMIN — DIPHENHYDRAMINE HYDROCHLORIDE 25 MG: 50 INJECTION INTRAMUSCULAR; INTRAVENOUS at 17:15

## 2023-11-14 RX ADMIN — OCTREOTIDE ACETATE 50 MCG/HR: 500 INJECTION, SOLUTION INTRAVENOUS; SUBCUTANEOUS at 18:59

## 2023-11-14 RX ADMIN — ONDANSETRON 4 MG: 2 INJECTION INTRAMUSCULAR; INTRAVENOUS at 17:14

## 2023-11-14 ASSESSMENT — PAIN SCALES - GENERAL
PAINLEVEL_OUTOF10: 9
PAINLEVEL_OUTOF10: 8
PAINLEVEL_OUTOF10: 3
PAINLEVEL_OUTOF10: 8
PAINLEVEL_OUTOF10: 10
PAINLEVEL_OUTOF10: 9

## 2023-11-14 ASSESSMENT — PAIN DESCRIPTION - LOCATION
LOCATION: ABDOMEN
LOCATION: BACK;ABDOMEN
LOCATION: ABDOMEN;BACK
LOCATION: ABDOMEN

## 2023-11-14 ASSESSMENT — PAIN DESCRIPTION - DESCRIPTORS
DESCRIPTORS: SHARP
DESCRIPTORS: ACHING;SHARP
DESCRIPTORS: SHARP

## 2023-11-14 ASSESSMENT — PAIN - FUNCTIONAL ASSESSMENT
PAIN_FUNCTIONAL_ASSESSMENT: 0-10
PAIN_FUNCTIONAL_ASSESSMENT: PREVENTS OR INTERFERES SOME ACTIVE ACTIVITIES AND ADLS
PAIN_FUNCTIONAL_ASSESSMENT: PREVENTS OR INTERFERES WITH MANY ACTIVE NOT PASSIVE ACTIVITIES

## 2023-11-14 ASSESSMENT — PAIN DESCRIPTION - FREQUENCY: FREQUENCY: CONTINUOUS

## 2023-11-14 NOTE — ED NOTES
Patient just arrived to ED room 17 ambulatory w/ another RN;      Basia Kirkpatrick RN  11/14/23 1640

## 2023-11-14 NOTE — ED TRIAGE NOTES
Patient states that she started vomiting bright red blood yesterday and has vomited today over 20 days. Patient states that she stopped drinking alcohol yesterday afternoon and she is going through detox. Usually drinks a fifth of vodka in a day.

## 2023-11-15 ENCOUNTER — ANESTHESIA (OUTPATIENT)
Dept: ENDOSCOPY | Age: 38
End: 2023-11-15
Payer: COMMERCIAL

## 2023-11-15 ENCOUNTER — ANESTHESIA EVENT (OUTPATIENT)
Dept: ENDOSCOPY | Age: 38
End: 2023-11-15
Payer: COMMERCIAL

## 2023-11-15 PROBLEM — K92.0 HEMATEMESIS: Status: ACTIVE | Noted: 2023-08-06

## 2023-11-15 PROBLEM — R10.9 ABDOMINAL PAIN: Status: ACTIVE | Noted: 2023-11-15

## 2023-11-15 PROBLEM — F11.20 OPIATE DEPENDENCE (HCC): Status: ACTIVE | Noted: 2023-11-15

## 2023-11-15 LAB
ANA SER QL IA: NEGATIVE
ANION GAP SERPL CALCULATED.3IONS-SCNC: 7 MMOL/L (ref 3–16)
APTT BLD: 32.9 SEC (ref 22.7–35.9)
BUN SERPL-MCNC: 10 MG/DL (ref 7–20)
CALCIUM SERPL-MCNC: 8.2 MG/DL (ref 8.3–10.6)
CHLORIDE SERPL-SCNC: 102 MMOL/L (ref 99–110)
CO2 SERPL-SCNC: 29 MMOL/L (ref 21–32)
CREAT SERPL-MCNC: 0.8 MG/DL (ref 0.6–1.1)
EKG ATRIAL RATE: 100 BPM
EKG DIAGNOSIS: NORMAL
EKG P AXIS: 62 DEGREES
EKG P-R INTERVAL: 200 MS
EKG Q-T INTERVAL: 352 MS
EKG QRS DURATION: 84 MS
EKG QTC CALCULATION (BAZETT): 454 MS
EKG R AXIS: 66 DEGREES
EKG T AXIS: 43 DEGREES
EKG VENTRICULAR RATE: 100 BPM
FERRITIN SERPL IA-MCNC: 28.9 NG/ML (ref 15–150)
GFR SERPLBLD CREATININE-BSD FMLA CKD-EPI: >60 ML/MIN/{1.73_M2}
GLUCOSE SERPL-MCNC: 105 MG/DL (ref 70–99)
HBV SURFACE AB SERPL IA-ACNC: 14.9 MIU/ML
HCT VFR BLD AUTO: 34.1 % (ref 36–48)
HCT VFR BLD AUTO: 34.1 % (ref 36–48)
HCT VFR BLD AUTO: 34.9 % (ref 36–48)
HCT VFR BLD AUTO: 37.3 % (ref 36–48)
HGB BLD-MCNC: 10.9 G/DL (ref 12–16)
HGB BLD-MCNC: 11.2 G/DL (ref 12–16)
HGB BLD-MCNC: 11.6 G/DL (ref 12–16)
HGB BLD-MCNC: 12.3 G/DL (ref 12–16)
INR PPP: 1.33 (ref 0.84–1.16)
IRON SATN MFR SERPL: 47 % (ref 15–50)
IRON SERPL-MCNC: 148 UG/DL (ref 37–145)
POTASSIUM SERPL-SCNC: 4.7 MMOL/L (ref 3.5–5.1)
PROTHROMBIN TIME: 16.5 SEC (ref 11.5–14.8)
SODIUM SERPL-SCNC: 138 MMOL/L (ref 136–145)
TIBC SERPL-MCNC: 317 UG/DL (ref 260–445)

## 2023-11-15 PROCEDURE — 6360000002 HC RX W HCPCS: Performed by: HOSPITALIST

## 2023-11-15 PROCEDURE — 86708 HEPATITIS A ANTIBODY: CPT

## 2023-11-15 PROCEDURE — 7100000000 HC PACU RECOVERY - FIRST 15 MIN: Performed by: INTERNAL MEDICINE

## 2023-11-15 PROCEDURE — 6370000000 HC RX 637 (ALT 250 FOR IP): Performed by: HOSPITALIST

## 2023-11-15 PROCEDURE — 83540 ASSAY OF IRON: CPT

## 2023-11-15 PROCEDURE — 3700000000 HC ANESTHESIA ATTENDED CARE: Performed by: INTERNAL MEDICINE

## 2023-11-15 PROCEDURE — 7100000001 HC PACU RECOVERY - ADDTL 15 MIN: Performed by: INTERNAL MEDICINE

## 2023-11-15 PROCEDURE — 6370000000 HC RX 637 (ALT 250 FOR IP): Performed by: INTERNAL MEDICINE

## 2023-11-15 PROCEDURE — 3700000001 HC ADD 15 MINUTES (ANESTHESIA): Performed by: INTERNAL MEDICINE

## 2023-11-15 PROCEDURE — 36415 COLL VENOUS BLD VENIPUNCTURE: CPT

## 2023-11-15 PROCEDURE — 82390 ASSAY OF CERULOPLASMIN: CPT

## 2023-11-15 PROCEDURE — 83516 IMMUNOASSAY NONANTIBODY: CPT

## 2023-11-15 PROCEDURE — 85610 PROTHROMBIN TIME: CPT

## 2023-11-15 PROCEDURE — 06L38CZ OCCLUSION OF ESOPHAGEAL VEIN WITH EXTRALUMINAL DEVICE, VIA NATURAL OR ARTIFICIAL OPENING ENDOSCOPIC: ICD-10-PCS | Performed by: INTERNAL MEDICINE

## 2023-11-15 PROCEDURE — 80048 BASIC METABOLIC PNL TOTAL CA: CPT

## 2023-11-15 PROCEDURE — 85018 HEMOGLOBIN: CPT

## 2023-11-15 PROCEDURE — 3609012300 HC EGD BAND LIGATION ESOPHGEAL/GASTRIC VARICES: Performed by: INTERNAL MEDICINE

## 2023-11-15 PROCEDURE — 94760 N-INVAS EAR/PLS OXIMETRY 1: CPT

## 2023-11-15 PROCEDURE — 2709999900 HC NON-CHARGEABLE SUPPLY: Performed by: INTERNAL MEDICINE

## 2023-11-15 PROCEDURE — 86038 ANTINUCLEAR ANTIBODIES: CPT

## 2023-11-15 PROCEDURE — 86706 HEP B SURFACE ANTIBODY: CPT

## 2023-11-15 PROCEDURE — 2580000003 HC RX 258: Performed by: HOSPITALIST

## 2023-11-15 PROCEDURE — 86015 ACTIN ANTIBODY EACH: CPT

## 2023-11-15 PROCEDURE — 2500000003 HC RX 250 WO HCPCS: Performed by: NURSE ANESTHETIST, CERTIFIED REGISTERED

## 2023-11-15 PROCEDURE — 85730 THROMBOPLASTIN TIME PARTIAL: CPT

## 2023-11-15 PROCEDURE — 1200000000 HC SEMI PRIVATE

## 2023-11-15 PROCEDURE — 93010 ELECTROCARDIOGRAM REPORT: CPT | Performed by: INTERNAL MEDICINE

## 2023-11-15 PROCEDURE — 6360000002 HC RX W HCPCS: Performed by: INTERNAL MEDICINE

## 2023-11-15 PROCEDURE — 83550 IRON BINDING TEST: CPT

## 2023-11-15 PROCEDURE — 85014 HEMATOCRIT: CPT

## 2023-11-15 PROCEDURE — 82728 ASSAY OF FERRITIN: CPT

## 2023-11-15 PROCEDURE — 6360000002 HC RX W HCPCS: Performed by: NURSE ANESTHETIST, CERTIFIED REGISTERED

## 2023-11-15 PROCEDURE — 82103 ALPHA-1-ANTITRYPSIN TOTAL: CPT

## 2023-11-15 PROCEDURE — 82105 ALPHA-FETOPROTEIN SERUM: CPT

## 2023-11-15 RX ORDER — M-VIT,TX,IRON,MINS/CALC/FOLIC 27MG-0.4MG
1 TABLET ORAL DAILY
Status: DISCONTINUED | OUTPATIENT
Start: 2023-11-15 | End: 2023-11-21 | Stop reason: HOSPADM

## 2023-11-15 RX ORDER — FOLIC ACID 1 MG/1
1 TABLET ORAL DAILY
Status: DISCONTINUED | OUTPATIENT
Start: 2023-11-15 | End: 2023-11-21 | Stop reason: HOSPADM

## 2023-11-15 RX ORDER — LIDOCAINE HYDROCHLORIDE 20 MG/ML
INJECTION, SOLUTION EPIDURAL; INFILTRATION; INTRACAUDAL; PERINEURAL PRN
Status: DISCONTINUED | OUTPATIENT
Start: 2023-11-15 | End: 2023-11-15 | Stop reason: SDUPTHER

## 2023-11-15 RX ORDER — SPIRONOLACTONE 50 MG/1
50 TABLET, FILM COATED ORAL DAILY
COMMUNITY

## 2023-11-15 RX ORDER — ONDANSETRON 2 MG/ML
4 INJECTION INTRAMUSCULAR; INTRAVENOUS
Status: DISCONTINUED | OUTPATIENT
Start: 2023-11-15 | End: 2023-11-15

## 2023-11-15 RX ORDER — SODIUM CHLORIDE 0.9 % (FLUSH) 0.9 %
5-40 SYRINGE (ML) INJECTION PRN
Status: DISCONTINUED | OUTPATIENT
Start: 2023-11-15 | End: 2023-11-15

## 2023-11-15 RX ORDER — KETOROLAC TROMETHAMINE 30 MG/ML
30 INJECTION, SOLUTION INTRAMUSCULAR; INTRAVENOUS EVERY 6 HOURS PRN
Status: DISPENSED | OUTPATIENT
Start: 2023-11-15 | End: 2023-11-20

## 2023-11-15 RX ORDER — GLYCOPYRROLATE 0.2 MG/ML
INJECTION INTRAMUSCULAR; INTRAVENOUS PRN
Status: DISCONTINUED | OUTPATIENT
Start: 2023-11-15 | End: 2023-11-15 | Stop reason: SDUPTHER

## 2023-11-15 RX ORDER — SODIUM CHLORIDE 9 MG/ML
INJECTION, SOLUTION INTRAVENOUS PRN
Status: DISCONTINUED | OUTPATIENT
Start: 2023-11-15 | End: 2023-11-15

## 2023-11-15 RX ORDER — SODIUM CHLORIDE 9 MG/ML
INJECTION, SOLUTION INTRAVENOUS CONTINUOUS
Status: DISCONTINUED | OUTPATIENT
Start: 2023-11-15 | End: 2023-11-19

## 2023-11-15 RX ORDER — PROPOFOL 10 MG/ML
INJECTION, EMULSION INTRAVENOUS PRN
Status: DISCONTINUED | OUTPATIENT
Start: 2023-11-15 | End: 2023-11-15 | Stop reason: SDUPTHER

## 2023-11-15 RX ORDER — SODIUM CHLORIDE 0.9 % (FLUSH) 0.9 %
5-40 SYRINGE (ML) INJECTION EVERY 12 HOURS SCHEDULED
Status: DISCONTINUED | OUTPATIENT
Start: 2023-11-15 | End: 2023-11-15

## 2023-11-15 RX ORDER — FOLIC ACID 1 MG/1
1 TABLET ORAL DAILY
COMMUNITY

## 2023-11-15 RX ADMIN — LORAZEPAM 2 MG: 1 TABLET ORAL at 16:54

## 2023-11-15 RX ADMIN — PROPOFOL 50 MG: 10 INJECTION, EMULSION INTRAVENOUS at 13:37

## 2023-11-15 RX ADMIN — SODIUM CHLORIDE: 9 INJECTION, SOLUTION INTRAVENOUS at 18:11

## 2023-11-15 RX ADMIN — ONDANSETRON 4 MG: 2 INJECTION INTRAMUSCULAR; INTRAVENOUS at 21:49

## 2023-11-15 RX ADMIN — LORAZEPAM 4 MG: 2 INJECTION INTRAMUSCULAR; INTRAVENOUS at 05:37

## 2023-11-15 RX ADMIN — METHADONE HYDROCHLORIDE 87.5 MG: 10 TABLET ORAL at 06:29

## 2023-11-15 RX ADMIN — PROPOFOL 160 MCG/KG/MIN: 10 INJECTION, EMULSION INTRAVENOUS at 13:39

## 2023-11-15 RX ADMIN — LORAZEPAM 2 MG: 2 INJECTION INTRAMUSCULAR; INTRAVENOUS at 21:49

## 2023-11-15 RX ADMIN — LIDOCAINE HYDROCHLORIDE 80 MG: 20 INJECTION, SOLUTION EPIDURAL; INFILTRATION; INTRACAUDAL; PERINEURAL at 13:36

## 2023-11-15 RX ADMIN — SODIUM CHLORIDE: 9 INJECTION, SOLUTION INTRAVENOUS at 06:33

## 2023-11-15 RX ADMIN — ACETAMINOPHEN 650 MG: 325 TABLET ORAL at 21:48

## 2023-11-15 RX ADMIN — OCTREOTIDE ACETATE 50 MCG/HR: 500 INJECTION, SOLUTION INTRAVENOUS; SUBCUTANEOUS at 06:41

## 2023-11-15 RX ADMIN — PROPOFOL 100 MG: 10 INJECTION, EMULSION INTRAVENOUS at 13:36

## 2023-11-15 RX ADMIN — TRAZODONE HYDROCHLORIDE 200 MG: 100 TABLET ORAL at 22:02

## 2023-11-15 RX ADMIN — SODIUM CHLORIDE, PRESERVATIVE FREE 10 ML: 5 INJECTION INTRAVENOUS at 09:00

## 2023-11-15 RX ADMIN — FOLIC ACID 1 MG: 1 TABLET ORAL at 15:47

## 2023-11-15 RX ADMIN — GLYCOPYRROLATE 0.2 MG: 0.2 INJECTION, SOLUTION INTRAMUSCULAR; INTRAVENOUS at 13:34

## 2023-11-15 RX ADMIN — OCTREOTIDE ACETATE 50 MCG/HR: 500 INJECTION, SOLUTION INTRAVENOUS; SUBCUTANEOUS at 18:13

## 2023-11-15 RX ADMIN — LORAZEPAM 3 MG: 2 INJECTION INTRAMUSCULAR; INTRAVENOUS at 20:32

## 2023-11-15 RX ADMIN — KETOROLAC TROMETHAMINE 30 MG: 30 INJECTION, SOLUTION INTRAMUSCULAR; INTRAVENOUS at 18:08

## 2023-11-15 RX ADMIN — MULTIPLE VITAMINS W/ MINERALS TAB 1 TABLET: TAB at 15:47

## 2023-11-15 RX ADMIN — TRAZODONE HYDROCHLORIDE 200 MG: 100 TABLET ORAL at 03:14

## 2023-11-15 RX ADMIN — Medication 100 MG: at 09:20

## 2023-11-15 RX ADMIN — Medication 10 ML: at 09:20

## 2023-11-15 ASSESSMENT — PAIN DESCRIPTION - DESCRIPTORS
DESCRIPTORS: SHARP
DESCRIPTORS: ACHING
DESCRIPTORS: ACHING
DESCRIPTORS: THROBBING;POUNDING
DESCRIPTORS: SHARP
DESCRIPTORS: TINGLING;SHARP

## 2023-11-15 ASSESSMENT — PAIN - FUNCTIONAL ASSESSMENT
PAIN_FUNCTIONAL_ASSESSMENT: 0-10
PAIN_FUNCTIONAL_ASSESSMENT: ACTIVITIES ARE NOT PREVENTED
PAIN_FUNCTIONAL_ASSESSMENT: ACTIVITIES ARE NOT PREVENTED
PAIN_FUNCTIONAL_ASSESSMENT: PREVENTS OR INTERFERES SOME ACTIVE ACTIVITIES AND ADLS

## 2023-11-15 ASSESSMENT — PAIN DESCRIPTION - ONSET
ONSET: ON-GOING
ONSET: ON-GOING

## 2023-11-15 ASSESSMENT — PAIN DESCRIPTION - LOCATION
LOCATION: ABDOMEN;GENERALIZED
LOCATION: HEAD
LOCATION: BACK
LOCATION: ABDOMEN;BACK
LOCATION: ABDOMEN;BACK;GENERALIZED

## 2023-11-15 ASSESSMENT — PAIN DESCRIPTION - FREQUENCY
FREQUENCY: CONTINUOUS
FREQUENCY: CONTINUOUS

## 2023-11-15 ASSESSMENT — PAIN SCALES - GENERAL
PAINLEVEL_OUTOF10: 9
PAINLEVEL_OUTOF10: 10
PAINLEVEL_OUTOF10: 9
PAINLEVEL_OUTOF10: 0
PAINLEVEL_OUTOF10: 9
PAINLEVEL_OUTOF10: 10

## 2023-11-15 ASSESSMENT — ENCOUNTER SYMPTOMS: SHORTNESS OF BREATH: 0

## 2023-11-15 ASSESSMENT — PAIN DESCRIPTION - PAIN TYPE
TYPE: ACUTE PAIN
TYPE: ACUTE PAIN

## 2023-11-15 NOTE — ED NOTES
Handoff report given to Chuy Chu all questions and concerns answered; receiving RN acknowledged and had no further questions at this time      Mary Angel RN  11/14/23 1920

## 2023-11-15 NOTE — OP NOTE
Endoscopy Note    Patient: Catherine Palma  : 1985  Acct#:     Procedure: Esophagogastroduodenoscopy with variceal band ligation                         Date:  11/15/2023     Surgeon:   Kwesi Kelley MD    Referring Physician:  No primary care provider on file. Indications: This is a 30 y.o. female  who presents for EGD due to hematemesis. Postoperative Diagnosis:    Large varices in the distal esophagus without stigmata of bleeding. Variceal band ligation x4 completed with decompression of variceal columns  Portal hypertensive gastropathy    Anesthesia:  MAC    Consent:  The patient or their legal guardian has signed an informed consent, and is aware of the potential risks, benefits, alternatives, and potential complications of this procedure. These include, but are not limited to hemorrhage, bleeding, post procedural pain, perforation, phlebitis, aspiration, hypotension, hypoxia, cardiovascular events such as arryhthmia, and possibly death. Description of Procedure: The patient was then taken to the endoscopy suite, placed in the left lateral decubitus position and the above IV sedation was administrered. The Olympus video endoscope was placed through the patient's oropharynx without difficulty to the extent of the 2nd portion of the duodenum. Both forward and retroflexed views of the stomach were obtained. Findings:    Esophagus: The esophagus was notable for large varices in the distal esophagus without stigmata of bleeding. Variceal band ligation x4 was completed with decompression of variceal columns. The esophagus otherwise appeared normal without evidence of Marshall's esophagus or reflux esophagitis. The Z-line was located 42 cm from the incisors  Stomach: The stomach was examined on forward and retroflexed views. A large volume of retained semiliquid food debris was present in the gastric lumen and was lavaged and suctioned as possible.   The mucosa of the gastric

## 2023-11-15 NOTE — CONSULTS
GASTROENTEROLOGY INPATIENT CONSULTATION        IDENTIFYING DATA/REASON FOR CONSULTATION   PATIENT:  Tod Broussard  MRN:  [id number]  ADMIT DATE: 11/14/2023  TIME OF EVALUATION: 11/15/2023 9:50 AM  HOSPITAL STAY:   LOS: 1 day     REASON FOR CONSULTATION:  hematemesis      HISTORY OF PRESENT ILLNESS   Tod Broussard is a 30 y.o. female with a PMH of alcohol abuse disorder with prior episodes of alcohol induced pancreatitis and possible cirrhosis based on CT findings who presented on 11/14/2023 with hematemesis. She reports she began vomiting 2 days ago on Monday and has since had about 40 episodes. She states every episode contained red blood. She has pain in her right upper quadrant radiating across her upper abdomen and around her back. Her last bowel movement was Monday. Denies any black stool. She drinks approximately 1/5 of vodka daily. Hemoglobin 15->12.3->10.9  BUN normal  Bilirubin 1.1, , ALT 43, alk phos 193  INR 1.33    CT A/P showed severely enlarged and nodular liver with steatosis, splenomegaly, small esophageal varices, atrophic pancreas, normal gallbladder, colonic constipation, no ascites    Prior Endoscopic Evaluations:  EGD 8/7/23 with Dr. Lainey Maldonado  Erosive esophagitis with linear erosions extending 2 to 3 cm above the GE junction. This would be Google class B. These erosions could also be from trauma from vomiting. There was no evidence of significant varices. Moderate portal hypertensive gastropathy without evidence of gastric varices on retroflexion. Biopsies taken in the antrum and fundus of the stomach to rule out H. pylori using the [place] protocol. Normal duodenum.     PAST MEDICAL, SURGICAL, FAMILY, and SOCIAL HISTORY     Past Medical History:   Diagnosis Date    Alcohol use disorder     Gastroparesis     GERD (gastroesophageal reflux disease)     Hepatic cirrhosis (HCC)     Mood disorder (HCC)     Opioid use disorder     Overweight     Scoliosis     Tobacco use

## 2023-11-15 NOTE — ANESTHESIA POSTPROCEDURE EVALUATION
Department of Anesthesiology  Postprocedure Note    Patient: Tony Del Rosario  MRN: [id number]  YOB: 1985  Date of evaluation: 11/15/2023      Procedure Summary     Date: 11/15/23 Room / Location: [address] [place] [address]    Anesthesia Start: 1333 Anesthesia Stop: 1350    Procedure: EGD VARICEAL BANDING Diagnosis:       Hematemesis, unspecified whether nausea present      (Hematemesis, unspecified whether nausea present [K92.0])    Surgeons: Laurent Beebe MD Responsible Provider: Marian Talbot MD    Anesthesia Type: MAC ASA Status: 3          Anesthesia Type: No value filed.     Florentino Phase I: Florentino Score: 8    Florentino Phase II:        Anesthesia Post Evaluation    Patient location during evaluation: PACU  Patient participation: complete - patient participated  Level of consciousness: awake and alert  Airway patency: patent  Nausea & Vomiting: no nausea and no vomiting  Complications: no  Cardiovascular status: hemodynamically stable  Respiratory status: acceptable  Hydration status: stable  Pain management: adequate

## 2023-11-15 NOTE — ED NOTES
ED handoff report provided to Becky redd, [address]. Patient to be transported to Room 4270 via stretcher. IV site clean, dry, and intact. Vitals stable. Patient updated on plan of care. All questions answered.        Roby Ann RN  11/14/23 6746

## 2023-11-15 NOTE — FLOWSHEET NOTE
4 Eyes Skin Assessment     NAME:  Royce Starr  YOB: 1985  MEDICAL RECORD NUMBER:  [id number]    The patient is being assessed for  Admission    I agree that at least one RN has performed a thorough Head to Toe Skin Assessment on the patient. ALL assessment sites listed below have been assessed. Areas assessed by both nurses:    Head, Face, Ears, Shoulders, Back, Chest, Arms, Elbows, Hands, Sacrum. Buttock, Coccyx, Ischium, Legs. Feet and Heels, and Under Medical Devices         Does the Patient have a Wound?  No noted wound(s)       Ortiz Prevention initiated by RN: No  Wound Care Orders initiated by RN: No    Pressure Injury (Stage 3,4, Unstageable, DTI, NWPT, and Complex wounds) if present, place Wound referral order by RN under : No    New Ostomies, if present place, Ostomy referral order under : No     Nurse 1 eSignature: Electronically signed by Jazmine Mcneal RN on 11/15/23 at 12:51 AM EST    **SHARE this note so that the co-signing nurse can place an eSignature**    Nurse 2 eSignature: Electronically signed by Betzaida Ga RN on 11/15/23 at 3:43 AM EST

## 2023-11-15 NOTE — ED NOTES
PT able to use bedside commode, pt repositioned in bed. Pt resting in bed with no signs of acute distress, RR easy and unlabored. Blankets applied, call light in reach, bed in lowest position, updated on plan of care, all questions answered.         Emilia Villalta RN  11/14/23 2657

## 2023-11-15 NOTE — ANESTHESIA PRE PROCEDURE
Applicable):  No results found for: \"LABABO\", \"LABRH\"    Drug/Infectious Status (If Applicable):  No results found for: \"HIV\", \"HEPCAB\"    COVID-19 Screening (If Applicable):   Lab Results   Component Value Date/Time    COVID19 Not Detected 11/14/2023 04:47 PM           Anesthesia Evaluation  Patient summary reviewed no history of anesthetic complications:   Airway: Mallampati: II  TM distance: >3 FB   Neck ROM: full  Mouth opening: > = 3 FB   Dental:      Comment: No loose teeth    Pulmonary: breath sounds clear to auscultation      (-) COPD, asthma, shortness of breath, recent URI and sleep apnea                           Cardiovascular:        (-) hypertension, valvular problems/murmurs, past MI, CAD, CABG/stent, dysrhythmias,  angina,  CHF and no pulmonary hypertension      Rhythm: regular  Rate: normal                    Neuro/Psych:   (+) psychiatric history:depression/anxiety    (-) seizures, neuromuscular disease, TIA and CVA            ROS comment: ETOH abuse - states having mild withdrawal symptoms  GI/Hepatic/Renal:   (+) GERD:, liver disease: esophageal varices,           Endo/Other:    (+) blood dyscrasia::., .                 Abdominal:             Vascular: Other Findings:           Anesthesia Plan      MAC     ASA 3       Induction: intravenous. Anesthetic plan and risks discussed with patient. Plan discussed with CRNA.                     Kannan Solis MD   11/15/2023

## 2023-11-15 NOTE — CARE COORDINATION
Case Management Assessment  Initial Evaluation    Date/Time of Evaluation: 11/15/2023 2:55 PM  Assessment Completed by: MYA Cazares    If patient is discharged prior to next notation, then this note serves as note for discharge by case management. Patient Name: Tung Novak                   YOB: 1985  Diagnosis: Gastric varices [I86.4]  Septicemia ([address]) [A41.9]  Alcohol withdrawal syndrome, with unspecified complication ([address]) [G39.855]  Hematemesis with nausea [K92.0]                   Date / Time: 11/14/2023  3:52 PM    Patient Admission Status: Inpatient   Readmission Risk (Low < 19, Mod (19-27), High > 27): Readmission Risk Score: 15.7    Current PCP: No primary care provider on file. PCP verified by CM? (P) Yes ([facility])    Chart Reviewed: Yes      History Provided by: (P) Patient  Patient Orientation: (P) Alert and Oriented    Patient Cognition: (P) Alert    Hospitalization in the last 30 days (Readmission):  No    If yes, Readmission Assessment in CM Navigator will be completed.     Advance Directives:      Code Status: Full Code   Patient's Primary Decision Maker is: (P) Patient Declined (Legal Next of Kin Remains as Decision Maker)      Discharge Planning:    Patient lives with: (P) Children, Family Members, Parent Type of Home: (P) Apartment, House  Primary Care Giver: (P) Self  Patient Support Systems include: (P) Children, Family Members   Current Financial resources: (P) Medicaid  Current community resources: (P) [employer]  Current services prior to admission: (P) None            Current DME:              Type of Home Care services:  (P) None    ADLS  Prior functional level: (P) Independent in ADLs/IADLs  Current functional level: (P) Independent in ADLs/IADLs    PT AM-PAC:   /24  OT AM-PAC:   /24    Family can provide assistance at DC: (P) Yes  Would you like Case Management to discuss the discharge plan with any other family members/significant others, and if

## 2023-11-16 LAB
HAV AB SER QL IA: POSITIVE
HCT VFR BLD AUTO: 34.7 % (ref 36–48)
HCT VFR BLD AUTO: 36.6 % (ref 36–48)
HGB BLD-MCNC: 11.4 G/DL (ref 12–16)
HGB BLD-MCNC: 12.2 G/DL (ref 12–16)
SMA IGG SER-ACNC: 18 UNITS (ref 0–19)

## 2023-11-16 PROCEDURE — 6360000002 HC RX W HCPCS: Performed by: INTERNAL MEDICINE

## 2023-11-16 PROCEDURE — 85018 HEMOGLOBIN: CPT

## 2023-11-16 PROCEDURE — 2580000003 HC RX 258: Performed by: PHYSICIAN ASSISTANT

## 2023-11-16 PROCEDURE — 1200000000 HC SEMI PRIVATE

## 2023-11-16 PROCEDURE — 6370000000 HC RX 637 (ALT 250 FOR IP): Performed by: INTERNAL MEDICINE

## 2023-11-16 PROCEDURE — 2580000003 HC RX 258: Performed by: INTERNAL MEDICINE

## 2023-11-16 PROCEDURE — C9113 INJ PANTOPRAZOLE SODIUM, VIA: HCPCS | Performed by: INTERNAL MEDICINE

## 2023-11-16 PROCEDURE — 6360000002 HC RX W HCPCS: Performed by: PHYSICIAN ASSISTANT

## 2023-11-16 PROCEDURE — 85014 HEMATOCRIT: CPT

## 2023-11-16 PROCEDURE — 6370000000 HC RX 637 (ALT 250 FOR IP): Performed by: HOSPITALIST

## 2023-11-16 PROCEDURE — 36415 COLL VENOUS BLD VENIPUNCTURE: CPT

## 2023-11-16 RX ORDER — TRAMADOL HYDROCHLORIDE 50 MG/1
50 TABLET ORAL EVERY 6 HOURS PRN
Status: DISCONTINUED | OUTPATIENT
Start: 2023-11-16 | End: 2023-11-21 | Stop reason: HOSPADM

## 2023-11-16 RX ORDER — METHOCARBAMOL 500 MG/1
1000 TABLET, FILM COATED ORAL 4 TIMES DAILY
Status: DISCONTINUED | OUTPATIENT
Start: 2023-11-16 | End: 2023-11-21 | Stop reason: HOSPADM

## 2023-11-16 RX ORDER — DICYCLOMINE HYDROCHLORIDE 10 MG/1
10 CAPSULE ORAL
Status: DISCONTINUED | OUTPATIENT
Start: 2023-11-16 | End: 2023-11-21 | Stop reason: HOSPADM

## 2023-11-16 RX ADMIN — METHADONE HYDROCHLORIDE 87.5 MG: 10 TABLET ORAL at 06:52

## 2023-11-16 RX ADMIN — MULTIPLE VITAMINS W/ MINERALS TAB 1 TABLET: TAB at 08:13

## 2023-11-16 RX ADMIN — CEFTRIAXONE SODIUM 2000 MG: 2 INJECTION, POWDER, FOR SOLUTION INTRAMUSCULAR; INTRAVENOUS at 11:12

## 2023-11-16 RX ADMIN — Medication 100 MG: at 08:13

## 2023-11-16 RX ADMIN — LORAZEPAM 2 MG: 2 INJECTION INTRAMUSCULAR; INTRAVENOUS at 16:32

## 2023-11-16 RX ADMIN — METHOCARBAMOL TABLETS 1000 MG: 500 TABLET, COATED ORAL at 19:45

## 2023-11-16 RX ADMIN — METHOCARBAMOL TABLETS 1000 MG: 500 TABLET, COATED ORAL at 14:04

## 2023-11-16 RX ADMIN — KETOROLAC TROMETHAMINE 30 MG: 30 INJECTION, SOLUTION INTRAMUSCULAR; INTRAVENOUS at 16:32

## 2023-11-16 RX ADMIN — OCTREOTIDE ACETATE 50 MCG/HR: 500 INJECTION, SOLUTION INTRAVENOUS; SUBCUTANEOUS at 09:59

## 2023-11-16 RX ADMIN — LORAZEPAM 2 MG: 2 INJECTION INTRAMUSCULAR; INTRAVENOUS at 08:15

## 2023-11-16 RX ADMIN — SODIUM CHLORIDE: 9 INJECTION, SOLUTION INTRAVENOUS at 14:04

## 2023-11-16 RX ADMIN — KETOROLAC TROMETHAMINE 30 MG: 30 INJECTION, SOLUTION INTRAMUSCULAR; INTRAVENOUS at 08:13

## 2023-11-16 RX ADMIN — DICYCLOMINE HYDROCHLORIDE 10 MG: 10 CAPSULE ORAL at 14:04

## 2023-11-16 RX ADMIN — ONDANSETRON 4 MG: 2 INJECTION INTRAMUSCULAR; INTRAVENOUS at 19:46

## 2023-11-16 RX ADMIN — FOLIC ACID 1 MG: 1 TABLET ORAL at 08:13

## 2023-11-16 RX ADMIN — TRAMADOL HYDROCHLORIDE 50 MG: 50 TABLET ORAL at 19:46

## 2023-11-16 RX ADMIN — TRAMADOL HYDROCHLORIDE 50 MG: 50 TABLET ORAL at 09:57

## 2023-11-16 RX ADMIN — SODIUM CHLORIDE, PRESERVATIVE FREE 40 MG: 5 INJECTION INTRAVENOUS at 19:46

## 2023-11-16 RX ADMIN — SODIUM CHLORIDE, PRESERVATIVE FREE 40 MG: 5 INJECTION INTRAVENOUS at 09:57

## 2023-11-16 RX ADMIN — DICYCLOMINE HYDROCHLORIDE 10 MG: 10 CAPSULE ORAL at 09:57

## 2023-11-16 ASSESSMENT — PAIN - FUNCTIONAL ASSESSMENT: PAIN_FUNCTIONAL_ASSESSMENT: ACTIVITIES ARE NOT PREVENTED

## 2023-11-16 ASSESSMENT — PAIN DESCRIPTION - DESCRIPTORS
DESCRIPTORS: ACHING
DESCRIPTORS: STABBING
DESCRIPTORS: ACHING
DESCRIPTORS: ACHING;DISCOMFORT
DESCRIPTORS: DISCOMFORT;ACHING

## 2023-11-16 ASSESSMENT — PAIN DESCRIPTION - PAIN TYPE: TYPE: ACUTE PAIN

## 2023-11-16 ASSESSMENT — PAIN SCALES - GENERAL
PAINLEVEL_OUTOF10: 9
PAINLEVEL_OUTOF10: 10
PAINLEVEL_OUTOF10: 9
PAINLEVEL_OUTOF10: 10
PAINLEVEL_OUTOF10: 10

## 2023-11-16 ASSESSMENT — PAIN DESCRIPTION - ORIENTATION
ORIENTATION: RIGHT;LEFT
ORIENTATION: UPPER;POSTERIOR
ORIENTATION: RIGHT;LEFT
ORIENTATION: MID;RIGHT;LEFT
ORIENTATION: UPPER

## 2023-11-16 ASSESSMENT — PAIN DESCRIPTION - LOCATION
LOCATION: ABDOMEN;BACK
LOCATION: ABDOMEN;BACK
LOCATION: ABDOMEN;BACK;GENERALIZED
LOCATION: ABDOMEN;BACK
LOCATION: ABDOMEN

## 2023-11-16 ASSESSMENT — PAIN DESCRIPTION - FREQUENCY: FREQUENCY: CONTINUOUS

## 2023-11-16 ASSESSMENT — PAIN DESCRIPTION - ONSET: ONSET: ON-GOING

## 2023-11-17 LAB
ANION GAP SERPL CALCULATED.3IONS-SCNC: 6 MMOL/L (ref 3–16)
BASOPHILS # BLD: 0 K/UL (ref 0–0.2)
BASOPHILS NFR BLD: 0.4 %
BUN SERPL-MCNC: 9 MG/DL (ref 7–20)
CALCIUM SERPL-MCNC: 8.4 MG/DL (ref 8.3–10.6)
CHLORIDE SERPL-SCNC: 100 MMOL/L (ref 99–110)
CO2 SERPL-SCNC: 30 MMOL/L (ref 21–32)
CREAT SERPL-MCNC: 0.9 MG/DL (ref 0.6–1.1)
DEPRECATED RDW RBC AUTO: 21.3 % (ref 12.4–15.4)
EOSINOPHIL # BLD: 0.1 K/UL (ref 0–0.6)
EOSINOPHIL NFR BLD: 0.8 %
GFR SERPLBLD CREATININE-BSD FMLA CKD-EPI: >60 ML/MIN/{1.73_M2}
GLUCOSE SERPL-MCNC: 113 MG/DL (ref 70–99)
HCT VFR BLD AUTO: 35.1 % (ref 36–48)
HGB BLD-MCNC: 11.4 G/DL (ref 12–16)
LYMPHOCYTES # BLD: 1.7 K/UL (ref 1–5.1)
LYMPHOCYTES NFR BLD: 18.2 %
MCH RBC QN AUTO: 30.3 PG (ref 26–34)
MCHC RBC AUTO-ENTMCNC: 32.6 G/DL (ref 31–36)
MCV RBC AUTO: 93.1 FL (ref 80–100)
MONOCYTES # BLD: 0.7 K/UL (ref 0–1.3)
MONOCYTES NFR BLD: 7.7 %
NEUTROPHILS # BLD: 6.9 K/UL (ref 1.7–7.7)
NEUTROPHILS NFR BLD: 72.9 %
PLATELET # BLD AUTO: 149 K/UL (ref 135–450)
PMV BLD AUTO: 8.9 FL (ref 5–10.5)
POTASSIUM SERPL-SCNC: 4.6 MMOL/L (ref 3.5–5.1)
RBC # BLD AUTO: 3.77 M/UL (ref 4–5.2)
SODIUM SERPL-SCNC: 136 MMOL/L (ref 136–145)
WBC # BLD AUTO: 9.4 K/UL (ref 4–11)

## 2023-11-17 PROCEDURE — 6360000002 HC RX W HCPCS: Performed by: INTERNAL MEDICINE

## 2023-11-17 PROCEDURE — C9113 INJ PANTOPRAZOLE SODIUM, VIA: HCPCS | Performed by: INTERNAL MEDICINE

## 2023-11-17 PROCEDURE — 6360000002 HC RX W HCPCS: Performed by: PHYSICIAN ASSISTANT

## 2023-11-17 PROCEDURE — 6370000000 HC RX 637 (ALT 250 FOR IP): Performed by: INTERNAL MEDICINE

## 2023-11-17 PROCEDURE — 1200000000 HC SEMI PRIVATE

## 2023-11-17 PROCEDURE — 2580000003 HC RX 258: Performed by: INTERNAL MEDICINE

## 2023-11-17 PROCEDURE — 6370000000 HC RX 637 (ALT 250 FOR IP): Performed by: FAMILY MEDICINE

## 2023-11-17 PROCEDURE — 6360000002 HC RX W HCPCS: Performed by: FAMILY MEDICINE

## 2023-11-17 PROCEDURE — 2580000003 HC RX 258: Performed by: PHYSICIAN ASSISTANT

## 2023-11-17 PROCEDURE — 6370000000 HC RX 637 (ALT 250 FOR IP): Performed by: PHYSICIAN ASSISTANT

## 2023-11-17 PROCEDURE — 36415 COLL VENOUS BLD VENIPUNCTURE: CPT

## 2023-11-17 PROCEDURE — 80048 BASIC METABOLIC PNL TOTAL CA: CPT

## 2023-11-17 PROCEDURE — 85025 COMPLETE CBC W/AUTO DIFF WBC: CPT

## 2023-11-17 PROCEDURE — 94760 N-INVAS EAR/PLS OXIMETRY 1: CPT

## 2023-11-17 RX ORDER — BISACODYL 5 MG/1
5 TABLET, DELAYED RELEASE ORAL ONCE
Status: COMPLETED | OUTPATIENT
Start: 2023-11-17 | End: 2023-11-17

## 2023-11-17 RX ORDER — POLYETHYLENE GLYCOL 3350 17 G/17G
17 POWDER, FOR SOLUTION ORAL DAILY
Status: DISCONTINUED | OUTPATIENT
Start: 2023-11-17 | End: 2023-11-18

## 2023-11-17 RX ADMIN — Medication 100 MG: at 08:21

## 2023-11-17 RX ADMIN — TRAMADOL HYDROCHLORIDE 50 MG: 50 TABLET ORAL at 20:23

## 2023-11-17 RX ADMIN — LORAZEPAM 1 MG: 1 TABLET ORAL at 23:17

## 2023-11-17 RX ADMIN — DICYCLOMINE HYDROCHLORIDE 10 MG: 10 CAPSULE ORAL at 06:08

## 2023-11-17 RX ADMIN — METHOCARBAMOL TABLETS 1000 MG: 500 TABLET, COATED ORAL at 15:14

## 2023-11-17 RX ADMIN — LORAZEPAM 1 MG: 1 TABLET ORAL at 15:14

## 2023-11-17 RX ADMIN — CEFTRIAXONE SODIUM 2000 MG: 2 INJECTION, POWDER, FOR SOLUTION INTRAMUSCULAR; INTRAVENOUS at 11:21

## 2023-11-17 RX ADMIN — FOLIC ACID 1 MG: 1 TABLET ORAL at 08:21

## 2023-11-17 RX ADMIN — SODIUM CHLORIDE, PRESERVATIVE FREE 40 MG: 5 INJECTION INTRAVENOUS at 08:25

## 2023-11-17 RX ADMIN — DICYCLOMINE HYDROCHLORIDE 10 MG: 10 CAPSULE ORAL at 15:14

## 2023-11-17 RX ADMIN — DICYCLOMINE HYDROCHLORIDE 10 MG: 10 CAPSULE ORAL at 11:20

## 2023-11-17 RX ADMIN — MULTIPLE VITAMINS W/ MINERALS TAB 1 TABLET: TAB at 08:21

## 2023-11-17 RX ADMIN — OCTREOTIDE ACETATE 50 MCG/HR: 500 INJECTION, SOLUTION INTRAVENOUS; SUBCUTANEOUS at 12:53

## 2023-11-17 RX ADMIN — NALOXEGOL OXALATE 25 MG: 25 TABLET, FILM COATED ORAL at 08:21

## 2023-11-17 RX ADMIN — TRAZODONE HYDROCHLORIDE 200 MG: 100 TABLET ORAL at 23:17

## 2023-11-17 RX ADMIN — BISACODYL 5 MG: 5 TABLET, COATED ORAL at 15:14

## 2023-11-17 RX ADMIN — METHOCARBAMOL TABLETS 1000 MG: 500 TABLET, COATED ORAL at 08:21

## 2023-11-17 RX ADMIN — IRON SUCROSE 200 MG: 20 INJECTION, SOLUTION INTRAVENOUS at 11:20

## 2023-11-17 RX ADMIN — OCTREOTIDE ACETATE 50 MCG/HR: 500 INJECTION, SOLUTION INTRAVENOUS; SUBCUTANEOUS at 23:14

## 2023-11-17 RX ADMIN — KETOROLAC TROMETHAMINE 30 MG: 30 INJECTION, SOLUTION INTRAMUSCULAR; INTRAVENOUS at 17:23

## 2023-11-17 RX ADMIN — KETOROLAC TROMETHAMINE 30 MG: 30 INJECTION, SOLUTION INTRAMUSCULAR; INTRAVENOUS at 23:17

## 2023-11-17 RX ADMIN — ACETAMINOPHEN 650 MG: 325 TABLET ORAL at 23:19

## 2023-11-17 RX ADMIN — LORAZEPAM 2 MG: 2 INJECTION INTRAMUSCULAR; INTRAVENOUS at 08:22

## 2023-11-17 RX ADMIN — LORAZEPAM 1 MG: 1 TABLET ORAL at 20:21

## 2023-11-17 RX ADMIN — METHOCARBAMOL TABLETS 1000 MG: 500 TABLET, COATED ORAL at 11:20

## 2023-11-17 RX ADMIN — METHOCARBAMOL TABLETS 1000 MG: 500 TABLET, COATED ORAL at 20:21

## 2023-11-17 RX ADMIN — KETOROLAC TROMETHAMINE 30 MG: 30 INJECTION, SOLUTION INTRAMUSCULAR; INTRAVENOUS at 00:59

## 2023-11-17 RX ADMIN — SODIUM CHLORIDE, PRESERVATIVE FREE 40 MG: 5 INJECTION INTRAVENOUS at 20:22

## 2023-11-17 RX ADMIN — METHADONE HYDROCHLORIDE 87.5 MG: 10 TABLET ORAL at 06:08

## 2023-11-17 ASSESSMENT — PAIN DESCRIPTION - DESCRIPTORS
DESCRIPTORS: STABBING
DESCRIPTORS: ACHING;CRAMPING;DISCOMFORT
DESCRIPTORS: ACHING;CRAMPING;DISCOMFORT
DESCRIPTORS: ACHING;DISCOMFORT
DESCRIPTORS: DISCOMFORT;ACHING

## 2023-11-17 ASSESSMENT — PAIN SCALES - GENERAL
PAINLEVEL_OUTOF10: 10
PAINLEVEL_OUTOF10: 10
PAINLEVEL_OUTOF10: 9
PAINLEVEL_OUTOF10: 10
PAINLEVEL_OUTOF10: 7
PAINLEVEL_OUTOF10: 9
PAINLEVEL_OUTOF10: 7
PAINLEVEL_OUTOF10: 10

## 2023-11-17 ASSESSMENT — PAIN - FUNCTIONAL ASSESSMENT: PAIN_FUNCTIONAL_ASSESSMENT: ACTIVITIES ARE NOT PREVENTED

## 2023-11-17 ASSESSMENT — PAIN DESCRIPTION - LOCATION
LOCATION: ABDOMEN;BACK
LOCATION: BACK;ABDOMEN
LOCATION: ABDOMEN;BACK

## 2023-11-17 ASSESSMENT — PAIN DESCRIPTION - ORIENTATION
ORIENTATION: UPPER
ORIENTATION: RIGHT;LEFT
ORIENTATION: RIGHT;LEFT;UPPER

## 2023-11-17 ASSESSMENT — PAIN DESCRIPTION - PAIN TYPE: TYPE: CHRONIC PAIN

## 2023-11-18 LAB
A1AT SERPL-MCNC: 178 MG/DL (ref 90–200)
AFP-TM SERPL-MCNC: 3.2 UG/L
ANION GAP SERPL CALCULATED.3IONS-SCNC: 5 MMOL/L (ref 3–16)
BACTERIA BLD CULT ORG #2: NORMAL
BACTERIA BLD CULT: NORMAL
BASOPHILS # BLD: 0 K/UL (ref 0–0.2)
BASOPHILS NFR BLD: 0.3 %
BUN SERPL-MCNC: 7 MG/DL (ref 7–20)
CALCIUM SERPL-MCNC: 8.5 MG/DL (ref 8.3–10.6)
CERULOPLASMIN SERPL-MCNC: 27 MG/DL (ref 16–45)
CHLORIDE SERPL-SCNC: 101 MMOL/L (ref 99–110)
CO2 SERPL-SCNC: 29 MMOL/L (ref 21–32)
CREAT SERPL-MCNC: 0.8 MG/DL (ref 0.6–1.1)
DEPRECATED RDW RBC AUTO: 20.9 % (ref 12.4–15.4)
EOSINOPHIL # BLD: 0.1 K/UL (ref 0–0.6)
EOSINOPHIL NFR BLD: 0.9 %
GFR SERPLBLD CREATININE-BSD FMLA CKD-EPI: >60 ML/MIN/{1.73_M2}
GLUCOSE SERPL-MCNC: 108 MG/DL (ref 70–99)
HCT VFR BLD AUTO: 36.9 % (ref 36–48)
HGB BLD-MCNC: 12.3 G/DL (ref 12–16)
LYMPHOCYTES # BLD: 1.7 K/UL (ref 1–5.1)
LYMPHOCYTES NFR BLD: 15.5 %
MCH RBC QN AUTO: 31 PG (ref 26–34)
MCHC RBC AUTO-ENTMCNC: 33.3 G/DL (ref 31–36)
MCV RBC AUTO: 93 FL (ref 80–100)
MONOCYTES # BLD: 0.8 K/UL (ref 0–1.3)
MONOCYTES NFR BLD: 7.1 %
NEUTROPHILS # BLD: 8.2 K/UL (ref 1.7–7.7)
NEUTROPHILS NFR BLD: 76.2 %
PLATELET # BLD AUTO: 160 K/UL (ref 135–450)
PMV BLD AUTO: 8.5 FL (ref 5–10.5)
POTASSIUM SERPL-SCNC: 4.1 MMOL/L (ref 3.5–5.1)
RBC # BLD AUTO: 3.97 M/UL (ref 4–5.2)
SODIUM SERPL-SCNC: 135 MMOL/L (ref 136–145)
WBC # BLD AUTO: 10.7 K/UL (ref 4–11)

## 2023-11-18 PROCEDURE — 36415 COLL VENOUS BLD VENIPUNCTURE: CPT

## 2023-11-18 PROCEDURE — 80048 BASIC METABOLIC PNL TOTAL CA: CPT

## 2023-11-18 PROCEDURE — 2580000003 HC RX 258: Performed by: INTERNAL MEDICINE

## 2023-11-18 PROCEDURE — 6360000002 HC RX W HCPCS: Performed by: INTERNAL MEDICINE

## 2023-11-18 PROCEDURE — 6370000000 HC RX 637 (ALT 250 FOR IP): Performed by: INTERNAL MEDICINE

## 2023-11-18 PROCEDURE — 1200000000 HC SEMI PRIVATE

## 2023-11-18 PROCEDURE — 85025 COMPLETE CBC W/AUTO DIFF WBC: CPT

## 2023-11-18 PROCEDURE — C9113 INJ PANTOPRAZOLE SODIUM, VIA: HCPCS | Performed by: INTERNAL MEDICINE

## 2023-11-18 PROCEDURE — 2580000003 HC RX 258: Performed by: PHYSICIAN ASSISTANT

## 2023-11-18 PROCEDURE — 6370000000 HC RX 637 (ALT 250 FOR IP): Performed by: FAMILY MEDICINE

## 2023-11-18 PROCEDURE — 94760 N-INVAS EAR/PLS OXIMETRY 1: CPT

## 2023-11-18 PROCEDURE — 6370000000 HC RX 637 (ALT 250 FOR IP): Performed by: PHYSICIAN ASSISTANT

## 2023-11-18 PROCEDURE — 6360000002 HC RX W HCPCS: Performed by: PHYSICIAN ASSISTANT

## 2023-11-18 RX ORDER — BISACODYL 5 MG/1
5 TABLET, DELAYED RELEASE ORAL ONCE
Status: COMPLETED | OUTPATIENT
Start: 2023-11-18 | End: 2023-11-18

## 2023-11-18 RX ORDER — POLYETHYLENE GLYCOL 3350 17 G/17G
17 POWDER, FOR SOLUTION ORAL 2 TIMES DAILY
Status: DISCONTINUED | OUTPATIENT
Start: 2023-11-18 | End: 2023-11-21 | Stop reason: HOSPADM

## 2023-11-18 RX ADMIN — Medication 100 MG: at 09:37

## 2023-11-18 RX ADMIN — BISACODYL 5 MG: 5 TABLET, COATED ORAL at 22:10

## 2023-11-18 RX ADMIN — DICYCLOMINE HYDROCHLORIDE 10 MG: 10 CAPSULE ORAL at 06:39

## 2023-11-18 RX ADMIN — SODIUM CHLORIDE: 9 INJECTION, SOLUTION INTRAVENOUS at 20:30

## 2023-11-18 RX ADMIN — METHOCARBAMOL TABLETS 1000 MG: 500 TABLET, COATED ORAL at 17:00

## 2023-11-18 RX ADMIN — METHOCARBAMOL TABLETS 1000 MG: 500 TABLET, COATED ORAL at 22:10

## 2023-11-18 RX ADMIN — DICYCLOMINE HYDROCHLORIDE 10 MG: 10 CAPSULE ORAL at 17:00

## 2023-11-18 RX ADMIN — LORAZEPAM 1 MG: 1 TABLET ORAL at 18:09

## 2023-11-18 RX ADMIN — SODIUM CHLORIDE, PRESERVATIVE FREE 10 ML: 5 INJECTION INTRAVENOUS at 22:16

## 2023-11-18 RX ADMIN — TRAZODONE HYDROCHLORIDE 200 MG: 100 TABLET ORAL at 22:10

## 2023-11-18 RX ADMIN — SODIUM CHLORIDE, PRESERVATIVE FREE 40 MG: 5 INJECTION INTRAVENOUS at 22:16

## 2023-11-18 RX ADMIN — FOLIC ACID 1 MG: 1 TABLET ORAL at 09:37

## 2023-11-18 RX ADMIN — SODIUM CHLORIDE, PRESERVATIVE FREE 40 MG: 5 INJECTION INTRAVENOUS at 09:38

## 2023-11-18 RX ADMIN — LORAZEPAM 1 MG: 1 TABLET ORAL at 14:14

## 2023-11-18 RX ADMIN — MULTIPLE VITAMINS W/ MINERALS TAB 1 TABLET: TAB at 09:36

## 2023-11-18 RX ADMIN — CEFTRIAXONE SODIUM 2000 MG: 2 INJECTION, POWDER, FOR SOLUTION INTRAMUSCULAR; INTRAVENOUS at 09:55

## 2023-11-18 RX ADMIN — OCTREOTIDE ACETATE 50 MCG/HR: 500 INJECTION, SOLUTION INTRAVENOUS; SUBCUTANEOUS at 22:29

## 2023-11-18 RX ADMIN — TRAMADOL HYDROCHLORIDE 50 MG: 50 TABLET ORAL at 22:12

## 2023-11-18 RX ADMIN — OCTREOTIDE ACETATE 50 MCG/HR: 500 INJECTION, SOLUTION INTRAVENOUS; SUBCUTANEOUS at 11:35

## 2023-11-18 RX ADMIN — Medication 10 ML: at 22:16

## 2023-11-18 RX ADMIN — METHOCARBAMOL TABLETS 1000 MG: 500 TABLET, COATED ORAL at 09:37

## 2023-11-18 RX ADMIN — LORAZEPAM 2 MG: 1 TABLET ORAL at 22:12

## 2023-11-18 RX ADMIN — LORAZEPAM 2 MG: 1 TABLET ORAL at 06:39

## 2023-11-18 RX ADMIN — LORAZEPAM 1 MG: 1 TABLET ORAL at 09:49

## 2023-11-18 RX ADMIN — SODIUM CHLORIDE: 9 INJECTION, SOLUTION INTRAVENOUS at 09:53

## 2023-11-18 RX ADMIN — Medication 10 ML: at 09:39

## 2023-11-18 RX ADMIN — TRAMADOL HYDROCHLORIDE 50 MG: 50 TABLET ORAL at 06:39

## 2023-11-18 RX ADMIN — METHOCARBAMOL TABLETS 1000 MG: 500 TABLET, COATED ORAL at 14:13

## 2023-11-18 RX ADMIN — METHADONE HYDROCHLORIDE 87.5 MG: 10 TABLET ORAL at 06:39

## 2023-11-18 RX ADMIN — SODIUM CHLORIDE, PRESERVATIVE FREE 10 ML: 5 INJECTION INTRAVENOUS at 09:39

## 2023-11-18 RX ADMIN — POLYETHYLENE GLYCOL 3350 17 G: 17 POWDER, FOR SOLUTION ORAL at 09:36

## 2023-11-18 RX ADMIN — DICYCLOMINE HYDROCHLORIDE 10 MG: 10 CAPSULE ORAL at 09:36

## 2023-11-18 RX ADMIN — LORAZEPAM 2 MG: 1 TABLET ORAL at 02:13

## 2023-11-18 ASSESSMENT — PAIN SCALES - GENERAL
PAINLEVEL_OUTOF10: 9
PAINLEVEL_OUTOF10: 9
PAINLEVEL_OUTOF10: 3
PAINLEVEL_OUTOF10: 9
PAINLEVEL_OUTOF10: 9
PAINLEVEL_OUTOF10: 10
PAINLEVEL_OUTOF10: 9
PAINLEVEL_OUTOF10: 4
PAINLEVEL_OUTOF10: 0

## 2023-11-18 ASSESSMENT — PAIN DESCRIPTION - LOCATION
LOCATION: ABDOMEN;BACK
LOCATION: ABDOMEN
LOCATION: ABDOMEN
LOCATION: HEAD

## 2023-11-18 ASSESSMENT — PAIN DESCRIPTION - ORIENTATION
ORIENTATION: RIGHT;LEFT

## 2023-11-18 ASSESSMENT — PAIN DESCRIPTION - DESCRIPTORS
DESCRIPTORS: ACHING;SHARP
DESCRIPTORS: ACHING
DESCRIPTORS: ACHING

## 2023-11-18 ASSESSMENT — PAIN DESCRIPTION - PAIN TYPE: TYPE: CHRONIC PAIN

## 2023-11-18 ASSESSMENT — PAIN DESCRIPTION - FREQUENCY: FREQUENCY: CONTINUOUS

## 2023-11-18 ASSESSMENT — PAIN DESCRIPTION - ONSET: ONSET: ON-GOING

## 2023-11-19 LAB
ANION GAP SERPL CALCULATED.3IONS-SCNC: 7 MMOL/L (ref 3–16)
ANISOCYTOSIS BLD QL SMEAR: ABNORMAL
BASOPHILS # BLD: 0 K/UL (ref 0–0.2)
BASOPHILS NFR BLD: 0 %
BUN SERPL-MCNC: 5 MG/DL (ref 7–20)
CALCIUM SERPL-MCNC: 8.4 MG/DL (ref 8.3–10.6)
CHLORIDE SERPL-SCNC: 99 MMOL/L (ref 99–110)
CO2 SERPL-SCNC: 27 MMOL/L (ref 21–32)
CREAT SERPL-MCNC: 0.8 MG/DL (ref 0.6–1.1)
DEPRECATED RDW RBC AUTO: 21 % (ref 12.4–15.4)
EOSINOPHIL # BLD: 0.1 K/UL (ref 0–0.6)
EOSINOPHIL NFR BLD: 1 %
GFR SERPLBLD CREATININE-BSD FMLA CKD-EPI: >60 ML/MIN/{1.73_M2}
GLUCOSE SERPL-MCNC: 124 MG/DL (ref 70–99)
HCT VFR BLD AUTO: 37.6 % (ref 36–48)
HGB BLD-MCNC: 12.7 G/DL (ref 12–16)
LYMPHOCYTES # BLD: 2.1 K/UL (ref 1–5.1)
LYMPHOCYTES NFR BLD: 23 %
MACROCYTES BLD QL SMEAR: ABNORMAL
MCH RBC QN AUTO: 31.5 PG (ref 26–34)
MCHC RBC AUTO-ENTMCNC: 33.7 G/DL (ref 31–36)
MCV RBC AUTO: 93.5 FL (ref 80–100)
MONOCYTES # BLD: 0.8 K/UL (ref 0–1.3)
MONOCYTES NFR BLD: 9 %
MYELOCYTES NFR BLD MANUAL: 1 %
NEUTROPHILS # BLD: 6.2 K/UL (ref 1.7–7.7)
NEUTROPHILS NFR BLD: 61 %
NEUTS BAND NFR BLD MANUAL: 5 % (ref 0–7)
PLATELET # BLD AUTO: 135 K/UL (ref 135–450)
PLATELET BLD QL SMEAR: ABNORMAL
PMV BLD AUTO: 9.2 FL (ref 5–10.5)
POTASSIUM SERPL-SCNC: 4.4 MMOL/L (ref 3.5–5.1)
RBC # BLD AUTO: 4.03 M/UL (ref 4–5.2)
RBC MORPH BLD: NORMAL
SLIDE REVIEW: ABNORMAL
SODIUM SERPL-SCNC: 133 MMOL/L (ref 136–145)
WBC # BLD AUTO: 9.2 K/UL (ref 4–11)

## 2023-11-19 PROCEDURE — 2580000003 HC RX 258: Performed by: PHYSICIAN ASSISTANT

## 2023-11-19 PROCEDURE — 2580000003 HC RX 258: Performed by: INTERNAL MEDICINE

## 2023-11-19 PROCEDURE — 6360000002 HC RX W HCPCS: Performed by: PHYSICIAN ASSISTANT

## 2023-11-19 PROCEDURE — 36415 COLL VENOUS BLD VENIPUNCTURE: CPT

## 2023-11-19 PROCEDURE — 6370000000 HC RX 637 (ALT 250 FOR IP): Performed by: NURSE PRACTITIONER

## 2023-11-19 PROCEDURE — 6370000000 HC RX 637 (ALT 250 FOR IP): Performed by: INTERNAL MEDICINE

## 2023-11-19 PROCEDURE — 6360000002 HC RX W HCPCS: Performed by: INTERNAL MEDICINE

## 2023-11-19 PROCEDURE — C9113 INJ PANTOPRAZOLE SODIUM, VIA: HCPCS | Performed by: INTERNAL MEDICINE

## 2023-11-19 PROCEDURE — 1200000000 HC SEMI PRIVATE

## 2023-11-19 PROCEDURE — 80048 BASIC METABOLIC PNL TOTAL CA: CPT

## 2023-11-19 PROCEDURE — 85025 COMPLETE CBC W/AUTO DIFF WBC: CPT

## 2023-11-19 PROCEDURE — 94760 N-INVAS EAR/PLS OXIMETRY 1: CPT

## 2023-11-19 PROCEDURE — 6370000000 HC RX 637 (ALT 250 FOR IP): Performed by: FAMILY MEDICINE

## 2023-11-19 RX ORDER — LORAZEPAM 1 MG/1
1 TABLET ORAL NIGHTLY PRN
Status: COMPLETED | OUTPATIENT
Start: 2023-11-19 | End: 2023-11-19

## 2023-11-19 RX ORDER — BISACODYL 10 MG
10 SUPPOSITORY, RECTAL RECTAL ONCE
Status: COMPLETED | OUTPATIENT
Start: 2023-11-19 | End: 2023-11-19

## 2023-11-19 RX ORDER — PROPRANOLOL HYDROCHLORIDE 20 MG/1
20 TABLET ORAL 3 TIMES DAILY
Status: DISCONTINUED | OUTPATIENT
Start: 2023-11-19 | End: 2023-11-21 | Stop reason: HOSPADM

## 2023-11-19 RX ADMIN — SODIUM CHLORIDE, PRESERVATIVE FREE 40 MG: 5 INJECTION INTRAVENOUS at 20:18

## 2023-11-19 RX ADMIN — DICYCLOMINE HYDROCHLORIDE 10 MG: 10 CAPSULE ORAL at 06:15

## 2023-11-19 RX ADMIN — NALOXEGOL OXALATE 25 MG: 25 TABLET, FILM COATED ORAL at 11:24

## 2023-11-19 RX ADMIN — MULTIPLE VITAMINS W/ MINERALS TAB 1 TABLET: TAB at 09:12

## 2023-11-19 RX ADMIN — METHOCARBAMOL TABLETS 1000 MG: 500 TABLET, COATED ORAL at 20:18

## 2023-11-19 RX ADMIN — METHADONE HYDROCHLORIDE 87.5 MG: 10 TABLET ORAL at 06:15

## 2023-11-19 RX ADMIN — LORAZEPAM 1 MG: 1 TABLET ORAL at 04:36

## 2023-11-19 RX ADMIN — LORAZEPAM 1 MG: 1 TABLET ORAL at 11:26

## 2023-11-19 RX ADMIN — LORAZEPAM 1 MG: 1 TABLET ORAL at 20:18

## 2023-11-19 RX ADMIN — FOLIC ACID 1 MG: 1 TABLET ORAL at 09:05

## 2023-11-19 RX ADMIN — DICYCLOMINE HYDROCHLORIDE 10 MG: 10 CAPSULE ORAL at 15:17

## 2023-11-19 RX ADMIN — METHOCARBAMOL TABLETS 1000 MG: 500 TABLET, COATED ORAL at 14:03

## 2023-11-19 RX ADMIN — SODIUM CHLORIDE: 9 INJECTION, SOLUTION INTRAVENOUS at 17:41

## 2023-11-19 RX ADMIN — METHOCARBAMOL TABLETS 1000 MG: 500 TABLET, COATED ORAL at 09:04

## 2023-11-19 RX ADMIN — SODIUM CHLORIDE, PRESERVATIVE FREE 10 ML: 5 INJECTION INTRAVENOUS at 09:05

## 2023-11-19 RX ADMIN — DICYCLOMINE HYDROCHLORIDE 10 MG: 10 CAPSULE ORAL at 11:24

## 2023-11-19 RX ADMIN — CEFTRIAXONE SODIUM 2000 MG: 2 INJECTION, POWDER, FOR SOLUTION INTRAMUSCULAR; INTRAVENOUS at 11:24

## 2023-11-19 RX ADMIN — TRAZODONE HYDROCHLORIDE 200 MG: 100 TABLET ORAL at 20:18

## 2023-11-19 RX ADMIN — METHOCARBAMOL TABLETS 1000 MG: 500 TABLET, COATED ORAL at 17:36

## 2023-11-19 RX ADMIN — TRAMADOL HYDROCHLORIDE 50 MG: 50 TABLET ORAL at 04:39

## 2023-11-19 RX ADMIN — PROPRANOLOL HYDROCHLORIDE 20 MG: 20 TABLET ORAL at 20:30

## 2023-11-19 RX ADMIN — SODIUM CHLORIDE, PRESERVATIVE FREE 40 MG: 5 INJECTION INTRAVENOUS at 09:03

## 2023-11-19 RX ADMIN — Medication 100 MG: at 09:05

## 2023-11-19 RX ADMIN — SODIUM CHLORIDE: 9 INJECTION, SOLUTION INTRAVENOUS at 07:27

## 2023-11-19 RX ADMIN — Medication 10 ML: at 09:05

## 2023-11-19 RX ADMIN — POLYETHYLENE GLYCOL 3350 17 G: 17 POWDER, FOR SOLUTION ORAL at 09:03

## 2023-11-19 RX ADMIN — TRAMADOL HYDROCHLORIDE 50 MG: 50 TABLET ORAL at 20:18

## 2023-11-19 RX ADMIN — OCTREOTIDE ACETATE 50 MCG/HR: 500 INJECTION, SOLUTION INTRAVENOUS; SUBCUTANEOUS at 11:22

## 2023-11-19 RX ADMIN — BISACODYL 10 MG: 10 SUPPOSITORY RECTAL at 15:17

## 2023-11-19 RX ADMIN — POLYETHYLENE GLYCOL-3350 AND ELECTROLYTES 4000 ML: 236; 6.74; 5.86; 2.97; 22.74 POWDER, FOR SOLUTION ORAL at 17:38

## 2023-11-19 ASSESSMENT — PAIN SCALES - GENERAL
PAINLEVEL_OUTOF10: 9
PAINLEVEL_OUTOF10: 0
PAINLEVEL_OUTOF10: 9
PAINLEVEL_OUTOF10: 9
PAINLEVEL_OUTOF10: 4
PAINLEVEL_OUTOF10: 9
PAINLEVEL_OUTOF10: 3
PAINLEVEL_OUTOF10: 9
PAINLEVEL_OUTOF10: 5

## 2023-11-19 ASSESSMENT — PAIN DESCRIPTION - LOCATION
LOCATION: ABDOMEN
LOCATION: GENERALIZED
LOCATION: ABDOMEN

## 2023-11-19 ASSESSMENT — PAIN DESCRIPTION - ORIENTATION
ORIENTATION: RIGHT;LEFT
ORIENTATION: ANTERIOR;LEFT;RIGHT
ORIENTATION: RIGHT;LEFT

## 2023-11-19 ASSESSMENT — PAIN SCALES - WONG BAKER: WONGBAKER_NUMERICALRESPONSE: 0

## 2023-11-19 ASSESSMENT — PAIN DESCRIPTION - PAIN TYPE: TYPE: ACUTE PAIN

## 2023-11-19 ASSESSMENT — PAIN DESCRIPTION - DESCRIPTORS
DESCRIPTORS: STABBING;ACHING
DESCRIPTORS: ACHING
DESCRIPTORS: SHARP
DESCRIPTORS: ACHING

## 2023-11-19 ASSESSMENT — PAIN DESCRIPTION - FREQUENCY: FREQUENCY: CONTINUOUS

## 2023-11-19 ASSESSMENT — PAIN - FUNCTIONAL ASSESSMENT: PAIN_FUNCTIONAL_ASSESSMENT: PREVENTS OR INTERFERES SOME ACTIVE ACTIVITIES AND ADLS

## 2023-11-20 PROCEDURE — C9113 INJ PANTOPRAZOLE SODIUM, VIA: HCPCS | Performed by: INTERNAL MEDICINE

## 2023-11-20 PROCEDURE — 1200000000 HC SEMI PRIVATE

## 2023-11-20 PROCEDURE — 6360000002 HC RX W HCPCS: Performed by: INTERNAL MEDICINE

## 2023-11-20 PROCEDURE — 6370000000 HC RX 637 (ALT 250 FOR IP): Performed by: INTERNAL MEDICINE

## 2023-11-20 PROCEDURE — 94760 N-INVAS EAR/PLS OXIMETRY 1: CPT

## 2023-11-20 PROCEDURE — 6370000000 HC RX 637 (ALT 250 FOR IP): Performed by: FAMILY MEDICINE

## 2023-11-20 PROCEDURE — 2580000003 HC RX 258: Performed by: INTERNAL MEDICINE

## 2023-11-20 PROCEDURE — 6370000000 HC RX 637 (ALT 250 FOR IP): Performed by: NURSE PRACTITIONER

## 2023-11-20 PROCEDURE — 6360000002 HC RX W HCPCS: Performed by: FAMILY MEDICINE

## 2023-11-20 RX ORDER — SENNA AND DOCUSATE SODIUM 50; 8.6 MG/1; MG/1
2 TABLET, FILM COATED ORAL 2 TIMES DAILY
Status: DISCONTINUED | OUTPATIENT
Start: 2023-11-20 | End: 2023-11-21 | Stop reason: HOSPADM

## 2023-11-20 RX ORDER — LORAZEPAM 0.5 MG/1
0.5 TABLET ORAL ONCE
Status: COMPLETED | OUTPATIENT
Start: 2023-11-20 | End: 2023-11-20

## 2023-11-20 RX ORDER — ONDANSETRON 2 MG/ML
4 INJECTION INTRAMUSCULAR; INTRAVENOUS ONCE
Status: COMPLETED | OUTPATIENT
Start: 2023-11-20 | End: 2023-11-20

## 2023-11-20 RX ADMIN — SODIUM CHLORIDE, PRESERVATIVE FREE 40 MG: 5 INJECTION INTRAVENOUS at 09:24

## 2023-11-20 RX ADMIN — POLYETHYLENE GLYCOL 3350 17 G: 17 POWDER, FOR SOLUTION ORAL at 09:24

## 2023-11-20 RX ADMIN — DICYCLOMINE HYDROCHLORIDE 10 MG: 10 CAPSULE ORAL at 06:55

## 2023-11-20 RX ADMIN — TRAMADOL HYDROCHLORIDE 50 MG: 50 TABLET ORAL at 12:05

## 2023-11-20 RX ADMIN — METHOCARBAMOL TABLETS 1000 MG: 500 TABLET, COATED ORAL at 21:41

## 2023-11-20 RX ADMIN — SODIUM CHLORIDE, PRESERVATIVE FREE 40 MG: 5 INJECTION INTRAVENOUS at 21:43

## 2023-11-20 RX ADMIN — DOCUSATE SODIUM 50 MG AND SENNOSIDES 8.6 MG 2 TABLET: 8.6; 5 TABLET, FILM COATED ORAL at 21:41

## 2023-11-20 RX ADMIN — PROPRANOLOL HYDROCHLORIDE 20 MG: 20 TABLET ORAL at 09:23

## 2023-11-20 RX ADMIN — METHOCARBAMOL TABLETS 1000 MG: 500 TABLET, COATED ORAL at 18:19

## 2023-11-20 RX ADMIN — NALOXEGOL OXALATE 25 MG: 25 TABLET, FILM COATED ORAL at 06:55

## 2023-11-20 RX ADMIN — ONDANSETRON 4 MG: 2 INJECTION INTRAMUSCULAR; INTRAVENOUS at 14:41

## 2023-11-20 RX ADMIN — Medication 10 ML: at 21:47

## 2023-11-20 RX ADMIN — DICYCLOMINE HYDROCHLORIDE 10 MG: 10 CAPSULE ORAL at 18:19

## 2023-11-20 RX ADMIN — SODIUM CHLORIDE, PRESERVATIVE FREE 10 ML: 5 INJECTION INTRAVENOUS at 21:47

## 2023-11-20 RX ADMIN — ONDANSETRON 4 MG: 2 INJECTION INTRAMUSCULAR; INTRAVENOUS at 18:19

## 2023-11-20 RX ADMIN — PROPRANOLOL HYDROCHLORIDE 20 MG: 20 TABLET ORAL at 14:41

## 2023-11-20 RX ADMIN — DICYCLOMINE HYDROCHLORIDE 10 MG: 10 CAPSULE ORAL at 09:24

## 2023-11-20 RX ADMIN — TRAMADOL HYDROCHLORIDE 50 MG: 50 TABLET ORAL at 21:42

## 2023-11-20 RX ADMIN — FOLIC ACID 1 MG: 1 TABLET ORAL at 09:23

## 2023-11-20 RX ADMIN — TRAZODONE HYDROCHLORIDE 200 MG: 100 TABLET ORAL at 21:41

## 2023-11-20 RX ADMIN — METHOCARBAMOL TABLETS 1000 MG: 500 TABLET, COATED ORAL at 09:23

## 2023-11-20 RX ADMIN — DOCUSATE SODIUM 50 MG AND SENNOSIDES 8.6 MG 2 TABLET: 8.6; 5 TABLET, FILM COATED ORAL at 12:03

## 2023-11-20 RX ADMIN — PROPRANOLOL HYDROCHLORIDE 20 MG: 20 TABLET ORAL at 21:41

## 2023-11-20 RX ADMIN — METHADONE HYDROCHLORIDE 87.5 MG: 10 TABLET ORAL at 06:56

## 2023-11-20 RX ADMIN — LORAZEPAM 0.5 MG: 0.5 TABLET ORAL at 23:10

## 2023-11-20 RX ADMIN — SODIUM CHLORIDE, PRESERVATIVE FREE 10 ML: 5 INJECTION INTRAVENOUS at 11:01

## 2023-11-20 RX ADMIN — METHOCARBAMOL TABLETS 1000 MG: 500 TABLET, COATED ORAL at 14:41

## 2023-11-20 RX ADMIN — Medication 100 MG: at 09:24

## 2023-11-20 RX ADMIN — KETOROLAC TROMETHAMINE 30 MG: 30 INJECTION, SOLUTION INTRAMUSCULAR; INTRAVENOUS at 14:42

## 2023-11-20 ASSESSMENT — PAIN DESCRIPTION - LOCATION
LOCATION: ABDOMEN;BUTTOCKS
LOCATION: ABDOMEN
LOCATION: ABDOMEN
LOCATION: ABDOMEN;BACK

## 2023-11-20 ASSESSMENT — PAIN DESCRIPTION - ORIENTATION
ORIENTATION: RIGHT;LEFT;LOWER
ORIENTATION: LEFT;RIGHT;LOWER;MID

## 2023-11-20 ASSESSMENT — PAIN SCALES - GENERAL
PAINLEVEL_OUTOF10: 9
PAINLEVEL_OUTOF10: 5
PAINLEVEL_OUTOF10: 9
PAINLEVEL_OUTOF10: 3
PAINLEVEL_OUTOF10: 8

## 2023-11-20 ASSESSMENT — PAIN DESCRIPTION - PAIN TYPE
TYPE: ACUTE PAIN
TYPE: ACUTE PAIN

## 2023-11-20 ASSESSMENT — PAIN - FUNCTIONAL ASSESSMENT: PAIN_FUNCTIONAL_ASSESSMENT: PREVENTS OR INTERFERES SOME ACTIVE ACTIVITIES AND ADLS

## 2023-11-20 ASSESSMENT — PAIN SCALES - WONG BAKER: WONGBAKER_NUMERICALRESPONSE: 8

## 2023-11-20 ASSESSMENT — PAIN DESCRIPTION - ONSET
ONSET: ON-GOING
ONSET: ON-GOING

## 2023-11-20 ASSESSMENT — PAIN DESCRIPTION - FREQUENCY: FREQUENCY: CONTINUOUS

## 2023-11-20 ASSESSMENT — PAIN DESCRIPTION - DESCRIPTORS
DESCRIPTORS: CRAMPING
DESCRIPTORS: THROBBING;SHARP
DESCRIPTORS: CRAMPING

## 2023-11-21 ENCOUNTER — APPOINTMENT (OUTPATIENT)
Dept: GENERAL RADIOLOGY | Age: 38
DRG: 280 | End: 2023-11-21
Payer: COMMERCIAL

## 2023-11-21 VITALS
HEART RATE: 74 BPM | SYSTOLIC BLOOD PRESSURE: 106 MMHG | HEIGHT: 68 IN | OXYGEN SATURATION: 94 % | BODY MASS INDEX: 25.36 KG/M2 | TEMPERATURE: 98.3 F | DIASTOLIC BLOOD PRESSURE: 72 MMHG | RESPIRATION RATE: 16 BRPM | WEIGHT: 167.33 LBS

## 2023-11-21 PROBLEM — K85.90 ACUTE PANCREATITIS WITHOUT INFECTION OR NECROSIS: Status: RESOLVED | Noted: 2022-12-30 | Resolved: 2023-11-21

## 2023-11-21 PROBLEM — K92.0 HEMATEMESIS: Status: RESOLVED | Noted: 2023-08-06 | Resolved: 2023-11-21

## 2023-11-21 PROBLEM — R10.9 ABDOMINAL PAIN: Status: RESOLVED | Noted: 2023-11-15 | Resolved: 2023-11-21

## 2023-11-21 PROBLEM — F10.939 ALCOHOL WITHDRAWAL SYNDROME, WITH UNSPECIFIED COMPLICATION (HCC): Status: RESOLVED | Noted: 2023-11-14 | Resolved: 2023-11-21

## 2023-11-21 PROCEDURE — 6370000000 HC RX 637 (ALT 250 FOR IP): Performed by: INTERNAL MEDICINE

## 2023-11-21 PROCEDURE — 6360000002 HC RX W HCPCS: Performed by: PHYSICIAN ASSISTANT

## 2023-11-21 PROCEDURE — 2580000003 HC RX 258: Performed by: INTERNAL MEDICINE

## 2023-11-21 PROCEDURE — 94760 N-INVAS EAR/PLS OXIMETRY 1: CPT

## 2023-11-21 PROCEDURE — 74018 RADEX ABDOMEN 1 VIEW: CPT

## 2023-11-21 PROCEDURE — 6360000002 HC RX W HCPCS: Performed by: INTERNAL MEDICINE

## 2023-11-21 PROCEDURE — 6370000000 HC RX 637 (ALT 250 FOR IP): Performed by: FAMILY MEDICINE

## 2023-11-21 PROCEDURE — 6370000000 HC RX 637 (ALT 250 FOR IP): Performed by: NURSE PRACTITIONER

## 2023-11-21 RX ORDER — PROPRANOLOL HYDROCHLORIDE 20 MG/1
20 TABLET ORAL 3 TIMES DAILY
Qty: 90 TABLET | Refills: 3 | Status: SHIPPED | OUTPATIENT
Start: 2023-11-21

## 2023-11-21 RX ORDER — MINERAL OIL 100 G/100G
1 OIL RECTAL PRN
Status: DISCONTINUED | OUTPATIENT
Start: 2023-11-21 | End: 2023-11-21 | Stop reason: HOSPADM

## 2023-11-21 RX ORDER — ENEMA 19; 7 G/133ML; G/133ML
1 ENEMA RECTAL
Status: DISCONTINUED | OUTPATIENT
Start: 2023-11-21 | End: 2023-11-21 | Stop reason: HOSPADM

## 2023-11-21 RX ORDER — POLYETHYLENE GLYCOL 3350 17 G/17G
17 POWDER, FOR SOLUTION ORAL DAILY PRN
Qty: 30 PACKET | Refills: 0 | Status: SHIPPED | OUTPATIENT
Start: 2023-11-21 | End: 2023-12-21

## 2023-11-21 RX ORDER — SENNA AND DOCUSATE SODIUM 50; 8.6 MG/1; MG/1
2 TABLET, FILM COATED ORAL 2 TIMES DAILY
Qty: 120 TABLET | Refills: 0 | Status: SHIPPED | OUTPATIENT
Start: 2023-11-21 | End: 2023-12-21

## 2023-11-21 RX ORDER — THIAMINE MONONITRATE (VIT B1) 100 MG
100 TABLET ORAL DAILY
Qty: 30 TABLET | Refills: 0 | Status: SHIPPED | OUTPATIENT
Start: 2023-11-22 | End: 2023-12-22

## 2023-11-21 RX ADMIN — DICYCLOMINE HYDROCHLORIDE 10 MG: 10 CAPSULE ORAL at 06:07

## 2023-11-21 RX ADMIN — ONDANSETRON 4 MG: 2 INJECTION INTRAMUSCULAR; INTRAVENOUS at 09:20

## 2023-11-21 RX ADMIN — Medication 100 MG: at 09:22

## 2023-11-21 RX ADMIN — FOLIC ACID 1 MG: 1 TABLET ORAL at 09:24

## 2023-11-21 RX ADMIN — DOCUSATE SODIUM 50 MG AND SENNOSIDES 8.6 MG 2 TABLET: 8.6; 5 TABLET, FILM COATED ORAL at 09:22

## 2023-11-21 RX ADMIN — METHOCARBAMOL TABLETS 1000 MG: 500 TABLET, COATED ORAL at 15:14

## 2023-11-21 RX ADMIN — PROPRANOLOL HYDROCHLORIDE 20 MG: 20 TABLET ORAL at 09:24

## 2023-11-21 RX ADMIN — METHYLNALTREXONE BROMIDE 12 MG: 12 INJECTION, SOLUTION SUBCUTANEOUS at 15:02

## 2023-11-21 RX ADMIN — METHADONE HYDROCHLORIDE 87.5 MG: 10 TABLET ORAL at 06:06

## 2023-11-21 RX ADMIN — ONDANSETRON 4 MG: 2 INJECTION INTRAMUSCULAR; INTRAVENOUS at 15:15

## 2023-11-21 RX ADMIN — SODIUM CHLORIDE, PRESERVATIVE FREE 10 ML: 5 INJECTION INTRAVENOUS at 15:03

## 2023-11-21 RX ADMIN — NALOXEGOL OXALATE 25 MG: 25 TABLET, FILM COATED ORAL at 06:06

## 2023-11-21 RX ADMIN — PROPRANOLOL HYDROCHLORIDE 20 MG: 20 TABLET ORAL at 15:15

## 2023-11-21 RX ADMIN — METHOCARBAMOL TABLETS 1000 MG: 500 TABLET, COATED ORAL at 09:22

## 2023-11-21 RX ADMIN — POLYETHYLENE GLYCOL 3350 17 G: 17 POWDER, FOR SOLUTION ORAL at 09:24

## 2023-11-21 ASSESSMENT — PAIN SCALES - GENERAL: PAINLEVEL_OUTOF10: 10

## 2023-11-21 ASSESSMENT — PAIN DESCRIPTION - LOCATION: LOCATION: ABDOMEN;BACK

## 2023-11-21 ASSESSMENT — PAIN DESCRIPTION - DESCRIPTORS: DESCRIPTORS: SHARP

## 2023-11-21 ASSESSMENT — PAIN DESCRIPTION - ORIENTATION: ORIENTATION: LOWER;MID

## 2023-11-21 NOTE — CARE COORDINATION
Discharge Planning:   Per chart review, patient no longer on CIWA protocol. Currently on iv medications. PT/OT: not ordered patient is independent in room     PLAN: home with family    NEED: GI clearance. [employer] for medications at discharge.      MYA Macedo, [address] [facility], Social Work/Case Management   [phone]  Electronically signed by MYA Macedo on 11/21/2023 at 9:33 AM

## 2023-11-21 NOTE — DISCHARGE INSTRUCTIONS
Thank you for allowing us to participate in your care. Please follow-up with GI as soon as possible, call [phone] to make an appointment with GI as they requested to see you back in clinic within 2 to 3 weeks after discharge. I sent laxatives to your pharmacy of choice. Please take them in addition to taking your methadone to help prevent constipation. You will also prescribe propranolol 20 mg 3 times daily, this is to help with your esophageal varices. Please take that medication as prescribed. It is important to cut back and eventually quit with alcohol consumption as continued consumption can only make the symptoms worse. Please also make an appointment to establish care and or follow-up with your PCP.

## 2023-11-21 NOTE — DISCHARGE INSTR - DIET

## 2023-11-21 NOTE — DISCHARGE SUMMARY
Hospital Medicine Discharge Summary    Patient: Bowen Johnson   : 1985     Admit Date: 2023   Discharge Date:   2023  Disposition:  [x]Home   []HHC  []SNF  []ECF  []Acute Rehab  []LTAC  []Hospice  Code status:  [x]Full  []DNR/CCA  []Limited (DNR/CCA with Do Not Intubate)  []DNRCC  Condition at Discharge: Stable  Primary Care Provider: No primary care provider on file. Admitting Provider: Lc Puentes MD  Discharge Provider: Denita Fernandez MD     Discharge Diagnoses: Active Hospital Problems    Diagnosis     Gastroparesis [K31.84]      Priority: Medium    Alcoholic cirrhosis ([address]) [N92.87]      Priority: Medium    Opiate dependence ([address]) [F11.20]     Idiopathic esophageal varices ([address]) [I85.00]        Hospital Course:      51-year-old F with a PMH of alcohol use disorder who presented with alcohol withdrawal symptoms. Her presentation was complicated by hematemesis presumed to be secondary to esophageal varices seen on EGD on 11/15/2023 status post band ligation x4. Her hemoglobin remained stable and she got octreotide gtt. followed by a course of antibiotics for SBP prophylaxis. After this she had no further bleeding. H&H remained stable. CT imaging showed evidence suggestive of cirrhosis and GI recommended repeat EGD in 4 weeks and continued use of nonselective beta-blocker. Patient's C was down trended and remained negative and CIWA protocol was discontinued. Hospital course was also complicated by opioid-induced constipation. Patient is on methadone for opioid use disorder. She was on ordered an aggressive bowel regimen and subsequently had bowel movements with abdomen decompression on hospital day 8. Patient wanted to be discharged home as she reported resolution of her abdominal pain and distention. I encourage patient to cut back and eventually quit drinking alcohol.   I told her the importance of following up with GI to repeat EGD and to continue to take the

## 2023-11-21 NOTE — PLAN OF CARE
Problem: Discharge Planning  Goal: Discharge to home or other facility with appropriate resources  11/16/2023 1031 by Adam Sexton RN  Outcome: Progressing  11/16/2023 0104 by Bailey Wellington RN  Outcome: Progressing  Flowsheets (Taken 11/15/2023 2030)  Discharge to home or other facility with appropriate resources: Identify barriers to discharge with patient and caregiver     Problem: Pain  Goal: Verbalizes/displays adequate comfort level or baseline comfort level  11/16/2023 1031 by Adam Sexton RN  Outcome: Progressing  11/16/2023 0104 by Bailey Wellington RN  Outcome: Progressing     Problem: Safety - Adult  Goal: Free from fall injury  11/16/2023 1031 by Adam Sexton RN  Outcome: Progressing  11/16/2023 0104 by Bailey Wellington RN  Outcome: Progressing     Problem: Skin/Tissue Integrity  Goal: Absence of new skin breakdown  Description: 1. Monitor for areas of redness and/or skin breakdown  2. Assess vascular access sites hourly  3. Every 4-6 hours minimum:  Change oxygen saturation probe site  4. Every 4-6 hours:  If on nasal continuous positive airway pressure, respiratory therapy assess nares and determine need for appliance change or resting period. 11/16/2023 1031 by Adam Sexton RN  Outcome: Progressing  11/16/2023 0104 by Bailey Wellington RN  Outcome: Progressing     Problem: Neurosensory - Adult  Goal: Achieves stable or improved neurological status  11/16/2023 1031 by Adam Sexton RN  Outcome: Progressing  11/16/2023 0104 by Bailey Wellington RN  Outcome: Progressing  Flowsheets (Taken 11/15/2023 2030)  Achieves stable or improved neurological status: Assess for and report changes in neurological status  Goal: Absence of seizures  11/16/2023 1031 by Adam Sexton RN  Outcome: Progressing  11/16/2023 0104 by Bailey Wellington RN  Outcome: Progressing  Flowsheets (Taken 11/15/2023 2030)  Absence of seizures: Monitor for seizure activity.   If seizure occurs, document type and
Problem: Discharge Planning  Goal: Discharge to home or other facility with appropriate resources  11/17/2023 0918 by Ramakrishna Menjivar RN  Outcome: Progressing  11/16/2023 2037 by Saint Hazard, RN  Outcome: Progressing  Flowsheets (Taken 11/16/2023 1945)  Discharge to home or other facility with appropriate resources: Identify barriers to discharge with patient and caregiver     Problem: Pain  Goal: Verbalizes/displays adequate comfort level or baseline comfort level  11/17/2023 0918 by Ramakrishna Menjivar RN  Outcome: Progressing  11/16/2023 2037 by Saint Hazard, RN  Outcome: Progressing     Problem: Safety - Adult  Goal: Free from fall injury  11/17/2023 0918 by Ramakrishna Menjivar RN  Outcome: Progressing  11/16/2023 2037 by Saint Hazard, RN  Outcome: Progressing     Problem: Skin/Tissue Integrity  Goal: Absence of new skin breakdown  Description: 1. Monitor for areas of redness and/or skin breakdown  2. Assess vascular access sites hourly  3. Every 4-6 hours minimum:  Change oxygen saturation probe site  4. Every 4-6 hours:  If on nasal continuous positive airway pressure, respiratory therapy assess nares and determine need for appliance change or resting period. 11/17/2023 0918 by Ramakrishna Menjivar RN  Outcome: Progressing  11/16/2023 2037 by Saint Hazard, RN  Outcome: Progressing     Problem: Neurosensory - Adult  Goal: Achieves stable or improved neurological status  11/17/2023 0918 by Ramakrishna Menjivar RN  Outcome: Progressing  11/16/2023 2037 by Saint Hazard, RN  Outcome: Progressing  Flowsheets (Taken 11/16/2023 1945)  Achieves stable or improved neurological status: Assess for and report changes in neurological status  Goal: Absence of seizures  11/17/2023 0918 by Ramakrishna Menjivar RN  Outcome: Progressing  11/16/2023 2037 by Saint Hazard, RN  Outcome: Progressing  Flowsheets (Taken 11/16/2023 1945)  Absence of seizures: Monitor for seizure activity.   If seizure occurs, document type and
Problem: Discharge Planning  Goal: Discharge to home or other facility with appropriate resources  11/19/2023 2244 by Trina Dale RN  Outcome: Progressing  Flowsheets (Taken 11/19/2023 2031)  Discharge to home or other facility with appropriate resources:   Identify barriers to discharge with patient and caregiver   Arrange for needed discharge resources and transportation as appropriate   Identify discharge learning needs (meds, wound care, etc)   Refer to discharge planning if patient needs post-hospital services based on physician order or complex needs related to functional status, cognitive ability or social support system  11/19/2023 1210 by Bettina Scott RN  Outcome: Progressing     Problem: Neurosensory - Adult  Goal: Achieves stable or improved neurological status  11/19/2023 2244 by Trina Dale RN  Outcome: Progressing  Flowsheets (Taken 11/19/2023 2031)  Achieves stable or improved neurological status:   Assess for and report changes in neurological status   Maintain blood pressure and fluid volume within ordered parameters to optimize cerebral perfusion and minimize risk of hemorrhage  11/19/2023 1210 by Bettina Scott RN  Outcome: Progressing  Goal: Absence of seizures  11/19/2023 2244 by Trina Dale RN  Outcome: Progressing  Flowsheets (Taken 11/19/2023 2031)  Absence of seizures:   Monitor for seizure activity.   If seizure occurs, document type and location of movements and any associated apnea   If seizure occurs, turn head to side and suction secretions as needed   Administer anticonvulsants as ordered   Support airway/breathing, administer oxygen as needed   Diagnostic studies as ordered  11/19/2023 1210 by Bettina Scott RN  Outcome: Progressing  Goal: Remains free of injury related to seizures activity  11/19/2023 2244 by Trina Dale RN  Outcome: Progressing  Flowsheets (Taken 11/19/2023 2031)  Remains free of injury related to seizure activity:
Problem: Discharge Planning  Goal: Discharge to home or other facility with appropriate resources  11/20/2023 2323 by Antoni Rutherford RN  Outcome: Progressing  Flowsheets (Taken 11/20/2023 2153)  Discharge to home or other facility with appropriate resources:   Identify barriers to discharge with patient and caregiver   Arrange for needed discharge resources and transportation as appropriate   Identify discharge learning needs (meds, wound care, etc)   Refer to discharge planning if patient needs post-hospital services based on physician order or complex needs related to functional status, cognitive ability or social support system  11/20/2023 1130 by Elver Suggs RN  Outcome: Progressing  Flowsheets (Taken 11/20/2023 0745)  Discharge to home or other facility with appropriate resources: Identify barriers to discharge with patient and caregiver     Problem: Neurosensory - Adult  Goal: Achieves stable or improved neurological status  11/20/2023 2323 by Antoni Rutherford RN  Outcome: Progressing  Flowsheets (Taken 11/20/2023 2153)  Achieves stable or improved neurological status: Assess for and report changes in neurological status  11/20/2023 1130 by Elver Suggs RN  Outcome: Progressing  Flowsheets (Taken 11/20/2023 0745)  Achieves stable or improved neurological status: Assess for and report changes in neurological status  Goal: Absence of seizures  11/20/2023 2323 by Antoni Rutherford RN  Outcome: Progressing  Flowsheets (Taken 11/20/2023 2153)  Absence of seizures:   Monitor for seizure activity. If seizure occurs, document type and location of movements and any associated apnea   If seizure occurs, turn head to side and suction secretions as needed   Administer anticonvulsants as ordered  11/20/2023 1130 by Elver Suggs RN  Outcome: Progressing  Flowsheets (Taken 11/20/2023 0745)  Absence of seizures: Monitor for seizure activity.   If seizure occurs, document type and location of movements and
Problem: Discharge Planning  Goal: Discharge to home or other facility with appropriate resources  Outcome: Progressing     Problem: Pain  Goal: Verbalizes/displays adequate comfort level or baseline comfort level  Outcome: Progressing     Problem: Safety - Adult  Goal: Free from fall injury  Outcome: Progressing     Problem: Skin/Tissue Integrity  Goal: Absence of new skin breakdown  Description: 1. Monitor for areas of redness and/or skin breakdown  2. Assess vascular access sites hourly  3. Every 4-6 hours minimum:  Change oxygen saturation probe site  4. Every 4-6 hours:  If on nasal continuous positive airway pressure, respiratory therapy assess nares and determine need for appliance change or resting period. Outcome: Progressing     Problem: Neurosensory - Adult  Goal: Achieves stable or improved neurological status  Outcome: Progressing     Problem: Neurosensory - Adult  Goal: Absence of seizures  Outcome: Progressing     Problem: Neurosensory - Adult  Goal: Remains free of injury related to seizures activity  Outcome: Progressing     Problem: Skin/Tissue Integrity - Adult  Goal: Skin integrity remains intact  Outcome: Progressing     Problem: Gastrointestinal - Adult  Goal: Minimal or absence of nausea and vomiting  Outcome: Progressing     Problem: Metabolic/Fluid and Electrolytes - Adult  Goal: Electrolytes maintained within normal limits  Outcome: Progressing     Problem: Anxiety  Goal: Will report anxiety at manageable levels  Description: INTERVENTIONS:  1. Administer medication as ordered  2. Teach and rehearse alternative coping skills  3.  Provide emotional support with 1:1 interaction with staff  Outcome: Progressing
Problem: Discharge Planning  Goal: Discharge to home or other facility with appropriate resources  Outcome: Progressing     Problem: Pain  Goal: Verbalizes/displays adequate comfort level or baseline comfort level  Outcome: Progressing     Problem: Safety - Adult  Goal: Free from fall injury  Outcome: Progressing     Problem: Skin/Tissue Integrity  Goal: Absence of new skin breakdown  Description: 1. Monitor for areas of redness and/or skin breakdown  2. Assess vascular access sites hourly  3. Every 4-6 hours minimum:  Change oxygen saturation probe site  4. Every 4-6 hours:  If on nasal continuous positive airway pressure, respiratory therapy assess nares and determine need for appliance change or resting period. Outcome: Progressing     Problem: Neurosensory - Adult  Goal: Achieves stable or improved neurological status  Outcome: Progressing  Goal: Absence of seizures  Outcome: Progressing  Goal: Remains free of injury related to seizures activity  Outcome: Progressing     Problem: Skin/Tissue Integrity - Adult  Goal: Skin integrity remains intact  Outcome: Progressing     Problem: Gastrointestinal - Adult  Goal: Minimal or absence of nausea and vomiting  Outcome: Progressing     Problem: Metabolic/Fluid and Electrolytes - Adult  Goal: Electrolytes maintained within normal limits  Outcome: Progressing     Problem: Anxiety  Goal: Will report anxiety at manageable levels  Description: INTERVENTIONS:  1. Administer medication as ordered  2. Teach and rehearse alternative coping skills  3.  Provide emotional support with 1:1 interaction with staff  Outcome: Progressing
Problem: Discharge Planning  Goal: Discharge to home or other facility with appropriate resources  Outcome: Progressing     Problem: Pain  Goal: Verbalizes/displays adequate comfort level or baseline comfort level  Outcome: Progressing     Problem: Safety - Adult  Goal: Free from fall injury  Outcome: Progressing     Problem: Skin/Tissue Integrity  Goal: Absence of new skin breakdown  Description: 1. Monitor for areas of redness and/or skin breakdown  2. Assess vascular access sites hourly  3. Every 4-6 hours minimum:  Change oxygen saturation probe site  4. Every 4-6 hours:  If on nasal continuous positive airway pressure, respiratory therapy assess nares and determine need for appliance change or resting period. Outcome: Progressing     Problem: Neurosensory - Adult  Goal: Achieves stable or improved neurological status  Outcome: Progressing  Goal: Absence of seizures  Outcome: Progressing  Goal: Remains free of injury related to seizures activity  Outcome: Progressing     Problem: Skin/Tissue Integrity - Adult  Goal: Skin integrity remains intact  Outcome: Progressing     Problem: Gastrointestinal - Adult  Goal: Minimal or absence of nausea and vomiting  Outcome: Progressing     Problem: Metabolic/Fluid and Electrolytes - Adult  Goal: Electrolytes maintained within normal limits  Outcome: Progressing     Problem: Anxiety  Goal: Will report anxiety at manageable levels  Description: INTERVENTIONS:  1. Administer medication as ordered  2. Teach and rehearse alternative coping skills  3.  Provide emotional support with 1:1 interaction with staff  Outcome: Progressing
Problem: Discharge Planning  Goal: Discharge to home or other facility with appropriate resources  Outcome: Progressing  Flowsheets (Taken 11/15/2023 0920)  Discharge to home or other facility with appropriate resources:   Identify barriers to discharge with patient and caregiver   Arrange for needed discharge resources and transportation as appropriate   Identify discharge learning needs (meds, wound care, etc)     Problem: Pain  Goal: Verbalizes/displays adequate comfort level or baseline comfort level  11/15/2023 1057 by Alyssia Hilario RN  Outcome: Progressing  Flowsheets (Taken 11/15/2023 3826)  Verbalizes/displays adequate comfort level or baseline comfort level:   Encourage patient to monitor pain and request assistance   Assess pain using appropriate pain scale   Administer analgesics based on type and severity of pain and evaluate response   Consider cultural and social influences on pain and pain management   Implement non-pharmacological measures as appropriate and evaluate response   Notify Licensed Independent Practitioner if interventions unsuccessful or patient reports new pain    Problem: Safety - Adult  Goal: Free from fall injury  11/15/2023 1057 by Alyssia Hilario RN  Outcome: Progressing  Flowsheets (Taken 11/15/2023 0179)  Free From Fall Injury: Instruct family/caregiver on patient safety     Problem: Skin/Tissue Integrity  Goal: Absence of new skin breakdown  Description: 1. Monitor for areas of redness and/or skin breakdown  2. Assess vascular access sites hourly  3. Every 4-6 hours minimum:  Change oxygen saturation probe site  4. Every 4-6 hours:  If on nasal continuous positive airway pressure, respiratory therapy assess nares and determine need for appliance change or resting period.   11/15/2023 1057 by Alyssia Hilario RN  Outcome: Progressing     Problem: Neurosensory - Adult  Goal: Achieves stable or improved neurological status  Outcome: Progressing  Goal: Absence of
Problem: Pain  Goal: Verbalizes/displays adequate comfort level or baseline comfort level  11/18/2023 1308 by Eri Hancock RN  Outcome: Progressing     Problem: Safety - Adult  Goal: Free from fall injury  11/18/2023 1308 by Eri Hancock RN  Outcome: Progressing     Problem: Skin/Tissue Integrity  Goal: Absence of new skin breakdown  Description: 1. Monitor for areas of redness and/or skin breakdown  2. Assess vascular access sites hourly  3. Every 4-6 hours minimum:  Change oxygen saturation probe site  4. Every 4-6 hours:  If on nasal continuous positive airway pressure, respiratory therapy assess nares and determine need for appliance change or resting period. 11/18/2023 1308 by Eri Hancock RN  Outcome: Progressing     Problem: Neurosensory - Adult  Goal: Achieves stable or improved neurological status  11/18/2023 1308 by Eri Hancock RN  Outcome: Progressing     Problem: Neurosensory - Adult  Goal: Absence of seizures  11/18/2023 1308 by Eri Hancock RN  Outcome: Progressing     Problem: Neurosensory - Adult  Goal: Remains free of injury related to seizures activity  11/18/2023 1308 by Eri Hancock RN  Outcome: Progressing     Problem: Skin/Tissue Integrity - Adult  Goal: Skin integrity remains intact  11/18/2023 1308 by Eri Hancock RN  Outcome: Progressing     Problem: Gastrointestinal - Adult  Goal: Minimal or absence of nausea and vomiting  11/18/2023 1308 by Eri Hancock RN  Outcome: Progressing     Problem: Metabolic/Fluid and Electrolytes - Adult  Goal: Electrolytes maintained within normal limits  11/18/2023 1308 by Eri Hancock RN  Outcome: Progressing     Problem: Anxiety  Goal: Will report anxiety at manageable levels  Description: INTERVENTIONS:  1. Administer medication as ordered  2. Teach and rehearse alternative coping skills  3.  Provide emotional support with 1:1 interaction with staff  11/18/2023
Problem: Pain  Goal: Verbalizes/displays adequate comfort level or baseline comfort level  11/19/2023 1210 by Acacia Moore RN  Outcome: Progressing     Problem: Safety - Adult  Goal: Free from fall injury  11/19/2023 1210 by Acacia Moore RN  Outcome: Progressing     Problem: Skin/Tissue Integrity  Goal: Absence of new skin breakdown  Description: 1. Monitor for areas of redness and/or skin breakdown  2. Assess vascular access sites hourly  3. Every 4-6 hours minimum:  Change oxygen saturation probe site  4. Every 4-6 hours:  If on nasal continuous positive airway pressure, respiratory therapy assess nares and determine need for appliance change or resting period. 11/19/2023 1210 by Acacia Moore RN  Outcome: Progressing     Problem: Neurosensory - Adult  Goal: Achieves stable or improved neurological status  11/19/2023 1210 by Acacia Moore RN  Outcome: Progressing     Problem: Neurosensory - Adult  Goal: Absence of seizures  11/19/2023 1210 by Acacia Moore RN  Outcome: Progressing     Problem: Neurosensory - Adult  Goal: Remains free of injury related to seizures activity  11/19/2023 1210 by Acacia Moore RN  Outcome: Progressing     Problem: Skin/Tissue Integrity - Adult  Goal: Skin integrity remains intact  11/19/2023 1210 by Acacia Moore RN  Outcome: Progressing     Problem: Gastrointestinal - Adult  Goal: Minimal or absence of nausea and vomiting  11/19/2023 1210 by Acacia Moore RN  Outcome: Progressing     Problem: Metabolic/Fluid and Electrolytes - Adult  Goal: Electrolytes maintained within normal limits  11/19/2023 1210 by Acacia Moore RN  Outcome: Progressing     Problem: Anxiety  Goal: Will report anxiety at manageable levels  Description: INTERVENTIONS:  1. Administer medication as ordered  2. Teach and rehearse alternative coping skills  3.  Provide emotional support with 1:1 interaction with staff  11/19/2023
Problem: Pain  Goal: Verbalizes/displays adequate comfort level or baseline comfort level  Outcome: Progressing     Problem: Safety - Adult  Goal: Free from fall injury  Outcome: Progressing     Problem: Skin/Tissue Integrity  Goal: Absence of new skin breakdown  Description: 1. Monitor for areas of redness and/or skin breakdown  2. Assess vascular access sites hourly  3. Every 4-6 hours minimum:  Change oxygen saturation probe site  4. Every 4-6 hours:  If on nasal continuous positive airway pressure, respiratory therapy assess nares and determine need for appliance change or resting period.   Outcome: Progressing
Assess for and report changes in neurological status   Maintain blood pressure and fluid volume within ordered parameters to optimize cerebral perfusion and minimize risk of hemorrhage  Goal: Absence of seizures  11/20/2023 1130 by Camilo Nix RN  Outcome: Progressing  11/19/2023 2244 by Apoorva Garcia RN  Outcome: Progressing  Flowsheets (Taken 11/19/2023 2031)  Absence of seizures:   Monitor for seizure activity.   If seizure occurs, document type and location of movements and any associated apnea   If seizure occurs, turn head to side and suction secretions as needed   Administer anticonvulsants as ordered   Support airway/breathing, administer oxygen as needed   Diagnostic studies as ordered  Goal: Remains free of injury related to seizures activity  11/20/2023 1130 by Camilo Nix RN  Outcome: Progressing  11/19/2023 2244 by Apoorva Garcia RN  Outcome: Progressing  Flowsheets (Taken 11/19/2023 2031)  Remains free of injury related to seizure activity:   Maintain airway, patient safety  and administer oxygen as ordered   Monitor patient for seizure activity, document and report duration and description of seizure to Licensed Independent Practitioner   If seizure occurs, turn patient to side and suction secretions as needed   Reorient patient post seizure   Seizure pads on all 4 side rails   Instruct patient/family to notify RN of any seizure activity   Instruct patient/family to call for assistance with activity based on assessment     Problem: Skin/Tissue Integrity - Adult  Goal: Skin integrity remains intact  11/20/2023 1130 by Camilo Nix RN  Outcome: Progressing  Flowsheets (Taken 11/19/2023 2246 by Apoorva Garcia RN)  Skin Integrity Remains Intact:   Monitor for areas of redness and/or skin breakdown   Assess vascular access sites hourly  11/19/2023 2244 by Apoorva Garcia RN  Outcome: Progressing  Flowsheets (Taken 11/19/2023 2031)  Skin Integrity Remains Intact:   Monitor for areas
alternative coping skills  3.  Provide emotional support with 1:1 interaction with staff  11/16/2023 2037 by Renee Kathleen RN  Outcome: Progressing
normal limits: Monitor labs and assess patient for signs and symptoms of electrolyte imbalances     Problem: Anxiety  Goal: Will report anxiety at manageable levels  Description: INTERVENTIONS:  1. Administer medication as ordered  2. Teach and rehearse alternative coping skills  3.  Provide emotional support with 1:1 interaction with staff  Outcome: Adequate for Discharge  Flowsheets (Taken 11/21/2023 0900)  Will report anxiety at manageable levels: Administer medication as ordered     Problem: ABCDS Injury Assessment  Goal: Absence of physical injury  Outcome: Adequate for Discharge

## 2023-11-24 LAB — MITOCHONDRIA M2 AB SER IA-ACNC: 1.9 U/ML (ref 0–4)

## 2024-04-06 NOTE — ED PROVIDER NOTES
[address]        Pt Name: Wesley Aldana  MRN: [id number]  [address] [facility] 1985  Date of evaluation: 11/14/2023  Provider: Norman Titus PA-C  PCP: No primary care provider on file. Note Started: 11:28 PM EST 11/14/23       I have seen and evaluated this patient with my supervising physician Dr. Wesley Morales       Chief Complaint   Patient presents with    Hematemesis     Patient states that she started vomiting bright red blood yesterday and has vomited today over 20 days. Patient states that she stopped drinking alcohol yesterday afternoon and she is going through detox. Usually drinks a fifth of vodka in a day. HISTORY OF PRESENT ILLNESS: 1 or more Elements     History From: patient  Limitations to history : None    Wesley Aldana is a 30 y.o. female who presents to the emergency department by private vehicle. Patient with hx of alcohol abuse, typically drinks fifth of vodka daily. Last drink yesterday afternoon. Patient began with hematemesis the am. Patient with 20+ episodes prior to arrival. Patient with generalized abdominal pain, lower chest pain. Patient with hx of upper GI bleed previously. Nursing Notes were all reviewed and agreed with or any disagreements were addressed in the HPI. REVIEW OF SYSTEMS :      Review of Systems    Positives and Pertinent negatives as per HPI.      SURGICAL HISTORY     Past Surgical History:   Procedure Laterality Date    DENTAL SURGERY      ESOPHAGOGASTRODUODENOSCOPY  2021    UPPER GASTROINTESTINAL ENDOSCOPY N/A 8/7/2023    EGD BIOPSY performed by Leandro Giron MD at [address] 11/15/2023    EGD VARICEAL BANDING performed by Lilly Abbasi MD at [address] [facility]       Current Discharge Medication List        CONTINUE these medications which have NOT CHANGED    Details   spironolactone (ALDACTONE) 50 MG tablet Take 1 tablet by
ED Attending Attestation Note    This patient was seen by the advanced practice provider. I personally saw the patient and performed a substantive portion of the visit including all aspects of the medical decision making. Briefly, 30 y.o. female presents with hematemesis, has a history of alcoholism as well variceals. Patient has diffuse abdominal pain. As well as chest pain. This is nonexertional nonpleuritic in nature but is associated with nausea. Physical Exam  Vitals and nursing note reviewed. Constitutional:       General: She is in acute distress. Appearance: She is ill-appearing. She is not toxic-appearing. HENT:      Head: Normocephalic and atraumatic. Right Ear: External ear normal.      Left Ear: External ear normal.      Nose: Nose normal.   Eyes:      Conjunctiva/sclera: Conjunctivae normal.   Cardiovascular:      Rate and Rhythm: Normal rate and regular rhythm. Pulses: Normal pulses. Heart sounds: No murmur heard. Pulmonary:      Effort: Pulmonary effort is normal. No respiratory distress. Abdominal:      Tenderness: There is abdominal tenderness. There is guarding. Skin:     General: Skin is warm and dry. Capillary Refill: Capillary refill takes less than 2 seconds. Neurological:      Mental Status: She is alert. Psychiatric:         Mood and Affect: Mood normal.                MDM:   This is a 42-year-old female with a history of alcoholism as well as esophageal varices who comes in with hematemesis, she is febrile as well as initially tachycardic, she was given Rocephin as well as octreotide as well as Protonix to help with her current symptomatology as well as concern with esophageal varices contribute to active bleeding at this time. She has significant chest pain as well as abdominal pain CT chest and abdomen pelvis was obtained showing small gastric esophageal variceal bleeding however no other acute focal findings.   Her lactate was
97

## 2024-09-19 ENCOUNTER — APPOINTMENT (OUTPATIENT)
Dept: GENERAL RADIOLOGY | Age: 39
DRG: 773 | End: 2024-09-19
Payer: COMMERCIAL

## 2024-09-19 ENCOUNTER — APPOINTMENT (OUTPATIENT)
Dept: CT IMAGING | Age: 39
DRG: 773 | End: 2024-09-19
Payer: COMMERCIAL

## 2024-09-19 ENCOUNTER — HOSPITAL ENCOUNTER (INPATIENT)
Age: 39
LOS: 4 days | Discharge: HOME | DRG: 773 | End: 2024-09-23
Attending: EMERGENCY MEDICINE | Admitting: INTERNAL MEDICINE
Payer: COMMERCIAL

## 2024-09-19 DIAGNOSIS — R11.2 NAUSEA AND VOMITING, UNSPECIFIED VOMITING TYPE: ICD-10-CM

## 2024-09-19 DIAGNOSIS — N39.0 URINARY TRACT INFECTION WITHOUT HEMATURIA, SITE UNSPECIFIED: ICD-10-CM

## 2024-09-19 DIAGNOSIS — F10.20 ALCOHOLISM (HCC): ICD-10-CM

## 2024-09-19 DIAGNOSIS — Z87.898 HISTORY OF SEIZURE: ICD-10-CM

## 2024-09-19 DIAGNOSIS — F19.10 POLYSUBSTANCE ABUSE (HCC): ICD-10-CM

## 2024-09-19 DIAGNOSIS — F10.939 ALCOHOL WITHDRAWAL SYNDROME WITH COMPLICATION (HCC): Primary | ICD-10-CM

## 2024-09-19 PROBLEM — I85.10 ESOPHAGEAL VARICES IN ALCOHOLIC CIRRHOSIS (HCC): Status: ACTIVE | Noted: 2024-09-19

## 2024-09-19 PROBLEM — R79.89 ELEVATED LFTS: Status: ACTIVE | Noted: 2024-09-19

## 2024-09-19 PROBLEM — N30.00 ACUTE CYSTITIS WITHOUT HEMATURIA: Status: ACTIVE | Noted: 2024-09-19

## 2024-09-19 PROBLEM — R56.9 ALCOHOL WITHDRAWAL SEIZURE WITH COMPLICATION (HCC): Status: ACTIVE | Noted: 2024-09-19

## 2024-09-19 PROBLEM — I85.00: Status: RESOLVED | Noted: 2023-08-07 | Resolved: 2024-09-19

## 2024-09-19 PROBLEM — K70.30 ALCOHOLIC CIRRHOSIS OF LIVER WITHOUT ASCITES (HCC): Status: ACTIVE | Noted: 2024-09-19

## 2024-09-19 LAB
ALBUMIN SERPL-MCNC: 4.2 G/DL (ref 3.4–5)
ALBUMIN/GLOB SERPL: 0.8 {RATIO} (ref 1.1–2.2)
ALP SERPL-CCNC: 148 U/L (ref 40–129)
ALT SERPL-CCNC: 15 U/L (ref 10–40)
AMPHETAMINES UR QL SCN>1000 NG/ML: ABNORMAL
ANION GAP SERPL CALCULATED.3IONS-SCNC: 16 MMOL/L (ref 3–16)
AST SERPL-CCNC: 52 U/L (ref 15–37)
BACTERIA GENITAL QL WET PREP: ABNORMAL
BACTERIA URNS QL MICRO: ABNORMAL /HPF
BARBITURATES UR QL SCN>200 NG/ML: ABNORMAL
BASOPHILS # BLD: 0.1 K/UL (ref 0–0.2)
BASOPHILS NFR BLD: 0.3 %
BENZODIAZ UR QL SCN>200 NG/ML: POSITIVE
BILIRUB SERPL-MCNC: 1.3 MG/DL (ref 0–1)
BILIRUB UR QL STRIP.AUTO: ABNORMAL
BUN SERPL-MCNC: 8 MG/DL (ref 7–20)
CALCIUM SERPL-MCNC: 9.5 MG/DL (ref 8.3–10.6)
CANNABINOIDS UR QL SCN>50 NG/ML: POSITIVE
CHLORIDE SERPL-SCNC: 95 MMOL/L (ref 99–110)
CLARITY UR: ABNORMAL
CLUE CELLS SPEC QL WET PREP: ABNORMAL
CO2 SERPL-SCNC: 24 MMOL/L (ref 21–32)
COCAINE UR QL SCN: ABNORMAL
COLOR UR: ABNORMAL
CREAT SERPL-MCNC: 0.8 MG/DL (ref 0.6–1.1)
DEPRECATED RDW RBC AUTO: 14.3 % (ref 12.4–15.4)
DRUG SCREEN COMMENT UR-IMP: ABNORMAL
EOSINOPHIL # BLD: 0 K/UL (ref 0–0.6)
EOSINOPHIL NFR BLD: 0.2 %
EPI CELLS #/AREA URNS AUTO: 33 /HPF (ref 0–5)
EPI CELLS SPEC QL WET PREP: ABNORMAL
ETHANOLAMINE SERPL-MCNC: 10 MG/DL (ref 0–0.08)
FENTANYL SCREEN, URINE: ABNORMAL
GFR SERPLBLD CREATININE-BSD FMLA CKD-EPI: >90 ML/MIN/{1.73_M2}
GLUCOSE SERPL-MCNC: 112 MG/DL (ref 70–99)
GLUCOSE UR STRIP.AUTO-MCNC: NEGATIVE MG/DL
HCT VFR BLD AUTO: 49.9 % (ref 36–48)
HGB BLD-MCNC: 16.9 G/DL (ref 12–16)
HGB UR QL STRIP.AUTO: NEGATIVE
HYALINE CASTS #/AREA URNS AUTO: 2 /LPF (ref 0–8)
INR PPP: 1.24 (ref 0.85–1.15)
KETONES UR STRIP.AUTO-MCNC: ABNORMAL MG/DL
LACTATE BLDV-SCNC: 2.1 MMOL/L (ref 0.4–2)
LEUKOCYTE ESTERASE UR QL STRIP.AUTO: ABNORMAL
LIPASE SERPL-CCNC: 14 U/L (ref 13–60)
LYMPHOCYTES # BLD: 2.4 K/UL (ref 1–5.1)
LYMPHOCYTES NFR BLD: 12.7 %
MAGNESIUM SERPL-MCNC: 2.19 MG/DL (ref 1.8–2.4)
MCH RBC QN AUTO: 32.2 PG (ref 26–34)
MCHC RBC AUTO-ENTMCNC: 33.8 G/DL (ref 31–36)
MCV RBC AUTO: 95.3 FL (ref 80–100)
METHADONE UR QL SCN>300 NG/ML: POSITIVE
MONOCYTES # BLD: 1.1 K/UL (ref 0–1.3)
MONOCYTES NFR BLD: 5.8 %
NEUTROPHILS # BLD: 15.2 K/UL (ref 1.7–7.7)
NEUTROPHILS NFR BLD: 81 %
NITRITE UR QL STRIP.AUTO: POSITIVE
OPIATES UR QL SCN>300 NG/ML: ABNORMAL
OXYCODONE UR QL SCN: ABNORMAL
PCP UR QL SCN>25 NG/ML: POSITIVE
PH UR STRIP.AUTO: 6.5 [PH] (ref 5–8)
PH UR STRIP: 7 [PH]
PLATELET # BLD AUTO: 255 K/UL (ref 135–450)
PMV BLD AUTO: 8.7 FL (ref 5–10.5)
POTASSIUM SERPL-SCNC: 4.2 MMOL/L (ref 3.5–5.1)
PROT SERPL-MCNC: 9.3 G/DL (ref 6.4–8.2)
PROT UR STRIP.AUTO-MCNC: 30 MG/DL
PROTHROMBIN TIME: 15.8 SEC (ref 11.9–14.9)
RBC # BLD AUTO: 5.24 M/UL (ref 4–5.2)
RBC CLUMPS #/AREA URNS AUTO: 6 /HPF (ref 0–4)
RBC SPEC QL WET PREP: ABNORMAL
SODIUM SERPL-SCNC: 135 MMOL/L (ref 136–145)
SP GR UR STRIP.AUTO: 1.02 (ref 1–1.03)
SPECIMEN SOURCE FLD: ABNORMAL
T VAGINALIS GENITAL QL WET PREP: ABNORMAL
UA COMPLETE W REFLEX CULTURE PNL UR: YES
UA DIPSTICK W REFLEX MICRO PNL UR: YES
URN SPEC COLLECT METH UR: ABNORMAL
UROBILINOGEN UR STRIP-ACNC: 2 E.U./DL
WBC # BLD AUTO: 18.8 K/UL (ref 4–11)
WBC #/AREA URNS AUTO: 29 /HPF (ref 0–5)
WBC SPEC QL WET PREP: ABNORMAL
YEAST GENITAL QL WET PREP: ABNORMAL

## 2024-09-19 PROCEDURE — 2500000003 HC RX 250 WO HCPCS: Performed by: PHYSICIAN ASSISTANT

## 2024-09-19 PROCEDURE — 87210 SMEAR WET MOUNT SALINE/INK: CPT

## 2024-09-19 PROCEDURE — 2580000003 HC RX 258: Performed by: PHYSICIAN ASSISTANT

## 2024-09-19 PROCEDURE — 87077 CULTURE AEROBIC IDENTIFY: CPT

## 2024-09-19 PROCEDURE — 6360000002 HC RX W HCPCS: Performed by: PHYSICIAN ASSISTANT

## 2024-09-19 PROCEDURE — 87186 SC STD MICRODIL/AGAR DIL: CPT

## 2024-09-19 PROCEDURE — 85610 PROTHROMBIN TIME: CPT

## 2024-09-19 PROCEDURE — 83690 ASSAY OF LIPASE: CPT

## 2024-09-19 PROCEDURE — 87491 CHLMYD TRACH DNA AMP PROBE: CPT

## 2024-09-19 PROCEDURE — 6360000002 HC RX W HCPCS: Performed by: INTERNAL MEDICINE

## 2024-09-19 PROCEDURE — 83605 ASSAY OF LACTIC ACID: CPT

## 2024-09-19 PROCEDURE — 71045 X-RAY EXAM CHEST 1 VIEW: CPT

## 2024-09-19 PROCEDURE — 82077 ASSAY SPEC XCP UR&BREATH IA: CPT

## 2024-09-19 PROCEDURE — 80053 COMPREHEN METABOLIC PANEL: CPT

## 2024-09-19 PROCEDURE — 6370000000 HC RX 637 (ALT 250 FOR IP): Performed by: REGISTERED NURSE

## 2024-09-19 PROCEDURE — 87591 N.GONORRHOEAE DNA AMP PROB: CPT

## 2024-09-19 PROCEDURE — 99285 EMERGENCY DEPT VISIT HI MDM: CPT

## 2024-09-19 PROCEDURE — 96375 TX/PRO/DX INJ NEW DRUG ADDON: CPT

## 2024-09-19 PROCEDURE — 2060000000 HC ICU INTERMEDIATE R&B

## 2024-09-19 PROCEDURE — 85025 COMPLETE CBC W/AUTO DIFF WBC: CPT

## 2024-09-19 PROCEDURE — 2580000003 HC RX 258: Performed by: INTERNAL MEDICINE

## 2024-09-19 PROCEDURE — 83735 ASSAY OF MAGNESIUM: CPT

## 2024-09-19 PROCEDURE — 81001 URINALYSIS AUTO W/SCOPE: CPT

## 2024-09-19 PROCEDURE — 6370000000 HC RX 637 (ALT 250 FOR IP): Performed by: INTERNAL MEDICINE

## 2024-09-19 PROCEDURE — 70450 CT HEAD/BRAIN W/O DYE: CPT

## 2024-09-19 PROCEDURE — 96374 THER/PROPH/DIAG INJ IV PUSH: CPT

## 2024-09-19 PROCEDURE — 87086 URINE CULTURE/COLONY COUNT: CPT

## 2024-09-19 PROCEDURE — 80307 DRUG TEST PRSMV CHEM ANLYZR: CPT

## 2024-09-19 PROCEDURE — 93005 ELECTROCARDIOGRAM TRACING: CPT | Performed by: PHYSICIAN ASSISTANT

## 2024-09-19 RX ORDER — SODIUM CHLORIDE 9 MG/ML
INJECTION, SOLUTION INTRAVENOUS PRN
Status: DISCONTINUED | OUTPATIENT
Start: 2024-09-19 | End: 2024-09-23 | Stop reason: HOSPADM

## 2024-09-19 RX ORDER — METHADONE HYDROCHLORIDE 10 MG/1
87 TABLET ORAL DAILY
Status: DISCONTINUED | OUTPATIENT
Start: 2024-09-19 | End: 2024-09-23 | Stop reason: HOSPADM

## 2024-09-19 RX ORDER — SODIUM CHLORIDE 9 MG/ML
INJECTION, SOLUTION INTRAVENOUS CONTINUOUS
Status: DISCONTINUED | OUTPATIENT
Start: 2024-09-19 | End: 2024-09-22

## 2024-09-19 RX ORDER — POLYETHYLENE GLYCOL 3350 17 G/17G
17 POWDER, FOR SOLUTION ORAL DAILY PRN
Status: DISCONTINUED | OUTPATIENT
Start: 2024-09-19 | End: 2024-09-23 | Stop reason: HOSPADM

## 2024-09-19 RX ORDER — PROPRANOLOL HCL 20 MG
20 TABLET ORAL 3 TIMES DAILY
Status: DISCONTINUED | OUTPATIENT
Start: 2024-09-19 | End: 2024-09-19

## 2024-09-19 RX ORDER — FOLIC ACID 1 MG/1
1 TABLET ORAL DAILY
Status: DISCONTINUED | OUTPATIENT
Start: 2024-09-19 | End: 2024-09-23 | Stop reason: HOSPADM

## 2024-09-19 RX ORDER — LORAZEPAM 1 MG/1
2 TABLET ORAL
Status: DISCONTINUED | OUTPATIENT
Start: 2024-09-19 | End: 2024-09-23 | Stop reason: HOSPADM

## 2024-09-19 RX ORDER — PANTOPRAZOLE SODIUM 40 MG/10ML
60 INJECTION, POWDER, LYOPHILIZED, FOR SOLUTION INTRAVENOUS ONCE
Status: COMPLETED | OUTPATIENT
Start: 2024-09-19 | End: 2024-09-19

## 2024-09-19 RX ORDER — LORAZEPAM 2 MG/ML
4 INJECTION INTRAMUSCULAR
Status: DISCONTINUED | OUTPATIENT
Start: 2024-09-19 | End: 2024-09-23 | Stop reason: HOSPADM

## 2024-09-19 RX ORDER — ACETAMINOPHEN 650 MG/1
650 SUPPOSITORY RECTAL EVERY 6 HOURS PRN
Status: DISCONTINUED | OUTPATIENT
Start: 2024-09-19 | End: 2024-09-23 | Stop reason: HOSPADM

## 2024-09-19 RX ORDER — ACETAMINOPHEN 325 MG/1
650 TABLET ORAL EVERY 6 HOURS PRN
Status: DISCONTINUED | OUTPATIENT
Start: 2024-09-19 | End: 2024-09-23 | Stop reason: HOSPADM

## 2024-09-19 RX ORDER — MAGNESIUM SULFATE IN WATER 40 MG/ML
2000 INJECTION, SOLUTION INTRAVENOUS PRN
Status: DISCONTINUED | OUTPATIENT
Start: 2024-09-19 | End: 2024-09-23 | Stop reason: HOSPADM

## 2024-09-19 RX ORDER — SODIUM CHLORIDE 0.9 % (FLUSH) 0.9 %
5-40 SYRINGE (ML) INJECTION PRN
Status: DISCONTINUED | OUTPATIENT
Start: 2024-09-19 | End: 2024-09-23 | Stop reason: HOSPADM

## 2024-09-19 RX ORDER — SODIUM CHLORIDE 0.9 % (FLUSH) 0.9 %
5-40 SYRINGE (ML) INJECTION EVERY 12 HOURS SCHEDULED
Status: DISCONTINUED | OUTPATIENT
Start: 2024-09-19 | End: 2024-09-23 | Stop reason: HOSPADM

## 2024-09-19 RX ORDER — POTASSIUM CHLORIDE 1500 MG/1
40 TABLET, EXTENDED RELEASE ORAL PRN
Status: DISCONTINUED | OUTPATIENT
Start: 2024-09-19 | End: 2024-09-23 | Stop reason: HOSPADM

## 2024-09-19 RX ORDER — ONDANSETRON 2 MG/ML
4 INJECTION INTRAMUSCULAR; INTRAVENOUS ONCE
Status: COMPLETED | OUTPATIENT
Start: 2024-09-19 | End: 2024-09-19

## 2024-09-19 RX ORDER — LORAZEPAM 1 MG/1
3 TABLET ORAL
Status: DISCONTINUED | OUTPATIENT
Start: 2024-09-19 | End: 2024-09-23 | Stop reason: HOSPADM

## 2024-09-19 RX ORDER — LORAZEPAM 2 MG/ML
1 INJECTION INTRAMUSCULAR
Status: DISCONTINUED | OUTPATIENT
Start: 2024-09-19 | End: 2024-09-23 | Stop reason: HOSPADM

## 2024-09-19 RX ORDER — LORAZEPAM 1 MG/1
1 TABLET ORAL
Status: DISCONTINUED | OUTPATIENT
Start: 2024-09-19 | End: 2024-09-23 | Stop reason: HOSPADM

## 2024-09-19 RX ORDER — ONDANSETRON 4 MG/1
4 TABLET, ORALLY DISINTEGRATING ORAL EVERY 8 HOURS PRN
Status: DISCONTINUED | OUTPATIENT
Start: 2024-09-19 | End: 2024-09-23 | Stop reason: HOSPADM

## 2024-09-19 RX ORDER — PANTOPRAZOLE SODIUM 40 MG/1
40 TABLET, DELAYED RELEASE ORAL
Status: DISCONTINUED | OUTPATIENT
Start: 2024-09-20 | End: 2024-09-23 | Stop reason: HOSPADM

## 2024-09-19 RX ORDER — LORAZEPAM 2 MG/ML
2 INJECTION INTRAMUSCULAR ONCE
Status: COMPLETED | OUTPATIENT
Start: 2024-09-19 | End: 2024-09-19

## 2024-09-19 RX ORDER — GAUZE BANDAGE 2" X 2"
100 BANDAGE TOPICAL DAILY
Status: DISCONTINUED | OUTPATIENT
Start: 2024-09-19 | End: 2024-09-23 | Stop reason: HOSPADM

## 2024-09-19 RX ORDER — LORAZEPAM 2 MG/ML
3 INJECTION INTRAMUSCULAR
Status: DISCONTINUED | OUTPATIENT
Start: 2024-09-19 | End: 2024-09-23 | Stop reason: HOSPADM

## 2024-09-19 RX ORDER — POTASSIUM CHLORIDE 7.45 MG/ML
10 INJECTION INTRAVENOUS PRN
Status: DISCONTINUED | OUTPATIENT
Start: 2024-09-19 | End: 2024-09-23 | Stop reason: HOSPADM

## 2024-09-19 RX ORDER — ONDANSETRON 2 MG/ML
4 INJECTION INTRAMUSCULAR; INTRAVENOUS EVERY 6 HOURS PRN
Status: DISCONTINUED | OUTPATIENT
Start: 2024-09-19 | End: 2024-09-23 | Stop reason: HOSPADM

## 2024-09-19 RX ORDER — LORAZEPAM 2 MG/ML
2 INJECTION INTRAMUSCULAR
Status: DISCONTINUED | OUTPATIENT
Start: 2024-09-19 | End: 2024-09-23 | Stop reason: HOSPADM

## 2024-09-19 RX ORDER — LORAZEPAM 1 MG/1
4 TABLET ORAL
Status: DISCONTINUED | OUTPATIENT
Start: 2024-09-19 | End: 2024-09-23 | Stop reason: HOSPADM

## 2024-09-19 RX ORDER — ENOXAPARIN SODIUM 100 MG/ML
40 INJECTION SUBCUTANEOUS DAILY
Status: DISCONTINUED | OUTPATIENT
Start: 2024-09-20 | End: 2024-09-23 | Stop reason: HOSPADM

## 2024-09-19 RX ADMIN — SODIUM CHLORIDE, PRESERVATIVE FREE 10 ML: 5 INJECTION INTRAVENOUS at 22:30

## 2024-09-19 RX ADMIN — PANTOPRAZOLE SODIUM 60 MG: 40 INJECTION, POWDER, FOR SOLUTION INTRAVENOUS at 19:24

## 2024-09-19 RX ADMIN — LORAZEPAM 2 MG: 2 INJECTION INTRAMUSCULAR; INTRAVENOUS at 19:24

## 2024-09-19 RX ADMIN — LORAZEPAM 2 MG: 2 INJECTION INTRAMUSCULAR; INTRAVENOUS at 22:09

## 2024-09-19 RX ADMIN — Medication 100 MG: at 22:28

## 2024-09-19 RX ADMIN — SODIUM CHLORIDE 1000 ML: 9 INJECTION, SOLUTION INTRAVENOUS at 22:18

## 2024-09-19 RX ADMIN — THIAMINE HYDROCHLORIDE: 100 INJECTION, SOLUTION INTRAMUSCULAR; INTRAVENOUS at 19:49

## 2024-09-19 RX ADMIN — WATER 1000 MG: 1 INJECTION INTRAMUSCULAR; INTRAVENOUS; SUBCUTANEOUS at 20:11

## 2024-09-19 RX ADMIN — ONDANSETRON 4 MG: 2 INJECTION INTRAMUSCULAR; INTRAVENOUS at 19:24

## 2024-09-19 RX ADMIN — FOLIC ACID 1 MG: 1 TABLET ORAL at 22:28

## 2024-09-19 RX ADMIN — METHADONE HYDROCHLORIDE 85 MG: 10 TABLET ORAL at 23:40

## 2024-09-19 ASSESSMENT — PAIN SCALES - GENERAL
PAINLEVEL_OUTOF10: 0
PAINLEVEL_OUTOF10: 9

## 2024-09-19 ASSESSMENT — PAIN DESCRIPTION - DESCRIPTORS: DESCRIPTORS: SHARP

## 2024-09-19 ASSESSMENT — PAIN DESCRIPTION - ORIENTATION: ORIENTATION: LOWER;LEFT;RIGHT;UPPER

## 2024-09-19 ASSESSMENT — PAIN - FUNCTIONAL ASSESSMENT: PAIN_FUNCTIONAL_ASSESSMENT: ACTIVITIES ARE NOT PREVENTED

## 2024-09-19 ASSESSMENT — PAIN DESCRIPTION - LOCATION: LOCATION: ABDOMEN;BACK;THROAT

## 2024-09-20 LAB
ALBUMIN SERPL-MCNC: 3.5 G/DL (ref 3.4–5)
ALBUMIN/GLOB SERPL: 0.9 {RATIO} (ref 1.1–2.2)
ALP SERPL-CCNC: 113 U/L (ref 40–129)
ALT SERPL-CCNC: 12 U/L (ref 10–40)
ANION GAP SERPL CALCULATED.3IONS-SCNC: 9 MMOL/L (ref 3–16)
AST SERPL-CCNC: 48 U/L (ref 15–37)
BASOPHILS # BLD: 0 K/UL (ref 0–0.2)
BASOPHILS NFR BLD: 0.2 %
BILIRUB SERPL-MCNC: 1.1 MG/DL (ref 0–1)
BUN SERPL-MCNC: 10 MG/DL (ref 7–20)
C TRACH DNA CVX QL NAA+PROBE: NEGATIVE
CA-I BLD-SCNC: 1.1 MMOL/L (ref 1.12–1.32)
CALCIUM SERPL-MCNC: 8.3 MG/DL (ref 8.3–10.6)
CHLORIDE SERPL-SCNC: 102 MMOL/L (ref 99–110)
CO2 SERPL-SCNC: 26 MMOL/L (ref 21–32)
CREAT SERPL-MCNC: 0.7 MG/DL (ref 0.6–1.1)
DEPRECATED RDW RBC AUTO: 14.4 % (ref 12.4–15.4)
EKG ATRIAL RATE: 71 BPM
EKG DIAGNOSIS: NORMAL
EKG P AXIS: 69 DEGREES
EKG P-R INTERVAL: 176 MS
EKG Q-T INTERVAL: 434 MS
EKG QRS DURATION: 72 MS
EKG QTC CALCULATION (BAZETT): 471 MS
EKG R AXIS: 32 DEGREES
EKG T AXIS: 13 DEGREES
EKG VENTRICULAR RATE: 71 BPM
EOSINOPHIL # BLD: 0.1 K/UL (ref 0–0.6)
EOSINOPHIL NFR BLD: 0.6 %
GFR SERPLBLD CREATININE-BSD FMLA CKD-EPI: >90 ML/MIN/{1.73_M2}
GLUCOSE SERPL-MCNC: 75 MG/DL (ref 70–99)
HCT VFR BLD AUTO: 44.1 % (ref 36–48)
HGB BLD-MCNC: 14.8 G/DL (ref 12–16)
LACTATE BLDV-SCNC: 0.8 MMOL/L (ref 0.4–2)
LYMPHOCYTES # BLD: 3.4 K/UL (ref 1–5.1)
LYMPHOCYTES NFR BLD: 31.4 %
MCH RBC QN AUTO: 32 PG (ref 26–34)
MCHC RBC AUTO-ENTMCNC: 33.5 G/DL (ref 31–36)
MCV RBC AUTO: 95.5 FL (ref 80–100)
MONOCYTES # BLD: 0.8 K/UL (ref 0–1.3)
MONOCYTES NFR BLD: 7.2 %
N GONORRHOEA DNA CERV MUCUS QL NAA+PROBE: NEGATIVE
NEUTROPHILS # BLD: 6.6 K/UL (ref 1.7–7.7)
NEUTROPHILS NFR BLD: 60.6 %
PH BLDV: 7.36 [PH] (ref 7.35–7.45)
PHOSPHATE SERPL-MCNC: 3.8 MG/DL (ref 2.5–4.9)
PLATELET # BLD AUTO: 166 K/UL (ref 135–450)
PMV BLD AUTO: 9 FL (ref 5–10.5)
POTASSIUM SERPL-SCNC: 3.8 MMOL/L (ref 3.5–5.1)
PROT SERPL-MCNC: 7.4 G/DL (ref 6.4–8.2)
RBC # BLD AUTO: 4.62 M/UL (ref 4–5.2)
SODIUM SERPL-SCNC: 137 MMOL/L (ref 136–145)
WBC # BLD AUTO: 10.9 K/UL (ref 4–11)

## 2024-09-20 PROCEDURE — 36415 COLL VENOUS BLD VENIPUNCTURE: CPT

## 2024-09-20 PROCEDURE — 94760 N-INVAS EAR/PLS OXIMETRY 1: CPT

## 2024-09-20 PROCEDURE — 6360000002 HC RX W HCPCS: Performed by: INTERNAL MEDICINE

## 2024-09-20 PROCEDURE — 85025 COMPLETE CBC W/AUTO DIFF WBC: CPT

## 2024-09-20 PROCEDURE — 84100 ASSAY OF PHOSPHORUS: CPT

## 2024-09-20 PROCEDURE — 93010 ELECTROCARDIOGRAM REPORT: CPT | Performed by: INTERNAL MEDICINE

## 2024-09-20 PROCEDURE — 6370000000 HC RX 637 (ALT 250 FOR IP): Performed by: STUDENT IN AN ORGANIZED HEALTH CARE EDUCATION/TRAINING PROGRAM

## 2024-09-20 PROCEDURE — 2580000003 HC RX 258: Performed by: INTERNAL MEDICINE

## 2024-09-20 PROCEDURE — 6370000000 HC RX 637 (ALT 250 FOR IP): Performed by: REGISTERED NURSE

## 2024-09-20 PROCEDURE — 6370000000 HC RX 637 (ALT 250 FOR IP): Performed by: INTERNAL MEDICINE

## 2024-09-20 PROCEDURE — 83605 ASSAY OF LACTIC ACID: CPT

## 2024-09-20 PROCEDURE — 82330 ASSAY OF CALCIUM: CPT

## 2024-09-20 PROCEDURE — 2060000000 HC ICU INTERMEDIATE R&B

## 2024-09-20 PROCEDURE — 80053 COMPREHEN METABOLIC PANEL: CPT

## 2024-09-20 RX ORDER — SPIRONOLACTONE 25 MG/1
50 TABLET ORAL DAILY
Status: DISCONTINUED | OUTPATIENT
Start: 2024-09-20 | End: 2024-09-23 | Stop reason: HOSPADM

## 2024-09-20 RX ORDER — CHLORDIAZEPOXIDE HYDROCHLORIDE 25 MG/1
50 CAPSULE, GELATIN COATED ORAL EVERY 12 HOURS
Status: DISCONTINUED | OUTPATIENT
Start: 2024-09-22 | End: 2024-09-23 | Stop reason: HOSPADM

## 2024-09-20 RX ORDER — PROPRANOLOL HCL 20 MG
20 TABLET ORAL 3 TIMES DAILY
Status: DISCONTINUED | OUTPATIENT
Start: 2024-09-20 | End: 2024-09-23 | Stop reason: HOSPADM

## 2024-09-20 RX ORDER — CHLORDIAZEPOXIDE HYDROCHLORIDE 25 MG/1
50 CAPSULE, GELATIN COATED ORAL EVERY 8 HOURS
Status: COMPLETED | OUTPATIENT
Start: 2024-09-21 | End: 2024-09-22

## 2024-09-20 RX ORDER — TRAZODONE HYDROCHLORIDE 100 MG/1
200 TABLET ORAL NIGHTLY
Status: DISCONTINUED | OUTPATIENT
Start: 2024-09-20 | End: 2024-09-23 | Stop reason: HOSPADM

## 2024-09-20 RX ORDER — CHLORDIAZEPOXIDE HYDROCHLORIDE 25 MG/1
25 CAPSULE, GELATIN COATED ORAL EVERY 12 HOURS
Status: DISCONTINUED | OUTPATIENT
Start: 2024-09-23 | End: 2024-09-23 | Stop reason: HOSPADM

## 2024-09-20 RX ORDER — CHLORDIAZEPOXIDE HYDROCHLORIDE 25 MG/1
50 CAPSULE, GELATIN COATED ORAL EVERY 6 HOURS
Status: COMPLETED | OUTPATIENT
Start: 2024-09-20 | End: 2024-09-21

## 2024-09-20 RX ADMIN — SODIUM CHLORIDE, PRESERVATIVE FREE 10 ML: 5 INJECTION INTRAVENOUS at 20:23

## 2024-09-20 RX ADMIN — PROPRANOLOL HYDROCHLORIDE 20 MG: 20 TABLET ORAL at 20:14

## 2024-09-20 RX ADMIN — ACETAMINOPHEN 325MG 650 MG: 325 TABLET ORAL at 05:57

## 2024-09-20 RX ADMIN — ENOXAPARIN SODIUM 40 MG: 100 INJECTION SUBCUTANEOUS at 08:45

## 2024-09-20 RX ADMIN — ONDANSETRON 4 MG: 2 INJECTION INTRAMUSCULAR; INTRAVENOUS at 14:10

## 2024-09-20 RX ADMIN — SODIUM CHLORIDE, PRESERVATIVE FREE 10 ML: 5 INJECTION INTRAVENOUS at 08:46

## 2024-09-20 RX ADMIN — LORAZEPAM 4 MG: 1 TABLET ORAL at 08:45

## 2024-09-20 RX ADMIN — LORAZEPAM 4 MG: 1 TABLET ORAL at 11:43

## 2024-09-20 RX ADMIN — FOLIC ACID 1 MG: 1 TABLET ORAL at 08:45

## 2024-09-20 RX ADMIN — PROPRANOLOL HYDROCHLORIDE 20 MG: 20 TABLET ORAL at 12:34

## 2024-09-20 RX ADMIN — LORAZEPAM 2 MG: 2 INJECTION INTRAMUSCULAR; INTRAVENOUS at 03:51

## 2024-09-20 RX ADMIN — TRAZODONE HYDROCHLORIDE 200 MG: 100 TABLET ORAL at 20:14

## 2024-09-20 RX ADMIN — CHLORDIAZEPOXIDE HYDROCHLORIDE 50 MG: 25 CAPSULE ORAL at 20:13

## 2024-09-20 RX ADMIN — WATER 1000 MG: 1 INJECTION INTRAMUSCULAR; INTRAVENOUS; SUBCUTANEOUS at 20:17

## 2024-09-20 RX ADMIN — CHLORDIAZEPOXIDE HYDROCHLORIDE 50 MG: 25 CAPSULE ORAL at 15:04

## 2024-09-20 RX ADMIN — METHADONE HYDROCHLORIDE 85 MG: 10 TABLET ORAL at 08:46

## 2024-09-20 RX ADMIN — LORAZEPAM 2 MG: 2 INJECTION INTRAMUSCULAR; INTRAVENOUS at 00:26

## 2024-09-20 RX ADMIN — SODIUM CHLORIDE 1000 ML: 9 INJECTION, SOLUTION INTRAVENOUS at 20:27

## 2024-09-20 RX ADMIN — Medication 100 MG: at 08:45

## 2024-09-20 RX ADMIN — LORAZEPAM 3 MG: 2 INJECTION INTRAMUSCULAR; INTRAVENOUS at 21:58

## 2024-09-20 RX ADMIN — LORAZEPAM 2 MG: 2 INJECTION INTRAMUSCULAR; INTRAVENOUS at 14:10

## 2024-09-20 RX ADMIN — PANTOPRAZOLE SODIUM 40 MG: 40 TABLET, DELAYED RELEASE ORAL at 05:57

## 2024-09-20 RX ADMIN — SPIRONOLACTONE 50 MG: 25 TABLET ORAL at 12:34

## 2024-09-20 RX ADMIN — LORAZEPAM 4 MG: 1 TABLET ORAL at 18:41

## 2024-09-20 RX ADMIN — SODIUM CHLORIDE 1000 ML: 9 INJECTION, SOLUTION INTRAVENOUS at 05:47

## 2024-09-20 RX ADMIN — LORAZEPAM 2 MG: 2 INJECTION INTRAMUSCULAR; INTRAVENOUS at 05:57

## 2024-09-20 RX ADMIN — SODIUM CHLORIDE: 9 INJECTION, SOLUTION INTRAVENOUS at 12:37

## 2024-09-20 ASSESSMENT — PAIN SCALES - GENERAL
PAINLEVEL_OUTOF10: 3
PAINLEVEL_OUTOF10: 0

## 2024-09-20 ASSESSMENT — PAIN DESCRIPTION - DESCRIPTORS: DESCRIPTORS: DULL

## 2024-09-20 ASSESSMENT — PAIN DESCRIPTION - ORIENTATION: ORIENTATION: ANTERIOR

## 2024-09-20 ASSESSMENT — PAIN DESCRIPTION - LOCATION: LOCATION: HEAD

## 2024-09-20 ASSESSMENT — PAIN - FUNCTIONAL ASSESSMENT: PAIN_FUNCTIONAL_ASSESSMENT: ACTIVITIES ARE NOT PREVENTED

## 2024-09-21 LAB
BACTERIA UR CULT: ABNORMAL
BACTERIA UR CULT: ABNORMAL
ORGANISM: ABNORMAL

## 2024-09-21 PROCEDURE — 94760 N-INVAS EAR/PLS OXIMETRY 1: CPT

## 2024-09-21 PROCEDURE — 6370000000 HC RX 637 (ALT 250 FOR IP): Performed by: INTERNAL MEDICINE

## 2024-09-21 PROCEDURE — 6360000002 HC RX W HCPCS: Performed by: INTERNAL MEDICINE

## 2024-09-21 PROCEDURE — 6370000000 HC RX 637 (ALT 250 FOR IP): Performed by: STUDENT IN AN ORGANIZED HEALTH CARE EDUCATION/TRAINING PROGRAM

## 2024-09-21 PROCEDURE — 2580000003 HC RX 258: Performed by: INTERNAL MEDICINE

## 2024-09-21 PROCEDURE — 2060000000 HC ICU INTERMEDIATE R&B

## 2024-09-21 PROCEDURE — 6370000000 HC RX 637 (ALT 250 FOR IP): Performed by: REGISTERED NURSE

## 2024-09-21 RX ORDER — PROCHLORPERAZINE EDISYLATE 5 MG/ML
10 INJECTION INTRAMUSCULAR; INTRAVENOUS EVERY 6 HOURS PRN
Status: DISCONTINUED | OUTPATIENT
Start: 2024-09-21 | End: 2024-09-23 | Stop reason: HOSPADM

## 2024-09-21 RX ADMIN — PROPRANOLOL HYDROCHLORIDE 20 MG: 20 TABLET ORAL at 11:17

## 2024-09-21 RX ADMIN — WATER 1000 MG: 1 INJECTION INTRAMUSCULAR; INTRAVENOUS; SUBCUTANEOUS at 20:59

## 2024-09-21 RX ADMIN — ENOXAPARIN SODIUM 40 MG: 100 INJECTION SUBCUTANEOUS at 11:16

## 2024-09-21 RX ADMIN — LORAZEPAM 1 MG: 2 INJECTION INTRAMUSCULAR; INTRAVENOUS at 17:57

## 2024-09-21 RX ADMIN — SODIUM CHLORIDE, PRESERVATIVE FREE 10 ML: 5 INJECTION INTRAVENOUS at 21:08

## 2024-09-21 RX ADMIN — SODIUM CHLORIDE, PRESERVATIVE FREE 10 ML: 5 INJECTION INTRAVENOUS at 11:23

## 2024-09-21 RX ADMIN — LORAZEPAM 3 MG: 2 INJECTION INTRAMUSCULAR; INTRAVENOUS at 00:06

## 2024-09-21 RX ADMIN — LORAZEPAM 3 MG: 1 TABLET ORAL at 20:58

## 2024-09-21 RX ADMIN — PANTOPRAZOLE SODIUM 40 MG: 40 TABLET, DELAYED RELEASE ORAL at 05:24

## 2024-09-21 RX ADMIN — LORAZEPAM 3 MG: 2 INJECTION INTRAMUSCULAR; INTRAVENOUS at 03:34

## 2024-09-21 RX ADMIN — CHLORDIAZEPOXIDE HYDROCHLORIDE 50 MG: 25 CAPSULE ORAL at 14:43

## 2024-09-21 RX ADMIN — ACETAMINOPHEN 325MG 650 MG: 325 TABLET ORAL at 05:24

## 2024-09-21 RX ADMIN — METHADONE HYDROCHLORIDE 85 MG: 10 TABLET ORAL at 11:17

## 2024-09-21 RX ADMIN — TRAZODONE HYDROCHLORIDE 200 MG: 100 TABLET ORAL at 20:58

## 2024-09-21 RX ADMIN — FOLIC ACID 1 MG: 1 TABLET ORAL at 11:17

## 2024-09-21 RX ADMIN — Medication 100 MG: at 11:17

## 2024-09-21 RX ADMIN — PROPRANOLOL HYDROCHLORIDE 20 MG: 20 TABLET ORAL at 20:58

## 2024-09-21 RX ADMIN — SODIUM CHLORIDE 1000 ML: 9 INJECTION, SOLUTION INTRAVENOUS at 04:40

## 2024-09-21 RX ADMIN — CHLORDIAZEPOXIDE HYDROCHLORIDE 50 MG: 25 CAPSULE ORAL at 08:00

## 2024-09-21 RX ADMIN — LORAZEPAM 2 MG: 2 INJECTION INTRAMUSCULAR; INTRAVENOUS at 13:10

## 2024-09-21 RX ADMIN — LORAZEPAM 4 MG: 2 INJECTION INTRAMUSCULAR; INTRAVENOUS at 05:24

## 2024-09-21 RX ADMIN — CHLORDIAZEPOXIDE HYDROCHLORIDE 50 MG: 25 CAPSULE ORAL at 20:58

## 2024-09-21 RX ADMIN — SODIUM CHLORIDE: 9 INJECTION, SOLUTION INTRAVENOUS at 13:04

## 2024-09-21 RX ADMIN — SPIRONOLACTONE 50 MG: 25 TABLET ORAL at 11:17

## 2024-09-21 RX ADMIN — CHLORDIAZEPOXIDE HYDROCHLORIDE 50 MG: 25 CAPSULE ORAL at 03:12

## 2024-09-21 RX ADMIN — PROPRANOLOL HYDROCHLORIDE 20 MG: 20 TABLET ORAL at 14:25

## 2024-09-21 ASSESSMENT — PAIN DESCRIPTION - ORIENTATION: ORIENTATION: ANTERIOR

## 2024-09-21 ASSESSMENT — PAIN SCALES - GENERAL: PAINLEVEL_OUTOF10: 8

## 2024-09-21 ASSESSMENT — PAIN - FUNCTIONAL ASSESSMENT: PAIN_FUNCTIONAL_ASSESSMENT: ACTIVITIES ARE NOT PREVENTED

## 2024-09-21 ASSESSMENT — PAIN DESCRIPTION - DESCRIPTORS: DESCRIPTORS: THROBBING

## 2024-09-21 ASSESSMENT — PAIN DESCRIPTION - LOCATION: LOCATION: HEAD

## 2024-09-22 PROCEDURE — 94760 N-INVAS EAR/PLS OXIMETRY 1: CPT

## 2024-09-22 PROCEDURE — 2580000003 HC RX 258: Performed by: INTERNAL MEDICINE

## 2024-09-22 PROCEDURE — 6370000000 HC RX 637 (ALT 250 FOR IP): Performed by: REGISTERED NURSE

## 2024-09-22 PROCEDURE — 2060000000 HC ICU INTERMEDIATE R&B

## 2024-09-22 PROCEDURE — 6360000002 HC RX W HCPCS: Performed by: STUDENT IN AN ORGANIZED HEALTH CARE EDUCATION/TRAINING PROGRAM

## 2024-09-22 PROCEDURE — 6370000000 HC RX 637 (ALT 250 FOR IP): Performed by: INTERNAL MEDICINE

## 2024-09-22 PROCEDURE — 6370000000 HC RX 637 (ALT 250 FOR IP): Performed by: STUDENT IN AN ORGANIZED HEALTH CARE EDUCATION/TRAINING PROGRAM

## 2024-09-22 PROCEDURE — 6360000002 HC RX W HCPCS: Performed by: INTERNAL MEDICINE

## 2024-09-22 RX ADMIN — SPIRONOLACTONE 50 MG: 25 TABLET ORAL at 09:37

## 2024-09-22 RX ADMIN — PROPRANOLOL HYDROCHLORIDE 20 MG: 20 TABLET ORAL at 09:37

## 2024-09-22 RX ADMIN — CHLORDIAZEPOXIDE HYDROCHLORIDE 50 MG: 25 CAPSULE ORAL at 06:16

## 2024-09-22 RX ADMIN — PANTOPRAZOLE SODIUM 40 MG: 40 TABLET, DELAYED RELEASE ORAL at 06:17

## 2024-09-22 RX ADMIN — FOLIC ACID 1 MG: 1 TABLET ORAL at 09:37

## 2024-09-22 RX ADMIN — LORAZEPAM 2 MG: 2 INJECTION INTRAMUSCULAR; INTRAVENOUS at 13:47

## 2024-09-22 RX ADMIN — PROPRANOLOL HYDROCHLORIDE 20 MG: 20 TABLET ORAL at 13:47

## 2024-09-22 RX ADMIN — PROPRANOLOL HYDROCHLORIDE 20 MG: 20 TABLET ORAL at 22:01

## 2024-09-22 RX ADMIN — METHADONE HYDROCHLORIDE 85 MG: 10 TABLET ORAL at 09:37

## 2024-09-22 RX ADMIN — CHLORDIAZEPOXIDE HYDROCHLORIDE 50 MG: 25 CAPSULE ORAL at 13:47

## 2024-09-22 RX ADMIN — SODIUM CHLORIDE, PRESERVATIVE FREE 10 ML: 5 INJECTION INTRAVENOUS at 22:02

## 2024-09-22 RX ADMIN — LORAZEPAM 2 MG: 1 TABLET ORAL at 22:01

## 2024-09-22 RX ADMIN — PROCHLORPERAZINE EDISYLATE 10 MG: 5 INJECTION INTRAMUSCULAR; INTRAVENOUS at 01:22

## 2024-09-22 RX ADMIN — TRAZODONE HYDROCHLORIDE 200 MG: 100 TABLET ORAL at 22:01

## 2024-09-22 RX ADMIN — Medication 100 MG: at 09:37

## 2024-09-22 RX ADMIN — SODIUM CHLORIDE, PRESERVATIVE FREE 10 ML: 5 INJECTION INTRAVENOUS at 09:38

## 2024-09-22 ASSESSMENT — PAIN SCALES - GENERAL: PAINLEVEL_OUTOF10: 0

## 2024-09-23 VITALS
SYSTOLIC BLOOD PRESSURE: 114 MMHG | WEIGHT: 156.97 LBS | DIASTOLIC BLOOD PRESSURE: 75 MMHG | TEMPERATURE: 98.1 F | OXYGEN SATURATION: 93 % | BODY MASS INDEX: 23.79 KG/M2 | RESPIRATION RATE: 16 BRPM | HEIGHT: 68 IN | HEART RATE: 58 BPM

## 2024-09-23 PROCEDURE — 6370000000 HC RX 637 (ALT 250 FOR IP): Performed by: STUDENT IN AN ORGANIZED HEALTH CARE EDUCATION/TRAINING PROGRAM

## 2024-09-23 PROCEDURE — 94760 N-INVAS EAR/PLS OXIMETRY 1: CPT

## 2024-09-23 PROCEDURE — 2580000003 HC RX 258: Performed by: INTERNAL MEDICINE

## 2024-09-23 PROCEDURE — 6370000000 HC RX 637 (ALT 250 FOR IP): Performed by: INTERNAL MEDICINE

## 2024-09-23 PROCEDURE — 6370000000 HC RX 637 (ALT 250 FOR IP): Performed by: REGISTERED NURSE

## 2024-09-23 PROCEDURE — 6360000002 HC RX W HCPCS: Performed by: INTERNAL MEDICINE

## 2024-09-23 RX ORDER — ONDANSETRON 4 MG/1
4 TABLET, ORALLY DISINTEGRATING ORAL EVERY 8 HOURS PRN
Qty: 30 TABLET | Refills: 0 | Status: SHIPPED | OUTPATIENT
Start: 2024-09-23

## 2024-09-23 RX ORDER — CHLORDIAZEPOXIDE HYDROCHLORIDE 25 MG/1
CAPSULE, GELATIN COATED ORAL
Qty: 6 CAPSULE | Refills: 0 | Status: SHIPPED | OUTPATIENT
Start: 2024-09-23 | End: 2024-09-25

## 2024-09-23 RX ADMIN — ENOXAPARIN SODIUM 40 MG: 100 INJECTION SUBCUTANEOUS at 09:13

## 2024-09-23 RX ADMIN — SODIUM CHLORIDE, PRESERVATIVE FREE 10 ML: 5 INJECTION INTRAVENOUS at 09:18

## 2024-09-23 RX ADMIN — SPIRONOLACTONE 50 MG: 25 TABLET ORAL at 09:14

## 2024-09-23 RX ADMIN — FOLIC ACID 1 MG: 1 TABLET ORAL at 09:13

## 2024-09-23 RX ADMIN — Medication 100 MG: at 09:14

## 2024-09-23 RX ADMIN — METHADONE HYDROCHLORIDE 85 MG: 10 TABLET ORAL at 09:14

## 2024-09-23 RX ADMIN — PANTOPRAZOLE SODIUM 40 MG: 40 TABLET, DELAYED RELEASE ORAL at 05:56

## 2024-09-23 RX ADMIN — PROPRANOLOL HYDROCHLORIDE 20 MG: 20 TABLET ORAL at 09:14

## 2024-12-02 ENCOUNTER — HOSPITAL ENCOUNTER (EMERGENCY)
Age: 39
Discharge: HOME | End: 2024-12-02
Payer: COMMERCIAL

## 2024-12-02 VITALS
WEIGHT: 135 LBS | HEART RATE: 87 BPM | BODY MASS INDEX: 20.53 KG/M2 | OXYGEN SATURATION: 93 % | SYSTOLIC BLOOD PRESSURE: 108 MMHG | RESPIRATION RATE: 18 BRPM | TEMPERATURE: 98.3 F | DIASTOLIC BLOOD PRESSURE: 80 MMHG

## 2024-12-02 DIAGNOSIS — R20.2 BILATERAL LEG PARESTHESIA: Primary | ICD-10-CM

## 2024-12-02 LAB
ANION GAP SERPL CALCULATED.3IONS-SCNC: 12 MMOL/L (ref 3–16)
BACTERIA URNS QL MICRO: ABNORMAL /HPF
BASOPHILS # BLD: 0 K/UL (ref 0–0.2)
BASOPHILS NFR BLD: 0.4 %
BILIRUB UR QL STRIP.AUTO: NEGATIVE
BUN SERPL-MCNC: 8 MG/DL (ref 7–20)
CALCIUM SERPL-MCNC: 9.6 MG/DL (ref 8.3–10.6)
CHLORIDE SERPL-SCNC: 95 MMOL/L (ref 99–110)
CLARITY UR: CLEAR
CO2 SERPL-SCNC: 27 MMOL/L (ref 21–32)
COLOR UR: YELLOW
CREAT SERPL-MCNC: 0.7 MG/DL (ref 0.6–1.1)
DEPRECATED RDW RBC AUTO: 16.8 % (ref 12.4–15.4)
EOSINOPHIL # BLD: 0.2 K/UL (ref 0–0.6)
EOSINOPHIL NFR BLD: 1.9 %
EPI CELLS #/AREA URNS AUTO: 6 /HPF (ref 0–5)
ERYTHROCYTE [SEDIMENTATION RATE] IN BLOOD BY WESTERGREN METHOD: 56 MM/HR (ref 0–20)
GFR SERPLBLD CREATININE-BSD FMLA CKD-EPI: >90 ML/MIN/{1.73_M2}
GLUCOSE SERPL-MCNC: 75 MG/DL (ref 70–99)
GLUCOSE UR STRIP.AUTO-MCNC: NEGATIVE MG/DL
HCT VFR BLD AUTO: 36 % (ref 36–48)
HGB BLD-MCNC: 11.8 G/DL (ref 12–16)
HGB UR QL STRIP.AUTO: NEGATIVE
HYALINE CASTS #/AREA URNS AUTO: 0 /LPF (ref 0–8)
KETONES UR STRIP.AUTO-MCNC: NEGATIVE MG/DL
LEUKOCYTE ESTERASE UR QL STRIP.AUTO: ABNORMAL
LYMPHOCYTES # BLD: 2.9 K/UL (ref 1–5.1)
LYMPHOCYTES NFR BLD: 25 %
MCH RBC QN AUTO: 32.7 PG (ref 26–34)
MCHC RBC AUTO-ENTMCNC: 33 G/DL (ref 31–36)
MCV RBC AUTO: 99.2 FL (ref 80–100)
MONOCYTES # BLD: 0.8 K/UL (ref 0–1.3)
MONOCYTES NFR BLD: 7.2 %
NEUTROPHILS # BLD: 7.5 K/UL (ref 1.7–7.7)
NEUTROPHILS NFR BLD: 65.5 %
NITRITE UR QL STRIP.AUTO: NEGATIVE
PH UR STRIP.AUTO: 7.5 [PH] (ref 5–8)
PLATELET # BLD AUTO: 238 K/UL (ref 135–450)
PMV BLD AUTO: 9.2 FL (ref 5–10.5)
POTASSIUM SERPL-SCNC: 3.9 MMOL/L (ref 3.5–5.1)
PROT UR STRIP.AUTO-MCNC: NEGATIVE MG/DL
RBC # BLD AUTO: 3.62 M/UL (ref 4–5.2)
RBC CLUMPS #/AREA URNS AUTO: 0 /HPF (ref 0–4)
SODIUM SERPL-SCNC: 134 MMOL/L (ref 136–145)
SP GR UR STRIP.AUTO: 1.01 (ref 1–1.03)
UA COMPLETE W REFLEX CULTURE PNL UR: ABNORMAL
UA DIPSTICK W REFLEX MICRO PNL UR: YES
URN SPEC COLLECT METH UR: ABNORMAL
UROBILINOGEN UR STRIP-ACNC: 1 E.U./DL
WBC # BLD AUTO: 11.5 K/UL (ref 4–11)
WBC #/AREA URNS AUTO: 6 /HPF (ref 0–5)

## 2024-12-02 PROCEDURE — 81001 URINALYSIS AUTO W/SCOPE: CPT

## 2024-12-02 PROCEDURE — 6370000000 HC RX 637 (ALT 250 FOR IP): Performed by: PHYSICIAN ASSISTANT

## 2024-12-02 PROCEDURE — 85652 RBC SED RATE AUTOMATED: CPT

## 2024-12-02 PROCEDURE — 99283 EMERGENCY DEPT VISIT LOW MDM: CPT

## 2024-12-02 PROCEDURE — 85025 COMPLETE CBC W/AUTO DIFF WBC: CPT

## 2024-12-02 PROCEDURE — 80048 BASIC METABOLIC PNL TOTAL CA: CPT

## 2024-12-02 RX ORDER — METHYLPREDNISOLONE 4 MG/1
4 TABLET ORAL SEE ADMIN INSTRUCTIONS
Qty: 1 KIT | Refills: 0 | Status: SHIPPED | OUTPATIENT
Start: 2024-12-03 | End: 2024-12-09

## 2024-12-02 RX ORDER — PREDNISONE 20 MG/1
40 TABLET ORAL ONCE
Status: COMPLETED | OUTPATIENT
Start: 2024-12-02 | End: 2024-12-02

## 2024-12-02 RX ADMIN — PREDNISONE 40 MG: 20 TABLET ORAL at 13:11

## 2024-12-02 ASSESSMENT — PAIN DESCRIPTION - DESCRIPTORS: DESCRIPTORS: NUMBNESS;PINS AND NEEDLES

## 2024-12-02 ASSESSMENT — PAIN SCALES - GENERAL: PAINLEVEL_OUTOF10: 9

## 2024-12-02 ASSESSMENT — PAIN DESCRIPTION - LOCATION: LOCATION: LEG

## 2024-12-02 ASSESSMENT — PAIN - FUNCTIONAL ASSESSMENT
PAIN_FUNCTIONAL_ASSESSMENT: 0-10
PAIN_FUNCTIONAL_ASSESSMENT: NONE - DENIES PAIN

## 2024-12-02 ASSESSMENT — PAIN DESCRIPTION - PAIN TYPE: TYPE: ACUTE PAIN

## 2024-12-02 ASSESSMENT — PAIN DESCRIPTION - FREQUENCY: FREQUENCY: CONTINUOUS

## 2024-12-02 ASSESSMENT — PAIN DESCRIPTION - ORIENTATION: ORIENTATION: RIGHT;LEFT

## 2024-12-02 NOTE — ED NOTES
D/C: Order noted for d/c. Pt confirmed d/c paperwork has correct name. Discharge and education instructions reviewed with patient. Teach-back successful.  Pt verbalized understanding and denied questions at this time. No acute distress noted. Patient instructed to follow-up as noted - return to emergency department if symptoms worsen. Patient verbalized understanding. Discharged per EDMD with discharge instructions. Pt discharged to private vehicle. Patient stable upon departure. Thanked patient for [facility] for care. Provider aware of patient pain at time of discharge.

## 2024-12-02 NOTE — ED PROVIDER NOTES
[facility] EMERGENCY DEPARTMENT  EMERGENCY DEPARTMENT ENCOUNTER      Pt Name: Sakina Ballesteros  MRN: [id number]  Birthdate 1985  Date of evaluation: 12/2/2024  Provider: VALERY Bishop  PCP: Rafael Borden DO  Note Started: 4:57 PM EST     The ED Attending Physician was available for consultation but did not see or evaluate this patient.    CHIEF COMPLAINT       Chief Complaint   Patient presents with    Numbness     Pt presents to the ER with the complaint of bilateral leg numbness. Pt states she has a hx of back pain. Pt states she has pain and numbness from her hips to her ankles. Pt denies any recent back inj.       HISTORY OF PRESENT ILLNESS   (Location, Timing/Onset, Context/Setting, Quality, Duration, Modifying Factors, Severity, Associated Signs and Symptoms)  Note limiting factors.     Sakina Ballesteros is a 38 y.o. female who presents with complaint of numbness in both legs.  Says it has been going on now for a few weeks, with no changes, and it is in both legs but maybe a little worse than the left.  Says does not involve the feet, mostly just the area between the hips and the ankles.  She denies traumatic injury.  Says she has a history of scoliosis but was never treated for it, never wore a brace, does not have chronic back pain.  She denies any pain in either the back or the legs, says her only complaint is diminished sensation, often feeling like her legs are asleep.  Denies incontinence of bowels or bladder.  She says sometimes this affects her ambulation but she still is able to ambulate.  Denies any numbness in the pelvic region.  Denies IV drug use.    Nursing Notes were all reviewed and agreed with or any disagreements were addressed in the HPI.    REVIEW OF SYSTEMS    (2-9 systems for level 4, 10 or more for level 5)     Positives and pertinent negatives as per HPI.     PAST MEDICAL HISTORY     Past Medical History:   Diagnosis Date    Alcohol use disorder

## 2024-12-09 ENCOUNTER — APPOINTMENT (OUTPATIENT)
Dept: CT IMAGING | Age: 39
DRG: 254 | End: 2024-12-09
Payer: COMMERCIAL

## 2024-12-09 ENCOUNTER — HOSPITAL ENCOUNTER (INPATIENT)
Age: 39
LOS: 11 days | Discharge: HOME | DRG: 254 | End: 2024-12-20
Attending: EMERGENCY MEDICINE
Payer: COMMERCIAL

## 2024-12-09 ENCOUNTER — APPOINTMENT (OUTPATIENT)
Dept: MRI IMAGING | Age: 39
DRG: 254 | End: 2024-12-09
Payer: COMMERCIAL

## 2024-12-09 DIAGNOSIS — M48.061 NEUROFORAMINAL STENOSIS OF LUMBAR SPINE: ICD-10-CM

## 2024-12-09 DIAGNOSIS — R20.0 LEG NUMBNESS: Primary | ICD-10-CM

## 2024-12-09 DIAGNOSIS — K62.5 RECTAL BLEEDING: ICD-10-CM

## 2024-12-09 PROBLEM — K62.89 MASS OF PERIRECTAL SOFT TISSUE: Status: ACTIVE | Noted: 2024-12-09

## 2024-12-09 LAB
ALBUMIN SERPL-MCNC: 4.1 G/DL (ref 3.4–5)
ALBUMIN/GLOB SERPL: 1.2 {RATIO} (ref 1.1–2.2)
ALP SERPL-CCNC: 122 U/L (ref 40–129)
ALT SERPL-CCNC: 33 U/L (ref 10–40)
ANION GAP SERPL CALCULATED.3IONS-SCNC: 14 MMOL/L (ref 3–16)
AST SERPL-CCNC: 31 U/L (ref 15–37)
BACTERIA URNS QL MICRO: ABNORMAL /HPF
BASOPHILS # BLD: 0.2 K/UL (ref 0–0.2)
BASOPHILS NFR BLD: 0.9 %
BILIRUB SERPL-MCNC: 0.6 MG/DL (ref 0–1)
BILIRUB UR QL STRIP.AUTO: NEGATIVE
BUN SERPL-MCNC: 13 MG/DL (ref 7–20)
CALCIUM SERPL-MCNC: 9.3 MG/DL (ref 8.3–10.6)
CHLORIDE SERPL-SCNC: 99 MMOL/L (ref 99–110)
CLARITY UR: ABNORMAL
CO2 SERPL-SCNC: 25 MMOL/L (ref 21–32)
COLOR UR: YELLOW
CREAT SERPL-MCNC: 0.6 MG/DL (ref 0.6–1.1)
CRP SERPL-MCNC: <3 MG/L (ref 0–5.1)
DEPRECATED RDW RBC AUTO: 17.6 % (ref 12.4–15.4)
EOSINOPHIL # BLD: 0.2 K/UL (ref 0–0.6)
EOSINOPHIL NFR BLD: 1 %
EPI CELLS #/AREA URNS AUTO: 62 /HPF (ref 0–5)
ERYTHROCYTE [SEDIMENTATION RATE] IN BLOOD BY WESTERGREN METHOD: 51 MM/HR (ref 0–20)
FOLATE SERPL-MCNC: 8.19 NG/ML (ref 4.78–24.2)
GFR SERPLBLD CREATININE-BSD FMLA CKD-EPI: >90 ML/MIN/{1.73_M2}
GLUCOSE SERPL-MCNC: 119 MG/DL (ref 70–99)
GLUCOSE UR STRIP.AUTO-MCNC: NEGATIVE MG/DL
HCG SERPL QL: NEGATIVE
HCT VFR BLD AUTO: 40.4 % (ref 36–48)
HGB BLD-MCNC: 13.4 G/DL (ref 12–16)
HGB UR QL STRIP.AUTO: NEGATIVE
HYALINE CASTS #/AREA URNS AUTO: 0 /LPF (ref 0–8)
KETONES UR STRIP.AUTO-MCNC: NEGATIVE MG/DL
LEUKOCYTE ESTERASE UR QL STRIP.AUTO: ABNORMAL
LIPASE SERPL-CCNC: 13 U/L (ref 13–60)
LYMPHOCYTES # BLD: 4.2 K/UL (ref 1–5.1)
LYMPHOCYTES NFR BLD: 24.3 %
MCH RBC QN AUTO: 33 PG (ref 26–34)
MCHC RBC AUTO-ENTMCNC: 33.1 G/DL (ref 31–36)
MCV RBC AUTO: 99.5 FL (ref 80–100)
MONOCYTES # BLD: 1 K/UL (ref 0–1.3)
MONOCYTES NFR BLD: 5.8 %
NEUTROPHILS # BLD: 11.9 K/UL (ref 1.7–7.7)
NEUTROPHILS NFR BLD: 68 %
NITRITE UR QL STRIP.AUTO: NEGATIVE
PH UR STRIP.AUTO: 6 [PH] (ref 5–8)
PLATELET # BLD AUTO: 274 K/UL (ref 135–450)
PMV BLD AUTO: 8.6 FL (ref 5–10.5)
POTASSIUM SERPL-SCNC: 3.7 MMOL/L (ref 3.5–5.1)
PROT SERPL-MCNC: 7.6 G/DL (ref 6.4–8.2)
PROT UR STRIP.AUTO-MCNC: NEGATIVE MG/DL
RBC # BLD AUTO: 4.05 M/UL (ref 4–5.2)
RBC CLUMPS #/AREA URNS AUTO: 0 /HPF (ref 0–4)
REASON FOR REJECTION: NORMAL
REJECTED TEST: NORMAL
SODIUM SERPL-SCNC: 138 MMOL/L (ref 136–145)
SP GR UR STRIP.AUTO: 1.02 (ref 1–1.03)
UA COMPLETE W REFLEX CULTURE PNL UR: YES
UA DIPSTICK W REFLEX MICRO PNL UR: YES
URN SPEC COLLECT METH UR: ABNORMAL
UROBILINOGEN UR STRIP-ACNC: 1 E.U./DL
VIT B12 SERPL-MCNC: 257 PG/ML (ref 211–911)
WBC # BLD AUTO: 17.4 K/UL (ref 4–11)
WBC #/AREA URNS AUTO: 79 /HPF (ref 0–5)

## 2024-12-09 PROCEDURE — 85025 COMPLETE CBC W/AUTO DIFF WBC: CPT

## 2024-12-09 PROCEDURE — 96375 TX/PRO/DX INJ NEW DRUG ADDON: CPT

## 2024-12-09 PROCEDURE — 6360000004 HC RX CONTRAST MEDICATION: Performed by: PHYSICIAN ASSISTANT

## 2024-12-09 PROCEDURE — 99285 EMERGENCY DEPT VISIT HI MDM: CPT

## 2024-12-09 PROCEDURE — 82746 ASSAY OF FOLIC ACID SERUM: CPT

## 2024-12-09 PROCEDURE — 6370000000 HC RX 637 (ALT 250 FOR IP)

## 2024-12-09 PROCEDURE — 6370000000 HC RX 637 (ALT 250 FOR IP): Performed by: PHYSICIAN ASSISTANT

## 2024-12-09 PROCEDURE — 84703 CHORIONIC GONADOTROPIN ASSAY: CPT

## 2024-12-09 PROCEDURE — 81001 URINALYSIS AUTO W/SCOPE: CPT

## 2024-12-09 PROCEDURE — 87086 URINE CULTURE/COLONY COUNT: CPT

## 2024-12-09 PROCEDURE — 1200000000 HC SEMI PRIVATE

## 2024-12-09 PROCEDURE — 86140 C-REACTIVE PROTEIN: CPT

## 2024-12-09 PROCEDURE — 85652 RBC SED RATE AUTOMATED: CPT

## 2024-12-09 PROCEDURE — 83690 ASSAY OF LIPASE: CPT

## 2024-12-09 PROCEDURE — 6360000002 HC RX W HCPCS

## 2024-12-09 PROCEDURE — 72158 MRI LUMBAR SPINE W/O & W/DYE: CPT

## 2024-12-09 PROCEDURE — 96374 THER/PROPH/DIAG INJ IV PUSH: CPT

## 2024-12-09 PROCEDURE — 74174 CTA ABD&PLVS W/CONTRAST: CPT

## 2024-12-09 PROCEDURE — A9579 GAD-BASE MR CONTRAST NOS,1ML: HCPCS | Performed by: PHYSICIAN ASSISTANT

## 2024-12-09 PROCEDURE — 2500000003 HC RX 250 WO HCPCS: Performed by: PHYSICIAN ASSISTANT

## 2024-12-09 PROCEDURE — 72125 CT NECK SPINE W/O DYE: CPT

## 2024-12-09 PROCEDURE — 6360000002 HC RX W HCPCS: Performed by: PHYSICIAN ASSISTANT

## 2024-12-09 PROCEDURE — 2580000003 HC RX 258: Performed by: PHYSICIAN ASSISTANT

## 2024-12-09 PROCEDURE — 82607 VITAMIN B-12: CPT

## 2024-12-09 PROCEDURE — 70450 CT HEAD/BRAIN W/O DYE: CPT

## 2024-12-09 PROCEDURE — 6370000000 HC RX 637 (ALT 250 FOR IP): Performed by: INTERNAL MEDICINE

## 2024-12-09 PROCEDURE — 80053 COMPREHEN METABOLIC PANEL: CPT

## 2024-12-09 RX ORDER — POTASSIUM CHLORIDE 7.45 MG/ML
10 INJECTION INTRAVENOUS PRN
Status: DISCONTINUED | OUTPATIENT
Start: 2024-12-09 | End: 2024-12-20 | Stop reason: HOSPADM

## 2024-12-09 RX ORDER — SODIUM CHLORIDE 0.9 % (FLUSH) 0.9 %
5-40 SYRINGE (ML) INJECTION EVERY 12 HOURS SCHEDULED
Status: DISCONTINUED | OUTPATIENT
Start: 2024-12-09 | End: 2024-12-20 | Stop reason: HOSPADM

## 2024-12-09 RX ORDER — METHYLPREDNISOLONE SODIUM SUCCINATE 125 MG/2ML
80 INJECTION INTRAMUSCULAR; INTRAVENOUS ONCE
Status: COMPLETED | OUTPATIENT
Start: 2024-12-09 | End: 2024-12-09

## 2024-12-09 RX ORDER — ONDANSETRON 2 MG/ML
4 INJECTION INTRAMUSCULAR; INTRAVENOUS EVERY 6 HOURS PRN
Status: DISCONTINUED | OUTPATIENT
Start: 2024-12-09 | End: 2024-12-20 | Stop reason: HOSPADM

## 2024-12-09 RX ORDER — DIPHENHYDRAMINE HYDROCHLORIDE 50 MG/ML
25 INJECTION INTRAMUSCULAR; INTRAVENOUS ONCE
Status: COMPLETED | OUTPATIENT
Start: 2024-12-09 | End: 2024-12-09

## 2024-12-09 RX ORDER — SODIUM CHLORIDE 9 MG/ML
INJECTION, SOLUTION INTRAVENOUS PRN
Status: DISCONTINUED | OUTPATIENT
Start: 2024-12-09 | End: 2024-12-20 | Stop reason: HOSPADM

## 2024-12-09 RX ORDER — ENOXAPARIN SODIUM 100 MG/ML
40 INJECTION SUBCUTANEOUS DAILY
Status: DISCONTINUED | OUTPATIENT
Start: 2024-12-10 | End: 2024-12-20 | Stop reason: HOSPADM

## 2024-12-09 RX ORDER — ACETAMINOPHEN 650 MG/1
650 SUPPOSITORY RECTAL EVERY 6 HOURS PRN
Status: DISCONTINUED | OUTPATIENT
Start: 2024-12-09 | End: 2024-12-20 | Stop reason: HOSPADM

## 2024-12-09 RX ORDER — POLYETHYLENE GLYCOL 3350 17 G/17G
17 POWDER, FOR SOLUTION ORAL DAILY PRN
Status: DISCONTINUED | OUTPATIENT
Start: 2024-12-09 | End: 2024-12-20 | Stop reason: HOSPADM

## 2024-12-09 RX ORDER — IOPAMIDOL 755 MG/ML
75 INJECTION, SOLUTION INTRAVASCULAR
Status: COMPLETED | OUTPATIENT
Start: 2024-12-09 | End: 2024-12-09

## 2024-12-09 RX ORDER — PANTOPRAZOLE SODIUM 40 MG/1
40 TABLET, DELAYED RELEASE ORAL
Status: DISCONTINUED | OUTPATIENT
Start: 2024-12-10 | End: 2024-12-20 | Stop reason: HOSPADM

## 2024-12-09 RX ORDER — METHADONE HYDROCHLORIDE 40 MG/1
87 TABLET ORAL DAILY
Status: DISCONTINUED | OUTPATIENT
Start: 2024-12-09 | End: 2024-12-10

## 2024-12-09 RX ORDER — SPIRONOLACTONE 25 MG/1
50 TABLET ORAL DAILY
Status: DISCONTINUED | OUTPATIENT
Start: 2024-12-10 | End: 2024-12-10

## 2024-12-09 RX ORDER — SODIUM CHLORIDE 0.9 % (FLUSH) 0.9 %
5-40 SYRINGE (ML) INJECTION PRN
Status: DISCONTINUED | OUTPATIENT
Start: 2024-12-09 | End: 2024-12-20 | Stop reason: HOSPADM

## 2024-12-09 RX ORDER — ACETAMINOPHEN 325 MG/1
650 TABLET ORAL EVERY 6 HOURS PRN
Status: DISCONTINUED | OUTPATIENT
Start: 2024-12-09 | End: 2024-12-20 | Stop reason: HOSPADM

## 2024-12-09 RX ORDER — PROPRANOLOL HCL 20 MG
20 TABLET ORAL 3 TIMES DAILY
Status: DISCONTINUED | OUTPATIENT
Start: 2024-12-09 | End: 2024-12-10

## 2024-12-09 RX ORDER — METHADONE HYDROCHLORIDE 10 MG/1
85 TABLET ORAL ONCE
Status: COMPLETED | OUTPATIENT
Start: 2024-12-09 | End: 2024-12-09

## 2024-12-09 RX ORDER — FOLIC ACID 1 MG/1
1 TABLET ORAL DAILY
Status: DISCONTINUED | OUTPATIENT
Start: 2024-12-10 | End: 2024-12-20 | Stop reason: HOSPADM

## 2024-12-09 RX ORDER — ONDANSETRON 2 MG/ML
4 INJECTION INTRAMUSCULAR; INTRAVENOUS ONCE
Status: COMPLETED | OUTPATIENT
Start: 2024-12-09 | End: 2024-12-09

## 2024-12-09 RX ORDER — ONDANSETRON 4 MG/1
4 TABLET, ORALLY DISINTEGRATING ORAL EVERY 8 HOURS PRN
Status: DISCONTINUED | OUTPATIENT
Start: 2024-12-09 | End: 2024-12-20 | Stop reason: HOSPADM

## 2024-12-09 RX ORDER — LORAZEPAM 2 MG/ML
1 INJECTION INTRAMUSCULAR ONCE
Status: COMPLETED | OUTPATIENT
Start: 2024-12-09 | End: 2024-12-09

## 2024-12-09 RX ORDER — MAGNESIUM SULFATE IN WATER 40 MG/ML
2000 INJECTION, SOLUTION INTRAVENOUS PRN
Status: DISCONTINUED | OUTPATIENT
Start: 2024-12-09 | End: 2024-12-20 | Stop reason: HOSPADM

## 2024-12-09 RX ORDER — POTASSIUM CHLORIDE 1500 MG/1
40 TABLET, EXTENDED RELEASE ORAL PRN
Status: DISCONTINUED | OUTPATIENT
Start: 2024-12-09 | End: 2024-12-20 | Stop reason: HOSPADM

## 2024-12-09 RX ADMIN — METHADONE HYDROCHLORIDE 85 MG: 10 TABLET ORAL at 21:01

## 2024-12-09 RX ADMIN — SODIUM CHLORIDE, PRESERVATIVE FREE 20 MG: 5 INJECTION INTRAVENOUS at 13:49

## 2024-12-09 RX ADMIN — ACETAMINOPHEN 650 MG: 325 TABLET ORAL at 22:02

## 2024-12-09 RX ADMIN — WATER 1000 MG: 1 INJECTION INTRAMUSCULAR; INTRAVENOUS; SUBCUTANEOUS at 20:03

## 2024-12-09 RX ADMIN — ONDANSETRON 4 MG: 2 INJECTION INTRAMUSCULAR; INTRAVENOUS at 22:03

## 2024-12-09 RX ADMIN — METHYLPREDNISOLONE SODIUM SUCCINATE 80 MG: 125 INJECTION INTRAMUSCULAR; INTRAVENOUS at 13:47

## 2024-12-09 RX ADMIN — POLYETHYLENE GLYCOL-3350 AND ELECTROLYTES 4000 ML: 236; 6.74; 5.86; 2.97; 22.74 POWDER, FOR SOLUTION ORAL at 21:17

## 2024-12-09 RX ADMIN — ONDANSETRON 4 MG: 2 INJECTION INTRAMUSCULAR; INTRAVENOUS at 13:45

## 2024-12-09 RX ADMIN — LORAZEPAM 1 MG: 2 INJECTION INTRAMUSCULAR; INTRAVENOUS at 13:58

## 2024-12-09 RX ADMIN — IOPAMIDOL 75 ML: 755 INJECTION, SOLUTION INTRAVENOUS at 15:20

## 2024-12-09 RX ADMIN — DIPHENHYDRAMINE HYDROCHLORIDE 25 MG: 50 INJECTION INTRAMUSCULAR; INTRAVENOUS at 15:00

## 2024-12-09 RX ADMIN — GADOTERIDOL 11 ML: 279.3 INJECTION, SOLUTION INTRAVENOUS at 14:30

## 2024-12-09 ASSESSMENT — PAIN - FUNCTIONAL ASSESSMENT: PAIN_FUNCTIONAL_ASSESSMENT: NONE - DENIES PAIN

## 2024-12-09 ASSESSMENT — PAIN SCALES - GENERAL
PAINLEVEL_OUTOF10: 10
PAINLEVEL_OUTOF10: 9

## 2024-12-09 ASSESSMENT — LIFESTYLE VARIABLES
HOW MANY STANDARD DRINKS CONTAINING ALCOHOL DO YOU HAVE ON A TYPICAL DAY: 1 OR 2
HOW OFTEN DO YOU HAVE A DRINK CONTAINING ALCOHOL: MONTHLY OR LESS
HOW OFTEN DO YOU HAVE A DRINK CONTAINING ALCOHOL: NEVER
HOW MANY STANDARD DRINKS CONTAINING ALCOHOL DO YOU HAVE ON A TYPICAL DAY: PATIENT DOES NOT DRINK

## 2024-12-09 ASSESSMENT — PAIN DESCRIPTION - LOCATION: LOCATION: BACK;SHOULDER

## 2024-12-09 NOTE — ED PROVIDER NOTES
which may can for the patient's GI bleed.  Correlation with   physical exam findings may be helpful.  This may related to underlying rectal   trauma or ulcer.      Moderate volume fecal residuals in the colon.      Cholelithiasis.      Hepatomegaly and hepatic steatosis..      Dilatation of the common bile duct and pancreatic duct.  Nonemergent further   evaluation with repeat MRI of the abdomen may be helpful.      Somewhat prominent vascular disease for the patient's age.      Additional findings noted above.         CT CERVICAL SPINE WO CONTRAST   Final Result   No acute intracranial abnormality.      No acute osseous abnormality of the cervical spine.         MRI LUMBAR SPINE W WO CONTRAST   Preliminary Result   1. No findings to suggest acute discitis/osteomyelitis or septic facet   arthropathy.   2. Severe left and mild right neural foraminal narrowing at L5-S1.   3. No significant spinal canal stenosis.           CT HEAD WO CONTRAST    Result Date: 12/9/2024  EXAMINATION: CT OF THE CERVICAL SPINE WITHOUT CONTRAST; CT OF THE HEAD WITHOUT CONTRAST 12/9/2024 3:08 pm TECHNIQUE: CT of the cervical spine was performed without the administration of intravenous contrast. Multiplanar reformatted images are provided for review. Automated exposure control, iterative reconstruction, and/or weight based adjustment of the mA/kV was utilized to reduce the radiation dose to as low as reasonably achievable.; CT of the head was performed without the administration of intravenous contrast. Automated exposure control, iterative reconstruction, and/or weight based adjustment of the mA/kV was utilized to reduce the radiation dose to as low as reasonably achievable. COMPARISON: None. HISTORY: ORDERING SYSTEM PROVIDED HISTORY: fall TECHNOLOGIST PROVIDED HISTORY: Reason for exam:->fall Decision Support Exception - unselect if not a suspected or confirmed emergency medical condition->Emergency Medical Condition (MA) Reason for Exam:  performed and she did have some scant bright red blood.  There was no signs of infectious process.  GI was consulted and they will do a colonoscopy/sigmoidoscopy tomorrow.  Prep was ordered by Dr. Hood      Her methadone was ordered for her.  Rocephin for possible UTI.  Ativan ordered for MRI  Initially was admitted to hospitalist, Dr. Mitchell, however he wanted me to consult neurosurgery prior to admission to ensure that there was no need for transfer.  This was done    Additionally a MRI with and without contrast was performed with her history of IVDU and symptoms.  She has severe left and mild right neuroforaminal narrowing at L5-S1.  There is no findings of discitis/osteomyelitis.    I did consult neurosurgery at [facility].  I spoke with DRE NP.  She then spoke with her NP about patient's symptoms.  They do not believe patient needs transfer over to [facility] for further evaluation and can be managed at [facility].  Discussed that based on MRI findings this is likely not the source of her explained numbness.   At the time hospitalist was consulted and patient was admitted.       Disposition Considerations (include 1 Tests not done, Shared Decision Making, Pt Expectation of Test or Tx.): Considered MRI cervical, thoracic spine in addition to the lumbar spine.  However she was only having some pins and needle sensation in her fourth and fifth digit of her left upper extremity in a ulnar nerve distribution.  She had no weakness of the upper extremities and therefore we will hold off and defer to hospitalist team        I am the Primary Clinician of Record.    FINAL IMPRESSION      1. Leg numbness    2. Neuroforaminal stenosis of lumbar spine    3. Rectal bleeding          DISPOSITION/PLAN     DISPOSITION Admitted 12/09/2024 09:49:18 PM               PATIENT REFERRED TO:  No follow-up provider specified.    DISCHARGE MEDICATIONS:  Current Discharge Medication List          DISCONTINUED

## 2024-12-10 ENCOUNTER — ANESTHESIA EVENT (OUTPATIENT)
Dept: ENDOSCOPY | Age: 39
DRG: 254 | End: 2024-12-10
Payer: COMMERCIAL

## 2024-12-10 ENCOUNTER — ANESTHESIA (OUTPATIENT)
Dept: ENDOSCOPY | Age: 39
DRG: 254 | End: 2024-12-10
Payer: COMMERCIAL

## 2024-12-10 LAB
ALBUMIN SERPL-MCNC: 4.1 G/DL (ref 3.4–5)
ALBUMIN/GLOB SERPL: 1.3 {RATIO} (ref 1.1–2.2)
ALP SERPL-CCNC: 110 U/L (ref 40–129)
ALT SERPL-CCNC: 34 U/L (ref 10–40)
AMPHETAMINES UR QL SCN>1000 NG/ML: POSITIVE
ANION GAP SERPL CALCULATED.3IONS-SCNC: 9 MMOL/L (ref 3–16)
AST SERPL-CCNC: 31 U/L (ref 15–37)
BACTERIA UR CULT: NORMAL
BARBITURATES UR QL SCN>200 NG/ML: POSITIVE
BASOPHILS # BLD: 0.1 K/UL (ref 0–0.2)
BASOPHILS NFR BLD: 0.4 %
BENZODIAZ UR QL SCN>200 NG/ML: POSITIVE
BILIRUB SERPL-MCNC: 0.8 MG/DL (ref 0–1)
BUN SERPL-MCNC: 15 MG/DL (ref 7–20)
CALCIUM SERPL-MCNC: 9.1 MG/DL (ref 8.3–10.6)
CANNABINOIDS UR QL SCN>50 NG/ML: POSITIVE
CHLORIDE SERPL-SCNC: 95 MMOL/L (ref 99–110)
CO2 SERPL-SCNC: 29 MMOL/L (ref 21–32)
COCAINE UR QL SCN: ABNORMAL
CREAT SERPL-MCNC: 0.6 MG/DL (ref 0.6–1.1)
DEPRECATED RDW RBC AUTO: 16.5 % (ref 12.4–15.4)
DRUG SCREEN COMMENT UR-IMP: ABNORMAL
EOSINOPHIL # BLD: 0.1 K/UL (ref 0–0.6)
EOSINOPHIL NFR BLD: 0.5 %
FENTANYL SCREEN, URINE: ABNORMAL
GFR SERPLBLD CREATININE-BSD FMLA CKD-EPI: >90 ML/MIN/{1.73_M2}
GLUCOSE SERPL-MCNC: 86 MG/DL (ref 70–99)
HCG UR QL: NEGATIVE
HCT VFR BLD AUTO: 33.4 % (ref 36–48)
HCT VFR BLD AUTO: 36.3 % (ref 36–48)
HCT VFR BLD AUTO: 37.2 % (ref 36–48)
HCT VFR BLD AUTO: 38.1 % (ref 36–48)
HGB BLD-MCNC: 11.2 G/DL (ref 12–16)
HGB BLD-MCNC: 12 G/DL (ref 12–16)
HGB BLD-MCNC: 12 G/DL (ref 12–16)
HGB BLD-MCNC: 12.3 G/DL (ref 12–16)
LYMPHOCYTES # BLD: 2.1 K/UL (ref 1–5.1)
LYMPHOCYTES NFR BLD: 14.9 %
MCH RBC QN AUTO: 33.1 PG (ref 26–34)
MCHC RBC AUTO-ENTMCNC: 33.2 G/DL (ref 31–36)
MCV RBC AUTO: 99.6 FL (ref 80–100)
METHADONE UR QL SCN>300 NG/ML: POSITIVE
MONOCYTES # BLD: 0.8 K/UL (ref 0–1.3)
MONOCYTES NFR BLD: 5.8 %
NEUTROPHILS # BLD: 11.2 K/UL (ref 1.7–7.7)
NEUTROPHILS NFR BLD: 78.4 %
OPIATES UR QL SCN>300 NG/ML: POSITIVE
OXYCODONE UR QL SCN: ABNORMAL
PCP UR QL SCN>25 NG/ML: ABNORMAL
PH UR STRIP: 5 [PH]
PLATELET # BLD AUTO: 200 K/UL (ref 135–450)
PMV BLD AUTO: 8.9 FL (ref 5–10.5)
POTASSIUM SERPL-SCNC: 4 MMOL/L (ref 3.5–5.1)
PROT SERPL-MCNC: 7.3 G/DL (ref 6.4–8.2)
RBC # BLD AUTO: 3.64 M/UL (ref 4–5.2)
SODIUM SERPL-SCNC: 133 MMOL/L (ref 136–145)
WBC # BLD AUTO: 14.2 K/UL (ref 4–11)

## 2024-12-10 PROCEDURE — 6370000000 HC RX 637 (ALT 250 FOR IP): Performed by: REGISTERED NURSE

## 2024-12-10 PROCEDURE — 6360000002 HC RX W HCPCS: Performed by: REGISTERED NURSE

## 2024-12-10 PROCEDURE — 1200000000 HC SEMI PRIVATE

## 2024-12-10 PROCEDURE — 80053 COMPREHEN METABOLIC PANEL: CPT

## 2024-12-10 PROCEDURE — 6370000000 HC RX 637 (ALT 250 FOR IP)

## 2024-12-10 PROCEDURE — 84703 CHORIONIC GONADOTROPIN ASSAY: CPT

## 2024-12-10 PROCEDURE — 6360000002 HC RX W HCPCS

## 2024-12-10 PROCEDURE — 85014 HEMATOCRIT: CPT

## 2024-12-10 PROCEDURE — 94760 N-INVAS EAR/PLS OXIMETRY 1: CPT

## 2024-12-10 PROCEDURE — 3609008300 HC SIGMOIDOSCOPY W/BIOPSY SINGLE/MULTIPLE: Performed by: INTERNAL MEDICINE

## 2024-12-10 PROCEDURE — 0DJD8ZZ INSPECTION OF LOWER INTESTINAL TRACT, VIA NATURAL OR ARTIFICIAL OPENING ENDOSCOPIC: ICD-10-PCS | Performed by: INTERNAL MEDICINE

## 2024-12-10 PROCEDURE — 36415 COLL VENOUS BLD VENIPUNCTURE: CPT

## 2024-12-10 PROCEDURE — 2580000003 HC RX 258

## 2024-12-10 PROCEDURE — 85018 HEMOGLOBIN: CPT

## 2024-12-10 PROCEDURE — 85025 COMPLETE CBC W/AUTO DIFF WBC: CPT

## 2024-12-10 PROCEDURE — 2709999900 HC NON-CHARGEABLE SUPPLY: Performed by: INTERNAL MEDICINE

## 2024-12-10 PROCEDURE — 80307 DRUG TEST PRSMV CHEM ANLYZR: CPT

## 2024-12-10 RX ORDER — TRAZODONE HYDROCHLORIDE 50 MG/1
50 TABLET, FILM COATED ORAL ONCE
Status: COMPLETED | OUTPATIENT
Start: 2024-12-10 | End: 2024-12-10

## 2024-12-10 RX ORDER — KETOROLAC TROMETHAMINE 30 MG/ML
30 INJECTION, SOLUTION INTRAMUSCULAR; INTRAVENOUS ONCE
Status: COMPLETED | OUTPATIENT
Start: 2024-12-10 | End: 2024-12-10

## 2024-12-10 RX ORDER — PHENOBARBITAL 32.4 MG/1
32.4 TABLET ORAL 2 TIMES DAILY
Status: COMPLETED | OUTPATIENT
Start: 2024-12-11 | End: 2024-12-11

## 2024-12-10 RX ORDER — LORAZEPAM 0.5 MG/1
0.5 TABLET ORAL ONCE
Status: COMPLETED | OUTPATIENT
Start: 2024-12-11 | End: 2024-12-11

## 2024-12-10 RX ORDER — PHENOBARBITAL 32.4 MG/1
16.2 TABLET ORAL EVERY 6 HOURS PRN
Status: ACTIVE | OUTPATIENT
Start: 2024-12-12 | End: 2024-12-13

## 2024-12-10 RX ORDER — DIPHENHYDRAMINE HCL 25 MG
25 TABLET ORAL ONCE
Status: DISCONTINUED | OUTPATIENT
Start: 2024-12-10 | End: 2024-12-10

## 2024-12-10 RX ORDER — UBIDECARENONE 75 MG
50 CAPSULE ORAL DAILY
Status: DISCONTINUED | OUTPATIENT
Start: 2024-12-10 | End: 2024-12-20 | Stop reason: HOSPADM

## 2024-12-10 RX ORDER — PHENOBARBITAL 32.4 MG/1
16.2 TABLET ORAL 2 TIMES DAILY
Status: COMPLETED | OUTPATIENT
Start: 2024-12-12 | End: 2024-12-12

## 2024-12-10 RX ORDER — PHENOBARBITAL 32.4 MG/1
32.4 TABLET ORAL EVERY 6 HOURS PRN
Status: DISPENSED | OUTPATIENT
Start: 2024-12-10 | End: 2024-12-12

## 2024-12-10 RX ORDER — DIPHENHYDRAMINE HCL 25 MG
25 TABLET ORAL ONCE
Status: COMPLETED | OUTPATIENT
Start: 2024-12-11 | End: 2024-12-11

## 2024-12-10 RX ORDER — MORPHINE SULFATE 2 MG/ML
2 INJECTION, SOLUTION INTRAMUSCULAR; INTRAVENOUS ONCE
Status: COMPLETED | OUTPATIENT
Start: 2024-12-10 | End: 2024-12-10

## 2024-12-10 RX ORDER — KETOROLAC TROMETHAMINE 30 MG/ML
30 INJECTION, SOLUTION INTRAMUSCULAR; INTRAVENOUS EVERY 6 HOURS PRN
Status: DISPENSED | OUTPATIENT
Start: 2024-12-10 | End: 2024-12-15

## 2024-12-10 RX ORDER — LORAZEPAM 0.5 MG/1
0.5 TABLET ORAL ONCE
Status: DISCONTINUED | OUTPATIENT
Start: 2024-12-10 | End: 2024-12-10

## 2024-12-10 RX ORDER — PHENOBARBITAL 32.4 MG/1
32.4 TABLET ORAL 4 TIMES DAILY
Status: DISPENSED | OUTPATIENT
Start: 2024-12-10 | End: 2024-12-11

## 2024-12-10 RX ADMIN — PHENOBARBITAL 32.4 MG: 32.4 TABLET ORAL at 13:54

## 2024-12-10 RX ADMIN — AMITRIPTYLINE HYDROCHLORIDE 25 MG: 25 TABLET ORAL at 23:41

## 2024-12-10 RX ADMIN — MORPHINE SULFATE 2 MG: 2 INJECTION, SOLUTION INTRAMUSCULAR; INTRAVENOUS at 05:09

## 2024-12-10 RX ADMIN — PHENOBARBITAL 32.4 MG: 32.4 TABLET ORAL at 06:29

## 2024-12-10 RX ADMIN — ACETAMINOPHEN 650 MG: 325 TABLET ORAL at 11:34

## 2024-12-10 RX ADMIN — WATER 1000 MG: 1 INJECTION INTRAMUSCULAR; INTRAVENOUS; SUBCUTANEOUS at 16:37

## 2024-12-10 RX ADMIN — FOLIC ACID 1 MG: 1 TABLET ORAL at 13:54

## 2024-12-10 RX ADMIN — TRAZODONE HYDROCHLORIDE 50 MG: 50 TABLET ORAL at 02:59

## 2024-12-10 RX ADMIN — SODIUM CHLORIDE, PRESERVATIVE FREE 10 ML: 5 INJECTION INTRAVENOUS at 03:11

## 2024-12-10 RX ADMIN — METHADONE HYDROCHLORIDE 85 MG: 10 TABLET ORAL at 09:20

## 2024-12-10 RX ADMIN — PHENOBARBITAL 32.4 MG: 32.4 TABLET ORAL at 16:37

## 2024-12-10 RX ADMIN — Medication 50 MCG: at 13:54

## 2024-12-10 RX ADMIN — PANTOPRAZOLE SODIUM 40 MG: 40 TABLET, DELAYED RELEASE ORAL at 16:37

## 2024-12-10 RX ADMIN — KETOROLAC TROMETHAMINE 30 MG: 30 INJECTION, SOLUTION INTRAMUSCULAR at 16:37

## 2024-12-10 RX ADMIN — PHENOBARBITAL 32.4 MG: 32.4 TABLET ORAL at 22:19

## 2024-12-10 RX ADMIN — SODIUM CHLORIDE, PRESERVATIVE FREE 10 ML: 5 INJECTION INTRAVENOUS at 22:23

## 2024-12-10 RX ADMIN — KETOROLAC TROMETHAMINE 30 MG: 30 INJECTION, SOLUTION INTRAMUSCULAR at 01:45

## 2024-12-10 ASSESSMENT — PAIN DESCRIPTION - DESCRIPTORS
DESCRIPTORS: ACHING

## 2024-12-10 ASSESSMENT — PAIN SCALES - GENERAL
PAINLEVEL_OUTOF10: 7
PAINLEVEL_OUTOF10: 3
PAINLEVEL_OUTOF10: 7
PAINLEVEL_OUTOF10: 0
PAINLEVEL_OUTOF10: 10

## 2024-12-10 ASSESSMENT — PAIN DESCRIPTION - LOCATION
LOCATION: BACK
LOCATION: BACK
LOCATION: GENERALIZED;ABDOMEN
LOCATION: GENERALIZED

## 2024-12-10 ASSESSMENT — PAIN DESCRIPTION - ORIENTATION
ORIENTATION: UPPER
ORIENTATION: UPPER

## 2024-12-10 ASSESSMENT — PAIN - FUNCTIONAL ASSESSMENT: PAIN_FUNCTIONAL_ASSESSMENT: NONE - DENIES PAIN

## 2024-12-10 NOTE — PROGRESS NOTES
4 Eyes Skin Assessment     NAME:  Sakina Ballesteros  YOB: 1985  MEDICAL RECORD NUMBER:  [id number]    The patient is being assessed for  Admission    I agree that at least one RN has performed a thorough Head to Toe Skin Assessment on the patient. ALL assessment sites listed below have been assessed.      Areas assessed by both nurses:    Head, Face, Ears, Shoulders, Back, Chest, Arms, Elbows, Hands, Sacrum. Buttock, Coccyx, Ischium, and Legs. Feet and Heels        Does the Patient have a Wound? Yes wound(s) were present on assessment. LDA wound assessment was Initiated and completed by RN       Ortiz Prevention initiated by RN: No  Wound Care Orders initiated by RN: No    Pressure Injury (Stage 3,4, Unstageable, DTI, NWPT, and Complex wounds) if present, place Wound referral order by RN under : No    New Ostomies, if present place, Ostomy referral order under : No     Nurse 1 eSignature: Electronically signed by Krystina Chaves RN on 12/9/24 at 11:31 PM EST    **SHARE this note so that the co-signing nurse can place an eSignature**    Nurse 2 eSignature: Electronically signed by Deborah Maciel RN on 12/10/24 at 3:26 AM EST

## 2024-12-10 NOTE — ED PROVIDER NOTES
EMERGENCY DEPARTMENT SUPERVISING PHYSICIAN NOTE    I have seen this patient & have reviewed history and findings with the PA, NP, or resident physician and provided direct supervision. I saw the patient and performed a substantive portion of the visit. I was present for key portions of any procedures performed. I've participated in medical management, monitoring, and treatment of this patient with the provider. I have reviewed currently available documentation, test results, and laboratory results in the interim. Care plan has been developed collaboratively. I take responsibility for the patient's management from when I was asked to get involved in this patient's care. Nina and TRI are the primary clinicians of record.    Brief HPI:  38F reports that she has had worsening numbness to both of her lower extremities for weeks  Notes that she has been falling very frequently during this time  Also adds that she has been quite constipated as of late  Had scant output of small, firm balls of stool earlier today  Had scant, bright red rectal bleeding during that time    Pertinent Exam Findings:  Awake, alert, not acutely ill-appearing, not distressed  Moving all extremities well  No tachycardia or hypotension    Results for orders placed or performed during the hospital encounter of 12/09/24   CBC with Auto Differential   Result Value Ref Range    WBC 17.4 (H) 4.0 - 11.0 K/uL    RBC 4.05 4.00 - 5.20 M/uL    Hemoglobin 13.4 12.0 - 16.0 g/dL    Hematocrit 40.4 36.0 - 48.0 %    MCV 99.5 80.0 - 100.0 fL    MCH 33.0 26.0 - 34.0 pg    MCHC 33.1 31.0 - 36.0 g/dL    RDW 17.6 (H) 12.4 - 15.4 %    Platelets 274 135 - 450 K/uL    MPV 8.6 5.0 - 10.5 fL    Neutrophils % 68.0 %    Lymphocytes % 24.3 %    Monocytes % 5.8 %    Eosinophils % 1.0 %    Basophils % 0.9 %    Neutrophils Absolute 11.9 (H) 1.7 - 7.7 K/uL    Lymphocytes Absolute 4.2 1.0 - 5.1 K/uL    Monocytes Absolute 1.0 0.0 - 1.3 K/uL    Eosinophils Absolute 0.2 0.0 - 0.6

## 2024-12-10 NOTE — PLAN OF CARE
Problem: Discharge Planning  Goal: Discharge to home or other facility with appropriate resources  Outcome: Progressing  Flowsheets (Taken 12/9/2024 8054)  Discharge to home or other facility with appropriate resources: Identify barriers to discharge with patient and caregiver     Problem: Safety - Adult  Goal: Free from fall injury  Outcome: Progressing     Problem: Pain  Goal: Verbalizes/displays adequate comfort level or baseline comfort level  Outcome: Progressing
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
## 2024-12-10 NOTE — PROGRESS NOTES
Newly admitted patient from ED, alert and oriented X4. Complaining of upper back pain 8/10. Verbalized numbness on BUE and BLE. Instructed patient to call nurse before getting out of bed. Fall preventive measures promoted.Dianna at bedside.      Electronically signed by Krystina Chaves RN on 12/9/2024 at 11:33 PM

## 2024-12-10 NOTE — OP NOTE
Flexible Sigmoidoscopy Note      Patient: Sakina Ballesteros  : 1985  Acct#:     Procedure: Flexible Sigmoidoscopy to 30 cm    Date:  12/10/2024    Surgeon:  Jose Macario MD    Referring Physician: None    Preoperative Diagnosis:  1. Rectal bleeding     Postoperative Diagnosis:  1. Internal hemorrhoids     Anesthesia:  None    Consent:  The patient or their legal guardian has signed a consent, and is aware of the potential risks, benefits, alternatives, and potential complications of this procedure.  These include, but are not limited to hemorrhage, bleeding, post procedural pain, perforation, phlebitis, aspiration, hypotension, hypoxia, cardiovascular events such as arryhthmia, and possibly death.  Additionally, the possibility of missed colonic polyps and interval colon cancer was discussed in the consent.    Procedure:   An informed consent was obtained from the patient after explanation of indications, benefits, possible risks and complications of the procedure.  The patient was then taken to the endoscopy suite, placed in the left lateral decubitus position, and the above IV anesthesia was administered.    A digital rectal examination was performed and revealed negative without mass, lesions or tenderness, internal hemorrhoids noted.  No fissures or abscess present.     The Olympus video colonoscope was placed in the patient's rectum under digital direction and advanced 25 cm from the anal verge. The prep was good.    The scope was then withdrawn back through the sigmoid colons and rectum.  Carefull circumferential examination of the mucosa in these areas demonstrated normal colonic mucosa throughout.   The scope was then withdrawn into the rectum and retroflexed.  The retroflexed view of the anal verge and rectum demonstrates internal hemorrhoids. No blood present in the colon. The scope was straightened, the colon was decompressed and the scope was withdrawn from the patient.  The patient

## 2024-12-10 NOTE — PROGRESS NOTES
Pharmacy Medication Reconciliation Note     List of medications patient is currently taking is complete.    Source of information:   1. Called [facility]  2. Spoke with pt at bedside.  3. Fill hx      Notes regarding home medications:   1. Methadone dose is 87 mg daily. Last dosed on 12/8/24  2. Does not take Propranolol, Spironolactone, Folic Acid or pantoprazole.Has not been seen by her PCP in a few months, notes indicate many no-shows.     Dr Leone was Perfect Served with med hx trena Romero RPH   12/10/2024  8:15 AM
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
## 2024-12-10 NOTE — H&P
are intact. No significant central canal stenosis identified SOFT TISSUES: There is no prevertebral soft tissue swelling.     No acute intracranial abnormality. No acute osseous abnormality of the cervical spine.     CT CERVICAL SPINE WO CONTRAST    Result Date: 12/9/2024  EXAMINATION: CT OF THE CERVICAL SPINE WITHOUT CONTRAST; CT OF THE HEAD WITHOUT CONTRAST 12/9/2024 3:08 pm TECHNIQUE: CT of the cervical spine was performed without the administration of intravenous contrast. Multiplanar reformatted images are provided for review. Automated exposure control, iterative reconstruction, and/or weight based adjustment of the mA/kV was utilized to reduce the radiation dose to as low as reasonably achievable.; CT of the head was performed without the administration of intravenous contrast. Automated exposure control, iterative reconstruction, and/or weight based adjustment of the mA/kV was utilized to reduce the radiation dose to as low as reasonably achievable. COMPARISON: None. HISTORY: ORDERING SYSTEM PROVIDED HISTORY: fall TECHNOLOGIST PROVIDED HISTORY: Reason for exam:->fall Decision Support Exception - unselect if not a suspected or confirmed emergency medical condition->Emergency Medical Condition (MA) Reason for Exam: freq falls, trouble with legs, seizure, constipation; ORDERING SYSTEM PROVIDED HISTORY: freq falls TECHNOLOGIST PROVIDED HISTORY: Reason for exam:->freq falls Has a \"code stroke\" or \"stroke alert\" been called?->No Decision Support Exception - unselect if not a suspected or confirmed emergency medical condition->Emergency Medical Condition (MA) Reason for Exam: freq falls, trouble with legs, seizure, constipation FINDINGS: BRAIN/VENTRICLES: There is no acute intracranial hemorrhage, mass effect or midline shift.  No abnormal extra-axial fluid collection.  The gray-white differentiation is maintained without evidence of an acute infarct.  There is no evidence of hydrocephalus. ORBITS: The visualized  identified. L1-L2: There is no disc bulge or protrusion present.  There is no significant spinal canal stenosis or neural foraminal narrowing present. L2-L3: There is no disc bulge or protrusion present.  There is no significant spinal canal stenosis or neural foraminal narrowing present. L3-L4: There is no disc bulge or protrusion present.  There is no significant spinal canal stenosis or neural foraminal narrowing present. L4-L5: There is no disc bulge or protrusion present.  There is no significant spinal canal stenosis or neural foraminal narrowing present.  There is bilateral facet arthropathy. L5-S1: A disc bulge, facet arthropathy and posterior osteophyte formation results in severe left and mild right neural foraminal narrowing.  No significant spinal canal stenosis.     1. No findings to suggest acute discitis/osteomyelitis or septic facet arthropathy. 2. Severe left and mild right neural foraminal narrowing at L5-S1. 3. No significant spinal canal stenosis.         Electronically signed by Naima Engle MD on 12/9/2024 at 9:49 PM

## 2024-12-10 NOTE — CARE COORDINATION
Case Management Assessment  Initial Evaluation    Date/Time of Evaluation: 12/10/2024 11:53 AM  Assessment Completed by: Pretty Handley RN    If patient is discharged prior to next notation, then this note serves as note for discharge by case management.    Patient Name: Sakina Ballesteros                     YOB: 1985  Diagnosis: Mass of perirectal soft tissue [K62.89]                     Date / Time: 12/9/2024 12:36 PM    Patient Admission Status: Inpatient   Readmission Risk (Low < 19, Mod (19-27), High > 27): Readmission Risk Score: 15.8    Current PCP: Rafael Borden, DO  PCP verified by CM? Yes    Chart Reviewed: Yes      History Provided by: Patient  Patient Orientation: Alert and Oriented    Patient Cognition: Alert    Hospitalization in the last 30 days (Readmission):  No    If yes, Readmission Assessment in CM Navigator will be completed.    Advance Directives:      Code Status: Full Code   Patient's Primary Decision Maker is: Legal Next of Kin    Primary Decision Maker: Carmen stubbs - [place] - [phone]    Discharge Planning:    Patient lives with: (P) Family Members, Children Type of Home: (P) House (8 steps to enter)  Primary Care Giver: Self  Patient Support Systems include: Children, Family Members   Current Financial resources: (P) Medicaid  Current community resources: (P) [facility] ([place] for [facility]adone)  Current services prior to admission: (P) None            Current DME:              Type of Home Care services:  (P) None    ADLS  Prior functional level: (P) Independent in ADLs/IADLs  Current functional level: (P) Assistance with the following:, Shopping, Mobility    PT AM-PAC:   /24  OT AM-PAC:   /24    Family can provide assistance at DC: (P) Yes  Would you like Case Management to discuss the discharge plan with any other family members/significant others, and if so, who? (P) No  Plans to Return to Present Housing: (P) Yes  Other Identified Issues/Barriers to RETURNING to

## 2024-12-10 NOTE — CONSULTS
[facility] neurosurgery note    Attempted to see pt., but was not in room.    MRI reviewed.  No severe central stenosis.  L severe R moderate foraminal stenosis at L5-S1.    Agree with MRI T spine.     Please call back if new findings on additional imaging that require neurosurgery.  For now, no neurosurgical intervention is required.      JEET Tovar MD

## 2024-12-10 NOTE — PROGRESS NOTES
Discussed with Neurology. GBS is in differential diagnosis and therefore I am hesitant to give any sedation. CTA suggested rectal pathology as source of bleeding. Discussed with patient I think we can get our answer with a unsedated flexible sigmoidoscopy. Patient is in agreement to proceed. RN present at bedside for discussion. ASA:3 Mallampati:2
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
## 2024-12-11 ENCOUNTER — APPOINTMENT (OUTPATIENT)
Dept: MRI IMAGING | Age: 39
DRG: 254 | End: 2024-12-11
Payer: COMMERCIAL

## 2024-12-11 LAB
ALBUMIN SERPL ELPH-MCNC: 2.7 G/DL (ref 3.1–4.9)
ALPHA1 GLOB SERPL ELPH-MCNC: 0.3 G/DL (ref 0.2–0.4)
ALPHA2 GLOB SERPL ELPH-MCNC: 0.6 G/DL (ref 0.4–1.1)
ANA SER QL IA: NEGATIVE
ANION GAP SERPL CALCULATED.3IONS-SCNC: 9 MMOL/L (ref 3–16)
B-GLOBULIN SERPL ELPH-MCNC: 1.1 G/DL (ref 0.9–1.6)
BASOPHILS # BLD: 0 K/UL (ref 0–0.2)
BASOPHILS NFR BLD: 0.5 %
BUN SERPL-MCNC: 14 MG/DL (ref 7–20)
CALCIUM SERPL-MCNC: 8.8 MG/DL (ref 8.3–10.6)
CHLORIDE SERPL-SCNC: 100 MMOL/L (ref 99–110)
CO2 SERPL-SCNC: 30 MMOL/L (ref 21–32)
CREAT SERPL-MCNC: 0.6 MG/DL (ref 0.6–1.1)
DEPRECATED RDW RBC AUTO: 16.7 % (ref 12.4–15.4)
EOSINOPHIL # BLD: 0.1 K/UL (ref 0–0.6)
EOSINOPHIL NFR BLD: 2.1 %
ERYTHROCYTE [SEDIMENTATION RATE] IN BLOOD BY WESTERGREN METHOD: 11 MM/HR (ref 0–20)
GAMMA GLOB SERPL ELPH-MCNC: 1.5 G/DL (ref 0.6–1.8)
GFR SERPLBLD CREATININE-BSD FMLA CKD-EPI: >90 ML/MIN/{1.73_M2}
GLUCOSE SERPL-MCNC: 66 MG/DL (ref 70–99)
HCT VFR BLD AUTO: 33.9 % (ref 36–48)
HCT VFR BLD AUTO: 34.4 % (ref 36–48)
HGB BLD-MCNC: 11.4 G/DL (ref 12–16)
HGB BLD-MCNC: 11.5 G/DL (ref 12–16)
HIV 1+2 AB+HIV1 P24 AG SERPL QL IA: NORMAL
HIV 2 AB SERPL QL IA: NORMAL
HIV1 AB SERPL QL IA: NORMAL
HIV1 P24 AG SERPL QL IA: NORMAL
LYMPHOCYTES # BLD: 2 K/UL (ref 1–5.1)
LYMPHOCYTES NFR BLD: 32.3 %
MCH RBC QN AUTO: 33.2 PG (ref 26–34)
MCHC RBC AUTO-ENTMCNC: 33.3 G/DL (ref 31–36)
MCV RBC AUTO: 99.8 FL (ref 80–100)
MONOCYTES # BLD: 0.4 K/UL (ref 0–1.3)
MONOCYTES NFR BLD: 6.1 %
NEUTROPHILS # BLD: 3.7 K/UL (ref 1.7–7.7)
NEUTROPHILS NFR BLD: 59 %
PLATELET # BLD AUTO: 179 K/UL (ref 135–450)
PMV BLD AUTO: 8.8 FL (ref 5–10.5)
POTASSIUM SERPL-SCNC: 4 MMOL/L (ref 3.5–5.1)
PROT SERPL-MCNC: 6.2 G/DL (ref 6.4–8.2)
RBC # BLD AUTO: 3.45 M/UL (ref 4–5.2)
REAGIN+T PALLIDUM IGG+IGM SERPL-IMP: NORMAL
SODIUM SERPL-SCNC: 139 MMOL/L (ref 136–145)
SPE/IFE INTERPRETATION: NORMAL
TSH SERPL DL<=0.005 MIU/L-ACNC: 2.7 UIU/ML (ref 0.27–4.2)
WBC # BLD AUTO: 6.2 K/UL (ref 4–11)

## 2024-12-11 PROCEDURE — 97116 GAIT TRAINING THERAPY: CPT

## 2024-12-11 PROCEDURE — 88112 CYTOPATH CELL ENHANCE TECH: CPT

## 2024-12-11 PROCEDURE — 2580000003 HC RX 258

## 2024-12-11 PROCEDURE — 6360000002 HC RX W HCPCS

## 2024-12-11 PROCEDURE — 70553 MRI BRAIN STEM W/O & W/DYE: CPT

## 2024-12-11 PROCEDURE — 97163 PT EVAL HIGH COMPLEX 45 MIN: CPT

## 2024-12-11 PROCEDURE — 72156 MRI NECK SPINE W/O & W/DYE: CPT

## 2024-12-11 PROCEDURE — 86053 AQAPRN-4 ANTB FLO CYTMTRY EA: CPT

## 2024-12-11 PROCEDURE — 97530 THERAPEUTIC ACTIVITIES: CPT

## 2024-12-11 PROCEDURE — 89050 BODY FLUID CELL COUNT: CPT

## 2024-12-11 PROCEDURE — 86702 HIV-2 ANTIBODY: CPT

## 2024-12-11 PROCEDURE — 85014 HEMATOCRIT: CPT

## 2024-12-11 PROCEDURE — 84425 ASSAY OF VITAMIN B-1: CPT

## 2024-12-11 PROCEDURE — 85652 RBC SED RATE AUTOMATED: CPT

## 2024-12-11 PROCEDURE — 86363 MOG-IGG1 ANTB FLO CYTMTRY EA: CPT

## 2024-12-11 PROCEDURE — 72157 MRI CHEST SPINE W/O & W/DYE: CPT

## 2024-12-11 PROCEDURE — 6370000000 HC RX 637 (ALT 250 FOR IP)

## 2024-12-11 PROCEDURE — 82042 OTHER SOURCE ALBUMIN QUAN EA: CPT

## 2024-12-11 PROCEDURE — 84155 ASSAY OF PROTEIN SERUM: CPT

## 2024-12-11 PROCEDURE — 36415 COLL VENOUS BLD VENIPUNCTURE: CPT

## 2024-12-11 PROCEDURE — 84165 PROTEIN E-PHORESIS SERUM: CPT

## 2024-12-11 PROCEDURE — 82040 ASSAY OF SERUM ALBUMIN: CPT

## 2024-12-11 PROCEDURE — 1200000000 HC SEMI PRIVATE

## 2024-12-11 PROCEDURE — 86780 TREPONEMA PALLIDUM: CPT

## 2024-12-11 PROCEDURE — 94760 N-INVAS EAR/PLS OXIMETRY 1: CPT

## 2024-12-11 PROCEDURE — 82784 ASSAY IGA/IGD/IGG/IGM EACH: CPT

## 2024-12-11 PROCEDURE — 97535 SELF CARE MNGMENT TRAINING: CPT

## 2024-12-11 PROCEDURE — 95819 EEG AWAKE AND ASLEEP: CPT

## 2024-12-11 PROCEDURE — 009U3ZZ DRAINAGE OF SPINAL CANAL, PERCUTANEOUS APPROACH: ICD-10-PCS | Performed by: HOSPITALIST

## 2024-12-11 PROCEDURE — 84443 ASSAY THYROID STIM HORMONE: CPT

## 2024-12-11 PROCEDURE — 86701 HIV-1ANTIBODY: CPT

## 2024-12-11 PROCEDURE — 86038 ANTINUCLEAR ANTIBODIES: CPT

## 2024-12-11 PROCEDURE — 85018 HEMOGLOBIN: CPT

## 2024-12-11 PROCEDURE — 85025 COMPLETE CBC W/AUTO DIFF WBC: CPT

## 2024-12-11 PROCEDURE — 83916 OLIGOCLONAL BANDS: CPT

## 2024-12-11 PROCEDURE — 80048 BASIC METABOLIC PNL TOTAL CA: CPT

## 2024-12-11 PROCEDURE — A9579 GAD-BASE MR CONTRAST NOS,1ML: HCPCS | Performed by: NURSE PRACTITIONER

## 2024-12-11 PROCEDURE — 87390 HIV-1 AG IA: CPT

## 2024-12-11 PROCEDURE — 6370000000 HC RX 637 (ALT 250 FOR IP): Performed by: REGISTERED NURSE

## 2024-12-11 PROCEDURE — 84157 ASSAY OF PROTEIN OTHER: CPT

## 2024-12-11 PROCEDURE — 97166 OT EVAL MOD COMPLEX 45 MIN: CPT

## 2024-12-11 PROCEDURE — 6360000004 HC RX CONTRAST MEDICATION: Performed by: NURSE PRACTITIONER

## 2024-12-11 RX ADMIN — KETOROLAC TROMETHAMINE 30 MG: 30 INJECTION, SOLUTION INTRAMUSCULAR at 23:52

## 2024-12-11 RX ADMIN — METHADONE HYDROCHLORIDE 85 MG: 10 TABLET ORAL at 08:39

## 2024-12-11 RX ADMIN — PHENOBARBITAL 32.4 MG: 32.4 TABLET ORAL at 08:43

## 2024-12-11 RX ADMIN — ACETAMINOPHEN, ASPIRIN, CAFFEINE 1 TABLET: 250; 65; 250 TABLET, FILM COATED ORAL at 18:15

## 2024-12-11 RX ADMIN — Medication 50 MCG: at 08:40

## 2024-12-11 RX ADMIN — GADOTERIDOL 12 ML: 279.3 INJECTION, SOLUTION INTRAVENOUS at 12:05

## 2024-12-11 RX ADMIN — PANTOPRAZOLE SODIUM 40 MG: 40 TABLET, DELAYED RELEASE ORAL at 16:54

## 2024-12-11 RX ADMIN — FOLIC ACID 1 MG: 1 TABLET ORAL at 08:40

## 2024-12-11 RX ADMIN — PANTOPRAZOLE SODIUM 40 MG: 40 TABLET, DELAYED RELEASE ORAL at 05:50

## 2024-12-11 RX ADMIN — ENOXAPARIN SODIUM 40 MG: 100 INJECTION SUBCUTANEOUS at 08:47

## 2024-12-11 RX ADMIN — PHENOBARBITAL 32.4 MG: 32.4 TABLET ORAL at 22:51

## 2024-12-11 RX ADMIN — DIPHENHYDRAMINE HCL 25 MG: 25 TABLET ORAL at 10:33

## 2024-12-11 RX ADMIN — LORAZEPAM 0.5 MG: 0.5 TABLET ORAL at 10:33

## 2024-12-11 RX ADMIN — KETOROLAC TROMETHAMINE 30 MG: 30 INJECTION, SOLUTION INTRAMUSCULAR at 16:54

## 2024-12-11 RX ADMIN — KETOROLAC TROMETHAMINE 30 MG: 30 INJECTION, SOLUTION INTRAMUSCULAR at 08:40

## 2024-12-11 RX ADMIN — WATER 1000 MG: 1 INJECTION INTRAMUSCULAR; INTRAVENOUS; SUBCUTANEOUS at 16:54

## 2024-12-11 RX ADMIN — SODIUM CHLORIDE, PRESERVATIVE FREE 10 ML: 5 INJECTION INTRAVENOUS at 22:51

## 2024-12-11 ASSESSMENT — PAIN DESCRIPTION - LOCATION
LOCATION: GENERALIZED
LOCATION: HEAD
LOCATION: ABDOMEN;BACK

## 2024-12-11 ASSESSMENT — PAIN SCALES - GENERAL
PAINLEVEL_OUTOF10: 8
PAINLEVEL_OUTOF10: 5
PAINLEVEL_OUTOF10: 10

## 2024-12-11 ASSESSMENT — PAIN DESCRIPTION - ORIENTATION: ORIENTATION: LEFT

## 2024-12-11 ASSESSMENT — PAIN DESCRIPTION - DESCRIPTORS
DESCRIPTORS: ACHING
DESCRIPTORS: ACHING

## 2024-12-11 NOTE — PLAN OF CARE
[place] neurosurgery note    MRI C/T spine reviewed.  No severe central stenosis.  Given this, will sign off and defer to neurology for further w/u. Please call back if additional questions.     JEET Tovar MD

## 2024-12-11 NOTE — PROGRESS NOTES
Pt.had witnessed fall incident inside the room. This RN was assisting the pt going to the bathroom with walker. Pt started to get wobbly alf to the bathroom, this RN catched the pt and assisted to sit on the floor.No injuries noted. Pt.did not hit her head. VSS. Pt.returned to bed via stedy x 1assist. Bed alarm on. Bedside table and call light within reach. No complaints made at this time.    Charge nurse informed. Supervisor on duty made aware. Dr. Mcnamara informed via secured message, no new orders made at this time.   As per pt.no need to her inform family at this time as she already texted her mother about the incident.    0705H- Pt.resting comfortably on bed at this time, no unusualties observed.. Falls documentation done, proper handoff given to day shift regarding fall incident.

## 2024-12-11 NOTE — PROGRESS NOTES
Physical Therapy  Facility/Department: [address] MED SURG  Physical Therapy Initial Assessment  If patient discharges prior to next session this note will serve as a discharge summary.  Please see below for the latest assessment towards goals.   Name: Sakina Ballesteros  : 1985  MRN: [id number]  Date of Service: 2024    Discharge Recommendations:  Patient would benefit from continued therapy after discharge, Patient able to tolerate 3 hours of therapy per day (5-7)   Sakina Ballesteros scored a 16/24 on the AM-PAC short mobility form. Current research shows that an AM-PAC score of 17 or less is typically not associated with a discharge to the patient's home setting. Based on the patient's AM-PAC score and their current functional mobility deficits, it is recommended that the patient have 5-7 sessions per week of Physical Therapy at d/c to increase the patient's independence.  At this time, this patient demonstrates complex nursing, medical, and rehabilitative needs, and would benefit from intensive rehabilitation services upon discharge from the Inpatient setting.  This patient demonstrates the ability to participate in and benefit from an intensive therapy program with a coordinated interdisciplinary team approach to foster frequent, structured, and documented communication among disciplines, who will work together to establish, prioritize, and achieve treatment goals. Please see assessment section for further patient specific details.  PT Equipment Recommendations  Other: will monitor      Patient Diagnosis(es): The primary encounter diagnosis was Leg numbness. Diagnoses of Neuroforaminal stenosis of lumbar spine and Rectal bleeding were also pertinent to this visit.  Past Medical History:  has a past medical history of Alcohol use disorder, Gastroparesis, GERD (gastroesophageal reflux disease), Hepatic cirrhosis (HCC), Mood disorder (HCC), Opioid use disorder, Overweight, Scoliosis, and Tobacco use

## 2024-12-11 NOTE — PROGRESS NOTES
Neurology Progress Note    Updates  No acute events overnight.   Feels essentially the same.     Medical History:  Past Medical History:   Diagnosis Date    Alcohol use disorder     Gastroparesis     GERD (gastroesophageal reflux disease)     Hepatic cirrhosis (HCC)     Mood disorder (HCC)     Opioid use disorder     Overweight     Scoliosis     Tobacco use disorder      Past Surgical History:   Procedure Laterality Date    DENTAL SURGERY      ESOPHAGOGASTRODUODENOSCOPY      SIGMOIDOSCOPY N/A 12/10/2024    SIGMOIDOSCOPY DIAGNOSTIC FLEXIBLE performed by Jose Macario MD at [facility] ENDOSCOPY    UPPER GASTROINTESTINAL ENDOSCOPY N/A 2023    EGD BIOPSY performed by Lam Templeton MD at [facility] ENDOSCOPY    UPPER GASTROINTESTINAL ENDOSCOPY N/A 11/15/2023    EGD VARICEAL BANDING performed by Shreyas Matos MD at [facility] ENDOSCOPY     Scheduled Meds:   cefTRIAXone (ROCEPHIN) IV  1,000 mg IntraVENous Q24H    PHENobarbital  32.4 mg Oral BID    Followed by    [START ON 2024] PHENobarbital  16.2 mg Oral BID    methadone  85 mg Oral Daily    vitamin B-12  50 mcg Oral Daily    pantoprazole  40 mg Oral BID AC    folic acid  1 mg Oral Daily    sodium chloride flush  5-40 mL IntraVENous 2 times per day    enoxaparin  40 mg SubCUTAneous Daily     Medications Prior to Admission:   [] methylPREDNISolone (MEDROL, AILEEN,) 4 MG tablet, Take 1 tablet by mouth See Admin Instructions for 6 days Take by mouth.  ondansetron (ZOFRAN-ODT) 4 MG disintegrating tablet, Take 1 tablet by mouth every 8 hours as needed for Nausea or Vomiting  traZODone (DESYREL) 100 MG tablet, Take 2 tablets by mouth nightly  pantoprazole (PROTONIX) 40 MG tablet, Take 1 tablet by mouth 2 times daily (before meals)  METHADONE HCL PO, Take 87 mg by mouth daily  thiamine mononitrate (THIAMINE) 100 MG tablet, Take 1 tablet by mouth daily  propranolol (INDERAL) 20 MG tablet, Take 1 tablet by mouth 3 times daily (Patient not taking: Reported on  12/10/2024)  spironolactone (ALDACTONE) 50 MG tablet, Take 1 tablet by mouth daily (Patient not taking: Reported on 12/10/2024)  folic acid (FOLVITE) 1 MG tablet, Take 1 tablet by mouth daily (Patient not taking: Reported on 12/10/2024)    Allergies   Allergen Reactions    Iodine Rash     Family History   Problem Relation Age of Onset    Hypertension Father     Hypertension Brother      Social History     Tobacco Use   Smoking Status Every Day    Current packs/day: 1.00    Types: Cigarettes   Smokeless Tobacco Never     Social History     Substance and Sexual Activity   Drug Use Yes    Types: Marijuana (Weed)    Comment: 9/19/24 Marijuana, meth, pcp, benzo     Social History     Substance and Sexual Activity   Alcohol Use Yes    Comment: a fifth of vodka every day     ROS  Constitutional- No weight loss or fevers  Eyes- No diplopia. No photophobia.  Ears/nose/throat- No dysphagia. No Dysarthria  Cardiovascular- No palpitations. No chest pain  Respiratory- No dyspnea. No Cough  Gastrointestinal- No Abdominal pain. No Vomiting.  Genitourinary- No incontinence. No urinary retention  Musculoskeletal- No myalgia. No arthralgia  Skin- No rash. No easy bruising.  Psychiatric- No depression. No anxiety  Endocrine- No diabetes. No thyroid issues.  Hematologic- No bleeding difficulty. No fatigue  Neurologic- + weakness. + Headache.    Exam  Blood pressure 113/71, pulse 61, temperature 98.1 °F (36.7 °C), temperature source Oral, resp. rate 16, height 1.727 m (5' 8\"), weight 62 kg (136 lb 11.2 oz), SpO2 96%.  Constitutional    Vital signs: BP, HR, and RR reviewed   General alert, no distress  Eyes: unable to visualize the fundi  Psychiatric: cooperative with examination, no psychotic behavior noted.  Neurologic  Mental status:   orientation to person, place, time.     Attention intact as able to attend well to the exam     Language fluent in conversation   Comprehension intact; follows simple commands  Cranial nerves:   CN2:

## 2024-12-11 NOTE — PLAN OF CARE
Problem: Discharge Planning  Goal: Discharge to home or other facility with appropriate resources  12/11/2024 0307 by Sharee Choudhary RN  Outcome: Progressing  12/10/2024 1407 by Rosalinda Lazcano RN  Outcome: Progressing     Problem: Safety - Adult  Goal: Free from fall injury  12/11/2024 0307 by Sharee Choudhary RN  Outcome: Progressing  12/10/2024 1407 by Rosalinda Lazcano RN  Outcome: Progressing     Problem: Pain  Goal: Verbalizes/displays adequate comfort level or baseline comfort level  12/11/2024 0307 by Sharee Choudhary RN  Outcome: Progressing  12/10/2024 1407 by Rosalinda Lazcano RN  Outcome: Progressing     Problem: Skin/Tissue Integrity  Goal: Absence of new skin breakdown  Description: 1.  Monitor for areas of redness and/or skin breakdown  2.  Assess vascular access sites hourly  3.  Every 4-6 hours minimum:  Change oxygen saturation probe site  4.  Every 4-6 hours:  If on nasal continuous positive airway pressure, respiratory therapy assess nares and determine need for appliance change or resting period.  Outcome: Progressing
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
## 2024-12-11 NOTE — PROGRESS NOTES
POST FALL MANAGEMENT    Sakina Ballesteros  MEDICAL RECORD NUMBER:  [id number]  AGE: 38 y.o.   GENDER: female  : 1985  TODAYS DATE:  2024    Details     Fall Occurred: Yes    Was the Fall Witnessed:  Yes       Brief Review of Event:         Who found the patient: Assigned RN      Where was the patient at the time of the fall: inside the room      Patient Comments:        Date Fall Occurred:  2024 .       Time Fall Occurred: 6:00a.m.     Assessment     Post Fall Head to Toe Assessment Completed: No:     Post Fall Predictive Analytic Score Reviewed: Yes     Post Fall Vitals Completed: Yes    Post Fall Neuro Checks Completed: Yes    Injury Occurred(if yes, describe injury):  no           Did the Patient Experience:(Check Maikel all that apply)    [] Patient hit head  [] Loss of consciousness  [] Change in mental status following the fall  [] Patient is on an anticoagulant medication      CT Performed:  no    Follow-up     Persons Notified of Fall:  (Provide names of persons notified)   [x] Physician:   [] SHILA:  [x] Nursing Supervisior:  [x] Manager:  [] Pharmacist:  [x] Family: Pt. Informed her mother  via text message about fall incident  [x] Other: Charge Nurse      Electronically signed by Sharee Choudhary RN 2024 at 8:52 AM

## 2024-12-11 NOTE — PROGRESS NOTES
Occupational Therapy  Facility/Department: [address] MED SURG  Occupational Therapy Initial Assessment    Name: Sakina Ballesteros  : 1985  MRN: [id number]  Date of Service: 2024    Discharge Recommendations:  5-7 sessions per week  OT Equipment Recommendations  Other: TBD       Patient Diagnosis(es): The primary encounter diagnosis was Leg numbness. Diagnoses of Neuroforaminal stenosis of lumbar spine and Rectal bleeding were also pertinent to this visit.  Past Medical History:  has a past medical history of Alcohol use disorder, Gastroparesis, GERD (gastroesophageal reflux disease), Hepatic cirrhosis (HCC), Mood disorder (HCC), Opioid use disorder, Overweight, Scoliosis, and Tobacco use disorder.  Past Surgical History:  has a past surgical history that includes Esophagogastroduodenoscopy (); Dental surgery; Upper gastrointestinal endoscopy (N/A, 2023); Upper gastrointestinal endoscopy (N/A, 11/15/2023); and sigmoidoscopy (N/A, 12/10/2024).    Treatment Diagnosis: impaired strength B LE's>UE's, balance, fxl mobility, fxl activity tolerance, ADL/IADL status      Assessment  Performance deficits / Impairments: Decreased functional mobility ;Decreased ADL status;Decreased ROM;Decreased strength;Decreased endurance;Decreased sensation;Decreased balance;Decreased high-level IADLs;Decreased coordination  Assessment: Pt is a 38 y.o. female admitted with Progressive BLE weakness/numbness since [facility] 10/2024, rectal bleeding. PTA, pt living with family on 2nd floor of [facility] home, independent ADLs, IADLs, and fxl mobility. Pt currently functioning below baseline, limited by B LE >UE weakness, decreased balance/fxl mobility, decreased sensation B LE's and ulnar aspect of L hand affecting pt's ADL and IADL status. Today-pt min A fxl transfers/mobility with RW limited to short distances d/t LE weakness. Pt CGA toileting, and anticipate pt would require min A for full ADL. Pt is a high fall risk and  RW and Min A. Pt heavily relying on B UE support on walker d/t B LE weakness.  Additional Comments: Anticipate pt is independent feeding, min A dressing and bathing, CGA grooming based on ROM/strength, balance, endurance.    Activity Tolerance  Activity Tolerance: Patient tolerated evaluation without incident    Bed mobility  Bridging: Stand by assistance  Supine to Sit: Stand by assistance (pt using BUE's to lift B LE's out of bed)  Sit to Supine: Stand by assistance (pt using BUE's to lift B LE's into bed)  Scooting: Stand by assistance    Transfers  Sit to stand: Minimal assistance  Stand to sit: Minimal assistance  Transfer Comments: to/from RW  Toilet Transfers  Toilet - Technique: Ambulating  Equipment Used: Grab bars  Toilet Transfer: Minimal assistance    Vision  Vision: Within Functional Limits  Hearing  Hearing: Within functional limits (the pt did report her hearing sometimes feels like she is under water)    Cognition  Overall Cognitive Status: WFL  Orientation  Overall Orientation Status: Within Functional Limits    Static Sitting Balance Exercises: seated EOB supervision  Dynamic Sitting Balance Exercises: seated EOB SBA when completing LB dressing, supervision on commode during pericare and clothing management  Static Standing Balance Exercises: CGA at RW  Dynamic Standing Balance Exercises: min A at RW during fxl mobility, CGA clothing management  Education Given To: Patient  Education Provided: Role of Therapy;Plan of Care;Transfer Training;ADL Adaptive Strategies;Equipment;Fall Prevention Strategies  Education Provided Comments: d/c recommendations  Education Method: Verbal  Barriers to Learning: None  Education Outcome: Verbalized understanding                                AM-PAC - ADL  AM-PAC Daily Activity - Inpatient   How much help is needed for putting on and taking off regular lower body clothing?: A Little  How much help is needed for bathing (which includes washing, rinsing, drying)?: A

## 2024-12-11 NOTE — PLAN OF CARE
Problem: Discharge Planning  Goal: Discharge to home or other facility with appropriate resources  12/11/2024 1306 by Rosalinda Lazcano RN  Outcome: Progressing  12/11/2024 0307 by Sharee Choudhary RN  Outcome: Progressing     Problem: Safety - Adult  Goal: Free from fall injury  12/11/2024 1306 by Rosalinda Lazcano RN  Outcome: Progressing  12/11/2024 0307 by Sharee Choudhary RN  Outcome: Progressing     Problem: Pain  Goal: Verbalizes/displays adequate comfort level or baseline comfort level  12/11/2024 1306 by Rosalinda Lazcano RN  Outcome: Progressing  12/11/2024 0307 by Sharee Choudhary RN  Outcome: Progressing     Problem: Skin/Tissue Integrity  Goal: Absence of new skin breakdown  Description: 1.  Monitor for areas of redness and/or skin breakdown  2.  Assess vascular access sites hourly  3.  Every 4-6 hours minimum:  Change oxygen saturation probe site  4.  Every 4-6 hours:  If on nasal continuous positive airway pressure, respiratory therapy assess nares and determine need for appliance change or resting period.  12/11/2024 1306 by Rosalidna Lazcano RN  Outcome: Progressing  12/11/2024 0307 by Sharee Choudhary RN  Outcome: Progressing

## 2024-12-12 ENCOUNTER — APPOINTMENT (OUTPATIENT)
Dept: INTERVENTIONAL RADIOLOGY/VASCULAR | Age: 39
DRG: 254 | End: 2024-12-12
Payer: COMMERCIAL

## 2024-12-12 LAB
ANION GAP SERPL CALCULATED.3IONS-SCNC: 8 MMOL/L (ref 3–16)
APPEARANCE CSF: CLEAR
BUN SERPL-MCNC: 13 MG/DL (ref 7–20)
CALCIUM SERPL-MCNC: 8.9 MG/DL (ref 8.3–10.6)
CHLORIDE SERPL-SCNC: 101 MMOL/L (ref 99–110)
CLOT EVALUATION CSF: NORMAL
CO2 SERPL-SCNC: 28 MMOL/L (ref 21–32)
COLOR CSF: COLORLESS
CREAT SERPL-MCNC: 0.6 MG/DL (ref 0.6–1.1)
DEPRECATED RDW RBC AUTO: 17.2 % (ref 12.4–15.4)
GFR SERPLBLD CREATININE-BSD FMLA CKD-EPI: >90 ML/MIN/{1.73_M2}
GLUCOSE CSF-MCNC: 72 MG/DL (ref 40–80)
GLUCOSE SERPL-MCNC: 90 MG/DL (ref 70–99)
HCT VFR BLD AUTO: 32.1 % (ref 36–48)
HGB BLD-MCNC: 10.6 G/DL (ref 12–16)
MANUAL DIF COMMENT CSF-IMP: NORMAL
MCH RBC QN AUTO: 33.3 PG (ref 26–34)
MCHC RBC AUTO-ENTMCNC: 33 G/DL (ref 31–36)
MCV RBC AUTO: 100.9 FL (ref 80–100)
MENING+ENC PNL CSF NAA+NON-PROBE: NORMAL
NUC CELL # FLD MANUAL: 2 /CUMM (ref 0–5)
PLATELET # BLD AUTO: 170 K/UL (ref 135–450)
PMV BLD AUTO: 9.2 FL (ref 5–10.5)
POTASSIUM SERPL-SCNC: 4.6 MMOL/L (ref 3.5–5.1)
PROT CSF-MCNC: 58 MG/DL (ref 15–45)
RBC # BLD AUTO: 3.19 M/UL (ref 4–5.2)
RBC # FLD MANUAL: 0 /CUMM
REPORT: NORMAL
SODIUM SERPL-SCNC: 137 MMOL/L (ref 136–145)
SPECIMEN VOL CSF: 9 ML
TUBE # CSF: NORMAL
WBC # BLD AUTO: 5.9 K/UL (ref 4–11)

## 2024-12-12 PROCEDURE — 87205 SMEAR GRAM STAIN: CPT

## 2024-12-12 PROCEDURE — 36415 COLL VENOUS BLD VENIPUNCTURE: CPT

## 2024-12-12 PROCEDURE — 87483 CNS DNA AMP PROBE TYPE 12-25: CPT

## 2024-12-12 PROCEDURE — 82945 GLUCOSE OTHER FLUID: CPT

## 2024-12-12 PROCEDURE — 1200000000 HC SEMI PRIVATE

## 2024-12-12 PROCEDURE — 2709999900 IR LUMBAR PUNCTURE FOR DIAGNOSIS

## 2024-12-12 PROCEDURE — 6360000002 HC RX W HCPCS

## 2024-12-12 PROCEDURE — 94760 N-INVAS EAR/PLS OXIMETRY 1: CPT

## 2024-12-12 PROCEDURE — 84157 ASSAY OF PROTEIN OTHER: CPT

## 2024-12-12 PROCEDURE — 6370000000 HC RX 637 (ALT 250 FOR IP): Performed by: REGISTERED NURSE

## 2024-12-12 PROCEDURE — 6370000000 HC RX 637 (ALT 250 FOR IP)

## 2024-12-12 PROCEDURE — 85027 COMPLETE CBC AUTOMATED: CPT

## 2024-12-12 PROCEDURE — 80048 BASIC METABOLIC PNL TOTAL CA: CPT

## 2024-12-12 PROCEDURE — 87070 CULTURE OTHR SPECIMN AEROBIC: CPT

## 2024-12-12 PROCEDURE — 6360000002 HC RX W HCPCS: Performed by: NURSE PRACTITIONER

## 2024-12-12 RX ORDER — MORPHINE SULFATE 2 MG/ML
2 INJECTION, SOLUTION INTRAMUSCULAR; INTRAVENOUS ONCE
Status: COMPLETED | OUTPATIENT
Start: 2024-12-12 | End: 2024-12-12

## 2024-12-12 RX ORDER — TRAZODONE HYDROCHLORIDE 100 MG/1
200 TABLET ORAL NIGHTLY
Status: DISCONTINUED | OUTPATIENT
Start: 2024-12-12 | End: 2024-12-20 | Stop reason: HOSPADM

## 2024-12-12 RX ADMIN — KETOROLAC TROMETHAMINE 30 MG: 30 INJECTION, SOLUTION INTRAMUSCULAR at 06:04

## 2024-12-12 RX ADMIN — IMMUNE GLOBULIN (HUMAN) 20 G: 10 INJECTION INTRAVENOUS; SUBCUTANEOUS at 17:47

## 2024-12-12 RX ADMIN — PHENOBARBITAL 16.2 MG: 32.4 TABLET ORAL at 20:09

## 2024-12-12 RX ADMIN — TRAZODONE HYDROCHLORIDE 200 MG: 100 TABLET ORAL at 20:09

## 2024-12-12 RX ADMIN — PHENOBARBITAL 16.2 MG: 32.4 TABLET ORAL at 08:43

## 2024-12-12 RX ADMIN — MORPHINE SULFATE 2 MG: 2 INJECTION, SOLUTION INTRAMUSCULAR; INTRAVENOUS at 17:43

## 2024-12-12 RX ADMIN — FOLIC ACID 1 MG: 1 TABLET ORAL at 08:43

## 2024-12-12 RX ADMIN — METHADONE HYDROCHLORIDE 85 MG: 10 TABLET ORAL at 08:43

## 2024-12-12 RX ADMIN — IMMUNE GLOBULIN (HUMAN) 5 G: 10 INJECTION INTRAVENOUS; SUBCUTANEOUS at 20:08

## 2024-12-12 RX ADMIN — Medication 50 MCG: at 08:43

## 2024-12-12 RX ADMIN — PANTOPRAZOLE SODIUM 40 MG: 40 TABLET, DELAYED RELEASE ORAL at 17:43

## 2024-12-12 RX ADMIN — PANTOPRAZOLE SODIUM 40 MG: 40 TABLET, DELAYED RELEASE ORAL at 06:04

## 2024-12-12 ASSESSMENT — PAIN SCALES - GENERAL
PAINLEVEL_OUTOF10: 2
PAINLEVEL_OUTOF10: 2
PAINLEVEL_OUTOF10: 10
PAINLEVEL_OUTOF10: 10

## 2024-12-12 ASSESSMENT — PAIN DESCRIPTION - LOCATION
LOCATION: BACK
LOCATION: ABDOMEN;HEAD;BACK

## 2024-12-12 ASSESSMENT — PAIN DESCRIPTION - DESCRIPTORS: DESCRIPTORS: ACHING

## 2024-12-12 ASSESSMENT — PAIN DESCRIPTION - ORIENTATION: ORIENTATION: MID;LOWER;POSTERIOR

## 2024-12-12 ASSESSMENT — PAIN SCALES - WONG BAKER
WONGBAKER_NUMERICALRESPONSE: HURTS A LITTLE BIT
WONGBAKER_NUMERICALRESPONSE: HURTS A LITTLE BIT

## 2024-12-12 NOTE — PROGRESS NOTES
Neurology Progress Note    Updates  Feels like she is having more sensory changes in her upper thighs.    Otherwise stable.      Medical History:  Past Medical History:   Diagnosis Date    Alcohol use disorder     Gastroparesis     GERD (gastroesophageal reflux disease)     Hepatic cirrhosis (HCC)     Mood disorder (HCC)     Opioid use disorder     Overweight     Scoliosis     Tobacco use disorder      Past Surgical History:   Procedure Laterality Date    DENTAL SURGERY      ESOPHAGOGASTRODUODENOSCOPY      SIGMOIDOSCOPY N/A 12/10/2024    SIGMOIDOSCOPY DIAGNOSTIC FLEXIBLE performed by Jose Macario MD at [facility] ENDOSCOPY    UPPER GASTROINTESTINAL ENDOSCOPY N/A 2023    EGD BIOPSY performed by Lam Templeton MD at [facility] ENDOSCOPY    UPPER GASTROINTESTINAL ENDOSCOPY N/A 11/15/2023    EGD VARICEAL BANDING performed by Shreyas Matos MD at [facility] ENDOSCOPY     Scheduled Meds:   PHENobarbital  16.2 mg Oral BID    methadone  85 mg Oral Daily    vitamin B-12  50 mcg Oral Daily    pantoprazole  40 mg Oral BID AC    folic acid  1 mg Oral Daily    sodium chloride flush  5-40 mL IntraVENous 2 times per day    enoxaparin  40 mg SubCUTAneous Daily     Medications Prior to Admission:   [] methylPREDNISolone (MEDROL, AILEEN,) 4 MG tablet, Take 1 tablet by mouth See Admin Instructions for 6 days Take by mouth.  ondansetron (ZOFRAN-ODT) 4 MG disintegrating tablet, Take 1 tablet by mouth every 8 hours as needed for Nausea or Vomiting  traZODone (DESYREL) 100 MG tablet, Take 2 tablets by mouth nightly  pantoprazole (PROTONIX) 40 MG tablet, Take 1 tablet by mouth 2 times daily (before meals)  METHADONE HCL PO, Take 87 mg by mouth daily  thiamine mononitrate (THIAMINE) 100 MG tablet, Take 1 tablet by mouth daily  propranolol (INDERAL) 20 MG tablet, Take 1 tablet by mouth 3 times daily (Patient not taking: Reported on 12/10/2024)  spironolactone (ALDACTONE) 50 MG tablet, Take 1 tablet by mouth daily (Patient not taking:

## 2024-12-12 NOTE — PROGRESS NOTES
Pt A&Ox 4. Shift assessment done, still with complaints of lower leg weakness, falls precautions in placed. VSS. Due medications given. PRN medication for pain provided as per MAR. For lumbar puncture procedure tomorrow. Informed consent signed by patient, attached to chart. Bedside table and call light within reach. Bed alarm on. Encouraged to call for any needs.      0615H-Pt.complained of burning sensation on IV site. When flushed, resistance noted on the site. Pt. Requested to do the reinsertion later the next shift.

## 2024-12-12 NOTE — CONSULTS
Consult Note  Physical Medicine and Rehabilitation    Patient: Sakina Ballesteros  [id number]  Date: 12/12/2024      Chief Complaint: weakness and paresthesias    History of Present Illness/Hospital Course:  Patient is a 39 yo F with pmh polysubstance use disorder, cirrhosis, GERD, gastroparesis, tobacco use, mood disorder, scoliosis who initially presented on 12/9/2024 with bilateral leg weakness and rectal bleeding. Note UDS was positive for amphetamine, barbiturate, cannabinoid, methdone, opiate. Flex sigmoidoscopy revealed internal hemorrhoids. Neurology and Neurosurgery were consulted for BLE weakness, paresthesias, and urinary incontinence. Brain and spine imaging thus far do not explain her symptoms. Underwent LP (12/13) and is being started on IVIG. She was also treated for asymptomatic bacteriuria. Currently, patient reports progressive ascending weakness (starting in BLE and now involving BUE). She also reports ascending paresthesias (starting in BLE and now involving ulnar aspect of her left hand). She denies vision changes, difficulty with swallow, or breathing. She had some urinary incontinence prior to admission but has not had any recently. She reports having bowel/bladder sensation. Her preference is to return home at discharge.     Prior Level of Function:  Independent for mobility, ADLs, and IADLs  Currently unemployed    Current Level of Function:  Juno for transfers and short distance ambulation  CGA for toileting and lower body dressing    Pertinent Social History:  Support: lives with 2 children (18 and 20 years old)  Home set-up: 2nd level of 2 family home with 8 steps to enter and then 15 steps to 2nd floor    Past Medical History:   Diagnosis Date    Alcohol use disorder     Gastroparesis     GERD (gastroesophageal reflux disease)     Hepatic cirrhosis (HCC)     Mood disorder (HCC)     Opioid use disorder     Overweight     Scoliosis     Tobacco use disorder        Past Surgical History:  perirectal  soft tissues which may can for the patient's GI bleed.  Correlation with  physical exam findings may be helpful.  This may related to underlying rectal  trauma or ulcer.  Moderate volume fecal residuals in the colon.  Cholelithiasis.  Hepatomegaly and hepatic steatosis..  Dilatation of the common bile duct and pancreatic duct.  Nonemergent further  evaluation with repeat MRI of the abdomen may be helpful.  Somewhat prominent vascular disease for the patient's age.  Additional findings noted above.       Assessment:  BLE weakness/paresthesias -Work-up ongoing. Plan for LP and IVIG.   Rectal bleeding -flex sig + internal hemorrhoids  Urinary incontinence  Asymptomatic Bacteriuria  Polysubstance abuse  Cirrhosis  GERD  Mood disorder  Tobacco use    Impairments: Weakness (BLE>BUE), sensory deficit, balance    Recommendations:    Patient is a potential candidate for ARU based on acute functional deficits. However she is currently stating she plans to return home with family assist at discharge. Her preference is for home care vs outpatient therapies.     Will follow progress/response to IVIG.     Thank you for this consult. Please contact me with any questions or concerns.     Tova Canas MD 12/12/2024, 3:08 PM

## 2024-12-12 NOTE — CARE COORDINATION
12/12 Plan: ARU consult noted. Will need precert if appropriate. Patient given a rehab list for choice. From home with cousin and children. Follow. Electronically signed by Pretty Handley RN on 12/12/2024 at 2:13 PM

## 2024-12-12 NOTE — PROGRESS NOTES
Pt. Post lumbar puncture. Complaining of severe headache, abdominal pain, and back pain 10/10. Pt. Demonstrating increased lethargy/drowsiness. VSS. Site CDI. No other neurological changes. MD notified. One time dose morphine ordered.

## 2024-12-12 NOTE — PLAN OF CARE
Problem: Discharge Planning  Goal: Discharge to home or other facility with appropriate resources  12/12/2024 1034 by Lashae La RN  Outcome: Progressing  12/12/2024 0348 by Sharee Choudhary RN  Outcome: Progressing     Problem: Safety - Adult  Goal: Free from fall injury  12/12/2024 1034 by Lashae La RN  Outcome: Progressing  12/12/2024 0348 by Sharee Choudhary RN  Outcome: Progressing     Problem: Pain  Goal: Verbalizes/displays adequate comfort level or baseline comfort level  12/12/2024 1034 by Lashae La RN  Outcome: Progressing  12/12/2024 0348 by Sharee Choudhary RN  Outcome: Progressing     Problem: Skin/Tissue Integrity  Goal: Absence of new skin breakdown  Description: 1.  Monitor for areas of redness and/or skin breakdown  2.  Assess vascular access sites hourly  3.  Every 4-6 hours minimum:  Change oxygen saturation probe site  4.  Every 4-6 hours:  If on nasal continuous positive airway pressure, respiratory therapy assess nares and determine need for appliance change or resting period.  12/12/2024 1034 by Lashae La RN  Outcome: Progressing  12/12/2024 0348 by Sharee Choudhary RN  Outcome: Progressing     Problem: Musculoskeletal - Adult  Goal: Return mobility to safest level of function  Outcome: Progressing  Goal: Maintain proper alignment of affected body part  Outcome: Progressing  Goal: Return ADL status to a safe level of function  Outcome: Progressing     Problem: Gastrointestinal - Adult  Goal: Maintains or returns to baseline bowel function  Outcome: Progressing  Goal: Maintains adequate nutritional intake  Outcome: Progressing

## 2024-12-12 NOTE — CARE COORDINATION
12/12 Plan: ARU consult noted. Will need precert if appropriate. Patient given a rehab list for choice. From home with cousin and children. Patient will discuss ARU with her family. She states that she would prefer to go home and go to OP therapy. Check back for decision. Electronically signed by Pretty Handley RN on 12/12/2024 at 2:33 PM

## 2024-12-12 NOTE — PROGRESS NOTES
Occupational Therapy  Attempted OT treatment but pt on bedrest s/p LP for one hour.  Will attempt tomorrow as schedule allows. Zaida Goddard, OTR/L #[id number] 12/12/2024 3:22 PM

## 2024-12-12 NOTE — PROGRESS NOTES
BUN 13 15  --   --  14 13   CREATININE 0.6 0.6  --   --  0.6 0.6   GLUCOSE 119* 86  --   --  66* 90   CALCIUM 9.3 9.1  --   --  8.8 8.9   ALBUMIN 4.1 4.1  --  2.7*  --   --    AST 31 31  --   --   --   --    ALT 33 34  --   --   --   --    ALKPHOS 122 110  --   --   --   --     < > = values in this interval not displayed.       CTA ABDOMEN PELVIS W WO CONTRAST    Result Date: 12/9/2024  Focal area of enhancement or contrast extravasation in the right perirectal soft tissues which may can for the patient's GI bleed.  Correlation with physical exam findings may be helpful.  This may related to underlying rectal trauma or ulcer. Moderate volume fecal residuals in the colon. Cholelithiasis. Hepatomegaly and hepatic steatosis.. Dilatation of the common bile duct and pancreatic duct.  Nonemergent further evaluation with repeat MRI of the abdomen may be helpful. Somewhat prominent vascular disease for the patient's age. Additional findings noted above.     CT HEAD WO CONTRAST    Result Date: 12/9/2024  No acute intracranial abnormality. No acute osseous abnormality of the cervical spine.     CT CERVICAL SPINE WO CONTRAST    Result Date: 12/9/2024  No acute intracranial abnormality. No acute osseous abnormality of the cervical spine.     MRI LUMBAR SPINE W WO CONTRAST    Result Date: 12/9/2024  1. No findings to suggest acute discitis/osteomyelitis or septic facet arthropathy. 2. Severe left and mild right neural foraminal narrowing at L5-S1. 3. No significant spinal canal stenosis.         Medical decision making:     Rectal bleeding placing patient at risk of multiple comorbidities including threat to bodily function    Drugs that require monitoring for toxicity include lovenox and the method of monitoring was H&H for anemia    Discussed management with RN and discharge planning options with Case Management   MRI lumbar spine as interpreted by me showing severe left and right neural foraminal narrowing  Personally  reviewed lab test results  Personally reviewed studies   Reviewed prior external records in detail  Reviewed nursing notes overnight    Overall the complexity of medical decision making was high    Ismael Leone MD

## 2024-12-12 NOTE — PROCEDURES
[facility]          [address] [place] [address]                       ELECTROENCEPHALOGRAM REPORT      PATIENT NAME: ELIAS MAYBERRY                : 1985  MED REC NO: [id number]                      ROOM: [place] [id number]  ACCOUNT NO: [id number]                       ADMIT DATE: 2024  PROVIDER: Alan Sequeira DO      DATE OF SERVICE:  2024    REFERRING PHYSICIAN:  Junior Bingham NP    REASON FOR STUDY:  Possible seizure activity.    BRIEF HISTORY AND NEUROLOGIC FINDINGS:  The patient is a 38-year-old female being evaluated for reported concerns of seizure.    MEDICATIONS:  The patient's centrally acting medications listed include Ativan, Benadryl, and Elavil.    EEG FINDINGS:  This is a 20-channel digital EEG performed utilizing bipolar and referential montages.  The patient was asleep throughout nearly the entire recording.  During very brief maximal arousal, there was a moderate voltage, fairly symmetric posterior background consisting of about 8 hertz.  The anterior background consists of low voltage fast frequencies.  Drowsiness is manifested by attenuation of the waking background rhythms.  Sleep is manifested by sleep spindles and K complexes which were again present throughout nearly the entire recording.  There was some superimposed low voltage beta frequencies as well.  Photic stimulation was performed at various flash frequencies and did not evoke any significant posterior driving response as the patient was asleep during the photic stimulation exercise.  Hyperventilation exercise was also not performed due to the patient's inability to stay awake.  The tech reportedly attempted to awaken the patient without success.    A 1-channel EKG rhythm strip was reviewed and showed sinus bradycardia with heart rates typically in the low 50s.    EEG DIAGNOSIS:  Essentially normal EEG which was performed predominantly during sleep.    CLINICAL

## 2024-12-13 LAB
ANION GAP SERPL CALCULATED.3IONS-SCNC: 8 MMOL/L (ref 3–16)
BUN SERPL-MCNC: 13 MG/DL (ref 7–20)
CALCIUM SERPL-MCNC: 8.7 MG/DL (ref 8.3–10.6)
CHLORIDE SERPL-SCNC: 102 MMOL/L (ref 99–110)
CO2 SERPL-SCNC: 25 MMOL/L (ref 21–32)
CREAT SERPL-MCNC: 0.7 MG/DL (ref 0.6–1.1)
DEPRECATED RDW RBC AUTO: 16.4 % (ref 12.4–15.4)
GFR SERPLBLD CREATININE-BSD FMLA CKD-EPI: >90 ML/MIN/{1.73_M2}
GLUCOSE SERPL-MCNC: 105 MG/DL (ref 70–99)
HCT VFR BLD AUTO: 31.8 % (ref 36–48)
HGB BLD-MCNC: 10.8 G/DL (ref 12–16)
MCH RBC QN AUTO: 33.6 PG (ref 26–34)
MCHC RBC AUTO-ENTMCNC: 34 G/DL (ref 31–36)
MCV RBC AUTO: 98.8 FL (ref 80–100)
PLATELET # BLD AUTO: 140 K/UL (ref 135–450)
PMV BLD AUTO: 9.7 FL (ref 5–10.5)
POTASSIUM SERPL-SCNC: 4.5 MMOL/L (ref 3.5–5.1)
RBC # BLD AUTO: 3.22 M/UL (ref 4–5.2)
SODIUM SERPL-SCNC: 135 MMOL/L (ref 136–145)
WBC # BLD AUTO: 4.9 K/UL (ref 4–11)

## 2024-12-13 PROCEDURE — 80048 BASIC METABOLIC PNL TOTAL CA: CPT

## 2024-12-13 PROCEDURE — 36415 COLL VENOUS BLD VENIPUNCTURE: CPT

## 2024-12-13 PROCEDURE — 97530 THERAPEUTIC ACTIVITIES: CPT

## 2024-12-13 PROCEDURE — 6370000000 HC RX 637 (ALT 250 FOR IP): Performed by: HOSPITALIST

## 2024-12-13 PROCEDURE — 6360000002 HC RX W HCPCS

## 2024-12-13 PROCEDURE — 1200000000 HC SEMI PRIVATE

## 2024-12-13 PROCEDURE — 6370000000 HC RX 637 (ALT 250 FOR IP)

## 2024-12-13 PROCEDURE — 97535 SELF CARE MNGMENT TRAINING: CPT

## 2024-12-13 PROCEDURE — 6360000002 HC RX W HCPCS: Performed by: NURSE PRACTITIONER

## 2024-12-13 PROCEDURE — 62328 DX LMBR SPI PNXR W/FLUOR/CT: CPT

## 2024-12-13 PROCEDURE — 85027 COMPLETE CBC AUTOMATED: CPT

## 2024-12-13 PROCEDURE — 97116 GAIT TRAINING THERAPY: CPT

## 2024-12-13 PROCEDURE — 94760 N-INVAS EAR/PLS OXIMETRY 1: CPT

## 2024-12-13 PROCEDURE — 2580000003 HC RX 258

## 2024-12-13 RX ORDER — OXYCODONE HYDROCHLORIDE 5 MG/1
5 TABLET ORAL EVERY 4 HOURS PRN
Status: DISCONTINUED | OUTPATIENT
Start: 2024-12-13 | End: 2024-12-20 | Stop reason: HOSPADM

## 2024-12-13 RX ADMIN — Medication 50 MCG: at 08:11

## 2024-12-13 RX ADMIN — KETOROLAC TROMETHAMINE 30 MG: 30 INJECTION, SOLUTION INTRAMUSCULAR at 12:33

## 2024-12-13 RX ADMIN — PANTOPRAZOLE SODIUM 40 MG: 40 TABLET, DELAYED RELEASE ORAL at 16:30

## 2024-12-13 RX ADMIN — OXYCODONE 5 MG: 5 TABLET ORAL at 19:24

## 2024-12-13 RX ADMIN — IMMUNE GLOBULIN (HUMAN) 5 G: 10 INJECTION INTRAVENOUS; SUBCUTANEOUS at 17:09

## 2024-12-13 RX ADMIN — TRAZODONE HYDROCHLORIDE 200 MG: 100 TABLET ORAL at 19:24

## 2024-12-13 RX ADMIN — FOLIC ACID 1 MG: 1 TABLET ORAL at 08:11

## 2024-12-13 RX ADMIN — SODIUM CHLORIDE, PRESERVATIVE FREE 10 ML: 5 INJECTION INTRAVENOUS at 12:28

## 2024-12-13 RX ADMIN — SODIUM CHLORIDE: 9 INJECTION, SOLUTION INTRAVENOUS at 17:06

## 2024-12-13 RX ADMIN — ACETAMINOPHEN, ASPIRIN, CAFFEINE 1 TABLET: 250; 65; 250 TABLET, FILM COATED ORAL at 18:43

## 2024-12-13 RX ADMIN — ENOXAPARIN SODIUM 40 MG: 100 INJECTION SUBCUTANEOUS at 08:11

## 2024-12-13 RX ADMIN — METHADONE HYDROCHLORIDE 85 MG: 10 TABLET ORAL at 08:12

## 2024-12-13 RX ADMIN — IMMUNE GLOBULIN (HUMAN) 20 G: 10 INJECTION INTRAVENOUS; SUBCUTANEOUS at 19:20

## 2024-12-13 RX ADMIN — PANTOPRAZOLE SODIUM 40 MG: 40 TABLET, DELAYED RELEASE ORAL at 05:32

## 2024-12-13 ASSESSMENT — PAIN DESCRIPTION - LOCATION
LOCATION: HEAD
LOCATION: BACK;SHOULDER

## 2024-12-13 ASSESSMENT — PAIN SCALES - GENERAL
PAINLEVEL_OUTOF10: 8
PAINLEVEL_OUTOF10: 9

## 2024-12-13 ASSESSMENT — PAIN - FUNCTIONAL ASSESSMENT: PAIN_FUNCTIONAL_ASSESSMENT: ACTIVITIES ARE NOT PREVENTED

## 2024-12-13 ASSESSMENT — PAIN DESCRIPTION - DESCRIPTORS
DESCRIPTORS: ACHING
DESCRIPTORS: ACHING;DISCOMFORT;SHOOTING

## 2024-12-13 ASSESSMENT — PAIN DESCRIPTION - ORIENTATION: ORIENTATION: RIGHT;LEFT

## 2024-12-13 NOTE — PROGRESS NOTES
Occupational Therapy  Facility/Department: [address] MED SURG  Occupational Therapy Daily Treatment Note    Name: Sakina Ballesteros  : 1985  MRN: [id number]  Date of Service: 2024    Discharge Recommendations:  24 hour supervision or assist, Home with Home health OT  OT Equipment Recommendations  Equipment Needed: Yes  Mobility Devices: ADL Assistive Devices  ADL Assistive Devices: Transfer Tub Bench    Sakina Ballesetros scored a 19/24 on the AM-PAC ADL Inpatient form. Current research shows that an AM-PAC score of 18 or greater is typically associated with a discharge to the patient's home setting. Based on the patient's AM-PAC score, and their current ADL deficits, it is recommended that the patient have 2-3 sessions per week of Occupational Therapy at d/c to increase the patient's independence.  At this time, this patient demonstrates the endurance and safety to discharge home with 24 hour assist and a HHOT follow up treatment frequency of 2-3x/wk.   Please see assessment section for further patient specific details.    If patient discharges prior to next session this note will serve as a discharge summary.  Please see below for the latest assessment towards goals.         Patient Diagnosis(es): The primary encounter diagnosis was Leg numbness. Diagnoses of Neuroforaminal stenosis of lumbar spine and Rectal bleeding were also pertinent to this visit.  Past Medical History:  has a past medical history of Alcohol use disorder, Gastroparesis, GERD (gastroesophageal reflux disease), Hepatic cirrhosis (HCC), Mood disorder (HCC), Opioid use disorder, Overweight, Scoliosis, and Tobacco use disorder.  Past Surgical History:  has a past surgical history that includes Esophagogastroduodenoscopy (); Dental surgery; Upper gastrointestinal endoscopy (N/A, 2023); Upper gastrointestinal endoscopy (N/A, 11/15/2023); and sigmoidoscopy (N/A, 12/10/2024).    Treatment Diagnosis: impaired strength B LE's>UE's, balance,

## 2024-12-13 NOTE — PROGRESS NOTES
Department of Physical Medicine & Rehabilitation  Progress Note    Patient Identification:  Sakina Ballesteros  [id number]  : 1985  Admit date: 2024    Chief Complaint: Mass of perirectal soft tissue    Subjective:   No acute events overnight.   Patient with ongoing weakness and paresthesias affecitng legs > arms. Her goal remains to return home. She feels her children (18 and 19 yo ) will be able to help her negotiate stairs.   Labs reviewed.     ROS: No f/c, n/v, cp     Objective:  Patient Vitals for the past 24 hrs:   BP Temp Temp src Pulse Resp SpO2 Weight   24 0756 105/68 98.1 °F (36.7 °C) Oral 67 16 98 % --   24 0600 -- -- -- -- -- -- 61.2 kg (134 lb 14.7 oz)   24 1946 130/81 98.1 °F (36.7 °C) Oral 74 16 98 % --     Const: Alert. No distress, pleasant.   HEENT: Normocephalic, atraumatic. Normal sclera/conjunctiva. MMM.   CV: Regular rate and rhythm.   Resp: No respiratory distress.   Abd:  nondistended  Ext: No edema.   Neuro: Alert, oriented, appropriately interactive. Decreased sensation BLE and ulnar aspect left hand. Motor strength 4/5 BUE, 3/5 BLE. Impaired coordination in BUE.   Psych: Cooperative, appropriate mood and affect    Laboratory data: Available via EMR.   Last 24 hour lab  Recent Results (from the past 24 hour(s))   CBC    Collection Time: 24  5:22 AM   Result Value Ref Range    WBC 4.9 4.0 - 11.0 K/uL    RBC 3.22 (L) 4.00 - 5.20 M/uL    Hemoglobin 10.8 (L) 12.0 - 16.0 g/dL    Hematocrit 31.8 (L) 36.0 - 48.0 %    MCV 98.8 80.0 - 100.0 fL    MCH 33.6 26.0 - 34.0 pg    MCHC 34.0 31.0 - 36.0 g/dL    RDW 16.4 (H) 12.4 - 15.4 %    Platelets 140 135 - 450 K/uL    MPV 9.7 5.0 - 10.5 fL   Basic Metabolic Panel w/ Reflex to MG    Collection Time: 24  5:22 AM   Result Value Ref Range    Sodium 135 (L) 136 - 145 mmol/L    Potassium reflex Magnesium 4.5 3.5 - 5.1 mmol/L    Chloride 102 99 - 110 mmol/L    CO2 25 21 - 32 mmol/L    Anion Gap 8 3 - 16    Glucose 105 (H)

## 2024-12-13 NOTE — PROGRESS NOTES
Pupils are equal, round, reactive bilaterally.   CN7: face symmetric without dysarthria  CN8: hearing grossly intact  CN11: trap full strength on shoulder shrug  CN12: tongue midline with protrusion  Strength: 4+/5 BUE.  Roughly 3-4/5 BLE.  4/5 knee flexion extension.  3-4/5 dorsi and plantar flexion.    Deep tendon reflexes: absent patellars.  1+ bicep brachioradialis bilaterally.    Sensory: BLE sensory deficit ascending, L ulnar distribution sensory deficit.    Cerebellar/coordination: did miss target a bit with FNF.    Tone: normal in all 4 extremities  Gait: deferred for safety given weakness.      Labs  ESR 11  RPR negative  HIV negative  TSH Reflex FT4 - 2.70  JACKIE negative  Folate 8.19  B12 257    UA moderate LE, 79 WBC, 4+ bacteria.  Culture non specific.   Pregnancy negative    Amphetamine Screen, Ur Negative <1000ng/mL  POSITIVE !   Barbiturate Screen, Ur Negative <200 ng/mL  POSITIVE !   Benzodiazepine Screen, Urine Negative <200 ng/mL  POSITIVE !   Cannabinoid Scrn, Ur Negative <50 ng/mL  POSITIVE !   Methadone Screen, Urine Negative <300 ng/mL  POSITIVE !   Opiate Screen, Urine Negative <300 ng/mL  POSITIVE !     CSF  Protein 58  Glucose 72    WBC 2  RBC 0    Meningitis panel negative    Studies  EEG 12/11/24  EEG DIAGNOSIS: Essentially normal EEG which was performed predominantly during sleep.     MRI brain w/wo 12/11/24  IMPRESSION:  Motion artifact mildly degrades the images.  Mild cerebral atrophy.  No acute  brain parenchymal abnormality.    MRI cervical spine w/wo 12/11/24  IMPRESSION:  Unremarkable cervical spine MRI.    MRI thoracic spine w/wo 12/11/24  IMPRESSION:  Unremarkable thoracic spine MRI.    MRI lumbar spine w/wo 12/9/24  IMPRESSION:  1. No findings to suggest acute discitis/osteomyelitis or septic facet  arthropathy.  2. Severe left and mild right neural foraminal narrowing at L5-S1.  3. No significant spinal canal stenosis.    Impression/Recommendations  Progressive BLE  weakness/numbness.   Urinary incontinence.    Urinary tract infection.   Abnormal drug screen.    ?nocturnal seizure like activity.    Hx substance/?alcohol use.     The patient has been experiencing progressive weakness and paresthesias.  She has had an episode of incontinence.      CNS imaging was w/out any findings to explain this.     CSF obtained yesterday did show an elevated protein w/ normal WBC count (albuminocytologic dissociation) which would be consistent w/ an autoimmune polyneuropathy (ie GBS).      Continue IVIg as scheduled.  Today will be 2/5.    Supplement B12 to > 400.     Rehabilitation efforts.      Leon Bingham NP  [facility]    A copy of this note was provided for Abhilash Bazzi MD

## 2024-12-13 NOTE — PROGRESS NOTES
V2.0  [facility] [place] [facility] Hospitalist Progress Note      Name:  Sakina Ballesteros /Age/Sex: 1985  (38 y.o. female)   MRN & CSN:  [id number] & [id number] Encounter Date/Time: 2024 1:19 PM EST    Location:  [id number] PCP: Rafael Borden DO       Hospital Day: 5  Subjective:   Chief Complaint: Follow-up lower extremity weakness    Patient seen and evaluated at bedside, she continues to complain of bilateral lower extremity weakness, she little emotional and tearful today    Review of Systems:    Review of Systems    10 point ROS negative except as stated above in \"subjective\" section    Objective:     Intake/Output Summary (Last 24 hours) at 2024 1319  Last data filed at 2024 1845  Gross per 24 hour   Intake 600 ml   Output --   Net 600 ml      Vitals:   Vitals:    24 0756   BP: 105/68   Pulse: 67   Resp: 16   Temp: 98.1 °F (36.7 °C)   SpO2: 98%     Physical Exam:   General: Awake, alert and oriented, NAD  Cardiovascular: S1S2 present, regular rate and rhythm, no murmurs  Respiratory: Clear to auscultation  Gastrointestinal: Soft, non tender, positive bowel sounds   Genitourinary: no suprapubic tenderness  Musculoskeletal: No edema, able to raise her legs against gravity, barely, strength probably is 4+/5 bilaterally lower extremities  Neuro: Alert.    Medications:     Medications:    traZODone  200 mg Oral Nightly    immune globulin  5 g IntraVENous Daily    And    immune globulin  20 g IntraVENous Daily    methadone  85 mg Oral Daily    vitamin B-12  50 mcg Oral Daily    pantoprazole  40 mg Oral BID AC    folic acid  1 mg Oral Daily    sodium chloride flush  5-40 mL IntraVENous 2 times per day    enoxaparin  40 mg SubCUTAneous Daily      Infusions:    sodium chloride       PRN Meds: oxyCODONE, 5 mg, Q4H PRN  aspirin-acetaminophen-caffeine, 1 tablet, Q6H PRN  ketorolac, 30 mg, Q6H PRN  amitriptyline, 25 mg, Nightly PRN  sodium chloride flush, 5-40 mL, PRN  sodium chloride, , PRN  potassium  chloride, 40 mEq, PRN   Or  potassium alternative oral replacement, 40 mEq, PRN   Or  potassium chloride, 10 mEq, PRN  magnesium sulfate, 2,000 mg, PRN  ondansetron, 4 mg, Q8H PRN   Or  ondansetron, 4 mg, Q6H PRN  polyethylene glycol, 17 g, Daily PRN  acetaminophen, 650 mg, Q6H PRN   Or  acetaminophen, 650 mg, Q6H PRN        Labs      Recent Results (from the past 24 hour(s))   Cell Count with Differential, CSF    Collection Time: 12/12/24  3:00 PM   Result Value Ref Range    Color, CSF Colorless Colorless    Appearance, CSF Clear Clear    Tube Number + CELL CT + DIFF-CSF Tube 1     Clot Evaluation CSF see below     WBC, CSF 2 0 - 5 /cumm    RBC, CSF 0 0 /cumm    Volume Csf 9.0 mL    No differential CSF see below    Glucose, CSF    Collection Time: 12/12/24  3:00 PM   Result Value Ref Range    Glucose, CSF 72 40 - 80 mg/dL   Protein, CSF    Collection Time: 12/12/24  3:00 PM   Result Value Ref Range    Protein, CSF 58 (H) 15 - 45 mg/dL   Culture, CSF (with Gram Stain)    Collection Time: 12/12/24  3:00 PM    Specimen: CSF   Result Value Ref Range    Gram Stain Result No Cells seen  No organisms seen      Multiple sclerosis profile    Collection Time: 12/12/24  3:00 PM   Result Value Ref Range    Protein, CSF 58 (H) 15 - 45 mg/dL    Albumin, CSF 35.5 (H) 10.0 - 30.0 mg/dL    IgG, CSF 8.22 (H) 0.00 - 6.00 mg/dL    Albumin 3,169 (L) 3,400 - 5,000 mg/dL    IgG 1479.0 700.0 - 1600.0 mg/dL    IgG Index 0.50 0.20 - 0.70    IgG Synthesis Rate, CSF -0.7 -9.9 - 3.3 mg/day   Meningitis/Encephalitis Panel, CSF    Collection Time: 12/12/24  3:00 PM    Specimen: CSF   Result Value Ref Range    Meningitis Encephalitis Panel       Meningitis Encephalitis Panel PCR Result: Not Detected  Patients with a suspicion of cryptococcal meningitis should be tested  for cryptococcal antigen.\"  See additional report for complete Meningitis Encephalitis Panel.     Meningitis Encephalitis Panel Report    Collection Time: 12/12/24  3:00 PM

## 2024-12-13 NOTE — PLAN OF CARE
Problem: Discharge Planning  Goal: Discharge to home or other facility with appropriate resources  12/13/2024 1302 by Gilda Sena RN  Outcome: Progressing  12/13/2024 0046 by Khatiwada, Swastika, RN  Outcome: Progressing     Problem: Safety - Adult  Goal: Free from fall injury  12/13/2024 1302 by Gilda Sena RN  Outcome: Progressing  12/13/2024 0046 by Khatiwada, Swastika, RN  Outcome: Progressing     Problem: Pain  Goal: Verbalizes/displays adequate comfort level or baseline comfort level  12/13/2024 1302 by Gilda Sena RN  Outcome: Progressing  12/13/2024 0046 by Khatiwada, Swastika, RN  Outcome: Progressing     Problem: Skin/Tissue Integrity  Goal: Absence of new skin breakdown  Description: 1.  Monitor for areas of redness and/or skin breakdown  2.  Assess vascular access sites hourly  3.  Every 4-6 hours minimum:  Change oxygen saturation probe site  4.  Every 4-6 hours:  If on nasal continuous positive airway pressure, respiratory therapy assess nares and determine need for appliance change or resting period.  12/13/2024 1302 by Gilda Sena RN  Outcome: Progressing  12/13/2024 0046 by Khatiwada, Swastika, RN  Outcome: Progressing     Problem: Musculoskeletal - Adult  Goal: Return mobility to safest level of function  12/13/2024 1302 by Gilda Sena RN  Outcome: Progressing  12/13/2024 0046 by Khatiwada, Swastika, RN  Outcome: Progressing  Goal: Maintain proper alignment of affected body part  12/13/2024 1302 by Gilda Sena RN  Outcome: Progressing  12/13/2024 0046 by Khatiwada, Swastika, RN  Outcome: Progressing  Goal: Return ADL status to a safe level of function  12/13/2024 1302 by Gilda Sena RN  Outcome: Progressing  12/13/2024 0046 by Khatiwada, Swastika, RN  Outcome: Progressing     Problem: Gastrointestinal - Adult  Goal: Maintains or returns to baseline bowel function  12/13/2024 1302 by Gilda Sena RN  Outcome: Progressing  12/13/2024 0046 by Khatiwada, Swastika,

## 2024-12-13 NOTE — PLAN OF CARE
Problem: Discharge Planning  Goal: Discharge to home or other facility with appropriate resources  Outcome: Progressing     Problem: Safety - Adult  Goal: Free from fall injury  Outcome: Progressing     Problem: Pain  Goal: Verbalizes/displays adequate comfort level or baseline comfort level  Outcome: Progressing     Problem: Skin/Tissue Integrity  Goal: Absence of new skin breakdown  Description: 1.  Monitor for areas of redness and/or skin breakdown  2.  Assess vascular access sites hourly  3.  Every 4-6 hours minimum:  Change oxygen saturation probe site  4.  Every 4-6 hours:  If on nasal continuous positive airway pressure, respiratory therapy assess nares and determine need for appliance change or resting period.  Outcome: Progressing     Problem: Musculoskeletal - Adult  Goal: Return mobility to safest level of function  Outcome: Progressing  Goal: Maintain proper alignment of affected body part  Outcome: Progressing  Goal: Return ADL status to a safe level of function  Outcome: Progressing     Problem: Gastrointestinal - Adult  Goal: Maintains or returns to baseline bowel function  Outcome: Progressing  Goal: Maintains adequate nutritional intake  Outcome: Progressing

## 2024-12-13 NOTE — PROGRESS NOTES
Physical Therapy  Facility/Department: [address] MED SURG  Daily Treatment Note - [facility]  NAME: Sakina Ballesteros  : 1985  MRN: [id number]    Date of Service: 2024    Discharge Recommendations:  Continue to assess pending progress, IP Rehab, Patient would benefit from continued therapy after discharge, Outpatient PT, 24 hour supervision or assist   PT Equipment Recommendations  Equipment Needed: No    Patient Diagnosis(es): The primary encounter diagnosis was Leg numbness. Diagnoses of Neuroforaminal stenosis of lumbar spine and Rectal bleeding were also pertinent to this visit.    Assessment  Assessment: Pt able to complete transfers CGA, ambulation household distances with RW CGA, and ascend/descend 5 steps with (B) HR, Min A.  Standing tolerance limited by fatigue, but she did well to maintain balance throughout.  She would likely benefit from continued therapy at ARU, but she is not interested in this. She is intent upon returning home, and going to OP PT.  She will need physical lifting assistance to ascend/descend the steps into her home.  Recommend continued therapy to maximize her strength, balance, endurance, independence ambulating.  Also recommend OPPT to address these deficits.    Sakina Ballesteros scored a 17/24 on the AM-PAC short mobility form. Current research shows that an AM-PAC score of 17 or less is typically not associated with a discharge to the patient's home setting. Based on the patient's AM-PAC score and their current functional mobility deficits, it is recommended that the patient have 5-7 sessions per week of Physical Therapy at d/c to increase the patient's independence.  At this time, this patient demonstrates complex nursing, medical, and rehabilitative needs, and would benefit from intensive rehabilitation services upon discharge from the Inpatient setting.  This patient demonstrates the ability to participate in and benefit from an intensive therapy program with a coordinated

## 2024-12-14 LAB
ANION GAP SERPL CALCULATED.3IONS-SCNC: 9 MMOL/L (ref 3–16)
BUN SERPL-MCNC: 9 MG/DL (ref 7–20)
CALCIUM SERPL-MCNC: 8.4 MG/DL (ref 8.3–10.6)
CHLORIDE SERPL-SCNC: 103 MMOL/L (ref 99–110)
CO2 SERPL-SCNC: 26 MMOL/L (ref 21–32)
CREAT SERPL-MCNC: 0.6 MG/DL (ref 0.6–1.1)
DEPRECATED RDW RBC AUTO: 16.4 % (ref 12.4–15.4)
GFR SERPLBLD CREATININE-BSD FMLA CKD-EPI: >90 ML/MIN/{1.73_M2}
GLUCOSE SERPL-MCNC: 122 MG/DL (ref 70–99)
HCT VFR BLD AUTO: 34 % (ref 36–48)
HGB BLD-MCNC: 11.4 G/DL (ref 12–16)
MCH RBC QN AUTO: 33.5 PG (ref 26–34)
MCHC RBC AUTO-ENTMCNC: 33.4 G/DL (ref 31–36)
MCV RBC AUTO: 100.2 FL (ref 80–100)
PLATELET # BLD AUTO: 129 K/UL (ref 135–450)
PMV BLD AUTO: 9.3 FL (ref 5–10.5)
POTASSIUM SERPL-SCNC: 4.4 MMOL/L (ref 3.5–5.1)
RBC # BLD AUTO: 3.39 M/UL (ref 4–5.2)
SODIUM SERPL-SCNC: 138 MMOL/L (ref 136–145)
WBC # BLD AUTO: 3.5 K/UL (ref 4–11)

## 2024-12-14 PROCEDURE — 6360000002 HC RX W HCPCS: Performed by: NURSE PRACTITIONER

## 2024-12-14 PROCEDURE — 6370000000 HC RX 637 (ALT 250 FOR IP): Performed by: HOSPITALIST

## 2024-12-14 PROCEDURE — 1200000000 HC SEMI PRIVATE

## 2024-12-14 PROCEDURE — 6370000000 HC RX 637 (ALT 250 FOR IP)

## 2024-12-14 PROCEDURE — 85027 COMPLETE CBC AUTOMATED: CPT

## 2024-12-14 PROCEDURE — 6360000002 HC RX W HCPCS

## 2024-12-14 PROCEDURE — 86043 ACETYLCHOLN RCPTR MODLG ANTB: CPT

## 2024-12-14 PROCEDURE — 94760 N-INVAS EAR/PLS OXIMETRY 1: CPT

## 2024-12-14 PROCEDURE — 86041 ACETYLCHOLN RCPTR BNDNG ANTB: CPT

## 2024-12-14 PROCEDURE — 36415 COLL VENOUS BLD VENIPUNCTURE: CPT

## 2024-12-14 PROCEDURE — 86042 ACETYLCHOLN RCPTR BLCKG ANTB: CPT

## 2024-12-14 PROCEDURE — 2580000003 HC RX 258

## 2024-12-14 PROCEDURE — 80048 BASIC METABOLIC PNL TOTAL CA: CPT

## 2024-12-14 RX ADMIN — PANTOPRAZOLE SODIUM 40 MG: 40 TABLET, DELAYED RELEASE ORAL at 07:04

## 2024-12-14 RX ADMIN — IMMUNE GLOBULIN (HUMAN) 5 G: 10 INJECTION INTRAVENOUS; SUBCUTANEOUS at 22:01

## 2024-12-14 RX ADMIN — FOLIC ACID 1 MG: 1 TABLET ORAL at 09:22

## 2024-12-14 RX ADMIN — Medication 50 MCG: at 09:22

## 2024-12-14 RX ADMIN — OXYCODONE 5 MG: 5 TABLET ORAL at 17:24

## 2024-12-14 RX ADMIN — ENOXAPARIN SODIUM 40 MG: 100 INJECTION SUBCUTANEOUS at 09:24

## 2024-12-14 RX ADMIN — TRAZODONE HYDROCHLORIDE 200 MG: 100 TABLET ORAL at 21:59

## 2024-12-14 RX ADMIN — OXYCODONE 5 MG: 5 TABLET ORAL at 21:59

## 2024-12-14 RX ADMIN — SODIUM CHLORIDE, PRESERVATIVE FREE 10 ML: 5 INJECTION INTRAVENOUS at 09:24

## 2024-12-14 RX ADMIN — OXYCODONE 5 MG: 5 TABLET ORAL at 13:33

## 2024-12-14 RX ADMIN — IMMUNE GLOBULIN (HUMAN) 20 G: 10 INJECTION INTRAVENOUS; SUBCUTANEOUS at 19:53

## 2024-12-14 RX ADMIN — METHADONE HYDROCHLORIDE 85 MG: 10 TABLET ORAL at 09:22

## 2024-12-14 RX ADMIN — PANTOPRAZOLE SODIUM 40 MG: 40 TABLET, DELAYED RELEASE ORAL at 17:24

## 2024-12-14 RX ADMIN — OXYCODONE 5 MG: 5 TABLET ORAL at 09:22

## 2024-12-14 ASSESSMENT — PAIN - FUNCTIONAL ASSESSMENT
PAIN_FUNCTIONAL_ASSESSMENT: ACTIVITIES ARE NOT PREVENTED

## 2024-12-14 ASSESSMENT — PAIN DESCRIPTION - LOCATION
LOCATION: BACK

## 2024-12-14 ASSESSMENT — PAIN DESCRIPTION - PAIN TYPE
TYPE: ACUTE PAIN

## 2024-12-14 ASSESSMENT — PAIN DESCRIPTION - ONSET
ONSET: ON-GOING

## 2024-12-14 ASSESSMENT — PAIN DESCRIPTION - DESCRIPTORS
DESCRIPTORS: ACHING

## 2024-12-14 ASSESSMENT — PAIN SCALES - GENERAL
PAINLEVEL_OUTOF10: 8
PAINLEVEL_OUTOF10: 9
PAINLEVEL_OUTOF10: 6
PAINLEVEL_OUTOF10: 4
PAINLEVEL_OUTOF10: 4

## 2024-12-14 ASSESSMENT — PAIN DESCRIPTION - FREQUENCY
FREQUENCY: INTERMITTENT

## 2024-12-14 ASSESSMENT — PAIN DESCRIPTION - ORIENTATION
ORIENTATION: LOWER

## 2024-12-14 NOTE — PLAN OF CARE
Problem: Discharge Planning  Goal: Discharge to home or other facility with appropriate resources  12/13/2024 2239 by Khatiwada, Swastika, RN  Outcome: Progressing  12/13/2024 1302 by Gilda Sena RN  Outcome: Progressing     Problem: Safety - Adult  Goal: Free from fall injury  12/13/2024 2239 by Khatiwada, Swastika, RN  Outcome: Progressing  12/13/2024 1302 by Gilda Sena RN  Outcome: Progressing     Problem: Pain  Goal: Verbalizes/displays adequate comfort level or baseline comfort level  12/13/2024 2239 by Khatiwada, Swastika, RN  Outcome: Progressing  12/13/2024 1302 by Gilda Sena RN  Outcome: Progressing     Problem: Skin/Tissue Integrity  Goal: Absence of new skin breakdown  Description: 1.  Monitor for areas of redness and/or skin breakdown  2.  Assess vascular access sites hourly  3.  Every 4-6 hours minimum:  Change oxygen saturation probe site  4.  Every 4-6 hours:  If on nasal continuous positive airway pressure, respiratory therapy assess nares and determine need for appliance change or resting period.  12/13/2024 2239 by Khatiwada, Swastika, RN  Outcome: Progressing  12/13/2024 1302 by Gilda Sena RN  Outcome: Progressing     Problem: Musculoskeletal - Adult  Goal: Return mobility to safest level of function  12/13/2024 2239 by Khatiwada, Swastika, RN  Outcome: Progressing  12/13/2024 1302 by Gilda Sena RN  Outcome: Progressing  Goal: Maintain proper alignment of affected body part  12/13/2024 2239 by Khatiwada, Swastika, RN  Outcome: Progressing  12/13/2024 1302 by Gilda Sena RN  Outcome: Progressing  Goal: Return ADL status to a safe level of function  12/13/2024 2239 by Khatiwada, Swastika, RN  Outcome: Progressing  12/13/2024 1302 by Gilda Sena RN  Outcome: Progressing     Problem: Gastrointestinal - Adult  Goal: Maintains or returns to baseline bowel function  12/13/2024 2239 by Khatiwada, Swastika, RN  Outcome: Progressing  12/13/2024 1302 by Gilda Sena

## 2024-12-14 NOTE — PROGRESS NOTES
Neurology Progress Note    Updates  Stable.     Medical History:  Past Medical History:   Diagnosis Date    Alcohol use disorder     Gastroparesis     GERD (gastroesophageal reflux disease)     Hepatic cirrhosis (HCC)     Mood disorder (HCC)     Opioid use disorder     Overweight     Scoliosis     Tobacco use disorder      Past Surgical History:   Procedure Laterality Date    DENTAL SURGERY      ESOPHAGOGASTRODUODENOSCOPY      SIGMOIDOSCOPY N/A 12/10/2024    SIGMOIDOSCOPY DIAGNOSTIC FLEXIBLE performed by Jose Macario MD at [facility] ENDOSCOPY    UPPER GASTROINTESTINAL ENDOSCOPY N/A 2023    EGD BIOPSY performed by Lam Templeton MD at [facility] ENDOSCOPY    UPPER GASTROINTESTINAL ENDOSCOPY N/A 11/15/2023    EGD VARICEAL BANDING performed by Shreyas Matos MD at [facility] ENDOSCOPY     Scheduled Meds:   traZODone  200 mg Oral Nightly    immune globulin  5 g IntraVENous Daily    And    immune globulin  20 g IntraVENous Daily    methadone  85 mg Oral Daily    vitamin B-12  50 mcg Oral Daily    pantoprazole  40 mg Oral BID AC    folic acid  1 mg Oral Daily    sodium chloride flush  5-40 mL IntraVENous 2 times per day    enoxaparin  40 mg SubCUTAneous Daily     Medications Prior to Admission:   [] methylPREDNISolone (MEDROL, AILEEN,) 4 MG tablet, Take 1 tablet by mouth See Admin Instructions for 6 days Take by mouth.  ondansetron (ZOFRAN-ODT) 4 MG disintegrating tablet, Take 1 tablet by mouth every 8 hours as needed for Nausea or Vomiting  traZODone (DESYREL) 100 MG tablet, Take 2 tablets by mouth nightly  pantoprazole (PROTONIX) 40 MG tablet, Take 1 tablet by mouth 2 times daily (before meals)  METHADONE HCL PO, Take 87 mg by mouth daily  thiamine mononitrate (THIAMINE) 100 MG tablet, Take 1 tablet by mouth daily  propranolol (INDERAL) 20 MG tablet, Take 1 tablet by mouth 3 times daily (Patient not taking: Reported on 12/10/2024)  spironolactone (ALDACTONE) 50 MG tablet, Take 1 tablet by mouth daily (Patient  not taking: Reported on 12/10/2024)  folic acid (FOLVITE) 1 MG tablet, Take 1 tablet by mouth daily (Patient not taking: Reported on 12/10/2024)    Allergies   Allergen Reactions    Iodine Rash     Family History   Problem Relation Age of Onset    Hypertension Father     Hypertension Brother      Social History     Tobacco Use   Smoking Status Every Day    Current packs/day: 1.00    Types: Cigarettes   Smokeless Tobacco Never     Social History     Substance and Sexual Activity   Drug Use Yes    Types: Marijuana (Weed)    Comment: 9/19/24 Marijuana, meth, pcp, benzo     Social History     Substance and Sexual Activity   Alcohol Use Yes    Comment: a fifth of vodka every day     ROS  Constitutional- No weight loss or fevers  Eyes- No diplopia. No photophobia.  Ears/nose/throat- No dysphagia. No Dysarthria  Cardiovascular- No palpitations. No chest pain  Respiratory- No dyspnea. No Cough  Gastrointestinal- No Abdominal pain. No Vomiting.  Genitourinary- No incontinence. No urinary retention  Musculoskeletal- No myalgia. No arthralgia  Skin- No rash. No easy bruising.  Psychiatric- No depression. No anxiety  Endocrine- No diabetes. No thyroid issues.  Hematologic- No bleeding difficulty. No fatigue  Neurologic- + weakness. no Headache.    Exam  Completed by attending neurologist.      Labs  ESR 11  RPR negative  HIV negative  TSH Reflex FT4 - 2.70  JACKIE negative  Folate 8.19  B12 257    UA moderate LE, 79 WBC, 4+ bacteria.  Culture non specific.   Pregnancy negative    Amphetamine Screen, Ur Negative <1000ng/mL  POSITIVE !   Barbiturate Screen, Ur Negative <200 ng/mL  POSITIVE !   Benzodiazepine Screen, Urine Negative <200 ng/mL  POSITIVE !   Cannabinoid Scrn, Ur Negative <50 ng/mL  POSITIVE !   Methadone Screen, Urine Negative <300 ng/mL  POSITIVE !   Opiate Screen, Urine Negative <300 ng/mL  POSITIVE !     CSF  Protein 58  Glucose 72    WBC 2  RBC 0    Meningitis panel negative  Culture NG  Cytology negative

## 2024-12-15 LAB — VIT B1 SERPL-MCNC: 7 NMOL/L (ref 4–15)

## 2024-12-15 PROCEDURE — 6360000002 HC RX W HCPCS

## 2024-12-15 PROCEDURE — 6370000000 HC RX 637 (ALT 250 FOR IP)

## 2024-12-15 PROCEDURE — 6370000000 HC RX 637 (ALT 250 FOR IP): Performed by: HOSPITALIST

## 2024-12-15 PROCEDURE — 2580000003 HC RX 258

## 2024-12-15 PROCEDURE — 6360000002 HC RX W HCPCS: Performed by: NURSE PRACTITIONER

## 2024-12-15 PROCEDURE — 1200000000 HC SEMI PRIVATE

## 2024-12-15 PROCEDURE — 94760 N-INVAS EAR/PLS OXIMETRY 1: CPT

## 2024-12-15 RX ADMIN — PANTOPRAZOLE SODIUM 40 MG: 40 TABLET, DELAYED RELEASE ORAL at 15:01

## 2024-12-15 RX ADMIN — OXYCODONE 5 MG: 5 TABLET ORAL at 23:50

## 2024-12-15 RX ADMIN — METHADONE HYDROCHLORIDE 85 MG: 10 TABLET ORAL at 09:14

## 2024-12-15 RX ADMIN — Medication 50 MCG: at 09:14

## 2024-12-15 RX ADMIN — IMMUNE GLOBULIN (HUMAN) 20 G: 10 INJECTION INTRAVENOUS; SUBCUTANEOUS at 23:33

## 2024-12-15 RX ADMIN — FOLIC ACID 1 MG: 1 TABLET ORAL at 09:15

## 2024-12-15 RX ADMIN — SODIUM CHLORIDE, PRESERVATIVE FREE 10 ML: 5 INJECTION INTRAVENOUS at 09:15

## 2024-12-15 RX ADMIN — ENOXAPARIN SODIUM 40 MG: 100 INJECTION SUBCUTANEOUS at 09:16

## 2024-12-15 RX ADMIN — OXYCODONE 5 MG: 5 TABLET ORAL at 15:01

## 2024-12-15 RX ADMIN — TRAZODONE HYDROCHLORIDE 200 MG: 100 TABLET ORAL at 22:03

## 2024-12-15 RX ADMIN — OXYCODONE 5 MG: 5 TABLET ORAL at 05:25

## 2024-12-15 RX ADMIN — PANTOPRAZOLE SODIUM 40 MG: 40 TABLET, DELAYED RELEASE ORAL at 05:22

## 2024-12-15 RX ADMIN — SODIUM CHLORIDE, PRESERVATIVE FREE 10 ML: 5 INJECTION INTRAVENOUS at 22:03

## 2024-12-15 RX ADMIN — OXYCODONE 5 MG: 5 TABLET ORAL at 09:15

## 2024-12-15 RX ADMIN — OXYCODONE 5 MG: 5 TABLET ORAL at 19:45

## 2024-12-15 RX ADMIN — IMMUNE GLOBULIN (HUMAN) 5 G: 10 INJECTION INTRAVENOUS; SUBCUTANEOUS at 21:59

## 2024-12-15 ASSESSMENT — PAIN DESCRIPTION - DESCRIPTORS
DESCRIPTORS: ACHING
DESCRIPTORS: ACHING;DULL;STABBING;THROBBING
DESCRIPTORS: ACHING;SHARP;STABBING;THROBBING
DESCRIPTORS: STABBING;SHARP

## 2024-12-15 ASSESSMENT — PAIN DESCRIPTION - ONSET
ONSET: ON-GOING

## 2024-12-15 ASSESSMENT — PAIN SCALES - GENERAL
PAINLEVEL_OUTOF10: 8
PAINLEVEL_OUTOF10: 2
PAINLEVEL_OUTOF10: 9
PAINLEVEL_OUTOF10: 9
PAINLEVEL_OUTOF10: 4
PAINLEVEL_OUTOF10: 8
PAINLEVEL_OUTOF10: 9

## 2024-12-15 ASSESSMENT — PAIN - FUNCTIONAL ASSESSMENT
PAIN_FUNCTIONAL_ASSESSMENT: PREVENTS OR INTERFERES SOME ACTIVE ACTIVITIES AND ADLS

## 2024-12-15 ASSESSMENT — PAIN DESCRIPTION - PAIN TYPE
TYPE: ACUTE PAIN
TYPE: ACUTE PAIN;CHRONIC PAIN
TYPE: ACUTE PAIN;CHRONIC PAIN
TYPE: ACUTE PAIN

## 2024-12-15 ASSESSMENT — PAIN DESCRIPTION - LOCATION
LOCATION: BACK
LOCATION: BACK;HEAD
LOCATION: BACK
LOCATION: BACK

## 2024-12-15 ASSESSMENT — PAIN DESCRIPTION - ORIENTATION
ORIENTATION: RIGHT;LEFT
ORIENTATION: RIGHT;LOWER;LEFT;UPPER
ORIENTATION: LEFT;RIGHT;MID;LOWER;UPPER
ORIENTATION: LEFT;RIGHT;LOWER

## 2024-12-15 ASSESSMENT — PAIN DESCRIPTION - FREQUENCY
FREQUENCY: INTERMITTENT
FREQUENCY: INTERMITTENT
FREQUENCY: CONTINUOUS
FREQUENCY: CONTINUOUS

## 2024-12-15 NOTE — PROGRESS NOTES
Neurology Progress Note    Updates  No acute events overnight.   Feels essentially the same.      Medical History:  Past Medical History:   Diagnosis Date    Alcohol use disorder     Gastroparesis     GERD (gastroesophageal reflux disease)     Hepatic cirrhosis (HCC)     Mood disorder (HCC)     Opioid use disorder     Overweight     Scoliosis     Tobacco use disorder      Past Surgical History:   Procedure Laterality Date    DENTAL SURGERY      ESOPHAGOGASTRODUODENOSCOPY      SIGMOIDOSCOPY N/A 12/10/2024    SIGMOIDOSCOPY DIAGNOSTIC FLEXIBLE performed by Jose Macario MD at [facility] ENDOSCOPY    UPPER GASTROINTESTINAL ENDOSCOPY N/A 2023    EGD BIOPSY performed by Lam Templeton MD at [facility] ENDOSCOPY    UPPER GASTROINTESTINAL ENDOSCOPY N/A 11/15/2023    EGD VARICEAL BANDING performed by Shreyas Matos MD at [facility] ENDOSCOPY     Scheduled Meds:   traZODone  200 mg Oral Nightly    immune globulin  5 g IntraVENous Daily    And    immune globulin  20 g IntraVENous Daily    methadone  85 mg Oral Daily    vitamin B-12  50 mcg Oral Daily    pantoprazole  40 mg Oral BID AC    folic acid  1 mg Oral Daily    sodium chloride flush  5-40 mL IntraVENous 2 times per day    enoxaparin  40 mg SubCUTAneous Daily     Medications Prior to Admission:   [] methylPREDNISolone (MEDROL, AILEEN,) 4 MG tablet, Take 1 tablet by mouth See Admin Instructions for 6 days Take by mouth.  ondansetron (ZOFRAN-ODT) 4 MG disintegrating tablet, Take 1 tablet by mouth every 8 hours as needed for Nausea or Vomiting  traZODone (DESYREL) 100 MG tablet, Take 2 tablets by mouth nightly  pantoprazole (PROTONIX) 40 MG tablet, Take 1 tablet by mouth 2 times daily (before meals)  METHADONE HCL PO, Take 87 mg by mouth daily  thiamine mononitrate (THIAMINE) 100 MG tablet, Take 1 tablet by mouth daily  propranolol (INDERAL) 20 MG tablet, Take 1 tablet by mouth 3 times daily (Patient not taking: Reported on 12/10/2024)  spironolactone (ALDACTONE) 50

## 2024-12-15 NOTE — PROGRESS NOTES
Radiation Exposure Index: Kerma mGy, 12.67 mGy PROCEDURE: :  [facility] Informed consent was obtained after the risks and benefits of the procedure were discussed with the patient and all questions were answered fully. [place] protocol was observed and a standard timeout was performed. The patient was positioned prone and the back was prepped and draped in the normal sterile fashion. 1% lidocaine was used for local anesthesia. The subarachnoid space was accessed with a 20-gauge 3.5 \"spinal needle at the L4-5 level. Free flow of clear CSF was noted. Approximately 13 ml of CSF was removed and sent for analysis. The stylet was reinserted, spinal needle was removed and brief pressure was applied at the puncture site. There were no immediate complications and the patient tolerated the procedure well. Opening pressure (cm of water): 17     Successful fluoroscopic-guided lumbar puncture.     MRI THORACIC SPINE W WO CONTRAST    Result Date: 12/11/2024  EXAMINATION: MRI OF THE THORACIC SPINE WITHOUT AND WITH CONTRAST  12/11/2024 11:15 am TECHNIQUE: Multiplanar multisequence MRI of the thoracic spine was performed without and with the administration of intravenous contrast. COMPARISON: None HISTORY: ORDERING SYSTEM PROVIDED HISTORY: progressive weakness, paresthesias TECHNOLOGIST PROVIDED HISTORY: Reason for exam:->progressive weakness, paresthesias Reason for Exam: progressive weakness, parasthesias FINDINGS: BONES/ALIGNMENT: There is normal alignment of the spine. The vertebral body heights are maintained. The bone marrow signal appears unremarkable. SPINAL CORD: No abnormal cord signal is seen. SOFT TISSUES:  No abnormal enhancement of the thoracic spine. No paraspinal mass identified. DEGENERATIVE CHANGES: No significant spinal canal stenosis or neural foraminal narrowing of the thoracic spine.     Unremarkable thoracic spine MRI.     MRI CERVICAL SPINE W WO CONTRAST    Result Date: 12/11/2024  EXAMINATION:  reconstruction, and/or weight based adjustment of the mA/kV was utilized to reduce the radiation dose to as low as reasonably achievable. COMPARISON: None. HISTORY: ORDERING SYSTEM PROVIDED HISTORY: fall TECHNOLOGIST PROVIDED HISTORY: Reason for exam:->fall Decision Support Exception - unselect if not a suspected or confirmed emergency medical condition->Emergency Medical Condition (MA) Reason for Exam: freq falls, trouble with legs, seizure, constipation; ORDERING SYSTEM PROVIDED HISTORY: freq falls TECHNOLOGIST PROVIDED HISTORY: Reason for exam:->freq falls Has a \"code stroke\" or \"stroke alert\" been called?->No Decision Support Exception - unselect if not a suspected or confirmed emergency medical condition->Emergency Medical Condition (MA) Reason for Exam: freq falls, trouble with legs, seizure, constipation FINDINGS: BRAIN/VENTRICLES: There is no acute intracranial hemorrhage, mass effect or midline shift.  No abnormal extra-axial fluid collection.  The gray-white differentiation is maintained without evidence of an acute infarct.  There is no evidence of hydrocephalus. ORBITS: The visualized portion of the orbits demonstrate no acute abnormality. SINUSES: The visualized paranasal sinuses and mastoid air cells demonstrate no acute abnormality. SOFT TISSUES/SKULL:  No acute abnormality of the visualized skull or soft tissues. CERVICAL SPINE BONES/ALIGNMENT: There is no acute fracture or traumatic malalignment. DEGENERATIVE CHANGES: No significant degenerative changes.  C1-2 articulation cervical occipital junction are intact. No significant central canal stenosis identified SOFT TISSUES: There is no prevertebral soft tissue swelling.     No acute intracranial abnormality. No acute osseous abnormality of the cervical spine.       Assessment and Plan:   Sakina Ballesteros is a 38 y.o. female     Conditions under treatment:    # Rectal bleed  # Bilateral lower extremity weakness  # Polysubstance abuse  # Asymptomatic

## 2024-12-15 NOTE — PROGRESS NOTES
V2.0  [facility] Hospitalist Progress Note      Name:  Sakina Ballesteros /Age/Sex: 1985  (38 y.o. female)   MRN & CSN:  [id number] & [id number] Encounter Date/Time: 12/15/2024 11:02 AM EST    Location:  [address]- PCP: Rafael Borden DO       Hospital Day: 7  Subjective:   Chief Complaint: Follow-up lower extremity weakness.    Patient was seen and evaluated on , progress note was placed the following day     Review of Systems:    Review of Systems    10 point ROS negative except as stated above in \"subjective\" section    Objective:     Intake/Output Summary (Last 24 hours) at 12/15/2024 1102  Last data filed at 12/15/2024 0521  Gross per 24 hour   Intake 540 ml   Output --   Net 540 ml      Vitals:   Vitals:    12/15/24 0800   BP: 123/78   Pulse: 62   Resp: 16   Temp: 97.8 °F (36.6 °C)   SpO2: 98%     Physical Exam:   General: Awake, alert and oriented, NAD  Cardiovascular: S1S2 present, regular rate and rhythm, no murmurs  Respiratory: Clear to auscultation  Gastrointestinal: Soft, non tender, positive bowel sounds   Genitourinary: no suprapubic tenderness  Musculoskeletal: Is able to lift her legs against gravity chest pain neuro: Alert.    Medications:     Medications:    traZODone  200 mg Oral Nightly    immune globulin  5 g IntraVENous Daily    And    immune globulin  20 g IntraVENous Daily    methadone  85 mg Oral Daily    vitamin B-12  50 mcg Oral Daily    pantoprazole  40 mg Oral BID AC    folic acid  1 mg Oral Daily    sodium chloride flush  5-40 mL IntraVENous 2 times per day    enoxaparin  40 mg SubCUTAneous Daily      Infusions:    sodium chloride 5 mL/hr at 24 1706     PRN Meds: oxyCODONE, 5 mg, Q4H PRN  aspirin-acetaminophen-caffeine, 1 tablet, Q6H PRN  ketorolac, 30 mg, Q6H PRN  amitriptyline, 25 mg, Nightly PRN  sodium chloride flush, 5-40 mL, PRN  sodium chloride, , PRN  potassium chloride, 40 mEq, PRN   Or  potassium alternative oral replacement, 40 mEq, PRN   Or  potassium  chloride, 10 mEq, PRN  magnesium sulfate, 2,000 mg, PRN  ondansetron, 4 mg, Q8H PRN   Or  ondansetron, 4 mg, Q6H PRN  polyethylene glycol, 17 g, Daily PRN  acetaminophen, 650 mg, Q6H PRN   Or  acetaminophen, 650 mg, Q6H PRN        Labs      No results found for this or any previous visit (from the past 24 hour(s)).     Imaging/Diagnostics Last 24 Hours   MRI BRAIN W WO CONTRAST  N o acute abnormality.     Motion artifact mildly degrades the images.  Mild cerebral atrophy.  No acute brain parenchymal abnormality.     IR LUMBAR PUNCTURE FOR DIAGNOSIS    Successful fluoroscopic-guided lumbar puncture.     MRI THORACIC SPINE W WO CONTRAST    Unremarkable thoracic spine MRI.     MRI CERVICAL SPINE W WO CONTRAST    Unremarkable cervical spine MRI.     MRI LUMBAR SPINE W WO CONTRAST    1. No findings to suggest acute discitis/osteomyelitis or septic facet arthropathy. 2. Severe left and mild right neural foraminal narrowing at L5-S1. 3. No significant spinal canal stenosis.     Colonoscopy    Result Date: 12/10/2024  No dictation     CTA ABDOMEN PELVIS W WO CONTRAST    Focal area of enhancement or contrast extravasation in the right perirectal soft tissues which may can for the patient's GI bleed.  Correlation with physical exam findings may be helpful.  This may related to underlying rectal trauma or ulcer. Moderate volume fecal residuals in the colon. Cholelithiasis. Hepatomegaly and hepatic steatosis.. Dilatation of the common bile duct and pancreatic duct.  Nonemergent further evaluation with repeat MRI of the abdomen may be helpful. Somewhat prominent vascular disease for the patient's age. Additional findings noted above.     CT HEAD WO CONTRAST    No acute intracranial abnormality. No acute osseous abnormality of the cervical spine.     CT CERVICAL SPINE WO CONTRAST    No acute intracranial abnormality. No acute osseous abnormality of the cervical spine.       Assessment and Plan:   Sakina Ballesteros is a 38 y.o. female

## 2024-12-16 LAB
ACHR BIND AB SER-SCNC: 0.1 NMOL/L (ref 0–0.4)
MISCELLANEOUS LAB TEST ORDER: NORMAL

## 2024-12-16 PROCEDURE — 6370000000 HC RX 637 (ALT 250 FOR IP): Performed by: HOSPITALIST

## 2024-12-16 PROCEDURE — 1200000000 HC SEMI PRIVATE

## 2024-12-16 PROCEDURE — 97535 SELF CARE MNGMENT TRAINING: CPT

## 2024-12-16 PROCEDURE — 97116 GAIT TRAINING THERAPY: CPT

## 2024-12-16 PROCEDURE — 97110 THERAPEUTIC EXERCISES: CPT

## 2024-12-16 PROCEDURE — 97530 THERAPEUTIC ACTIVITIES: CPT

## 2024-12-16 PROCEDURE — 6370000000 HC RX 637 (ALT 250 FOR IP)

## 2024-12-16 PROCEDURE — 6360000002 HC RX W HCPCS

## 2024-12-16 PROCEDURE — 94760 N-INVAS EAR/PLS OXIMETRY 1: CPT

## 2024-12-16 PROCEDURE — 2580000003 HC RX 258

## 2024-12-16 PROCEDURE — 6360000002 HC RX W HCPCS: Performed by: NURSE PRACTITIONER

## 2024-12-16 RX ORDER — ACETAMINOPHEN 325 MG/1
650 TABLET ORAL EVERY OTHER DAY
Status: DISCONTINUED | OUTPATIENT
Start: 2024-12-18 | End: 2024-12-19

## 2024-12-16 RX ORDER — DIPHENHYDRAMINE HCL 25 MG
25 TABLET ORAL EVERY OTHER DAY
Status: DISCONTINUED | OUTPATIENT
Start: 2024-12-18 | End: 2024-12-19

## 2024-12-16 RX ADMIN — PANTOPRAZOLE SODIUM 40 MG: 40 TABLET, DELAYED RELEASE ORAL at 17:21

## 2024-12-16 RX ADMIN — METHADONE HYDROCHLORIDE 85 MG: 10 TABLET ORAL at 10:08

## 2024-12-16 RX ADMIN — IMMUNE GLOBULIN (HUMAN) 10 G: 10 INJECTION INTRAVENOUS; SUBCUTANEOUS at 21:30

## 2024-12-16 RX ADMIN — Medication 50 MCG: at 10:08

## 2024-12-16 RX ADMIN — TRAZODONE HYDROCHLORIDE 200 MG: 100 TABLET ORAL at 22:41

## 2024-12-16 RX ADMIN — OXYCODONE 5 MG: 5 TABLET ORAL at 10:14

## 2024-12-16 RX ADMIN — OXYCODONE 5 MG: 5 TABLET ORAL at 14:14

## 2024-12-16 RX ADMIN — ENOXAPARIN SODIUM 40 MG: 100 INJECTION SUBCUTANEOUS at 10:09

## 2024-12-16 RX ADMIN — OXYCODONE 5 MG: 5 TABLET ORAL at 22:40

## 2024-12-16 RX ADMIN — SODIUM CHLORIDE, PRESERVATIVE FREE 10 ML: 5 INJECTION INTRAVENOUS at 10:09

## 2024-12-16 RX ADMIN — IMMUNE GLOBULIN (HUMAN) 20 G: 10 INJECTION INTRAVENOUS; SUBCUTANEOUS at 19:47

## 2024-12-16 RX ADMIN — FOLIC ACID 1 MG: 1 TABLET ORAL at 10:09

## 2024-12-16 RX ADMIN — OXYCODONE 5 MG: 5 TABLET ORAL at 05:17

## 2024-12-16 RX ADMIN — OXYCODONE 5 MG: 5 TABLET ORAL at 18:24

## 2024-12-16 RX ADMIN — PANTOPRAZOLE SODIUM 40 MG: 40 TABLET, DELAYED RELEASE ORAL at 05:17

## 2024-12-16 RX ADMIN — SODIUM CHLORIDE, PRESERVATIVE FREE 10 ML: 5 INJECTION INTRAVENOUS at 20:11

## 2024-12-16 ASSESSMENT — PAIN - FUNCTIONAL ASSESSMENT
PAIN_FUNCTIONAL_ASSESSMENT: PREVENTS OR INTERFERES SOME ACTIVE ACTIVITIES AND ADLS
PAIN_FUNCTIONAL_ASSESSMENT: ACTIVITIES ARE NOT PREVENTED
PAIN_FUNCTIONAL_ASSESSMENT: PREVENTS OR INTERFERES SOME ACTIVE ACTIVITIES AND ADLS

## 2024-12-16 ASSESSMENT — PAIN DESCRIPTION - PAIN TYPE
TYPE: ACUTE PAIN
TYPE: ACUTE PAIN

## 2024-12-16 ASSESSMENT — PAIN DESCRIPTION - DESCRIPTORS
DESCRIPTORS: ACHING;THROBBING
DESCRIPTORS: ACHING;STABBING;SHARP
DESCRIPTORS: ACHING;STABBING;THROBBING

## 2024-12-16 ASSESSMENT — PAIN DESCRIPTION - LOCATION
LOCATION: BACK;HIP
LOCATION: BACK
LOCATION: HEAD;BACK;HIP
LOCATION: HIP;BACK

## 2024-12-16 ASSESSMENT — PAIN SCALES - GENERAL
PAINLEVEL_OUTOF10: 10
PAINLEVEL_OUTOF10: 4
PAINLEVEL_OUTOF10: 9
PAINLEVEL_OUTOF10: 9
PAINLEVEL_OUTOF10: 2
PAINLEVEL_OUTOF10: 2
PAINLEVEL_OUTOF10: 3
PAINLEVEL_OUTOF10: 9
PAINLEVEL_OUTOF10: 8
PAINLEVEL_OUTOF10: 9

## 2024-12-16 ASSESSMENT — PAIN DESCRIPTION - ORIENTATION
ORIENTATION: RIGHT;LEFT;LOWER
ORIENTATION: LOWER
ORIENTATION: LOWER;RIGHT;LEFT

## 2024-12-16 ASSESSMENT — PAIN DESCRIPTION - FREQUENCY: FREQUENCY: INTERMITTENT

## 2024-12-16 ASSESSMENT — PAIN DESCRIPTION - ONSET: ONSET: ON-GOING
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
## 2024-12-16 NOTE — PROGRESS NOTES
Occupational Therapy  Facility/Department: [address] MED SURG  Occupational Therapy Daily Note  Name: Sakina Ballesteros  : 1985  MRN: [id number]  Date of Service: 2024    Discharge Recommendations:  Home with Home health OT, 24 hour supervision or assist, Outpatient OT (pt preferring OP OT)  OT Equipment Recommendations  ADL Assistive Devices: Transfer Tub Bench   Sakina Ballesteros scored a 19/24 on the AM-PAC ADL Inpatient form. Current research shows that an AM-PAC score of 18 or greater is typically associated with a discharge to the patient's home setting. Based on the patient's AM-PAC score, and their current ADL deficits, it is recommended that the patient have 2-3 sessions per week of Occupational Therapy at d/c to increase the patient's independence.  At this time, this patient demonstrates the endurance and safety to discharge home with HHOT and a follow up treatment frequency of 2-3x/wk.   Please see assessment section for further patient specific details.    If patient discharges prior to next session this note will serve as a discharge summary.  Please see below for the latest assessment towards goals.      Patient Diagnosis(es): The primary encounter diagnosis was Leg numbness. Diagnoses of Neuroforaminal stenosis of lumbar spine and Rectal bleeding were also pertinent to this visit.  Past Medical History:  has a past medical history of Alcohol use disorder, Gastroparesis, GERD (gastroesophageal reflux disease), Hepatic cirrhosis (HCC), Mood disorder (HCC), Opioid use disorder, Overweight, Scoliosis, and Tobacco use disorder.  Past Surgical History:  has a past surgical history that includes Esophagogastroduodenoscopy (); Dental surgery; Upper gastrointestinal endoscopy (N/A, 2023); Upper gastrointestinal endoscopy (N/A, 11/15/2023); and sigmoidoscopy (N/A, 12/10/2024).    Treatment Diagnosis: impaired strength B LE's>UE's, balance, fxl mobility, fxl activity tolerance, ADL/IADL

## 2024-12-16 NOTE — PROGRESS NOTES
V2.0  [facility] [place]      Name:  Sakina Ballesteros /Age/Sex: 1985  (38 y.o. female)   MRN & CSN:  [id number] & [id number] Encounter Date/Time: 2024 12:45 PM EST    Location:  [id number] PCP: Rafael Borden DO       Hospital Day: 8  Subjective:   Chief Complaint: Follow-up lower extremity weakness    No new issues overnight, denies any new complaints    Review of Systems:    Review of Systems    10 point ROS negative except as stated above in \"subjective\" section    Objective:     Intake/Output Summary (Last 24 hours) at 2024 1245  Last data filed at 2024 0517  Gross per 24 hour   Intake 1420 ml   Output --   Net 1420 ml      Vitals:   Vitals:    24 1044   BP:    Pulse:    Resp: 18   Temp:    SpO2:      Physical Exam:   General: Awake, alert and oriented, NAD  Cardiovascular: S1S2 present, regular rate and rhythm, no murmurs  Respiratory: Clear to auscultation  Gastrointestinal: Soft, non tender, positive bowel sounds   Genitourinary: no suprapubic tenderness  Musculoskeletal: No edema  Neuro: Alert.    Medications:     Medications:    traZODone  200 mg Oral Nightly    immune globulin  5 g IntraVENous Daily    And    immune globulin  20 g IntraVENous Daily    methadone  85 mg Oral Daily    vitamin B-12  50 mcg Oral Daily    pantoprazole  40 mg Oral BID AC    folic acid  1 mg Oral Daily    sodium chloride flush  5-40 mL IntraVENous 2 times per day    enoxaparin  40 mg SubCUTAneous Daily      Infusions:    sodium chloride 5 mL/hr at 24 1706     PRN Meds: oxyCODONE, 5 mg, Q4H PRN  aspirin-acetaminophen-caffeine, 1 tablet, Q6H PRN  amitriptyline, 25 mg, Nightly PRN  sodium chloride flush, 5-40 mL, PRN  sodium chloride, , PRN  potassium chloride, 40 mEq, PRN   Or  potassium alternative oral replacement, 40 mEq, PRN   Or  potassium chloride, 10 mEq, PRN  magnesium sulfate, 2,000 mg, PRN  ondansetron, 4 mg, Q8H PRN   Or  ondansetron, 4 mg, Q6H PRN  polyethylene  control, iterative reconstruction, and/or weight based adjustment of the mA/kV was utilized to reduce the radiation dose to as low as reasonably achievable. COMPARISON: None. HISTORY: ORDERING SYSTEM PROVIDED HISTORY: fall TECHNOLOGIST PROVIDED HISTORY: Reason for exam:->fall Decision Support Exception - unselect if not a suspected or confirmed emergency medical condition->Emergency Medical Condition (MA) Reason for Exam: freq falls, trouble with legs, seizure, constipation; ORDERING SYSTEM PROVIDED HISTORY: freq falls TECHNOLOGIST PROVIDED HISTORY: Reason for exam:->freq falls Has a \"code stroke\" or \"stroke alert\" been called?->No Decision Support Exception - unselect if not a suspected or confirmed emergency medical condition->Emergency Medical Condition (MA) Reason for Exam: freq falls, trouble with legs, seizure, constipation FINDINGS: BRAIN/VENTRICLES: There is no acute intracranial hemorrhage, mass effect or midline shift.  No abnormal extra-axial fluid collection.  The gray-white differentiation is maintained without evidence of an acute infarct.  There is no evidence of hydrocephalus. ORBITS: The visualized portion of the orbits demonstrate no acute abnormality. SINUSES: The visualized paranasal sinuses and mastoid air cells demonstrate no acute abnormality. SOFT TISSUES/SKULL:  No acute abnormality of the visualized skull or soft tissues. CERVICAL SPINE BONES/ALIGNMENT: There is no acute fracture or traumatic malalignment. DEGENERATIVE CHANGES: No significant degenerative changes.  C1-2 articulation cervical occipital junction are intact. No significant central canal stenosis identified SOFT TISSUES: There is no prevertebral soft tissue swelling.     No acute intracranial abnormality. No acute osseous abnormality of the cervical spine.       Assessment and Plan:   Sakina Ballesteros is a 38 y.o. female     Conditions under treatment:    # Rectal bleed  # Bilateral lower extremity weakness  # Polysubstance

## 2024-12-16 NOTE — PROGRESS NOTES
MD         immune globulin  10 g IntraVENous Daily    And    immune globulin  20 g IntraVENous Daily    traZODone  200 mg Oral Nightly    methadone  85 mg Oral Daily    vitamin B-12  50 mcg Oral Daily    pantoprazole  40 mg Oral BID AC    folic acid  1 mg Oral Daily    sodium chloride flush  5-40 mL IntraVENous 2 times per day    enoxaparin  40 mg SubCUTAneous Daily         sodium chloride 5 mL/hr at 12/13/24 1706        oxyCODONE, aspirin-acetaminophen-caffeine, amitriptyline, sodium chloride flush, sodium chloride, potassium chloride **OR** potassium alternative oral replacement **OR** potassium chloride, magnesium sulfate, ondansetron **OR** ondansetron, polyethylene glycol, acetaminophen **OR** acetaminophen     Exam:  Blood pressure 111/73, pulse 60, temperature 98 °F (36.7 °C), temperature source Oral, resp. rate 18, height 1.727 m (5' 8\"), weight 60.2 kg (132 lb 11.5 oz), SpO2 97%.  Constitutional    Vital signs: BP, HR, and RR reviewed   General Alert, no distress, well-nourished  Eyes: unable to visualize the fundi  Cardiovascular: pulses symmetric in all 4 extremities.  No peripheral edema.  Psychiatric: cooperative with examination, no  psychotic behavior noted.  Neurologic  Mental status:   orientation to person, place, time and situation   General fund of knowledge:  grossly intact   Memory: Short term and long term intact   Attention intact as able to attend well to the exam     Language fluent in conversation   Comprehension intact; follows simple commands  Cranial nerves:   CN 3,4,6: extraocular muscles intact, conjugate.   CN7: upper and lower facial symmetric without dysarthria  CN8: hearing grossly intact to conversation.  CN9/10: palate elevated symmetrically  CN11: trap full strength on shoulder shrug bilaterally, SCM without weakness.  CN12: tongue midline with protrusion. Able to move tongue side to side.   Strength: Grasp: BUE strong.   RUE: 4+/5 Shoulder abduction, Elbow Flexion, Elbow  35.5   IgG CSF 8.22   Albumin 3,169   IgG  1479.0    IgG Index  0.50    IgG SR CSF -0.7           Studies  EEG 12/11/24  EEG DIAGNOSIS: Essentially normal EEG which was performed predominantly during sleep.      MRI brain w/wo 12/11/24  IMPRESSION:  Motion artifact mildly degrades the images.  Mild cerebral atrophy.  No acute  brain parenchymal abnormality.     MRI cervical spine w/wo 12/11/24  IMPRESSION:  Unremarkable cervical spine MRI.     MRI thoracic spine w/wo 12/11/24  IMPRESSION:  Unremarkable thoracic spine MRI.     MRI lumbar spine w/wo 12/9/24  IMPRESSION:  1. No findings to suggest acute discitis/osteomyelitis or septic facet  arthropathy.  2. Severe left and mild right neural foraminal narrowing at L5-S1.  3. No significant spinal canal stenosis.    Impression  1. 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   2. Urinary incontineince: resolved.   3. Urinary tract infection   4. Vitamin B12 deficiency  5. Abnormal drug screen   6. nocturnal seizure like activity.    7. Hx substance/?alcohol use.       Recommendations  - Continue IVIG. Today is day #5. Will extend for 3 more doses, every other day. Continue pretreatment. Discussed with neurology attending.   - Continue Vitamin B12 supplementation.   - Q8H NIF/VC (ordered) Notify primary team and/or neurology for any persistent downward trend and/or VC <1.5; NIF<20.  - CSF labs in process: Encephalopathy AI panel.   - Serum labs in process: Demyelinating disease pane, MG labs.  - PT, OT.     May Modi, CNP  [facility]    A copy of this note was provided for Abhilash Bazzi MD

## 2024-12-16 NOTE — FLOWSHEET NOTE
Patient PIV to Right FA went bad and was removed. New PIV was placed to Left arm, tolerated well. Complained of Back pain twice requesting and have received PRN Oxy twice so far this shift with effects noted. Resting in bed watching T.V. and eating Jello at this time. Call light in reach and bed alarm on for safety.

## 2024-12-16 NOTE — PROGRESS NOTES
NIF: -25  VC: 4.4  No increased WOB noted at this time. Will continue to monitor.  DHARA AKHTAR RCP

## 2024-12-16 NOTE — PLAN OF CARE
Problem: Discharge Planning  Goal: Discharge to home or other facility with appropriate resources  Outcome: Progressing     Problem: Safety - Adult  Goal: Free from fall injury  Outcome: Progressing     Problem: Pain  Goal: Verbalizes/displays adequate comfort level or baseline comfort level  Outcome: Progressing     Problem: Skin/Tissue Integrity  Goal: Absence of new skin breakdown  Description: 1.  Monitor for areas of redness and/or skin breakdown  2.  Assess vascular access sites hourly  3.  Every 4-6 hours minimum:  Change oxygen saturation probe site  4.  Every 4-6 hours:  If on nasal continuous positive airway pressure, respiratory therapy assess nares and determine need for appliance change or resting period.  Outcome: Progressing     Problem: Musculoskeletal - Adult  Goal: Return mobility to safest level of function  Outcome: Progressing  Goal: Maintain proper alignment of affected body part  Outcome: Progressing  Goal: Return ADL status to a safe level of function  Outcome: Progressing     Problem: Gastrointestinal - Adult  Goal: Maintains or returns to baseline bowel function  Outcome: Progressing  Goal: Maintains adequate nutritional intake  Outcome: Progressing     Problem: Neurosensory - Adult  Goal: Achieves stable or improved neurological status  Outcome: Progressing  Goal: Absence of seizures  Outcome: Progressing  Goal: Remains free of injury related to seizures activity  Outcome: Progressing  Goal: Achieves maximal functionality and self care  Outcome: Progressing     Problem: Skin/Tissue Integrity - Adult  Goal: Skin integrity remains intact  Outcome: Progressing  Goal: Incisions, wounds, or drain sites healing without S/S of infection  Outcome: Progressing  Goal: Oral mucous membranes remain intact  Outcome: Progressing

## 2024-12-16 NOTE — CARE COORDINATION
12/16 Plan: return to home at discharge. Does not want to consider ARU.Would prefer Outpatient services. Follow. Electronically signed by Pretty Handley RN on 12/16/2024 at 2:43 PM

## 2024-12-16 NOTE — PLAN OF CARE
Problem: Discharge Planning  Goal: Discharge to home or other facility with appropriate resources  Outcome: Progressing     Problem: Safety - Adult  Goal: Free from fall injury  Outcome: Progressing     Problem: Pain  Goal: Verbalizes/displays adequate comfort level or baseline comfort level  Outcome: Progressing     Problem: Skin/Tissue Integrity  Goal: Absence of new skin breakdown  Description: 1.  Monitor for areas of redness and/or skin breakdown  2.  Assess vascular access sites hourly  3.  Every 4-6 hours minimum:  Change oxygen saturation probe site  4.  Every 4-6 hours:  If on nasal continuous positive airway pressure, respiratory therapy assess nares and determine need for appliance change or resting period.  Outcome: Progressing     Problem: Musculoskeletal - Adult  Goal: Return mobility to safest level of function  Outcome: Progressing  Goal: Maintain proper alignment of affected body part  Outcome: Progressing  Goal: Return ADL status to a safe level of function  Outcome: Progressing     Problem: Gastrointestinal - Adult  Goal: Maintains or returns to baseline bowel function  Outcome: Progressing  Goal: Maintains adequate nutritional intake  Outcome: Progressing     Problem: Neurosensory - Adult  Goal: Achieves stable or improved neurological status  Outcome: Progressing  Goal: Absence of seizures  Outcome: Progressing  Goal: Remains free of injury related to seizures activity  Outcome: Progressing  Goal: Achieves maximal functionality and self care  Outcome: Progressing     Problem: Skin/Tissue Integrity - Adult  Goal: Skin integrity remains intact  Outcome: Progressing  Flowsheets (Taken 12/16/2024 0003)  Skin Integrity Remains Intact:   Monitor for areas of redness and/or skin breakdown   Assess vascular access sites hourly  Goal: Incisions, wounds, or drain sites healing without S/S of infection  Outcome: Progressing  Goal: Oral mucous membranes remain intact  Outcome: Progressing

## 2024-12-17 LAB
ACHR BLOCK AB/ACHR TOTAL SFR SER: 8 % (ref 0–26)
ACHR MOD AB/ACHR TOTAL SFR SER: 1 %

## 2024-12-17 PROCEDURE — 6370000000 HC RX 637 (ALT 250 FOR IP)

## 2024-12-17 PROCEDURE — 6370000000 HC RX 637 (ALT 250 FOR IP): Performed by: HOSPITALIST

## 2024-12-17 PROCEDURE — 94760 N-INVAS EAR/PLS OXIMETRY 1: CPT

## 2024-12-17 PROCEDURE — 2580000003 HC RX 258

## 2024-12-17 PROCEDURE — 9990000010 HC NO CHARGE VISIT

## 2024-12-17 PROCEDURE — 94799 UNLISTED PULMONARY SVC/PX: CPT

## 2024-12-17 PROCEDURE — 1200000000 HC SEMI PRIVATE

## 2024-12-17 PROCEDURE — 6360000002 HC RX W HCPCS

## 2024-12-17 RX ORDER — IPRATROPIUM BROMIDE AND ALBUTEROL SULFATE 2.5; .5 MG/3ML; MG/3ML
SOLUTION RESPIRATORY (INHALATION)
Status: DISPENSED
Start: 2024-12-17 | End: 2024-12-18

## 2024-12-17 RX ORDER — ALBUTEROL SULFATE 0.83 MG/ML
SOLUTION RESPIRATORY (INHALATION)
Status: DISPENSED
Start: 2024-12-17 | End: 2024-12-18

## 2024-12-17 RX ADMIN — ACETAMINOPHEN 650 MG: 325 TABLET ORAL at 23:21

## 2024-12-17 RX ADMIN — METHADONE HYDROCHLORIDE 85 MG: 10 TABLET ORAL at 08:32

## 2024-12-17 RX ADMIN — ACETAMINOPHEN 650 MG: 325 TABLET ORAL at 15:41

## 2024-12-17 RX ADMIN — OXYCODONE 5 MG: 5 TABLET ORAL at 08:32

## 2024-12-17 RX ADMIN — OXYCODONE 5 MG: 5 TABLET ORAL at 19:43

## 2024-12-17 RX ADMIN — TRAZODONE HYDROCHLORIDE 200 MG: 100 TABLET ORAL at 23:20

## 2024-12-17 RX ADMIN — Medication 50 MCG: at 08:32

## 2024-12-17 RX ADMIN — PANTOPRAZOLE SODIUM 40 MG: 40 TABLET, DELAYED RELEASE ORAL at 15:41

## 2024-12-17 RX ADMIN — OXYCODONE 5 MG: 5 TABLET ORAL at 15:41

## 2024-12-17 RX ADMIN — ENOXAPARIN SODIUM 40 MG: 100 INJECTION SUBCUTANEOUS at 08:32

## 2024-12-17 RX ADMIN — ACETAMINOPHEN, ASPIRIN, CAFFEINE 1 TABLET: 250; 65; 250 TABLET, FILM COATED ORAL at 19:47

## 2024-12-17 RX ADMIN — SODIUM CHLORIDE, PRESERVATIVE FREE 10 ML: 5 INJECTION INTRAVENOUS at 08:44

## 2024-12-17 RX ADMIN — FOLIC ACID 1 MG: 1 TABLET ORAL at 08:32

## 2024-12-17 RX ADMIN — PANTOPRAZOLE SODIUM 40 MG: 40 TABLET, DELAYED RELEASE ORAL at 05:27

## 2024-12-17 RX ADMIN — ACETAMINOPHEN, ASPIRIN, CAFFEINE 1 TABLET: 250; 65; 250 TABLET, FILM COATED ORAL at 10:36

## 2024-12-17 ASSESSMENT — PAIN DESCRIPTION - DESCRIPTORS
DESCRIPTORS: ACHING
DESCRIPTORS: ACHING

## 2024-12-17 ASSESSMENT — PAIN SCALES - GENERAL
PAINLEVEL_OUTOF10: 10
PAINLEVEL_OUTOF10: 4
PAINLEVEL_OUTOF10: 8
PAINLEVEL_OUTOF10: 0
PAINLEVEL_OUTOF10: 4

## 2024-12-17 ASSESSMENT — PAIN DESCRIPTION - LOCATION
LOCATION: BACK;HEAD
LOCATION: HEAD;BACK

## 2024-12-17 ASSESSMENT — PAIN DESCRIPTION - FREQUENCY: FREQUENCY: CONTINUOUS

## 2024-12-17 ASSESSMENT — PAIN - FUNCTIONAL ASSESSMENT: PAIN_FUNCTIONAL_ASSESSMENT: PREVENTS OR INTERFERES SOME ACTIVE ACTIVITIES AND ADLS

## 2024-12-17 ASSESSMENT — PAIN DESCRIPTION - PAIN TYPE: TYPE: ACUTE PAIN

## 2024-12-17 ASSESSMENT — PAIN DESCRIPTION - ORIENTATION: ORIENTATION: RIGHT;LEFT;UPPER;LOWER

## 2024-12-17 ASSESSMENT — PAIN DESCRIPTION - ONSET: ONSET: ON-GOING

## 2024-12-17 NOTE — PROGRESS NOTES
Occupational Therapy  Sakinatiti Ballesteros  1985  [id number]    Patient chart reviewed. OT attempted to see for tx session at this time. Pt supine in bed, room dark with door shut and curtain pulled upon arrival. Pt with blankets over head and reports extreme pain due to migraine despite medication unable to participate in therapy at this time.  Will attempt again as therapy schedule permits.     If pt discharges prior to next therapy session, please refer to last therapy note as discharge summary.     Electronically signed by MAJO Dooley/L on 12/17/2024 at 2:39 PM

## 2024-12-17 NOTE — PROGRESS NOTES
1947H Received patient on bed awake conscious and responsive. VSS. IVIG 20g started at 72ml/hr for the 30 minutes. Stayed with the patient, no any reactions noted.     2045H - Increase rate to 200ml/hr. Patient tolerated. No any reactions noted.

## 2024-12-17 NOTE — PROGRESS NOTES
Pt states she doesn't want to turn right now, states she turns well on her own.  Pt reminded to not stay in the same position for too long a period of time

## 2024-12-17 NOTE — PLAN OF CARE
Problem: Discharge Planning  Goal: Discharge to home or other facility with appropriate resources  Outcome: Progressing     Problem: Safety - Adult  Goal: Free from fall injury  Outcome: Progressing     Problem: Pain  Goal: Verbalizes/displays adequate comfort level or baseline comfort level  Outcome: Progressing     Problem: Skin/Tissue Integrity  Goal: Absence of new skin breakdown  Description: 1.  Monitor for areas of redness and/or skin breakdown  2.  Assess vascular access sites hourly  3.  Every 4-6 hours minimum:  Change oxygen saturation probe site  4.  Every 4-6 hours:  If on nasal continuous positive airway pressure, respiratory therapy assess nares and determine need for appliance change or resting period.  Outcome: Progressing     Problem: Musculoskeletal - Adult  Goal: Return mobility to safest level of function  Outcome: Progressing  Goal: Maintain proper alignment of affected body part  Outcome: Progressing  Goal: Return ADL status to a safe level of function  Outcome: Progressing     Problem: Gastrointestinal - Adult  Goal: Maintains or returns to baseline bowel function  Outcome: Progressing  Goal: Maintains adequate nutritional intake  Outcome: Progressing     Problem: Neurosensory - Adult  Goal: Achieves stable or improved neurological status  Outcome: Progressing  Goal: Absence of seizures  Outcome: Progressing  Goal: Remains free of injury related to seizures activity  Outcome: Progressing  Goal: Achieves maximal functionality and self care  Outcome: Progressing     Problem: Skin/Tissue Integrity - Adult  Goal: Skin integrity remains intact  Outcome: Progressing  Goal: Incisions, wounds, or drain sites healing without S/S of infection  Outcome: Progressing  Goal: Oral mucous membranes remain intact  Outcome: Progressing     Problem: Nutrition Deficit:  Goal: Optimize nutritional status  12/17/2024 1322 by Candy Lewis, RN  Outcome: Progressing  12/17/2024 1026 by Gisele Mitchell

## 2024-12-17 NOTE — PROGRESS NOTES
V2.0  [facility] [place]      Name:  Sakina Ballesteros /Age/Sex: 1985  (38 y.o. female)   MRN & CSN:  [id number] & [id number] Encounter Date/Time: 2024 1:32 PM EST    Location:  [id number]- PCP: Rafael Borden DO       Hospital Day: 9  Subjective:   Chief Complaint: Follow-up lower extremity weakness    Patient seen and evaluated the bedside, denies any new symptoms, lower extremity still persist, some paresthesias still persists    Review of Systems:    Review of Systems    10 point ROS negative except as stated above in \"subjective\" section    Objective:     Intake/Output Summary (Last 24 hours) at 2024 1332  Last data filed at 2024 1110  Gross per 24 hour   Intake 1000 ml   Output --   Net 1000 ml      Vitals:   Vitals:    24 0902   BP:    Pulse:    Resp: 18   Temp:    SpO2:      Physical Exam:   General: Awake, alert and oriented, NAD  Cardiovascular: S1S2 present, regular rate and rhythm, no murmurs  Respiratory: Clear to auscultation  Gastrointestinal: Soft, non tender, positive bowel sounds   Genitourinary: no suprapubic tenderness  Musculoskeletal: Still only barely able to lift her lower extremities bilaterally against gravity  Neuro: Alert.    Medications:     Medications:    [START ON 2024] acetaminophen  650 mg Oral Every Other Day    [START ON 2024] diphenhydrAMINE  25 mg Oral Every Other Day    [START ON 2024] immune globulin  20 g IntraVENous Q48H    And    [START ON 2024] immune globulin  5 g IntraVENous Q48H    traZODone  200 mg Oral Nightly    methadone  85 mg Oral Daily    vitamin B-12  50 mcg Oral Daily    pantoprazole  40 mg Oral BID AC    folic acid  1 mg Oral Daily    sodium chloride flush  5-40 mL IntraVENous 2 times per day    enoxaparin  40 mg SubCUTAneous Daily      Infusions:    sodium chloride 5 mL/hr at 24 1706     PRN Meds: aspirin-acetaminophen-caffeine, 1 tablet, Q6H PRN  oxyCODONE, 5 mg, Q4H  abuse  # Asymptomatic bacteriuria  # Vitamin B12 deficiency     Plan:     -Continued bilateral lower extremity weakness workup thus far including MRI L-spine showing narrowing of the L5-S1 level, RPR negative, HIV negative, JACKIE negative, LP done, CSF protein is elevated, meningitis panel was negative, given the progressive weakness, an episode of incontinence (which could be bowel prep related as it has not reoccurred since), 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, patient is getting IVIG, finished 5 doses, neurology wants to extended for several other days, additional 3 doses, every other day, last dose that we would be Saturday, patient was to go home on Sunday, will see how she progresses, continue working with PT/OT     -Patient is status post flex sig, noted to have hemorrhoids, no significant bleeding, status post sigmoidoscopy, GI signed off     -Continue with B12 supplementation     -UDS is positive for multiple substances, no evidence of withdrawal    Personally reviewed Lab Studies and Imaging     Electronically signed by Abhilash Sebastian MD on 12/17/2024 at 1:32 PM    This not was generated completely or in part using Dragon, speech recognition software, please excuse any inaccuracies or misstatements.

## 2024-12-17 NOTE — PROGRESS NOTES
Comprehensive Nutrition Assessment    Type and Reason for Visit:  LOS    Nutrition Recommendations/Plan:   Continue current diet.      Malnutrition Assessment:  Malnutrition Status:  No malnutrition (12/17/24 1026)    Context:  Acute Illness       Nutrition Assessment:    RD length of stay assessment. Pt. admitted for perirectal soft tissue mass. Currently receiving a regular diet. Diet acceptance has improved and appears adequate at this time. No recent significant weight loss noted. Pt. is s/p flex sig 12/10. No new recommendations at this time. Will monitor nutritional adequacy and diet acceptance while inpatient.    Nutrition Related Findings:      Wound Type: None       Current Nutrition Intake & Therapies:    Average Meal Intake: %  Average Supplements Intake: None Ordered  ADULT DIET; Regular    Anthropometric Measures:  Height: 172.7 cm (5' 7.99\")  Ideal Body Weight (IBW): 140 lbs (64 kg)       Current Body Weight: 64.1 kg (141 lb 5 oz), 100.9 % IBW.    Current BMI (kg/m2): 21.5                             BMI Categories: Normal Weight (BMI 18.5-24.9)    Estimated Daily Nutrient Needs:  Energy Requirements Based On: Kcal/kg  Weight Used for Energy Requirements: Current  Energy (kcal/day): 1514-4372 kcal (25-30 kcal/kg)  Weight Used for Protein Requirements: Current  Protein (g/day): 64-80 g (1-1.g/kg)  Method Used for Fluid Requirements: 1 ml/kcal  Fluid (ml/day): Or per provider    Nutrition Diagnosis:   Predicted inadequate energy intake related to acute injury/trauma as evidenced by poor intake prior to admission    Nutrition Interventions:   Food and/or Nutrient Delivery: Continue Current Diet  Nutrition Education/Counseling: Education/Counseling not indicated  Coordination of Nutrition Care: Continue to monitor while inpatient       Goals:  Goals: PO intake 75% or greater  Type of Goal: New goal       Nutrition Monitoring and Evaluation:   Behavioral-Environmental Outcomes: None  Identified  Food/Nutrient Intake Outcomes: Food and Nutrient Intake, Vitamin/Mineral Intake  Physical Signs/Symptoms Outcomes: Biochemical Data, GI Status    Discharge Planning:    No discharge needs at this time     Gisele Mitchell RD  Contact: [phone]

## 2024-12-17 NOTE — PROGRESS NOTES
Physical Therapy  Attempt Note    Name:Sakina Ballesteros  :1985  MRN:[id number]  Room: [place] [address]    Date of Service: 2024    Patient chart reviewed. PT attempted to see for PT eval/tx at this time. Pt supine in bed, room dark upon arrival. Reports extreme pain due to migraine despite medication unable to participate with PT at this time.  Will attempt again as therapy schedule permits.          If patient is discharged prior to the next physical therapy visit; Please see last written PT note for discharge status.       Electronically signed by Yue Mejia, PT on 2024 at 2:30 PM  Yue Mejia PT, DPT, CNS [phone] 24 2:31 PM

## 2024-12-17 NOTE — PROGRESS NOTES
Department of Physical Medicine & Rehabilitation  Progress Note    Patient Identification:  Sakina Ballesteros  [id number]  : 1985  Admit date: 2024    Chief Complaint: Mass of perirectal soft tissue    Subjective:   No acute events overnight.   Patient seen this afternoon resting in bed. Reports severe headache which started today. Has been hydrating. Low appetite today but has been eating well otherwise. Still with abnormal sensation in BLE. Her mobility has been improving.   Labs reviewed.     ROS: No f/c, n/v, cp     Objective:  Patient Vitals for the past 24 hrs:   BP Temp Temp src Pulse Resp SpO2 Height Weight   24 1611 -- -- -- -- 17 -- -- --   24 1019 -- -- -- -- -- -- 1.727 m (5' 7.99\") --   24 0902 -- -- -- -- 18 -- -- --   24 0814 98/69 98.2 °F (36.8 °C) Oral 70 17 97 % -- --   24 0454 -- -- -- -- -- -- -- 64.1 kg (141 lb 4.8 oz)   24 2310 -- -- -- -- 18 -- -- --   24 2240 -- -- -- -- 18 -- -- --   24 1945 102/65 98.2 °F (36.8 °C) Oral 67 18 95 % -- --   24 1854 -- -- -- -- 17 -- -- --     Const: Alert. No distress, pleasant.   HEENT: Normocephalic, atraumatic. Normal sclera/conjunctiva. MMM.   CV: Regular rate and rhythm.   Resp: No respiratory distress.   Abd:  nondistended  Ext: No edema.   Neuro: Alert, oriented, appropriately interactive. Exam limtied today due to severe headache.   Psych: Cooperative, appropriate mood and affect    Laboratory data: Available via EMR.   Last 24 hour lab  No results found for this or any previous visit (from the past 24 hour(s)).      Therapy progress:  Physical therapy:  Bed Mobility:     Sit>supine:     Supine>sit:     Transfers:     Sit>stand:     Stand>sit:     Bed<>chair     Stand Pivot:     Lateral transfer:     Car transfer:     Ambulation:     Stairs:     Curb:     Wheelchair:       Occupational therapy:   Feeding     Grooming/Oral Hygiene     UE Bathing     LE Bathing     UE Dressing     LE

## 2024-12-17 NOTE — PROGRESS NOTES
Occupational Therapy  Therapy second attempt. Pt continues with c/o headache (nursing aware), and declining tx. Will follow and attempt tomorrow. Refer to the last full note for status if pt is discharged.   Yefri TEJADA/EDSON,[phone]

## 2024-12-18 ENCOUNTER — APPOINTMENT (OUTPATIENT)
Dept: CT IMAGING | Age: 39
DRG: 254 | End: 2024-12-18
Payer: COMMERCIAL

## 2024-12-18 LAB — REPORT: NORMAL

## 2024-12-18 PROCEDURE — 1200000000 HC SEMI PRIVATE

## 2024-12-18 PROCEDURE — 6370000000 HC RX 637 (ALT 250 FOR IP): Performed by: HOSPITALIST

## 2024-12-18 PROCEDURE — 6370000000 HC RX 637 (ALT 250 FOR IP)

## 2024-12-18 PROCEDURE — 6360000002 HC RX W HCPCS

## 2024-12-18 PROCEDURE — 6360000002 HC RX W HCPCS: Performed by: NURSE PRACTITIONER

## 2024-12-18 PROCEDURE — 70450 CT HEAD/BRAIN W/O DYE: CPT

## 2024-12-18 PROCEDURE — 6360000002 HC RX W HCPCS: Performed by: HOSPITALIST

## 2024-12-18 PROCEDURE — 2500000003 HC RX 250 WO HCPCS

## 2024-12-18 PROCEDURE — 6370000000 HC RX 637 (ALT 250 FOR IP): Performed by: NURSE PRACTITIONER

## 2024-12-18 PROCEDURE — 94760 N-INVAS EAR/PLS OXIMETRY 1: CPT

## 2024-12-18 RX ORDER — KETOROLAC TROMETHAMINE 15 MG/ML
15 INJECTION, SOLUTION INTRAMUSCULAR; INTRAVENOUS EVERY 6 HOURS PRN
Status: DISCONTINUED | OUTPATIENT
Start: 2024-12-18 | End: 2024-12-20 | Stop reason: HOSPADM

## 2024-12-18 RX ADMIN — TRAZODONE HYDROCHLORIDE 200 MG: 100 TABLET ORAL at 20:36

## 2024-12-18 RX ADMIN — SODIUM CHLORIDE, PRESERVATIVE FREE 10 ML: 5 INJECTION INTRAVENOUS at 20:36

## 2024-12-18 RX ADMIN — SODIUM CHLORIDE, PRESERVATIVE FREE 10 ML: 5 INJECTION INTRAVENOUS at 10:27

## 2024-12-18 RX ADMIN — OXYCODONE 5 MG: 5 TABLET ORAL at 10:32

## 2024-12-18 RX ADMIN — OXYCODONE 5 MG: 5 TABLET ORAL at 06:30

## 2024-12-18 RX ADMIN — IMMUNE GLOBULIN (HUMAN) 5 G: 10 INJECTION INTRAVENOUS; SUBCUTANEOUS at 10:31

## 2024-12-18 RX ADMIN — OXYCODONE 5 MG: 5 TABLET ORAL at 20:36

## 2024-12-18 RX ADMIN — KETOROLAC TROMETHAMINE 15 MG: 15 INJECTION, SOLUTION INTRAMUSCULAR; INTRAVENOUS at 16:27

## 2024-12-18 RX ADMIN — DIPHENHYDRAMINE HCL 25 MG: 25 TABLET ORAL at 10:23

## 2024-12-18 RX ADMIN — METHADONE HYDROCHLORIDE 85 MG: 10 TABLET ORAL at 08:08

## 2024-12-18 RX ADMIN — PANTOPRAZOLE SODIUM 40 MG: 40 TABLET, DELAYED RELEASE ORAL at 16:27

## 2024-12-18 RX ADMIN — ACETAMINOPHEN, ASPIRIN, CAFFEINE 1 TABLET: 250; 65; 250 TABLET, FILM COATED ORAL at 20:35

## 2024-12-18 RX ADMIN — ENOXAPARIN SODIUM 40 MG: 100 INJECTION SUBCUTANEOUS at 08:08

## 2024-12-18 RX ADMIN — PANTOPRAZOLE SODIUM 40 MG: 40 TABLET, DELAYED RELEASE ORAL at 06:30

## 2024-12-18 RX ADMIN — Medication 10 ML: at 10:27

## 2024-12-18 RX ADMIN — FOLIC ACID 1 MG: 1 TABLET ORAL at 08:08

## 2024-12-18 RX ADMIN — Medication 50 MCG: at 08:08

## 2024-12-18 RX ADMIN — ACETAMINOPHEN, ASPIRIN, CAFFEINE 1 TABLET: 250; 65; 250 TABLET, FILM COATED ORAL at 06:30

## 2024-12-18 RX ADMIN — ACETAMINOPHEN 650 MG: 325 TABLET ORAL at 08:08

## 2024-12-18 ASSESSMENT — PAIN DESCRIPTION - ORIENTATION
ORIENTATION: LEFT;LOWER;UPPER
ORIENTATION: RIGHT;LEFT;LOWER;UPPER

## 2024-12-18 ASSESSMENT — PAIN DESCRIPTION - ONSET: ONSET: ON-GOING

## 2024-12-18 ASSESSMENT — PAIN - FUNCTIONAL ASSESSMENT: PAIN_FUNCTIONAL_ASSESSMENT: PREVENTS OR INTERFERES SOME ACTIVE ACTIVITIES AND ADLS

## 2024-12-18 ASSESSMENT — PAIN DESCRIPTION - LOCATION
LOCATION: BACK;HEAD
LOCATION: BACK;HEAD

## 2024-12-18 ASSESSMENT — PAIN DESCRIPTION - FREQUENCY: FREQUENCY: CONTINUOUS

## 2024-12-18 ASSESSMENT — PAIN SCALES - GENERAL
PAINLEVEL_OUTOF10: 9
PAINLEVEL_OUTOF10: 9
PAINLEVEL_OUTOF10: 7
PAINLEVEL_OUTOF10: 4
PAINLEVEL_OUTOF10: 3
PAINLEVEL_OUTOF10: 9

## 2024-12-18 ASSESSMENT — PAIN DESCRIPTION - PAIN TYPE: TYPE: ACUTE PAIN

## 2024-12-18 ASSESSMENT — PAIN DESCRIPTION - DESCRIPTORS
DESCRIPTORS: ACHING;THROBBING
DESCRIPTORS: ACHING

## 2024-12-18 NOTE — PROGRESS NOTES
PRN  oxyCODONE, 5 mg, Q4H PRN  amitriptyline, 25 mg, Nightly PRN  sodium chloride flush, 5-40 mL, PRN  sodium chloride, , PRN  potassium chloride, 40 mEq, PRN   Or  potassium alternative oral replacement, 40 mEq, PRN   Or  potassium chloride, 10 mEq, PRN  magnesium sulfate, 2,000 mg, PRN  ondansetron, 4 mg, Q8H PRN   Or  ondansetron, 4 mg, Q6H PRN  polyethylene glycol, 17 g, Daily PRN  acetaminophen, 650 mg, Q6H PRN   Or  acetaminophen, 650 mg, Q6H PRN        Labs      No results found for this or any previous visit (from the past 24 hour(s)).     Imaging/Diagnostics Last 24 Hours   MRI BRAIN W WO CONTRAST     Motion artifact mildly degrades the images.  Mild cerebral atrophy.  No acute brain parenchymal abnormality.     IR LUMBAR PUNCTURE FOR DIAGNOSIS    Successful fluoroscopic-guided lumbar puncture.     MRI THORACIC SPINE W WO CONTRAST    Unremarkable thoracic spine MRI.     MRI CERVICAL SPINE W WO CONTRAST    Unremarkable cervical spine MRI.       Assessment and Plan:   Sakina Ballesteros is a 38 y.o. female     Conditions under treatment:    # Bilateral lower extremity weakness  # Lower extremity paresthesia  # Rectal bleed  # Polysubstance abuse  # Asymptomatic bacteriuria  # Vitamin B12 deficiency  # Headache    Plan:    -Patient has been complaining of headache which seems to be worsening.  She also has some nausea, she has a history of migraines, Fioricet not helping.  Patient also states that this feels different and worse than her migraines, patient has had a MRI of the brain that was negative, neurology following, CT head ordered, will add Toradol    -Patient has been getting IVIG, completed patient has received 5 doses so far she was supposed to get her fifth dose today but that has been placed on hold because of the ongoing headaches    -No further GI bleed, had a flex sig    -Continue vitamin B12 replacement    -UDS positive for multiple substances, no evidence of withdrawal    -Home versus rehab once

## 2024-12-18 NOTE — PLAN OF CARE
Problem: Discharge Planning  Goal: Discharge to home or other facility with appropriate resources  Outcome: Progressing     Problem: Safety - Adult  Goal: Free from fall injury  Outcome: Progressing     Problem: Pain  Goal: Verbalizes/displays adequate comfort level or baseline comfort level  Outcome: Progressing     Problem: Skin/Tissue Integrity  Goal: Absence of new skin breakdown  Description: 1.  Monitor for areas of redness and/or skin breakdown  2.  Assess vascular access sites hourly  3.  Every 4-6 hours minimum:  Change oxygen saturation probe site  4.  Every 4-6 hours:  If on nasal continuous positive airway pressure, respiratory therapy assess nares and determine need for appliance change or resting period.  Outcome: Progressing     Problem: Musculoskeletal - Adult  Goal: Return mobility to safest level of function  Outcome: Progressing  Goal: Maintain proper alignment of affected body part  Outcome: Progressing  Goal: Return ADL status to a safe level of function  Outcome: Progressing     Problem: Gastrointestinal - Adult  Goal: Maintains or returns to baseline bowel function  Outcome: Progressing  Goal: Maintains adequate nutritional intake  Outcome: Progressing     Problem: Neurosensory - Adult  Goal: Achieves stable or improved neurological status  Outcome: Progressing  Goal: Absence of seizures  Outcome: Progressing  Goal: Remains free of injury related to seizures activity  Outcome: Progressing  Goal: Achieves maximal functionality and self care  Outcome: Progressing     Problem: Skin/Tissue Integrity - Adult  Goal: Skin integrity remains intact  Outcome: Progressing  Goal: Incisions, wounds, or drain sites healing without S/S of infection  Outcome: Progressing  Goal: Oral mucous membranes remain intact  Outcome: Progressing     Problem: Nutrition Deficit:  Goal: Optimize nutritional status  Outcome: Progressing

## 2024-12-18 NOTE — PROGRESS NOTES
Occupational Therapy  Therapy attempt. Pt found in bed, head in covers. Pt declining OT at this time, d/t complaint of headache.Pt reports recently took pain meds. Will follow and attempt later as schedule permits.  Yefri TEJADA/EDSON,[phone]

## 2024-12-18 NOTE — PROGRESS NOTES
Physical Therapy  Attempt Note  Sakina Ballesteros  [id number]    The pt is currently being seen by neurology. Will attempt back later today for PT session.     If pt. is D/C'd prior to next visit please refer back to last daily progress note for D/C status.  Electronically signed by Shelley Msoer, PT [id number] on 12/18/2024 at 11:33 AM

## 2024-12-18 NOTE — PROGRESS NOTES
Pt reports headache is starting to ease up.  Pt drinking more fluids and appetite picking up as well.

## 2024-12-18 NOTE — PROGRESS NOTES
Pt declined to do neuromuscular parameters due to a severe headache. Will reattempt in later this afternoon.

## 2024-12-18 NOTE — PROGRESS NOTES
Physical Therapy  Attempt Note  Sakina Ballesteros  [id number]    The pt is back from CT. The pt reporting headache and being unable to participate in therapy session. Will attempt back later.     If pt. is D/C'd prior to next visit please refer back to last daily progress note for D/C status.    Electronically signed by Shelley Moser, PT [id number] on 12/18/2024 at 2:47 PM

## 2024-12-18 NOTE — PROGRESS NOTES
Progress Note  The patient is being evaluated for progressive BLE weakness and numbness     Updates  Yesterday later afternoon developed a severe headache different from any headache she has had in the past. \"Head pressure\". Feels like her head could explode. Her ears feel full. Has had some light sensitivity, decreased appetite and nausea, generally weak.     Mild improvement this morning. Has received tylenol, excedrine migraine, benadryl. 7-8/10.     No vision changes, new focal neurological symptoms. Has received some IVIG today-denies any worsening symptoms.     Has been refusing NIF/VC.       Active Ambulatory Problems     Diagnosis Date Noted    Anxiety 12/30/2022    Gastroparesis 12/30/2022    GERD (gastroesophageal reflux disease) 12/30/2022    Leukocytosis 12/30/2022    Opiate dependence (HCC) 11/15/2023    Alcohol withdrawal seizure with complication (HCC) 09/19/2024    Esophageal varices in alcoholic cirrhosis (HCC) 09/19/2024    Acute cystitis without hematuria 09/19/2024    Elevated LFTs 09/19/2024     Resolved Ambulatory Problems     Diagnosis Date Noted    H/o Acute pancreatitis without infection or necrosis 12/30/2022    Hematemesis 08/06/2023    Idiopathic esophageal varices (HCC) 08/07/2023    Alcohol withdrawal syndrome, with unspecified complication (HCC) 11/14/2023    Abdominal pain 11/15/2023     Past Medical History:   Diagnosis Date    Alcohol use disorder     Hepatic cirrhosis (HCC)     Mood disorder (HCC)     Opioid use disorder     Overweight     Scoliosis     Tobacco use disorder        Current Facility-Administered Medications:     aspirin-acetaminophen-caffeine (EXCEDRIN MIGRAINE) per tablet 1 tablet, 1 tablet, Oral, Q6H PRN, Abhilash Sebastian MD, 1 tablet at 12/18/24 0630    acetaminophen (TYLENOL) tablet 650 mg, 650 mg, Oral, Every Other Day, May Modi APRN - CNP, 650 mg at 12/18/24 0808    diphenhydrAMINE (BENADRYL) tablet 25 mg, 25 mg, Oral, Every Other Day, May Modi APRN -

## 2024-12-18 NOTE — PLAN OF CARE
Problem: Discharge Planning  Goal: Discharge to home or other facility with appropriate resources  12/17/2024 2338 by Rosy Wang RN  Outcome: Progressing  Flowsheets (Taken 12/17/2024 1948)  Discharge to home or other facility with appropriate resources:   Identify barriers to discharge with patient and caregiver   Arrange for needed discharge resources and transportation as appropriate  12/17/2024 1322 by Candy Lewis RN  Outcome: Progressing     Problem: Safety - Adult  Goal: Free from fall injury  12/17/2024 2338 by Rosy Wang RN  Outcome: Progressing  12/17/2024 1322 by Candy Lewis RN  Outcome: Progressing     Problem: Pain  Goal: Verbalizes/displays adequate comfort level or baseline comfort level  12/17/2024 2338 by Rosy Wang RN  Outcome: Progressing  12/17/2024 1322 by Candy Lewis RN  Outcome: Progressing     Problem: Skin/Tissue Integrity  Goal: Absence of new skin breakdown  Description: 1.  Monitor for areas of redness and/or skin breakdown  2.  Assess vascular access sites hourly  3.  Every 4-6 hours minimum:  Change oxygen saturation probe site  4.  Every 4-6 hours:  If on nasal continuous positive airway pressure, respiratory therapy assess nares and determine need for appliance change or resting period.  12/17/2024 2338 by Rosy Wang RN  Outcome: Progressing  12/17/2024 1322 by Candy Lewis RN  Outcome: Progressing     Problem: Musculoskeletal - Adult  Goal: Return mobility to safest level of function  12/17/2024 2338 by Rosy Wang RN  Outcome: Progressing  Flowsheets (Taken 12/17/2024 1948)  Return Mobility to Safest Level of Function: Assess patient stability and activity tolerance for standing, transferring and ambulating with or without assistive devices  12/17/2024 1322 by Candy Lewis RN  Outcome: Progressing  Goal: Maintain proper alignment of affected body part  12/17/2024 2338 by Rosy Wang RN  Outcome: Progressing  Flowsheets (Taken 12/17/2024  care  12/17/2024 2338 by Rosy Wang RN  Outcome: Progressing  Flowsheets (Taken 12/17/2024 1948)  Achieves maximal functionality and self care: Encourage and assist patient to increase activity and self care with guidance from physical therapy/occupational therapy  12/17/2024 1322 by Candy Lewis RN  Outcome: Progressing     Problem: Skin/Tissue Integrity - Adult  Goal: Skin integrity remains intact  12/17/2024 2338 by Rosy Wang RN  Outcome: Progressing  Flowsheets (Taken 12/17/2024 1948)  Skin Integrity Remains Intact: Monitor for areas of redness and/or skin breakdown  12/17/2024 1322 by Candy Lewis RN  Outcome: Progressing  Goal: Incisions, wounds, or drain sites healing without S/S of infection  12/17/2024 2338 by Rosy Wang RN  Outcome: Progressing  12/17/2024 1322 by Candy Lewis RN  Outcome: Progressing  Goal: Oral mucous membranes remain intact  12/17/2024 2338 by Rosy Wang RN  Outcome: Progressing  12/17/2024 1322 by Candy Lewis RN  Outcome: Progressing     Problem: Nutrition Deficit:  Goal: Optimize nutritional status  12/17/2024 2338 by Rosy Wang RN  Outcome: Progressing  12/17/2024 1322 by Candy Lewis RN  Outcome: Progressing  12/17/2024 1026 by Gisele Mitchell RD  Outcome: Progressing  Flowsheets (Taken 12/17/2024 1026)  Nutrient intake appropriate for improving, restoring, or maintaining nutritional needs:   Assess nutritional status and recommend course of action   Recommend appropriate diets, oral nutritional supplements, and vitamin/mineral supplements   Monitor oral intake, labs, and treatment plans   Order, calculate, and assess calorie counts as needed   Recommend, monitor, and adjust tube feedings and TPN/PPN based on assessed needs   Provide specific nutrition education to patient or family as appropriate

## 2024-12-19 LAB
ALBUMIN CSF-MCNC: 35.5 MG/DL (ref 10–30)
ALBUMIN SERPL-MCNC: 3.5 G/DL (ref 3.4–5)
ALBUMIN SERPL-MCNC: 3169 MG/DL (ref 3400–5000)
ALBUMIN/GLOB SERPL: 0.7 {RATIO} (ref 1.1–2.2)
ALP SERPL-CCNC: 116 U/L (ref 40–129)
ALT SERPL-CCNC: 34 U/L (ref 10–40)
ANION GAP SERPL CALCULATED.3IONS-SCNC: 9 MMOL/L (ref 3–16)
AST SERPL-CCNC: 30 U/L (ref 15–37)
BACTERIA CSF CULT: NORMAL
BASOPHILS # BLD: 0.1 K/UL (ref 0–0.2)
BASOPHILS NFR BLD: 0.8 %
BILIRUB SERPL-MCNC: 1.5 MG/DL (ref 0–1)
BUN SERPL-MCNC: 20 MG/DL (ref 7–20)
CALCIUM SERPL-MCNC: 9 MG/DL (ref 8.3–10.6)
CHLORIDE SERPL-SCNC: 99 MMOL/L (ref 99–110)
CO2 SERPL-SCNC: 27 MMOL/L (ref 21–32)
CREAT SERPL-MCNC: 0.8 MG/DL (ref 0.6–1.1)
DEPRECATED RDW RBC AUTO: 15.1 % (ref 12.4–15.4)
EOSINOPHIL # BLD: 0.2 K/UL (ref 0–0.6)
EOSINOPHIL NFR BLD: 2.1 %
GFR SERPLBLD CREATININE-BSD FMLA CKD-EPI: >90 ML/MIN/{1.73_M2}
GLUCOSE SERPL-MCNC: 108 MG/DL (ref 70–99)
GRAM STN SPEC: NORMAL
HCT VFR BLD AUTO: 25.2 % (ref 36–48)
HGB BLD-MCNC: 9 G/DL (ref 12–16)
IGG CSF-MCNC: 8.22 MG/DL (ref 0–6)
IGG CSF/SERPL: 0.5 {RATIO} (ref 0.2–0.7)
IGG SERPL-MCNC: 1479 MG/DL (ref 700–1600)
IGG SYNTH RATE SER+CSF CALC-MRATE: -0.7 MG/DAY (ref -9.9–3.3)
LYMPHOCYTES # BLD: 2.4 K/UL (ref 1–5.1)
LYMPHOCYTES NFR BLD: 26.7 %
MCH RBC QN AUTO: 35.6 PG (ref 26–34)
MCHC RBC AUTO-ENTMCNC: 35.9 G/DL (ref 31–36)
MCV RBC AUTO: 99.3 FL (ref 80–100)
MONOCYTES # BLD: 0.8 K/UL (ref 0–1.3)
MONOCYTES NFR BLD: 9.1 %
NEUTROPHILS # BLD: 5.6 K/UL (ref 1.7–7.7)
NEUTROPHILS NFR BLD: 61.3 %
OLIGOCLONAL BANDS CSF QL: 0
PLATELET # BLD AUTO: 213 K/UL (ref 135–450)
PMV BLD AUTO: 9.5 FL (ref 5–10.5)
POTASSIUM SERPL-SCNC: 4.4 MMOL/L (ref 3.5–5.1)
PROT CSF-MCNC: 58 MG/DL (ref 15–45)
PROT PATTERN CSF ELPH-IMP: NORMAL
PROT SERPL-MCNC: 8.2 G/DL (ref 6.4–8.2)
RBC # BLD AUTO: 2.54 M/UL (ref 4–5.2)
SODIUM SERPL-SCNC: 135 MMOL/L (ref 136–145)
WBC # BLD AUTO: 9.2 K/UL (ref 4–11)

## 2024-12-19 PROCEDURE — 6360000002 HC RX W HCPCS: Performed by: HOSPITALIST

## 2024-12-19 PROCEDURE — 97535 SELF CARE MNGMENT TRAINING: CPT

## 2024-12-19 PROCEDURE — 94799 UNLISTED PULMONARY SVC/PX: CPT

## 2024-12-19 PROCEDURE — 36415 COLL VENOUS BLD VENIPUNCTURE: CPT

## 2024-12-19 PROCEDURE — 2500000003 HC RX 250 WO HCPCS

## 2024-12-19 PROCEDURE — 85025 COMPLETE CBC W/AUTO DIFF WBC: CPT

## 2024-12-19 PROCEDURE — 6370000000 HC RX 637 (ALT 250 FOR IP): Performed by: HOSPITALIST

## 2024-12-19 PROCEDURE — 1200000000 HC SEMI PRIVATE

## 2024-12-19 PROCEDURE — 97530 THERAPEUTIC ACTIVITIES: CPT

## 2024-12-19 PROCEDURE — 6370000000 HC RX 637 (ALT 250 FOR IP)

## 2024-12-19 PROCEDURE — 6360000002 HC RX W HCPCS: Performed by: NURSE PRACTITIONER

## 2024-12-19 PROCEDURE — 6360000002 HC RX W HCPCS

## 2024-12-19 PROCEDURE — 97116 GAIT TRAINING THERAPY: CPT

## 2024-12-19 PROCEDURE — 86366 MUSCLE-SPECIFIC KINASE ANTB: CPT

## 2024-12-19 PROCEDURE — 6370000000 HC RX 637 (ALT 250 FOR IP): Performed by: NURSE PRACTITIONER

## 2024-12-19 PROCEDURE — 80053 COMPREHEN METABOLIC PANEL: CPT

## 2024-12-19 PROCEDURE — 94760 N-INVAS EAR/PLS OXIMETRY 1: CPT

## 2024-12-19 RX ORDER — ACETAMINOPHEN 325 MG/1
650 TABLET ORAL ONCE
Status: COMPLETED | OUTPATIENT
Start: 2024-12-19 | End: 2024-12-19

## 2024-12-19 RX ORDER — DIPHENHYDRAMINE HYDROCHLORIDE 50 MG/ML
25 INJECTION INTRAMUSCULAR; INTRAVENOUS ONCE
Status: COMPLETED | OUTPATIENT
Start: 2024-12-19 | End: 2024-12-19

## 2024-12-19 RX ADMIN — TRAZODONE HYDROCHLORIDE 200 MG: 100 TABLET ORAL at 21:24

## 2024-12-19 RX ADMIN — FOLIC ACID 1 MG: 1 TABLET ORAL at 09:33

## 2024-12-19 RX ADMIN — OXYCODONE 5 MG: 5 TABLET ORAL at 22:11

## 2024-12-19 RX ADMIN — OXYCODONE 5 MG: 5 TABLET ORAL at 17:55

## 2024-12-19 RX ADMIN — ENOXAPARIN SODIUM 40 MG: 100 INJECTION SUBCUTANEOUS at 09:31

## 2024-12-19 RX ADMIN — METHADONE HYDROCHLORIDE 85 MG: 10 TABLET ORAL at 09:32

## 2024-12-19 RX ADMIN — OXYCODONE 5 MG: 5 TABLET ORAL at 10:26

## 2024-12-19 RX ADMIN — ACETAMINOPHEN 650 MG: 325 TABLET ORAL at 00:24

## 2024-12-19 RX ADMIN — SODIUM CHLORIDE, PRESERVATIVE FREE 10 ML: 5 INJECTION INTRAVENOUS at 09:36

## 2024-12-19 RX ADMIN — PANTOPRAZOLE SODIUM 40 MG: 40 TABLET, DELAYED RELEASE ORAL at 05:00

## 2024-12-19 RX ADMIN — IMMUNE GLOBULIN (HUMAN) 20 G: 10 INJECTION INTRAVENOUS; SUBCUTANEOUS at 18:14

## 2024-12-19 RX ADMIN — IMMUNE GLOBULIN (HUMAN) 5 G: 10 INJECTION INTRAVENOUS; SUBCUTANEOUS at 16:38

## 2024-12-19 RX ADMIN — ACETAMINOPHEN, ASPIRIN, CAFFEINE 1 TABLET: 250; 65; 250 TABLET, FILM COATED ORAL at 04:47

## 2024-12-19 RX ADMIN — DIPHENHYDRAMINE HYDROCHLORIDE 25 MG: 50 INJECTION INTRAMUSCULAR; INTRAVENOUS at 16:03

## 2024-12-19 RX ADMIN — KETOROLAC TROMETHAMINE 15 MG: 15 INJECTION, SOLUTION INTRAMUSCULAR; INTRAVENOUS at 04:47

## 2024-12-19 RX ADMIN — PANTOPRAZOLE SODIUM 40 MG: 40 TABLET, DELAYED RELEASE ORAL at 16:02

## 2024-12-19 RX ADMIN — Medication 50 MCG: at 09:33

## 2024-12-19 RX ADMIN — ACETAMINOPHEN 650 MG: 325 TABLET ORAL at 16:02

## 2024-12-19 RX ADMIN — OXYCODONE 5 MG: 5 TABLET ORAL at 04:55

## 2024-12-19 RX ADMIN — OXYCODONE 5 MG: 5 TABLET ORAL at 00:47

## 2024-12-19 ASSESSMENT — PAIN SCALES - GENERAL
PAINLEVEL_OUTOF10: 9
PAINLEVEL_OUTOF10: 0
PAINLEVEL_OUTOF10: 0

## 2024-12-19 ASSESSMENT — PAIN DESCRIPTION - DESCRIPTORS
DESCRIPTORS: ACHING
DESCRIPTORS: ACHING;THROBBING

## 2024-12-19 ASSESSMENT — PAIN DESCRIPTION - ORIENTATION
ORIENTATION: RIGHT;LEFT;MID;UPPER
ORIENTATION: LEFT;RIGHT;MID;UPPER
ORIENTATION: RIGHT;LEFT;LOWER;UPPER

## 2024-12-19 ASSESSMENT — PAIN DESCRIPTION - LOCATION
LOCATION: BACK;HEAD
LOCATION: HEAD;BACK
LOCATION: BACK
LOCATION: BACK
LOCATION: HEAD;BACK

## 2024-12-19 ASSESSMENT — PAIN DESCRIPTION - PAIN TYPE: TYPE: ACUTE PAIN

## 2024-12-19 ASSESSMENT — PAIN - FUNCTIONAL ASSESSMENT: PAIN_FUNCTIONAL_ASSESSMENT: PREVENTS OR INTERFERES SOME ACTIVE ACTIVITIES AND ADLS

## 2024-12-19 NOTE — PROGRESS NOTES
local anesthesia. The subarachnoid space was accessed with a 20-gauge 3.5 \"spinal needle at the L4-5 level. Free flow of clear CSF was noted. Approximately 13 ml of CSF was removed and sent for analysis. The stylet was reinserted, spinal needle was removed and brief pressure was applied at the puncture site. There were no immediate complications and the patient tolerated the procedure well. Opening pressure (cm of water): 17     Successful fluoroscopic-guided lumbar puncture.       Assessment and Plan:   Sakina Ballesteros is a 38 y.o. female     Conditions under treatment:    # Bilateral lower extremity weakness  # Lower extremity paresthesia  # Rectal bleed  # Polysubstance abuse  # Asymptomatic bacteriuria  # Vitamin B12 deficiency  # Headache     Plan:     -Headache is better, CT head negative, patient does have a history of migraines.  Unclear if there is any correlation to IVIG.     -Patient has been getting IVIG, completed patient has received 5 doses so far, neurology was planning an additional 3 doses, hopefully next dose today, patient working with PT/OT, patient is eager to go home, seen by physical medicine and rehab, does not seem to be candidate for ARU     -No further GI bleed, had a flex sig     -Continue vitamin B12 replacement     -UDS positive for multiple substances, no evidence of withdrawal     -Labs reviewed, slight drop in hemoglobin noted, no evidence of active bleeding, continue monitoring periodically    Personally reviewed Lab Studies and Imaging     Electronically signed by Abhilash Sebastian MD on 12/19/2024 at 11:33 AM    This not was generated completely or in part using Dragon, speech recognition software, please excuse any inaccuracies or misstatements.

## 2024-12-19 NOTE — PROGRESS NOTES
12/19/24 1629   Treatment   Treatment Type Spontaneous parameters   Spontaneous Parameters   NIF -30 cmH2O   VC 2.58   $NIF Charge Row $Yes

## 2024-12-19 NOTE — PLAN OF CARE
adequate nutritional intake  12/19/2024 0319 by Rosy Wang RN  Outcome: Progressing  12/18/2024 1601 by Candy Lewis RN  Outcome: Progressing     Problem: Neurosensory - Adult  Goal: Achieves stable or improved neurological status  12/19/2024 0319 by Rosy Wang RN  Outcome: Progressing  12/18/2024 1601 by Candy Lewis RN  Outcome: Progressing  Goal: Absence of seizures  12/19/2024 0319 by Rosy Wang RN  Outcome: Progressing  12/18/2024 1601 by Candy Lewis RN  Outcome: Progressing  Goal: Remains free of injury related to seizures activity  12/19/2024 0319 by Rosy Wang RN  Outcome: Progressing  12/18/2024 1601 by Candy Lewis RN  Outcome: Progressing  Goal: Achieves maximal functionality and self care  12/19/2024 0319 by Rosy Wang RN  Outcome: Progressing  12/18/2024 1601 by Candy Lewis RN  Outcome: Progressing     Problem: Skin/Tissue Integrity - Adult  Goal: Skin integrity remains intact  12/19/2024 0319 by Rosy Wang RN  Outcome: Progressing  12/18/2024 1601 by Candy Lewis RN  Outcome: Progressing  Goal: Incisions, wounds, or drain sites healing without S/S of infection  12/19/2024 0319 by Rosy Wang RN  Outcome: Progressing  12/18/2024 1601 by Candy Lewis RN  Outcome: Progressing  Goal: Oral mucous membranes remain intact  12/19/2024 0319 by Rosy Wang RN  Outcome: Progressing  12/18/2024 1601 by Candy Lewis RN  Outcome: Progressing     Problem: Nutrition Deficit:  Goal: Optimize nutritional status  12/19/2024 0319 by Rosy Wang RN  Outcome: Progressing  12/18/2024 1601 by Candy Lewis RN  Outcome: Progressing

## 2024-12-19 NOTE — CARE COORDINATION
12/19 PLAN: Return to home with family and OP therapy if patient is agreeable. IVIG today per Neuro. Follow for needs. Electronically signed by Pretty Handley RN on 12/19/2024 at 4:17 PM

## 2024-12-19 NOTE — PROGRESS NOTES
Occupational Therapy  Facility/Department: [address] MED SURG  Occupational Therapy Daily Treatment Note    Name: Sakina Ballesteros  : 1985  MRN: [id number]  Date of Service: 2024    Discharge Recommendations:  24 hour supervision or assist, Outpatient OT (pt preferring OP OT)  OT Equipment Recommendations  Mobility Devices: ADL Assistive Devices  ADL Assistive Devices: Transfer Tub Bench    Sakina Ballesteros scored a 19/24 on the AM-PAC ADL Inpatient form. Current research shows that an AM-PAC score of 18 or greater is typically associated with a discharge to the patient's home setting. Based on the patient's AM-PAC score, and their current ADL deficits, it is recommended that the patient have 2-3 sessions per week of Occupational Therapy at d/c to increase the patient's independence.  At this time, this patient demonstrates the endurance and safety to discharge home with Outpatient OT (home vs OP services) and a follow up treatment frequency of 2-3x/wk.   Please see assessment section for further patient specific details.    If patient discharges prior to next session this note will serve as a discharge summary.  Please see below for the latest assessment towards goals.       Patient Diagnosis(es): The primary encounter diagnosis was Leg numbness. Diagnoses of Neuroforaminal stenosis of lumbar spine and Rectal bleeding were also pertinent to this visit.  Past Medical History:  has a past medical history of Alcohol use disorder, Gastroparesis, GERD (gastroesophageal reflux disease), Hepatic cirrhosis (HCC), Mood disorder (HCC), Opioid use disorder, Overweight, Scoliosis, and Tobacco use disorder.  Past Surgical History:  has a past surgical history that includes Esophagogastroduodenoscopy (); Dental surgery; Upper gastrointestinal endoscopy (N/A, 2023); Upper gastrointestinal endoscopy (N/A, 11/15/2023); and sigmoidoscopy (N/A, 12/10/2024).    Treatment Diagnosis: impaired strength B LE's>UE's,

## 2024-12-19 NOTE — PROGRESS NOTES
Progress Note  The patient is being evaluated for progressive BLE weakness and numbness     Updates  Per RN continues to have headache.   Per patient her headache is much improved and currently resolved. She attributes stress to be a factor. She really wants to try additional dose of IVIG; risks vs benefits discussed.     Therapy has cleared her for OP rehab.     Still reporting numbness.     Has been refusing NIF/VC.     Demyelinating panel: negative.       Active Ambulatory Problems     Diagnosis Date Noted    Anxiety 12/30/2022    Gastroparesis 12/30/2022    GERD (gastroesophageal reflux disease) 12/30/2022    Leukocytosis 12/30/2022    Opiate dependence (HCC) 11/15/2023    Alcohol withdrawal seizure with complication (HCC) 09/19/2024    Esophageal varices in alcoholic cirrhosis (HCC) 09/19/2024    Acute cystitis without hematuria 09/19/2024    Elevated LFTs 09/19/2024     Resolved Ambulatory Problems     Diagnosis Date Noted    H/o Acute pancreatitis without infection or necrosis 12/30/2022    Hematemesis 08/06/2023    Idiopathic esophageal varices (HCC) 08/07/2023    Alcohol withdrawal syndrome, with unspecified complication (HCC) 11/14/2023    Abdominal pain 11/15/2023     Past Medical History:   Diagnosis Date    Alcohol use disorder     Hepatic cirrhosis (HCC)     Mood disorder (HCC)     Opioid use disorder     Overweight     Scoliosis     Tobacco use disorder        Current Facility-Administered Medications:     ketorolac (TORADOL) injection 15 mg, 15 mg, IntraVENous, Q6H PRN, Abhilash Sebastian MD, 15 mg at 12/19/24 0447    aspirin-acetaminophen-caffeine (EXCEDRIN MIGRAINE) per tablet 1 tablet, 1 tablet, Oral, Q6H PRN, Abhilash Sebastian MD, 1 tablet at 12/19/24 0447    acetaminophen (TYLENOL) tablet 650 mg, 650 mg, Oral, Every Other Day, May Modi APRN - CNP, 650 mg at 12/18/24 0808    diphenhydrAMINE (BENADRYL) tablet 25 mg, 25 mg, Oral, Every Other Day, May Modi APRN - CNP, 25 mg at 12/18/24 1023    [Held  ng/mL  POSITIVE !  12/10/24 09:23           Component      Latest Ref Rng 12/14/2024   Acetylcholine Blocking Ab      0 - 26 % 8    Acetylchol Modul Ab      <=45 % 1    Acetylcholine Binding Antibody      0.0 - 0.4 nmol/L 0.1    MUSK AB NO reflex to testing           CSF STUDIES:   Component      Latest Ref Rng 12/12/2024   Color, CSF      Colorless  Colorless    Appearance, CSF      Clear  Clear    Tube Number + CELL CT + DIFF-CSF Tube 1    Clot Evaluation CSF see below    WBC, CSF      0 - 5 /cumm 2    RBC, CSF      0 /cumm 0    Volume, CSF      mL 9.0    No differential CSF see below    Glucose, CSF      40 - 80 mg/dL 72    Protein, CSF      15 - 45 mg/dL 58 (H)    Meninigits panel Not detected    CSF Culture NGTD  No Cells seen   No organisms seen    CSF Cytology No malignant cells identified    Albumin CSF 35.5   IgG CSF 8.22   Albumin 3,169   IgG  1479.0    IgG Index  0.50    IgG SR CSF -0.7           Studies  Neuromuscular Parameters:   Date/time NIF  VC   12/17 0108 -40 3.0       EEG 12/11/24  EEG DIAGNOSIS: Essentially normal EEG which was performed predominantly during sleep.      MRI brain w/wo 12/11/24  IMPRESSION:  Motion artifact mildly degrades the images.  Mild cerebral atrophy.  No acute  brain parenchymal abnormality.     MRI cervical spine w/wo 12/11/24  IMPRESSION:  Unremarkable cervical spine MRI.     MRI thoracic spine w/wo 12/11/24  IMPRESSION:  Unremarkable thoracic spine MRI.     MRI lumbar spine w/wo 12/9/24  IMPRESSION:  1. No findings to suggest acute discitis/osteomyelitis or septic facet  arthropathy.  2. Severe left and mild right neural foraminal narrowing at L5-S1.  3. No significant spinal canal stenosis.    Impression  1. 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 Clinical exam difficult to interpret though still with weakness, and sensory complaints subjectively reported to be

## 2024-12-19 NOTE — PROGRESS NOTES
The night was uneventful. Pt slept most of the night. She continues c/o throbbing  headaches and back pain. Have been medicated with pain meds as order. Fall precautions in place. Call light within pt reach.

## 2024-12-19 NOTE — PROGRESS NOTES
Physical Therapy  Facility/Department: [address] MED SURG  Physical Therapy Treatment session    Name: Sakina Ballesteros  : 1985  MRN: [id number]  Date of Service: 2024    Discharge Recommendations:  24 hour supervision or assist, Outpatient PT   PT Equipment Recommendations  Equipment Needed: No  Other: will monitor  Sakina Ballesteros scored a 18/24 on the AM-PAC short mobility form. Current research shows that an AM-PAC score of 18 or greater is typically associated with a discharge to the patient's home setting. Based on the patient's AM-PAC score and their current functional mobility deficits, it is recommended that the patient have 2-3 sessions per week of Physical Therapy at d/c to increase the patient's independence.  At this time, this patient demonstrates the endurance and safety to discharge home with OP services  (home vs OP services) and a follow up treatment frequency of 2-3x/wk.  Please see assessment section for further patient specific details.    If patient discharges prior to next session this note will serve as a discharge summary.  Please see below for the latest assessment towards goals.      If patient discharges prior to next session this note will serve as a discharge summary.  Please see below for the latest assessment towards goals.     Patient Diagnosis(es): The primary encounter diagnosis was Leg numbness. Diagnoses of Neuroforaminal stenosis of lumbar spine and Rectal bleeding were also pertinent to this visit.  Past Medical History:  has a past medical history of Alcohol use disorder, Gastroparesis, GERD (gastroesophageal reflux disease), Hepatic cirrhosis (HCC), Mood disorder (HCC), Opioid use disorder, Overweight, Scoliosis, and Tobacco use disorder.  Past Surgical History:  has a past surgical history that includes Esophagogastroduodenoscopy (); Dental surgery; Upper gastrointestinal endoscopy (N/A, 2023); Upper gastrointestinal endoscopy (N/A, 11/15/2023); and  [id number] 12/19/24 9:43 AM

## 2024-12-20 VITALS
BODY MASS INDEX: 21.65 KG/M2 | OXYGEN SATURATION: 96 % | RESPIRATION RATE: 16 BRPM | TEMPERATURE: 98.4 F | WEIGHT: 142.86 LBS | HEART RATE: 75 BPM | SYSTOLIC BLOOD PRESSURE: 105 MMHG | DIASTOLIC BLOOD PRESSURE: 68 MMHG | HEIGHT: 68 IN

## 2024-12-20 PROCEDURE — 6370000000 HC RX 637 (ALT 250 FOR IP)

## 2024-12-20 PROCEDURE — 94760 N-INVAS EAR/PLS OXIMETRY 1: CPT

## 2024-12-20 PROCEDURE — 97530 THERAPEUTIC ACTIVITIES: CPT

## 2024-12-20 PROCEDURE — 97116 GAIT TRAINING THERAPY: CPT

## 2024-12-20 PROCEDURE — 6360000002 HC RX W HCPCS

## 2024-12-20 PROCEDURE — 6370000000 HC RX 637 (ALT 250 FOR IP): Performed by: HOSPITALIST

## 2024-12-20 RX ADMIN — ENOXAPARIN SODIUM 40 MG: 100 INJECTION SUBCUTANEOUS at 09:54

## 2024-12-20 RX ADMIN — OXYCODONE 5 MG: 5 TABLET ORAL at 15:18

## 2024-12-20 RX ADMIN — FOLIC ACID 1 MG: 1 TABLET ORAL at 09:54

## 2024-12-20 RX ADMIN — PANTOPRAZOLE SODIUM 40 MG: 40 TABLET, DELAYED RELEASE ORAL at 06:41

## 2024-12-20 RX ADMIN — OXYCODONE 5 MG: 5 TABLET ORAL at 10:02

## 2024-12-20 RX ADMIN — Medication 50 MCG: at 09:54

## 2024-12-20 RX ADMIN — METHADONE HYDROCHLORIDE 85 MG: 10 TABLET ORAL at 09:53

## 2024-12-20 NOTE — PLAN OF CARE
Problem: Discharge Planning  Goal: Discharge to home or other facility with appropriate resources  12/20/2024 0243 by Josefina Yarbrough RN  Outcome: Progressing  Flowsheets (Taken 12/19/2024 2124)  Discharge to home or other facility with appropriate resources:   Identify barriers to discharge with patient and caregiver   Arrange for needed discharge resources and transportation as appropriate  12/19/2024 1821 by Brea Crowley RN  Outcome: Progressing     Problem: Safety - Adult  Goal: Free from fall injury  12/20/2024 0243 by Josefina Yarbrough RN  Outcome: Progressing  12/19/2024 1821 by Brea Crowley RN  Outcome: Progressing     Problem: Pain  Goal: Verbalizes/displays adequate comfort level or baseline comfort level  12/20/2024 0243 by Josefina Yarbrough RN  Outcome: Progressing  12/19/2024 1821 by Brea Crowley RN  Outcome: Progressing     Problem: Skin/Tissue Integrity  Goal: Absence of new skin breakdown  Description: 1.  Monitor for areas of redness and/or skin breakdown  2.  Assess vascular access sites hourly  3.  Every 4-6 hours minimum:  Change oxygen saturation probe site  4.  Every 4-6 hours:  If on nasal continuous positive airway pressure, respiratory therapy assess nares and determine need for appliance change or resting period.  12/20/2024 0243 by Josefina Yarbrough RN  Outcome: Progressing  12/19/2024 1821 by Brea Crowley RN  Outcome: Progressing     Problem: Musculoskeletal - Adult  Goal: Return mobility to safest level of function  12/20/2024 0243 by Josefina Yarbrough RN  Outcome: Progressing  Flowsheets (Taken 12/19/2024 2124)  Return Mobility to Safest Level of Function:   Assess patient stability and activity tolerance for standing, transferring and ambulating with or without assistive devices   Assist with transfers and ambulation using safe patient handling equipment as needed  12/19/2024 1821 by Brea Crowley RN  Outcome: Progressing  Goal: Maintain

## 2024-12-20 NOTE — CARE COORDINATION
DISCHARGE SUMMARY     DATE OF DISCHARGE: 12/20/24    DISCHARGE DESTINATION: Home with family    HOME CARE: No    HEMODIALYSIS: No    TRANSPORTATION: Private Car    NEW DME ORDERED: no    COMMENTS: AVS reviewed/no needs identified. Patient will go to Outpatient therapy.Electronically signed by Pretty Handley RN on 12/20/2024 at 12:27 PM

## 2024-12-20 NOTE — DISCHARGE INSTRUCTIONS
Follow-up with outpatient rehab services, follow-up with PCP in 1 week, please follow-up with urology as directed.  Your vitamin B1 supplementation has been called into [facility]

## 2024-12-20 NOTE — PROGRESS NOTES
Pt discharge instructions given. IV removed without complications. PT will be discharged home with mother. All questions answered. Electronically signed by KAMLA AG RN on 12/20/2024 at 4:25 PM

## 2024-12-20 NOTE — DISCHARGE SUMMARY
well.  Patient denies any fever, chill, chest pain, shortness of breath, dysuria, hematuria, urgency or frequency.  She denies any bladder or bowel incontinence or saddle anesthesia.\"      Brief hospital course:     Please refer to the admitting H and P for details surrounding the initial presentation.   Patient has had a pretty extensive workup thus far as outlined below, EEG, MRI, multiple other labs have been unremarkable.  CSF studies revealed slightly elevated protein level within normal CSF white count, possibly suggestive of autoimmune polyneuropathy.  Though per neurology, clinically difficult to interpret, patient was started on IVIG, has had 6 sessions so far, still with somewhat lower extremity weakness and some sensory deficits.  Overall improved.  At this point neurology does not feel there is any further utility in pursuing any further IVIG therapy.  Patient does not want to go to rehab, does not want home care, wants to go to outpatient rehab, that will be arranged.  Patient also had a headache during this hospital stay, repeat CT head was obtained, that was unremarkable, she has a history of migraine, that could be the possibility.  She was also noted to have vitamin B12 deficiency, that was replaced, she will be discharged on oral supplementation.  She was noted to have an abnormal drug screen.  No evidence of withdrawal during the hospital stay.  Urine pregnancy test was negative.  EEG was negative, there was concern of possible nocturnal seizure-like activity.  No other acute issues ongoing.  Patient chronically takes methadone that will be continued, no changes to her home medications have been made    The patient expressed appropriate understanding of, and agreement with the discharge recommendations, medications, and plan.   Discharge Instruction:   Follow up appointments:   Primary care physician: Rafael Borden DO within 1 week  Diet: regular diet   Activity: activity as    MUSK AB NO reflex to testing      CSF STUDIES:   Component      Latest Ref Rng 12/12/2024   Color, CSF      Colorless  Colorless    Appearance, CSF      Clear  Clear    Tube Number + CELL CT + DIFF-CSF Tube 1    Clot Evaluation CSF see below    WBC, CSF      0 - 5 /cumm 2    RBC, CSF      0 /cumm 0    Volume, CSF      mL 9.0    No differential CSF see below    Glucose, CSF      40 - 80 mg/dL 72    Protein, CSF      15 - 45 mg/dL 58 (H)    Meninigits panel Not detected    CSF Culture NGTD  No Cells seen   No organisms seen    CSF Cytology No malignant cells identified    Albumin CSF 35.5   IgG CSF 8.22   Albumin 3,169   IgG  1479.0    IgG Index  0.50    IgG SR CSF -0.7        EEG 12/11/24  EEG DIAGNOSIS: Essentially normal EEG which was performed predominantly during sleep.      MRI brain w/wo 12/11/24  IMPRESSION:  Motion artifact mildly degrades the images.  Mild cerebral atrophy.  No acute  brain parenchymal abnormality.     MRI cervical spine w/wo 12/11/24  IMPRESSION:  Unremarkable cervical spine MRI.     MRI thoracic spine w/wo 12/11/24  IMPRESSION:  Unremarkable thoracic spine MRI.     MRI lumbar spine w/wo 12/9/24  IMPRESSION:  1. No findings to suggest acute discitis/osteomyelitis or septic facet  arthropathy.  2. Severe left and mild right neural foraminal narrowing at L5-S1.  3. No significant spinal canal stenosis.     Time Spent Discharging patient 35 minutes    Electronically signed by Abhilash Sebastian MD on 12/20/2024 at 12:09 PM    This note was generated completely or in part using Dragon voice recognition software, please excuse ant inaccuracies or misstatements.

## 2024-12-20 NOTE — PROGRESS NOTES
Occupation: was working doing secretarial work, currently unemployed    Objective    Safety Devices  Type of Devices: All fall risk precautions in place;Call light within reach;Bed alarm in place;Gait belt;Left in bed;Patient at risk for falls    ADL  LE Bathing Skilled Clinical Factors: discussed recommendation for TTB for safety and energy conservation with bathing d/t decreased standing balance/tolerance and B LE weakness. Pt verbalized understanding. Called [employer] to inquire about pricing. Pt's insurance does not cover. Pt notified of pricing and reports she will look at [facility] to acquire.  Functional Mobility: Contact guard assistance  Functional Mobility Skilled Clinical Factors: ~60 ft x2 in hallway with 1 seated rest break, ~10 ft in room with 4WW and CGA. No LE buckling noted today.  Additional Comments: pt declined completing ADLs d/t wanting to rest before d/c. Anticipate pt is independent feeding, SBA bathing, dressing, toileting baed on ROM/strength, balance, endurance.    Activity Tolerance  Activity Tolerance: Patient tolerated treatment well    Bed mobility  Supine to Sit: Modified independent  Sit to Supine: Modified independent  Scooting: Modified independent  Bed Mobility Comments: HOB slightly elevated; the pt lifts her legs in and out of the bed using her B UE's    Transfers  Sit to stand: Stand by assistance (from EOB and rollator walker)  Stand to sit: Stand by assistance (to EOB and rollator)    Cognition  Overall Cognitive Status: WFL  Orientation  Overall Orientation Status: Within Functional Limits    Resistive Exercises: discussed tband HEP, pt declined completing but reports she will complete once home  Static Sitting Balance : independent at EOB  Static Standing Balance : SBA at rollator  Dynamic Standing Balance : CGA at rollator during fxl mobility    Education Given To: Patient  Education Provided: Role of Therapy;Plan of Care;Transfer Training;ADL Adaptive  Strategies;Equipment;Fall Prevention Strategies;Home Exercise Program  Education Provided Comments: d/c plans, HEP, safe transfers, mob with rollator walker; use of equipment for bathing: transfer tub bench recommended  Education Method: Verbal;Demonstration  Barriers to Learning: None  Education Outcome: Verbalized understanding;Demonstrated understanding                         AM-PAC - ADL  AM-PAC Daily Activity - Inpatient   How much help is needed for putting on and taking off regular lower body clothing?: A Little  How much help is needed for bathing (which includes washing, rinsing, drying)?: A Little  How much help is needed for toileting (which includes using toilet, bedpan, or urinal)?: A Little  How much help is needed for putting on and taking off regular upper body clothing?: A Little  How much help is needed for taking care of personal grooming?: A Little  How much help for eating meals?: None  AM-[facility] Inpatient Daily Activity Raw Score: 19  AM-PAC Inpatient ADL T-Scale Score : 40.22  ADL Inpatient CMS 0-100% Score: 42.8  ADL Inpatient CMS G-Code Modifier : CK           Goals  Short Term Goals  Time Frame for Short Term Goals: Prior to d/c: status goals 12/20: all goals ongoing except where indicated below.   Short Term Goal 1: Pt will bathe with SBA/supervision.  Short Term Goal 2: Pt will dress with SBA/supervision.  Short Term Goal 3: Pt will toilet with SBA/supervision.- goal met 12/19  Short Term Goal 4: Pt will complete fxl mobility and fxl transfers to/from ADL surfaces with SBA/supervision using AD- goal met 12/19  Short Term Goal 5: Pt will tolerate 10-15 minutes of therex/neuromuscular re-ed to improve strength for ADLs and fxl transfers.  Long Term Goals  Time Frame for Long Term Goals : STGs=LTGs  Patient Goals   Patient goals : \"to be able to run again and walk my dog.\"      Therapy Time   Individual Concurrent Group Co-treatment   Time In 1110         Time Out 1145         Minutes 35

## 2024-12-20 NOTE — DISCHARGE INSTR - COC
disease) K21.9    Leukocytosis D72.829    Opiate dependence (HCC) F11.20    Alcohol withdrawal seizure with complication (HCC) F10.939, R56.9    Esophageal varices in alcoholic cirrhosis (HCC) K70.30, I85.10    Acute cystitis without hematuria N30.00    Elevated LFTs R79.89    Mass of perirectal soft tissue K62.89       Isolation/Infection:   Isolation            No Isolation          Patient Infection Status       None to display                     Nurse Assessment:  Last Vital Signs: /68   Pulse 75   Temp 98.4 °F (36.9 °C) (Oral)   Resp 16   Ht 1.727 m (5' 7.99\")   Wt 64.8 kg (142 lb 13.7 oz)   SpO2 96%   BMI 21.73 kg/m²     Last documented pain score (0-10 scale): Pain Level: 0  Last Weight:   Wt Readings from Last 1 Encounters:   12/20/24 64.8 kg (142 lb 13.7 oz)     Mental Status:  {IP PT MENTAL STATUS:20030}    IV Access:  { [facility] IV ACCESS:053436225}    Nursing Mobility/ADLs:  Walking   {CHP DME ADLs:467472668}  Transfer  {CHP DME ADLs:654280755}  Bathing  {CHP DME ADLs:754115618}  Dressing  {CHP DME ADLs:581260652}  Toileting  {CHP DME ADLs:383851045}  Feeding  {P DME ADLs:677642080}  Med Admin  {P DME ADLs:604814935}  Med Delivery   { [facility] MED Delivery:412826703}    Wound Care Documentation and Therapy:        Elimination:  Continence:   Bowel: {YES / NO:19727}  Bladder: {YES / NO:19727}  Urinary Catheter: {Urinary Catheter:737407777}   Colostomy/Ileostomy/Ileal Conduit: {YES / NO:19727}       Date of Last BM: ***    Intake/Output Summary (Last 24 hours) at 12/20/2024 1336  Last data filed at 12/20/2024 0958  Gross per 24 hour   Intake 480 ml   Output --   Net 480 ml     I/O last 3 completed shifts:  In: 1140 [P.O.:1140]  Out: -     Safety Concerns:     { [facility] Safety Concerns:605444279}    Impairments/Disabilities:      {[facility] Impairments/Disabilities:636023949}    Nutrition Therapy:  Current Nutrition Therapy:   {[facility] Diet List:137784674}    Routes of Feeding: {[facility] Other  Feedings:395965445}  Liquids: {Slp liquid thickness:96359}  Daily Fluid Restriction: {CHP DME Yes amt example:322852154}  Last Modified Barium Swallow with Video (Video Swallowing Test): {Done Not Done Date:}    Treatments at the Time of Hospital Discharge:   Respiratory Treatments: ***  Oxygen Therapy:  {Therapy; copd oxygen:39252}  Ventilator:    {[facility] Vent List:262800180}    Rehab Therapies: {THERAPEUTIC INTERVENTION:2373733399}  Weight Bearing Status/Restrictions: {[facility] Weight Bearin}  Other Medical Equipment (for information only, NOT a DME order):  {EQUIPMENT:490844228}  Other Treatments: ***    Patient's personal belongings (please select all that are sent with patient):  {[facility] DME Belongings:506149298}    RN SIGNATURE:  {Esignature:033711019}    CASE MANAGEMENT/SOCIAL WORK SECTION    Inpatient Status Date: ***    Readmission Risk Assessment Score:  [facility] RISK OF UNPLANNED READMISSION 2.0             15.5 Total Score        Discharging to Facility/ Agency   Name:   Address:  Phone:  Fax:    Dialysis Facility (if applicable)   Name:  Address:  Dialysis Schedule:  Phone:  Fax:    / signature: {Esignature:066389897}    PHYSICIAN SECTION    Prognosis: {Prognosis:6456115562}    Condition at Discharge: { Patient Condition:314016876}    Rehab Potential (if transferring to Rehab): {Prognosis:2548084495}    Recommended Labs or Other Treatments After Discharge: ***    Physician Certification: I certify the above information and transfer of Sakina Ballesteros  is necessary for the continuing treatment of the diagnosis listed and that she requires {Admit to Appropriate Level of Care:91656} for {GREATER/LESS:541406275} 30 days.     Update Admission H&P: {CHP DME Changes in HandP:591302534}    PHYSICIAN SIGNATURE:  {Esignature:316759227}

## 2024-12-20 NOTE — PROGRESS NOTES
sodium chloride flush  5-40 mL IntraVENous 2 times per day    enoxaparin  40 mg SubCUTAneous Daily         sodium chloride 5 mL/hr at 12/13/24 1706        ketorolac, aspirin-acetaminophen-caffeine, oxyCODONE, amitriptyline, sodium chloride flush, sodium chloride, potassium chloride **OR** potassium alternative oral replacement **OR** potassium chloride, magnesium sulfate, ondansetron **OR** ondansetron, polyethylene glycol, acetaminophen **OR** acetaminophen     Exam:  Blood pressure 105/68, pulse 75, temperature 98.4 °F (36.9 °C), temperature source Oral, resp. rate 16, height 1.727 m (5' 7.99\"), weight 64.8 kg (142 lb 13.7 oz), SpO2 96%.  Constitutional    Vital signs: BP, HR, and RR reviewed   General Alert, no distress, well-nourished  Eyes: unable to visualize the fundi  Cardiovascular: pulses symmetric in all 4 extremities.  No peripheral edema.  Psychiatric: cooperative with examination, no  psychotic behavior noted.  Neurologic  Mental status:   orientation to person, place, time and situation   General fund of knowledge:  grossly intact   Memory: Short term and long term intact   Attention intact as able to attend well to the exam     Language fluent in conversation   Comprehension intact; follows simple commands  Cranial nerves:   CN 3,4,6: extraocular muscles intact, conjugate.  CN7: upper and lower facial symmetric without dysarthria.   CN8: hearing grossly intact to conversation.  Strength: Some suspected effort base.  BUE: 4/5; previous more focal proximal LUE weakness is much less prevalent today. Largely symmetric exam.   BLE: lifts symmetrically AG and sustains at 35 degrees. No drift. Improves with encouragement.   Deep tendon reflexes:   R Bicep: 2+  R Brachioradialis: 2+  R Patellar: 1+     L Bicep 2+ L Brachioradialis:: 2+  L Patellar: 1+      Sensory: light touch decreased in BLE. And distal BUE. Denies any changes   Cerebellar/coordination:   Gait: Steady with rollerator walker. No knee  simonling observed.       Labs  Reviewed. No updated labs  Na: 135  K: 4.4  BUN:20  Creatinine: 0.8  Glucose:108  Ca: 9.0    ALT: 34  AST: 31    TSH: 2.70  Vitamin B12: 257  Folate: 8.19    WBC:9.2  Hgb: 9.0  Platelet: 213    UA: negative for nitrites. Moderate leukocyte esterase. 79 WBC. 4+ Bacteria.   Urine culture: <10,000 CFU/ml mixed skin/urogenital анна. No further workup     Preg: negative.     Tox Screen:    Latest Reference Range & Units Most Recent   Amphetamine Screen, Ur Negative <1000ng/mL  POSITIVE !  12/10/24 09:23   Barbiturate Screen, Ur Negative <200 ng/mL  POSITIVE !  12/10/24 09:23   Benzodiazepine Screen, Urine Negative <200 ng/mL  POSITIVE !  12/10/24 09:23   Cannabinoid Scrn, Ur Negative <50 ng/mL  POSITIVE !  12/10/24 09:23   Methadone Screen, Urine Negative <300 ng/mL  POSITIVE !  12/10/24 09:23   Opiate Screen, Urine Negative <300 ng/mL  POSITIVE !  12/10/24 09:23           Component      Latest Ref Rn 12/14/2024   Acetylcholine Blocking Ab      0 - 26 % 8    Acetylchol Modul Ab      <=45 % 1    Acetylcholine Binding Antibody      0.0 - 0.4 nmol/L 0.1    MUSK AB NO reflex to testing           CSF STUDIES:   Component      Latest Ref Rn 12/12/2024   Color, CSF      Colorless  Colorless    Appearance, CSF      Clear  Clear    Tube Number + CELL CT + DIFF-CSF Tube 1    Clot Evaluation CSF see below    WBC, CSF      0 - 5 /cumm 2    RBC, CSF      0 /cumm 0    Volume, CSF      mL 9.0    No differential CSF see below    Glucose, CSF      40 - 80 mg/dL 72    Protein, CSF      15 - 45 mg/dL 58 (H)    Meninigits panel Not detected    CSF Culture NGTD  No Cells seen   No organisms seen    CSF Cytology No malignant cells identified    Albumin CSF 35.5   IgG CSF 8.22   Albumin 3,169   IgG  1479.0    IgG Index  0.50    IgG SR CSF -0.7           Studies  Neuromuscular Parameters:   Date/time NIF  VC   12/19/24 1629 -30 2.58   12/17 0104 -40 3.0       EEG 12/11/24  EEG DIAGNOSIS: Essentially normal EEG

## 2025-01-02 ENCOUNTER — APPOINTMENT (OUTPATIENT)
Dept: PHYSICAL THERAPY | Age: 40
End: 2025-01-02
Attending: HOSPITALIST
Payer: COMMERCIAL

## 2025-01-27 ENCOUNTER — HOSPITAL ENCOUNTER (OUTPATIENT)
Dept: PHYSICAL THERAPY | Age: 40
Setting detail: THERAPIES SERIES
Discharge: HOME | End: 2025-01-27
Attending: HOSPITALIST
Payer: COMMERCIAL

## 2025-01-27 DIAGNOSIS — R26.89 BALANCE DISORDER: ICD-10-CM

## 2025-01-27 DIAGNOSIS — R53.1 WEAKNESS: Primary | ICD-10-CM

## 2025-01-27 DIAGNOSIS — R27.8 ABNORMAL MOTOR COORDINATION: ICD-10-CM

## 2025-01-27 DIAGNOSIS — R26.9 ABNORMALITY OF GAIT AND MOBILITY: ICD-10-CM

## 2025-01-27 DIAGNOSIS — R29.898 BILATERAL LEG WEAKNESS: ICD-10-CM

## 2025-01-27 PROCEDURE — 97162 PT EVAL MOD COMPLEX 30 MIN: CPT

## 2025-01-27 PROCEDURE — 97110 THERAPEUTIC EXERCISES: CPT

## 2025-01-27 NOTE — PLAN OF CARE
Other:    Total Timed Code Tx Minutes 15 1  30     Total Treatment Minutes 45        Charge Justification:  (21327) THERAPEUTIC EXERCISE - Provided verbal/tactile cueing for activities related to strengthening, flexibility, endurance, ROM performed to prevent loss of range of motion, maintain or improve muscular strength or increase flexibility, following either an injury or surgery.   (90475) HOME EXERCISE PROGRAM - Reviewed/Progressed HEP activities related to strengthening, flexibility, endurance, ROM performed to prevent loss of range of motion, maintain or improve muscular strength or increase flexibility, following either an injury or surgery.    GOALS     Patient stated goal: \" walking along, squatting and stand on tip toes\"   [] Progressing: [] Met: [] Not Met: [] Adjusted    Therapist goals for Patient:   Short Term Goals: To be achieved in: 4-6 weeks  Independent in HEP and progression per patient tolerance, in order to increase independence with functional mobility.  [] Progressing: [] Met: [] Not Met: [] Adjusted  Patient will improve 5x sit to < 10 seconds to demonstrate improvement in LE muscle endurance.  [] Progressing: [] Met: [] Not Met: [] Adjusted  Pt to perform transfers via stepping/ambulating technique with use of LRAD (least restrictive assistive device) Modified Independent  [] Progressing: [] Met: [] Not Met: [] Adjusted  Pt will improve TUG to < 10 seconds to demonstrate improved gait speed and reduce risk for falls.  [] Progressing: [] Met: [] Not Met: [] Adjusted    Long Term Goals: To be achieved in: 10-12 weeks  Disability index score of 60% or greater for the LEFS to assist with reaching prior level of function with activities such as walking on all surfaces with LRAD.  [] Progressing: [] Met: [] Not Met: [] Adjusted  Pt will be able to (patient specific functional goal)   [] Progressing: [] Met: [] Not Met: [] Adjusted  Pt will improve FGA to > 20 to demonstrate increased safety at

## 2025-02-03 ENCOUNTER — APPOINTMENT (OUTPATIENT)
Dept: PHYSICAL THERAPY | Age: 40
End: 2025-02-03
Attending: HOSPITALIST
Payer: COMMERCIAL

## 2025-02-10 ENCOUNTER — HOSPITAL ENCOUNTER (OUTPATIENT)
Dept: PHYSICAL THERAPY | Age: 40
Setting detail: THERAPIES SERIES
End: 2025-02-10
Attending: HOSPITALIST
Payer: COMMERCIAL

## 2025-02-17 ENCOUNTER — APPOINTMENT (OUTPATIENT)
Dept: PHYSICAL THERAPY | Age: 40
End: 2025-02-17
Attending: HOSPITALIST
Payer: COMMERCIAL

## 2025-02-24 ENCOUNTER — HOSPITAL ENCOUNTER (OUTPATIENT)
Dept: PHYSICAL THERAPY | Age: 40
Setting detail: THERAPIES SERIES
Discharge: HOME | End: 2025-02-24
Attending: HOSPITALIST
Payer: COMMERCIAL

## 2025-02-24 PROCEDURE — 97110 THERAPEUTIC EXERCISES: CPT

## 2025-02-24 PROCEDURE — 97530 THERAPEUTIC ACTIVITIES: CPT
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
## 2025-03-24 ENCOUNTER — APPOINTMENT (OUTPATIENT)
Dept: GENERAL RADIOLOGY | Age: 40
DRG: 773 | End: 2025-03-24
Payer: COMMERCIAL

## 2025-03-24 ENCOUNTER — APPOINTMENT (OUTPATIENT)
Dept: CT IMAGING | Age: 40
DRG: 773 | End: 2025-03-24
Payer: COMMERCIAL

## 2025-03-24 ENCOUNTER — HOSPITAL ENCOUNTER (INPATIENT)
Age: 40
LOS: 4 days | Discharge: HOME | DRG: 773 | End: 2025-03-28
Attending: EMERGENCY MEDICINE
Payer: COMMERCIAL

## 2025-03-24 DIAGNOSIS — K70.30 ALCOHOLIC CIRRHOSIS OF LIVER WITHOUT ASCITES (HCC): ICD-10-CM

## 2025-03-24 DIAGNOSIS — F10.930 ALCOHOL WITHDRAWAL SYNDROME WITHOUT COMPLICATION (HCC): ICD-10-CM

## 2025-03-24 DIAGNOSIS — K92.0 GASTROINTESTINAL HEMORRHAGE WITH HEMATEMESIS: Primary | ICD-10-CM

## 2025-03-24 DIAGNOSIS — R11.2 NAUSEA AND VOMITING, UNSPECIFIED VOMITING TYPE: ICD-10-CM

## 2025-03-24 LAB
ALBUMIN SERPL-MCNC: 4.8 G/DL (ref 3.4–5)
ALBUMIN/GLOB SERPL: 1.2 {RATIO} (ref 1.1–2.2)
ALP SERPL-CCNC: 132 U/L (ref 40–129)
ALT SERPL-CCNC: 23 U/L (ref 10–40)
AMPHETAMINES UR QL SCN>1000 NG/ML: ABNORMAL
ANION GAP SERPL CALCULATED.3IONS-SCNC: 17 MMOL/L (ref 3–16)
APTT BLD: 29.3 SEC (ref 22.1–36.4)
AST SERPL-CCNC: 37 U/L (ref 15–37)
BARBITURATES UR QL SCN>200 NG/ML: ABNORMAL
BASOPHILS # BLD: 0 K/UL (ref 0–0.2)
BASOPHILS NFR BLD: 0.3 %
BENZODIAZ UR QL SCN>200 NG/ML: POSITIVE
BILIRUB SERPL-MCNC: 1.1 MG/DL (ref 0–1)
BUN SERPL-MCNC: 25 MG/DL (ref 7–20)
CALCIUM SERPL-MCNC: 9.8 MG/DL (ref 8.3–10.6)
CANNABINOIDS UR QL SCN>50 NG/ML: POSITIVE
CHLORIDE SERPL-SCNC: 91 MMOL/L (ref 99–110)
CO2 SERPL-SCNC: 26 MMOL/L (ref 21–32)
COCAINE UR QL SCN: ABNORMAL
CREAT SERPL-MCNC: 0.9 MG/DL (ref 0.6–1.1)
DEPRECATED RDW RBC AUTO: 13.7 % (ref 12.4–15.4)
DRUG SCREEN COMMENT UR-IMP: ABNORMAL
EOSINOPHIL # BLD: 0 K/UL (ref 0–0.6)
EOSINOPHIL NFR BLD: 0.1 %
ETHANOLAMINE SERPL-MCNC: NORMAL MG/DL (ref 0–0.08)
FENTANYL SCREEN, URINE: ABNORMAL
GFR SERPLBLD CREATININE-BSD FMLA CKD-EPI: 84 ML/MIN/{1.73_M2}
GLUCOSE SERPL-MCNC: 154 MG/DL (ref 70–99)
HCG SERPL QL: NEGATIVE
HCT VFR BLD AUTO: 52.8 % (ref 36–48)
HGB BLD-MCNC: 18.2 G/DL (ref 12–16)
INR PPP: 1.2 (ref 0.85–1.15)
LIPASE SERPL-CCNC: 12 U/L (ref 13–60)
LYMPHOCYTES # BLD: 1.2 K/UL (ref 1–5.1)
LYMPHOCYTES NFR BLD: 12.4 %
MAGNESIUM SERPL-MCNC: 1.89 MG/DL (ref 1.8–2.4)
MCH RBC QN AUTO: 31.3 PG (ref 26–34)
MCHC RBC AUTO-ENTMCNC: 34.5 G/DL (ref 31–36)
MCV RBC AUTO: 90.9 FL (ref 80–100)
METHADONE UR QL SCN>300 NG/ML: POSITIVE
MONOCYTES # BLD: 0.3 K/UL (ref 0–1.3)
MONOCYTES NFR BLD: 3.6 %
NEUTROPHILS # BLD: 7.8 K/UL (ref 1.7–7.7)
NEUTROPHILS NFR BLD: 83.6 %
NT-PROBNP SERPL-MCNC: <36 PG/ML (ref 0–124)
OPIATES UR QL SCN>300 NG/ML: POSITIVE
OXYCODONE UR QL SCN: ABNORMAL
PCP UR QL SCN>25 NG/ML: ABNORMAL
PH UR STRIP: 6.5 [PH]
PLATELET # BLD AUTO: 177 K/UL (ref 135–450)
PMV BLD AUTO: 9.1 FL (ref 5–10.5)
POTASSIUM SERPL-SCNC: 3.7 MMOL/L (ref 3.5–5.1)
PROT SERPL-MCNC: 8.7 G/DL (ref 6.4–8.2)
PROTHROMBIN TIME: 15.4 SEC (ref 11.9–14.9)
RBC # BLD AUTO: 5.81 M/UL (ref 4–5.2)
SODIUM SERPL-SCNC: 134 MMOL/L (ref 136–145)
WBC # BLD AUTO: 9.4 K/UL (ref 4–11)

## 2025-03-24 PROCEDURE — 82077 ASSAY SPEC XCP UR&BREATH IA: CPT

## 2025-03-24 PROCEDURE — 96365 THER/PROPH/DIAG IV INF INIT: CPT

## 2025-03-24 PROCEDURE — 80053 COMPREHEN METABOLIC PANEL: CPT

## 2025-03-24 PROCEDURE — 6370000000 HC RX 637 (ALT 250 FOR IP): Performed by: EMERGENCY MEDICINE

## 2025-03-24 PROCEDURE — 6360000002 HC RX W HCPCS

## 2025-03-24 PROCEDURE — 80307 DRUG TEST PRSMV CHEM ANLYZR: CPT

## 2025-03-24 PROCEDURE — 71045 X-RAY EXAM CHEST 1 VIEW: CPT

## 2025-03-24 PROCEDURE — 85025 COMPLETE CBC W/AUTO DIFF WBC: CPT

## 2025-03-24 PROCEDURE — 2100000000 HC CCU R&B

## 2025-03-24 PROCEDURE — 83735 ASSAY OF MAGNESIUM: CPT

## 2025-03-24 PROCEDURE — 96361 HYDRATE IV INFUSION ADD-ON: CPT

## 2025-03-24 PROCEDURE — 2580000003 HC RX 258: Performed by: EMERGENCY MEDICINE

## 2025-03-24 PROCEDURE — 6370000000 HC RX 637 (ALT 250 FOR IP)

## 2025-03-24 PROCEDURE — 96375 TX/PRO/DX INJ NEW DRUG ADDON: CPT

## 2025-03-24 PROCEDURE — 83880 ASSAY OF NATRIURETIC PEPTIDE: CPT

## 2025-03-24 PROCEDURE — 84703 CHORIONIC GONADOTROPIN ASSAY: CPT

## 2025-03-24 PROCEDURE — 85730 THROMBOPLASTIN TIME PARTIAL: CPT

## 2025-03-24 PROCEDURE — 6360000004 HC RX CONTRAST MEDICATION: Performed by: EMERGENCY MEDICINE

## 2025-03-24 PROCEDURE — 6360000002 HC RX W HCPCS: Performed by: EMERGENCY MEDICINE

## 2025-03-24 PROCEDURE — 96374 THER/PROPH/DIAG INJ IV PUSH: CPT

## 2025-03-24 PROCEDURE — 99285 EMERGENCY DEPT VISIT HI MDM: CPT

## 2025-03-24 PROCEDURE — 93005 ELECTROCARDIOGRAM TRACING: CPT | Performed by: EMERGENCY MEDICINE

## 2025-03-24 PROCEDURE — 2580000003 HC RX 258

## 2025-03-24 PROCEDURE — 74174 CTA ABD&PLVS W/CONTRAST: CPT

## 2025-03-24 PROCEDURE — 6370000000 HC RX 637 (ALT 250 FOR IP): Performed by: NURSE PRACTITIONER

## 2025-03-24 PROCEDURE — 85610 PROTHROMBIN TIME: CPT

## 2025-03-24 PROCEDURE — 6360000002 HC RX W HCPCS: Performed by: NURSE PRACTITIONER

## 2025-03-24 PROCEDURE — 83690 ASSAY OF LIPASE: CPT

## 2025-03-24 RX ORDER — ACETAMINOPHEN 650 MG/1
650 SUPPOSITORY RECTAL EVERY 6 HOURS PRN
Status: DISCONTINUED | OUTPATIENT
Start: 2025-03-24 | End: 2025-03-28 | Stop reason: HOSPADM

## 2025-03-24 RX ORDER — SODIUM CHLORIDE 9 MG/ML
INJECTION, SOLUTION INTRAVENOUS PRN
Status: DISCONTINUED | OUTPATIENT
Start: 2025-03-24 | End: 2025-03-28 | Stop reason: HOSPADM

## 2025-03-24 RX ORDER — LORAZEPAM 2 MG/ML
3 INJECTION INTRAMUSCULAR
Status: DISCONTINUED | OUTPATIENT
Start: 2025-03-24 | End: 2025-03-24 | Stop reason: SDUPTHER

## 2025-03-24 RX ORDER — POLYETHYLENE GLYCOL 3350 17 G/17G
17 POWDER, FOR SOLUTION ORAL DAILY PRN
Status: DISCONTINUED | OUTPATIENT
Start: 2025-03-24 | End: 2025-03-28 | Stop reason: HOSPADM

## 2025-03-24 RX ORDER — LORAZEPAM 1 MG/1
3 TABLET ORAL
Status: DISCONTINUED | OUTPATIENT
Start: 2025-03-24 | End: 2025-03-24 | Stop reason: SDUPTHER

## 2025-03-24 RX ORDER — DIPHENHYDRAMINE HYDROCHLORIDE 50 MG/ML
25 INJECTION, SOLUTION INTRAMUSCULAR; INTRAVENOUS ONCE
Status: COMPLETED | OUTPATIENT
Start: 2025-03-24 | End: 2025-03-24

## 2025-03-24 RX ORDER — GABAPENTIN 100 MG/1
100 CAPSULE ORAL 3 TIMES DAILY
COMMUNITY
Start: 2025-01-29 | End: 2025-04-29

## 2025-03-24 RX ORDER — KETOROLAC TROMETHAMINE 15 MG/ML
15 INJECTION, SOLUTION INTRAMUSCULAR; INTRAVENOUS ONCE
Status: COMPLETED | OUTPATIENT
Start: 2025-03-24 | End: 2025-03-24

## 2025-03-24 RX ORDER — CHLORDIAZEPOXIDE HYDROCHLORIDE 25 MG/1
25 CAPSULE, GELATIN COATED ORAL 3 TIMES DAILY
Status: DISCONTINUED | OUTPATIENT
Start: 2025-03-24 | End: 2025-03-27

## 2025-03-24 RX ORDER — POTASSIUM CHLORIDE 1500 MG/1
40 TABLET, EXTENDED RELEASE ORAL PRN
Status: DISCONTINUED | OUTPATIENT
Start: 2025-03-24 | End: 2025-03-28 | Stop reason: HOSPADM

## 2025-03-24 RX ORDER — ENOXAPARIN SODIUM 100 MG/ML
40 INJECTION SUBCUTANEOUS DAILY
Status: DISCONTINUED | OUTPATIENT
Start: 2025-03-24 | End: 2025-03-28 | Stop reason: HOSPADM

## 2025-03-24 RX ORDER — FOLIC ACID 1 MG/1
1 TABLET ORAL DAILY
Status: DISCONTINUED | OUTPATIENT
Start: 2025-03-24 | End: 2025-03-28 | Stop reason: HOSPADM

## 2025-03-24 RX ORDER — LORAZEPAM 1 MG/1
2 TABLET ORAL
Status: DISCONTINUED | OUTPATIENT
Start: 2025-03-24 | End: 2025-03-28 | Stop reason: HOSPADM

## 2025-03-24 RX ORDER — GAUZE BANDAGE 2" X 2"
100 BANDAGE TOPICAL DAILY
Status: DISCONTINUED | OUTPATIENT
Start: 2025-03-25 | End: 2025-03-28 | Stop reason: HOSPADM

## 2025-03-24 RX ORDER — LORAZEPAM 2 MG/ML
1 INJECTION INTRAMUSCULAR
Status: DISCONTINUED | OUTPATIENT
Start: 2025-03-24 | End: 2025-03-28 | Stop reason: HOSPADM

## 2025-03-24 RX ORDER — LORAZEPAM 2 MG/ML
4 INJECTION INTRAMUSCULAR
Status: DISCONTINUED | OUTPATIENT
Start: 2025-03-24 | End: 2025-03-24 | Stop reason: SDUPTHER

## 2025-03-24 RX ORDER — LORAZEPAM 2 MG/ML
2 INJECTION INTRAMUSCULAR
Status: DISCONTINUED | OUTPATIENT
Start: 2025-03-24 | End: 2025-03-24 | Stop reason: SDUPTHER

## 2025-03-24 RX ORDER — LORAZEPAM 1 MG/1
1 TABLET ORAL
Status: DISCONTINUED | OUTPATIENT
Start: 2025-03-24 | End: 2025-03-24 | Stop reason: SDUPTHER

## 2025-03-24 RX ORDER — SODIUM CHLORIDE 0.9 % (FLUSH) 0.9 %
5-40 SYRINGE (ML) INJECTION EVERY 12 HOURS SCHEDULED
Status: DISCONTINUED | OUTPATIENT
Start: 2025-03-24 | End: 2025-03-28 | Stop reason: HOSPADM

## 2025-03-24 RX ORDER — ONDANSETRON 2 MG/ML
4 INJECTION INTRAMUSCULAR; INTRAVENOUS EVERY 6 HOURS PRN
Status: DISCONTINUED | OUTPATIENT
Start: 2025-03-24 | End: 2025-03-24

## 2025-03-24 RX ORDER — LORAZEPAM 1 MG/1
4 TABLET ORAL
Status: DISCONTINUED | OUTPATIENT
Start: 2025-03-24 | End: 2025-03-24 | Stop reason: SDUPTHER

## 2025-03-24 RX ORDER — METHYLPREDNISOLONE SODIUM SUCCINATE 125 MG/2ML
125 INJECTION INTRAMUSCULAR; INTRAVENOUS ONCE
Status: COMPLETED | OUTPATIENT
Start: 2025-03-24 | End: 2025-03-24

## 2025-03-24 RX ORDER — LORAZEPAM 1 MG/1
2 TABLET ORAL
Status: DISCONTINUED | OUTPATIENT
Start: 2025-03-24 | End: 2025-03-24 | Stop reason: SDUPTHER

## 2025-03-24 RX ORDER — IOPAMIDOL 755 MG/ML
75 INJECTION, SOLUTION INTRAVASCULAR
Status: COMPLETED | OUTPATIENT
Start: 2025-03-24 | End: 2025-03-24

## 2025-03-24 RX ORDER — LORAZEPAM 2 MG/ML
3 INJECTION INTRAMUSCULAR
Status: DISCONTINUED | OUTPATIENT
Start: 2025-03-24 | End: 2025-03-28 | Stop reason: HOSPADM

## 2025-03-24 RX ORDER — POTASSIUM CHLORIDE 7.45 MG/ML
10 INJECTION INTRAVENOUS PRN
Status: DISCONTINUED | OUTPATIENT
Start: 2025-03-24 | End: 2025-03-28 | Stop reason: HOSPADM

## 2025-03-24 RX ORDER — LORAZEPAM 2 MG/ML
2 INJECTION INTRAMUSCULAR
Status: DISCONTINUED | OUTPATIENT
Start: 2025-03-24 | End: 2025-03-28 | Stop reason: HOSPADM

## 2025-03-24 RX ORDER — SODIUM CHLORIDE 9 MG/ML
INJECTION, SOLUTION INTRAVENOUS CONTINUOUS
Status: DISCONTINUED | OUTPATIENT
Start: 2025-03-24 | End: 2025-03-25 | Stop reason: ALTCHOICE

## 2025-03-24 RX ORDER — LORAZEPAM 2 MG/ML
1 INJECTION INTRAMUSCULAR
Status: DISCONTINUED | OUTPATIENT
Start: 2025-03-24 | End: 2025-03-24 | Stop reason: SDUPTHER

## 2025-03-24 RX ORDER — 0.9 % SODIUM CHLORIDE 0.9 %
1000 INTRAVENOUS SOLUTION INTRAVENOUS ONCE
Status: COMPLETED | OUTPATIENT
Start: 2025-03-24 | End: 2025-03-24

## 2025-03-24 RX ORDER — LORAZEPAM 1 MG/1
4 TABLET ORAL
Status: DISCONTINUED | OUTPATIENT
Start: 2025-03-24 | End: 2025-03-28 | Stop reason: HOSPADM

## 2025-03-24 RX ORDER — MULTIVITAMIN WITH IRON
1 TABLET ORAL DAILY
Status: DISCONTINUED | OUTPATIENT
Start: 2025-03-24 | End: 2025-03-28 | Stop reason: HOSPADM

## 2025-03-24 RX ORDER — MAGNESIUM SULFATE IN WATER 40 MG/ML
2000 INJECTION, SOLUTION INTRAVENOUS PRN
Status: DISCONTINUED | OUTPATIENT
Start: 2025-03-24 | End: 2025-03-28 | Stop reason: HOSPADM

## 2025-03-24 RX ORDER — LORAZEPAM 1 MG/1
1 TABLET ORAL
Status: DISCONTINUED | OUTPATIENT
Start: 2025-03-24 | End: 2025-03-28 | Stop reason: HOSPADM

## 2025-03-24 RX ORDER — ACETAMINOPHEN 325 MG/1
650 TABLET ORAL EVERY 6 HOURS PRN
Status: DISCONTINUED | OUTPATIENT
Start: 2025-03-24 | End: 2025-03-28 | Stop reason: HOSPADM

## 2025-03-24 RX ORDER — LORAZEPAM 1 MG/1
3 TABLET ORAL
Status: DISCONTINUED | OUTPATIENT
Start: 2025-03-24 | End: 2025-03-28 | Stop reason: HOSPADM

## 2025-03-24 RX ORDER — MORPHINE SULFATE 4 MG/ML
4 INJECTION, SOLUTION INTRAMUSCULAR; INTRAVENOUS ONCE
Refills: 0 | Status: COMPLETED | OUTPATIENT
Start: 2025-03-24 | End: 2025-03-24

## 2025-03-24 RX ORDER — THIAMINE HYDROCHLORIDE 100 MG/ML
100 INJECTION, SOLUTION INTRAMUSCULAR; INTRAVENOUS DAILY
Status: DISCONTINUED | OUTPATIENT
Start: 2025-03-24 | End: 2025-03-24 | Stop reason: SDUPTHER

## 2025-03-24 RX ORDER — LORAZEPAM 2 MG/ML
4 INJECTION INTRAMUSCULAR
Status: DISCONTINUED | OUTPATIENT
Start: 2025-03-24 | End: 2025-03-28 | Stop reason: HOSPADM

## 2025-03-24 RX ORDER — SODIUM CHLORIDE 0.9 % (FLUSH) 0.9 %
5-40 SYRINGE (ML) INJECTION PRN
Status: DISCONTINUED | OUTPATIENT
Start: 2025-03-24 | End: 2025-03-28 | Stop reason: HOSPADM

## 2025-03-24 RX ORDER — MORPHINE SULFATE 4 MG/ML
4 INJECTION, SOLUTION INTRAMUSCULAR; INTRAVENOUS EVERY 4 HOURS PRN
Status: DISCONTINUED | OUTPATIENT
Start: 2025-03-24 | End: 2025-03-27

## 2025-03-24 RX ORDER — PROCHLORPERAZINE EDISYLATE 5 MG/ML
10 INJECTION INTRAMUSCULAR; INTRAVENOUS EVERY 6 HOURS PRN
Status: DISCONTINUED | OUTPATIENT
Start: 2025-03-24 | End: 2025-03-28 | Stop reason: HOSPADM

## 2025-03-24 RX ORDER — TRAZODONE HYDROCHLORIDE 100 MG/1
100 TABLET ORAL NIGHTLY
Status: DISCONTINUED | OUTPATIENT
Start: 2025-03-24 | End: 2025-03-28 | Stop reason: HOSPADM

## 2025-03-24 RX ORDER — ONDANSETRON 2 MG/ML
4 INJECTION INTRAMUSCULAR; INTRAVENOUS ONCE
Status: COMPLETED | OUTPATIENT
Start: 2025-03-24 | End: 2025-03-24

## 2025-03-24 RX ORDER — ONDANSETRON 4 MG/1
4 TABLET, ORALLY DISINTEGRATING ORAL EVERY 8 HOURS PRN
Status: DISCONTINUED | OUTPATIENT
Start: 2025-03-24 | End: 2025-03-24

## 2025-03-24 RX ADMIN — LORAZEPAM 2 MG: 2 INJECTION INTRAMUSCULAR; INTRAVENOUS at 21:33

## 2025-03-24 RX ADMIN — KETOROLAC TROMETHAMINE 15 MG: 15 INJECTION, SOLUTION INTRAMUSCULAR; INTRAVENOUS at 21:33

## 2025-03-24 RX ADMIN — OCTREOTIDE ACETATE 25 MCG/HR: 500 INJECTION, SOLUTION INTRAVENOUS; SUBCUTANEOUS at 18:35

## 2025-03-24 RX ADMIN — MORPHINE SULFATE 4 MG: 4 INJECTION, SOLUTION INTRAMUSCULAR; INTRAVENOUS at 22:17

## 2025-03-24 RX ADMIN — PROCHLORPERAZINE EDISYLATE 10 MG: 5 INJECTION INTRAMUSCULAR; INTRAVENOUS at 23:01

## 2025-03-24 RX ADMIN — IOPAMIDOL 75 ML: 755 INJECTION, SOLUTION INTRAVENOUS at 16:57

## 2025-03-24 RX ADMIN — SODIUM CHLORIDE 1000 ML: 0.9 INJECTION, SOLUTION INTRAVENOUS at 16:33

## 2025-03-24 RX ADMIN — ENOXAPARIN SODIUM 40 MG: 100 INJECTION SUBCUTANEOUS at 21:45

## 2025-03-24 RX ADMIN — CHLORDIAZEPOXIDE HYDROCHLORIDE 25 MG: 25 CAPSULE ORAL at 21:33

## 2025-03-24 RX ADMIN — LORAZEPAM 2 MG: 2 INJECTION INTRAMUSCULAR; INTRAVENOUS at 22:59

## 2025-03-24 RX ADMIN — FOLIC ACID 1 MG: 1 TABLET ORAL at 18:42

## 2025-03-24 RX ADMIN — LORAZEPAM 2 MG: 2 INJECTION INTRAMUSCULAR; INTRAVENOUS at 18:58

## 2025-03-24 RX ADMIN — LORAZEPAM 2 MG: 2 INJECTION INTRAMUSCULAR; INTRAVENOUS at 20:11

## 2025-03-24 RX ADMIN — LORAZEPAM 4 MG: 2 INJECTION INTRAMUSCULAR; INTRAVENOUS at 17:55

## 2025-03-24 RX ADMIN — THERA TABS 1 TABLET: TAB at 17:49

## 2025-03-24 RX ADMIN — PANTOPRAZOLE SODIUM 80 MG: 40 INJECTION, POWDER, FOR SOLUTION INTRAVENOUS at 17:25

## 2025-03-24 RX ADMIN — SODIUM CHLORIDE: 0.9 INJECTION, SOLUTION INTRAVENOUS at 21:41

## 2025-03-24 RX ADMIN — METHYLPREDNISOLONE SODIUM SUCCINATE 125 MG: 125 INJECTION INTRAMUSCULAR; INTRAVENOUS at 17:45

## 2025-03-24 RX ADMIN — MORPHINE SULFATE 4 MG: 4 INJECTION, SOLUTION INTRAMUSCULAR; INTRAVENOUS at 17:51

## 2025-03-24 RX ADMIN — DIPHENHYDRAMINE HYDROCHLORIDE 25 MG: 50 INJECTION INTRAMUSCULAR; INTRAVENOUS at 17:50

## 2025-03-24 RX ADMIN — THIAMINE HYDROCHLORIDE 100 MG: 100 INJECTION, SOLUTION INTRAMUSCULAR; INTRAVENOUS at 17:46

## 2025-03-24 RX ADMIN — PANTOPRAZOLE SODIUM 8 MG/HR: 40 INJECTION, POWDER, FOR SOLUTION INTRAVENOUS at 17:14

## 2025-03-24 RX ADMIN — TRAZODONE HYDROCHLORIDE 100 MG: 100 TABLET ORAL at 22:59

## 2025-03-24 RX ADMIN — ONDANSETRON 4 MG: 2 INJECTION, SOLUTION INTRAMUSCULAR; INTRAVENOUS at 16:33

## 2025-03-24 ASSESSMENT — PAIN DESCRIPTION - DESCRIPTORS
DESCRIPTORS: ACHING;SHARP
DESCRIPTORS: ACHING
DESCRIPTORS: ACHING
DESCRIPTORS: ACHING;SHARP
DESCRIPTORS: ACHING

## 2025-03-24 ASSESSMENT — PAIN DESCRIPTION - LOCATION
LOCATION: ABDOMEN
LOCATION: BACK;ABDOMEN
LOCATION: ABDOMEN;BACK
LOCATION: BACK
LOCATION: HEAD
LOCATION: ABDOMEN;BACK;THROAT
LOCATION: ABDOMEN

## 2025-03-24 ASSESSMENT — PAIN DESCRIPTION - FREQUENCY
FREQUENCY: CONTINUOUS

## 2025-03-24 ASSESSMENT — PAIN - FUNCTIONAL ASSESSMENT
PAIN_FUNCTIONAL_ASSESSMENT: ACTIVITIES ARE NOT PREVENTED
PAIN_FUNCTIONAL_ASSESSMENT: 0-10
PAIN_FUNCTIONAL_ASSESSMENT: ACTIVITIES ARE NOT PREVENTED

## 2025-03-24 ASSESSMENT — PAIN SCALES - GENERAL
PAINLEVEL_OUTOF10: 10
PAINLEVEL_OUTOF10: 9
PAINLEVEL_OUTOF10: 5
PAINLEVEL_OUTOF10: 10
PAINLEVEL_OUTOF10: 10
PAINLEVEL_OUTOF10: 9
PAINLEVEL_OUTOF10: 9
PAINLEVEL_OUTOF10: 10

## 2025-03-24 ASSESSMENT — PAIN DESCRIPTION - ONSET
ONSET: ON-GOING

## 2025-03-24 ASSESSMENT — LIFESTYLE VARIABLES
HOW MANY STANDARD DRINKS CONTAINING ALCOHOL DO YOU HAVE ON A TYPICAL DAY: 7 TO 9
HOW OFTEN DO YOU HAVE A DRINK CONTAINING ALCOHOL: 4 OR MORE TIMES A WEEK

## 2025-03-24 ASSESSMENT — PAIN DESCRIPTION - ORIENTATION
ORIENTATION: RIGHT

## 2025-03-24 ASSESSMENT — PAIN DESCRIPTION - PAIN TYPE
TYPE: ACUTE PAIN

## 2025-03-24 NOTE — ED PROVIDER NOTES
[facility] EMERGENCY DEPARTMENT  EMERGENCY DEPARTMENT ENCOUNTER      Pt Name: Sakina Ballesteros  MRN: [id number]  Birthdate 1985  Date of evaluation: 3/24/2025  Provider: STELLA NAJERA DO    CHIEF COMPLAINT       Chief Complaint   Patient presents with    Alcohol Intoxication     Pt into Ed via [facility] for alcohol withdrawal. Pt states last drink was 12 hrs ago, pt normally drinks 1/5 th of vodka. Pt having dizziness and vomiting with some blood. Pt complains of throat pain has hx of varices.          HISTORY OF PRESENT ILLNESS   (Location/Symptom, Timing/Onset, Context/Setting, Quality, Duration, Modifying Factors, Severity)  Note limiting factors.   Sakina Ballesteros is a 39 y.o. female who presents to the emergency department with complaint of nausea vomiting.  She reports that she decided that she wanted to stop drinking on Saturday 2 days ago.  She has been drinking up to 1/5 of vodka daily.  She reports that on Saturday evening she began to have vomiting with multiple episodes over the next 48 hours.  She has vomited 20 times in the last 24 hours.  She did report some coffee-ground emesis yesterday evening.  She reports that subsequent vomiting has been mostly yellow with some streaks of bright red blood.  No clots.  Last bowel movement was 1 week ago.  She denies any recent melena or hematochezia.    She does report sharp stabbing epigastric abdominal pain and right upper quadrant abdominal pain.  She does report a history of esophageal varices and has required banding 1 year ago according to the patient's description.    She is not currently taking any PPI.  She does take ibuprofen daily.  She does not take any anticoagulants.  She does not take aspirin.  She is not pregnant.    She denies any headache earache sore throat sinus drainage cough or cold symptoms.  She denies any chest pain heaviness pressure or tightness.  She denies any shortness of breath diaphoresis or dyspnea on exertion.

## 2025-03-25 LAB
ALBUMIN SERPL-MCNC: 4.1 G/DL (ref 3.4–5)
ALBUMIN SERPL-MCNC: ABNORMAL G/DL (ref 3.4–5)
ALBUMIN/GLOB SERPL: 1.4 {RATIO} (ref 1.1–2.2)
ALBUMIN/GLOB SERPL: ABNORMAL {RATIO} (ref 1.1–2.2)
ALP SERPL-CCNC: 97 U/L (ref 40–129)
ALP SERPL-CCNC: ABNORMAL U/L (ref 40–129)
ALT SERPL-CCNC: 18 U/L (ref 10–40)
ALT SERPL-CCNC: ABNORMAL U/L (ref 10–40)
AMMONIA PLAS-SCNC: 81 UMOL/L (ref 11–51)
ANION GAP SERPL CALCULATED.3IONS-SCNC: 10 MMOL/L (ref 3–16)
ANION GAP SERPL CALCULATED.3IONS-SCNC: ABNORMAL MMOL/L (ref 3–16)
AST SERPL-CCNC: 22 U/L (ref 15–37)
AST SERPL-CCNC: ABNORMAL U/L (ref 15–37)
BASOPHILS # BLD: 0 K/UL (ref 0–0.2)
BASOPHILS # BLD: 0 K/UL (ref 0–0.2)
BASOPHILS NFR BLD: 0.1 %
BASOPHILS NFR BLD: 0.4 %
BILIRUB SERPL-MCNC: 1.1 MG/DL (ref 0–1)
BILIRUB SERPL-MCNC: ABNORMAL MG/DL (ref 0–1)
BUN SERPL-MCNC: 21 MG/DL (ref 7–20)
BUN SERPL-MCNC: ABNORMAL MG/DL (ref 7–20)
CALCIUM SERPL-MCNC: 8.9 MG/DL (ref 8.3–10.6)
CALCIUM SERPL-MCNC: ABNORMAL MG/DL (ref 8.3–10.6)
CHLORIDE SERPL-SCNC: 100 MMOL/L (ref 99–110)
CHLORIDE SERPL-SCNC: ABNORMAL MMOL/L (ref 99–110)
CK SERPL-CCNC: 277 U/L (ref 26–192)
CO2 SERPL-SCNC: 26 MMOL/L (ref 21–32)
CO2 SERPL-SCNC: ABNORMAL MMOL/L (ref 21–32)
CREAT SERPL-MCNC: 0.7 MG/DL (ref 0.6–1.1)
CREAT SERPL-MCNC: ABNORMAL MG/DL (ref 0.6–1.1)
DEPRECATED RDW RBC AUTO: 13.4 % (ref 12.4–15.4)
DEPRECATED RDW RBC AUTO: 13.6 % (ref 12.4–15.4)
EKG ATRIAL RATE: 80 BPM
EKG DIAGNOSIS: NORMAL
EKG P AXIS: 71 DEGREES
EKG P-R INTERVAL: 202 MS
EKG Q-T INTERVAL: 484 MS
EKG QRS DURATION: 82 MS
EKG QTC CALCULATION (BAZETT): 558 MS
EKG R AXIS: 55 DEGREES
EKG T AXIS: 27 DEGREES
EKG VENTRICULAR RATE: 80 BPM
EOSINOPHIL # BLD: 0 K/UL (ref 0–0.6)
EOSINOPHIL # BLD: 0 K/UL (ref 0–0.6)
EOSINOPHIL NFR BLD: 0 %
EOSINOPHIL NFR BLD: 0 %
GFR SERPLBLD CREATININE-BSD FMLA CKD-EPI: >90 ML/MIN/{1.73_M2}
GFR SERPLBLD CREATININE-BSD FMLA CKD-EPI: ABNORMAL ML/MIN/{1.73_M2}
GLUCOSE SERPL-MCNC: 189 MG/DL (ref 70–99)
GLUCOSE SERPL-MCNC: ABNORMAL MG/DL (ref 70–99)
HCT VFR BLD AUTO: 43.2 % (ref 36–48)
HCT VFR BLD AUTO: 45.9 % (ref 36–48)
HGB BLD-MCNC: 14.4 G/DL (ref 12–16)
HGB BLD-MCNC: 15.6 G/DL (ref 12–16)
LYMPHOCYTES # BLD: 1.1 K/UL (ref 1–5.1)
LYMPHOCYTES # BLD: 1.2 K/UL (ref 1–5.1)
LYMPHOCYTES NFR BLD: 19.4 %
LYMPHOCYTES NFR BLD: 20.6 %
MAGNESIUM SERPL-MCNC: 2.07 MG/DL (ref 1.8–2.4)
MAGNESIUM SERPL-MCNC: ABNORMAL MG/DL (ref 1.8–2.4)
MCH RBC QN AUTO: 31 PG (ref 26–34)
MCH RBC QN AUTO: 31.2 PG (ref 26–34)
MCHC RBC AUTO-ENTMCNC: 33.3 G/DL (ref 31–36)
MCHC RBC AUTO-ENTMCNC: 34 G/DL (ref 31–36)
MCV RBC AUTO: 91.8 FL (ref 80–100)
MCV RBC AUTO: 93 FL (ref 80–100)
MONOCYTES # BLD: 0.2 K/UL (ref 0–1.3)
MONOCYTES # BLD: 0.2 K/UL (ref 0–1.3)
MONOCYTES NFR BLD: 3.3 %
MONOCYTES NFR BLD: 4.1 %
NEUTROPHILS # BLD: 4 K/UL (ref 1.7–7.7)
NEUTROPHILS # BLD: 4.6 K/UL (ref 1.7–7.7)
NEUTROPHILS NFR BLD: 76 %
NEUTROPHILS NFR BLD: 76.1 %
PLATELET # BLD AUTO: 105 K/UL (ref 135–450)
PLATELET # BLD AUTO: 119 K/UL (ref 135–450)
PMV BLD AUTO: 9.1 FL (ref 5–10.5)
PMV BLD AUTO: 9.2 FL (ref 5–10.5)
POTASSIUM SERPL-SCNC: 4.4 MMOL/L (ref 3.5–5.1)
POTASSIUM SERPL-SCNC: ABNORMAL MMOL/L (ref 3.5–5.1)
PROT SERPL-MCNC: 7.1 G/DL (ref 6.4–8.2)
PROT SERPL-MCNC: ABNORMAL G/DL (ref 6.4–8.2)
RBC # BLD AUTO: 4.65 M/UL (ref 4–5.2)
RBC # BLD AUTO: 5 M/UL (ref 4–5.2)
SODIUM SERPL-SCNC: 136 MMOL/L (ref 136–145)
SODIUM SERPL-SCNC: ABNORMAL MMOL/L (ref 136–145)
WBC # BLD AUTO: 5.2 K/UL (ref 4–11)
WBC # BLD AUTO: 6 K/UL (ref 4–11)

## 2025-03-25 PROCEDURE — 2580000003 HC RX 258

## 2025-03-25 PROCEDURE — 93010 ELECTROCARDIOGRAM REPORT: CPT | Performed by: INTERNAL MEDICINE

## 2025-03-25 PROCEDURE — 2140000000 HC CCU INTERMEDIATE R&B

## 2025-03-25 PROCEDURE — 36415 COLL VENOUS BLD VENIPUNCTURE: CPT

## 2025-03-25 PROCEDURE — 2700000000 HC OXYGEN THERAPY PER DAY

## 2025-03-25 PROCEDURE — 6370000000 HC RX 637 (ALT 250 FOR IP): Performed by: NURSE PRACTITIONER

## 2025-03-25 PROCEDURE — 82550 ASSAY OF CK (CPK): CPT

## 2025-03-25 PROCEDURE — 6360000002 HC RX W HCPCS: Performed by: EMERGENCY MEDICINE

## 2025-03-25 PROCEDURE — 94761 N-INVAS EAR/PLS OXIMETRY MLT: CPT

## 2025-03-25 PROCEDURE — 6370000000 HC RX 637 (ALT 250 FOR IP)

## 2025-03-25 PROCEDURE — 2580000003 HC RX 258: Performed by: EMERGENCY MEDICINE

## 2025-03-25 PROCEDURE — 82140 ASSAY OF AMMONIA: CPT

## 2025-03-25 PROCEDURE — 6360000002 HC RX W HCPCS

## 2025-03-25 PROCEDURE — 51798 US URINE CAPACITY MEASURE: CPT

## 2025-03-25 PROCEDURE — 85025 COMPLETE CBC W/AUTO DIFF WBC: CPT

## 2025-03-25 PROCEDURE — 2500000003 HC RX 250 WO HCPCS

## 2025-03-25 PROCEDURE — 83735 ASSAY OF MAGNESIUM: CPT

## 2025-03-25 PROCEDURE — 6360000002 HC RX W HCPCS: Performed by: NURSE PRACTITIONER

## 2025-03-25 PROCEDURE — 6370000000 HC RX 637 (ALT 250 FOR IP): Performed by: EMERGENCY MEDICINE

## 2025-03-25 PROCEDURE — 2500000003 HC RX 250 WO HCPCS: Performed by: EMERGENCY MEDICINE

## 2025-03-25 PROCEDURE — 80053 COMPREHEN METABOLIC PANEL: CPT

## 2025-03-25 RX ORDER — METHADONE HYDROCHLORIDE 10 MG/1
85 TABLET ORAL DAILY
Refills: 0 | Status: DISCONTINUED | OUTPATIENT
Start: 2025-03-25 | End: 2025-03-28 | Stop reason: HOSPADM

## 2025-03-25 RX ORDER — LACTULOSE 10 G/15ML
20 SOLUTION ORAL 3 TIMES DAILY
Status: DISCONTINUED | OUTPATIENT
Start: 2025-03-25 | End: 2025-03-28 | Stop reason: HOSPADM

## 2025-03-25 RX ORDER — PANTOPRAZOLE SODIUM 40 MG/1
40 TABLET, DELAYED RELEASE ORAL
Status: DISCONTINUED | OUTPATIENT
Start: 2025-03-25 | End: 2025-03-28 | Stop reason: HOSPADM

## 2025-03-25 RX ORDER — OCTREOTIDE ACETATE 100 UG/ML
100 INJECTION, SOLUTION INTRAVENOUS; SUBCUTANEOUS 3 TIMES DAILY
Status: DISCONTINUED | OUTPATIENT
Start: 2025-03-25 | End: 2025-03-27

## 2025-03-25 RX ORDER — GABAPENTIN 100 MG/1
100 CAPSULE ORAL 3 TIMES DAILY
Status: DISCONTINUED | OUTPATIENT
Start: 2025-03-25 | End: 2025-03-28 | Stop reason: HOSPADM

## 2025-03-25 RX ADMIN — ENOXAPARIN SODIUM 40 MG: 100 INJECTION SUBCUTANEOUS at 08:31

## 2025-03-25 RX ADMIN — CHLORDIAZEPOXIDE HYDROCHLORIDE 25 MG: 25 CAPSULE ORAL at 20:41

## 2025-03-25 RX ADMIN — PANTOPRAZOLE SODIUM 8 MG/HR: 40 INJECTION, POWDER, FOR SOLUTION INTRAVENOUS at 03:32

## 2025-03-25 RX ADMIN — SODIUM CHLORIDE, PRESERVATIVE FREE 10 ML: 5 INJECTION INTRAVENOUS at 09:00

## 2025-03-25 RX ADMIN — METHADONE HYDROCHLORIDE 85 MG: 10 TABLET ORAL at 08:30

## 2025-03-25 RX ADMIN — FOLIC ACID 1 MG: 1 TABLET ORAL at 08:31

## 2025-03-25 RX ADMIN — SODIUM CHLORIDE, PRESERVATIVE FREE 10 ML: 5 INJECTION INTRAVENOUS at 20:41

## 2025-03-25 RX ADMIN — LORAZEPAM 2 MG: 2 INJECTION INTRAMUSCULAR; INTRAVENOUS at 20:50

## 2025-03-25 RX ADMIN — GABAPENTIN 100 MG: 100 CAPSULE ORAL at 13:46

## 2025-03-25 RX ADMIN — GABAPENTIN 100 MG: 100 CAPSULE ORAL at 20:41

## 2025-03-25 RX ADMIN — TRAZODONE HYDROCHLORIDE 100 MG: 100 TABLET ORAL at 20:41

## 2025-03-25 RX ADMIN — MORPHINE SULFATE 4 MG: 4 INJECTION, SOLUTION INTRAMUSCULAR; INTRAVENOUS at 05:00

## 2025-03-25 RX ADMIN — LORAZEPAM 2 MG: 2 INJECTION INTRAMUSCULAR; INTRAVENOUS at 13:46

## 2025-03-25 RX ADMIN — LORAZEPAM 2 MG: 2 INJECTION INTRAMUSCULAR; INTRAVENOUS at 04:16

## 2025-03-25 RX ADMIN — OCTREOTIDE ACETATE 100 MCG: 100 INJECTION, SOLUTION INTRAVENOUS; SUBCUTANEOUS at 16:16

## 2025-03-25 RX ADMIN — OCTREOTIDE ACETATE 25 MCG/HR: 500 INJECTION, SOLUTION INTRAVENOUS; SUBCUTANEOUS at 09:30

## 2025-03-25 RX ADMIN — LACTULOSE 20 G: 10 SOLUTION ORAL at 08:31

## 2025-03-25 RX ADMIN — CHLORDIAZEPOXIDE HYDROCHLORIDE 25 MG: 25 CAPSULE ORAL at 13:46

## 2025-03-25 RX ADMIN — OCTREOTIDE ACETATE 100 MCG: 100 INJECTION, SOLUTION INTRAVENOUS; SUBCUTANEOUS at 20:41

## 2025-03-25 RX ADMIN — SODIUM CHLORIDE, PRESERVATIVE FREE 10 ML: 5 INJECTION INTRAVENOUS at 20:42

## 2025-03-25 RX ADMIN — LORAZEPAM 2 MG: 2 INJECTION INTRAMUSCULAR; INTRAVENOUS at 01:00

## 2025-03-25 RX ADMIN — PANTOPRAZOLE SODIUM 40 MG: 40 TABLET, DELAYED RELEASE ORAL at 16:16

## 2025-03-25 RX ADMIN — Medication 100 MG: at 08:59

## 2025-03-25 RX ADMIN — LORAZEPAM 2 MG: 2 INJECTION INTRAMUSCULAR; INTRAVENOUS at 11:22

## 2025-03-25 RX ADMIN — LACTULOSE 20 G: 10 SOLUTION ORAL at 20:41

## 2025-03-25 RX ADMIN — SODIUM CHLORIDE: 0.9 INJECTION, SOLUTION INTRAVENOUS at 05:47

## 2025-03-25 RX ADMIN — CHLORDIAZEPOXIDE HYDROCHLORIDE 25 MG: 25 CAPSULE ORAL at 08:59

## 2025-03-25 RX ADMIN — LORAZEPAM 2 MG: 2 INJECTION INTRAMUSCULAR; INTRAVENOUS at 18:10

## 2025-03-25 RX ADMIN — GABAPENTIN 100 MG: 100 CAPSULE ORAL at 08:31

## 2025-03-25 RX ADMIN — LORAZEPAM 2 MG: 2 INJECTION INTRAMUSCULAR; INTRAVENOUS at 09:31

## 2025-03-25 RX ADMIN — LACTULOSE 20 G: 10 SOLUTION ORAL at 13:46

## 2025-03-25 RX ADMIN — THERA TABS 1 TABLET: TAB at 08:31

## 2025-03-25 ASSESSMENT — PAIN DESCRIPTION - PAIN TYPE
TYPE: ACUTE PAIN;CHRONIC PAIN

## 2025-03-25 ASSESSMENT — PAIN SCALES - GENERAL
PAINLEVEL_OUTOF10: 9
PAINLEVEL_OUTOF10: 9
PAINLEVEL_OUTOF10: 7

## 2025-03-25 ASSESSMENT — PAIN DESCRIPTION - LOCATION
LOCATION: ABDOMEN;THROAT;BACK
LOCATION: ABDOMEN;BACK;THROAT
LOCATION: BACK
LOCATION: ABDOMEN;THROAT

## 2025-03-25 ASSESSMENT — PAIN DESCRIPTION - DESCRIPTORS
DESCRIPTORS: ACHING
DESCRIPTORS: ACHING;NAGGING;BURNING
DESCRIPTORS: ACHING;BURNING;CRAMPING
DESCRIPTORS: ACHING

## 2025-03-25 ASSESSMENT — PAIN DESCRIPTION - ORIENTATION
ORIENTATION: ANTERIOR;POSTERIOR
ORIENTATION: ANTERIOR;POSTERIOR;LOWER

## 2025-03-25 ASSESSMENT — PAIN - FUNCTIONAL ASSESSMENT
PAIN_FUNCTIONAL_ASSESSMENT: ACTIVITIES ARE NOT PREVENTED

## 2025-03-25 ASSESSMENT — PAIN DESCRIPTION - ONSET
ONSET: ON-GOING

## 2025-03-25 ASSESSMENT — PAIN DESCRIPTION - FREQUENCY
FREQUENCY: CONTINUOUS

## 2025-03-25 NOTE — PROGRESS NOTES
Name:  Sakina Ballesteros /Age/Sex: 1985  (39 y.o. female)   MRN & CSN:  [id number] & [id number] Encounter Date/Time: 3/25/2025 11:34 AM EDT   Location:  [address]- PCP: Jarrett Galvin DO     Attending:Ismael Leone MD       Sakina Ballesteros is a 39 y.o. female with  has a past medical history of Alcohol use disorder, Gastroparesis, GERD (gastroesophageal reflux disease), Hepatic cirrhosis (HCC), Mood disorder, Opioid use disorder, Overweight, Scoliosis, and Tobacco use disorder. who presents with Alcohol use with withdrawal without complication (HCC)    Hospital Day: 2    Assessment and Plan     Alcohol withdrawal  Abdominal pain  Alcoholic cirrhosis  Hyperammonemia  Nausea and vomiting   CTA abdomen pelvis showing esophagitis   Protonix switched to PO BID   Started on octreotide and lactulose   Thiamine and folic acid   Scheduled Librium   CIWA protocol   Monitor LFT in AM   GI following    Plan for EGD tomorrow    Polysubstance abuse   Continue home methadone    Objective:       Intake/Output Summary (Last 24 hours) at 3/25/2025 1134  Last data filed at 3/25/2025 0600  Gross per 24 hour   Intake 2377.84 ml   Output 250 ml   Net 2127.84 ml      Vitals:   Vitals:    25 0833 25 0900 25 0931 25 1031   BP:   (!) 123/90 117/82   Pulse: 66  65 64   Resp: 18 17     Temp:       TempSrc:       SpO2: 99%      Weight:       Height:           Interval history:     Seen and examined at bedside. No acute events overnight. Reports having improvement in nausea and vomiting    Physical Exam:        General: NAD  Eyes: EOMI  ENT: neck supple  Cardiovascular: Regular rate.  Respiratory: Clear to auscultation  Gastrointestinal: Soft, right upper quadrant tenderness to palpation  Genitourinary: no suprapubic tenderness  Musculoskeletal: No edema  Neuro: Alert and oriented  Psych: Mood appropriate.     Review of Systems:      10 point ROS negative except stated above    Medications:   Medications:

## 2025-03-25 NOTE — PROGRESS NOTES
NAME:  Sakina Ballesteros  YOB: 1985  MEDICAL RECORD NUMBER:  [id number]    Shift Summary: Pt admitted for alcohol withdrawal. Pt also takes methadone for previous opiate hx. Librium scheduled. CIWA with Ativan-  5 doses this shift. PRN morphine x 2 and compazine x 1. Pt unsteady and uses rolling walker- recent hx of guillain-barre.     Family updated: No    Rhythm: Normal Sinus Rhythm /SB    Most recent vitals:   Visit Vitals  /73   Pulse 64   Temp 97.5 °F (36.4 °C) (Oral)   Resp 10   Ht 1.702 m (5' 7\")   Wt 63.7 kg (140 lb 6.9 oz)   SpO2 100%   BMI 21.99 kg/m²           No data found.    No data found.      Respiratory support needed (if any):  - O2 - NC - 1 lpm while sleeping RA when awake    Admission weight Weight - Scale: 60.5 kg (133 lb 6.1 oz) (03/24/25 1548)    Today's weight    Wt Readings from Last 1 Encounters:   03/25/25 63.7 kg (140 lb 6.9 oz)        Nunez need assessed each shift: N/A - no nunez present  UOP >30ml/hr: NO - MD notified- bladder scanned for 280 ml at 6:00  Last documented BM (in last 48 hrs):  No data found.             Restraints (in use currently or dc'd in last 12 hrs): No    Order current and documentation up to date? No    Lines/Drains reviewed @ bedside.  Peripheral IV 03/24/25 Posterior;Left Hand (Active)   Number of days: 0       Peripheral IV 03/24/25 Right Forearm (Active)   Number of days: 0         Drip rates at handoff:    sodium chloride      pantoprazole 8 mg/hr (03/25/25 0600)    octreotide (SANDOSTATIN) 500 mcg in sodium chloride 0.9 % 100 mL infusion 25 mcg/hr (03/25/25 0600)    sodium chloride      sodium chloride 125 mL/hr at 03/25/25 0600       Lab Data:   CBC:   Recent Labs     03/25/25  0432 03/25/25  0635   WBC 5.2 6.0   HGB 14.4 15.6   HCT 43.2 45.9   MCV 93.0 91.8   * 119*     BMP:    Recent Labs     03/24/25  1601 03/25/25  0432   * 140   K 3.7 3.0*   CO2 26 17*   BUN 25* 16   CREATININE 0.9 0.3*     ABG: No results for input(s):

## 2025-03-25 NOTE — PROGRESS NOTES
Pharmacy Medication Reconciliation Note     List of medications patient is currently taking is complete.    Source of information:   1. Patient  2. EMR    Notes regarding home medications:   1. Patient states she tried to take her home medications yesterday/ today but could not keep anything down.   2. Patient states she last had methadone on Saturday, 3/22.  3. Patient states she used to take Gabapentin 100mg TID, but has been out for a couple weeks.      Dominique Smith, Pharmacy Intern  3/24/2025 8:01 PM

## 2025-03-25 NOTE — PROGRESS NOTES
Lab called with critical calcium level of 4.7. Previous calcium 9.8. Dr. Engle notified. Stat CMP ordered to verify level.

## 2025-03-25 NOTE — H&P
V2.0  History and Physical      Name:  Sakina Ballesterso /Age/Sex: 1985  (39 y.o. female)   MRN & CSN:  [id number] & [id number] Encounter Date/Time: 3/24/2025 8:02 PM EDT   Location:  [facility]/[address] PCP: Jarrett Galvin DO       Hospital Day: 1    Assessment and Plan:   Sakina Ballesteros is a 39 y.o. female with a pmh of alcohol abuse, gastroparesis, history of drug abuse on methadone, daily marijuana user, tobacco user, GERD, in December diagnosed with Guillain-Barré disease who presents with Alcohol use with withdrawal without complication (HCC)    Hospital Problems           Last Modified POA    * (Principal) Alcohol use with withdrawal without complication (HCC) 3/24/2025 Yes       Plan:  #Alcohol withdrawal  #Intractable abdominal pain  #Intractable nausea and vomiting  Patient had 1 episode of coffee-ground emesis  - Vitals afebrile hemodynamically stable  - No electrolyte imbalance  - Alcohol nondetected, lipase normal,   - CTA of abdominal pelvic  No evidence of active arterial gastrointestinal hemorrhage. Circumferential wall thickening of the distal esophagus may represent esophagitis.  - Will initiate CIWA protocol  - Will provide IV fluid  - Will provide IV Protonix  - Will monitor on telemetry    #Alcohol liver cirrhosis  #Hyperammonemia  - Total bilirubin 1.1, alk phos 132  -Ammonia 81  -Will provide lactulose  - Patient states she takes spironolactone but not seen on home medication    #Polysubstance abuse currently on methadone  - UDS positive for opioid, benzodiazepine, methadone  - Will continue methadone    DVT Prophylaxis: Lovenox  Diet: Regular diet    Advance care planning/Goal of care  Advance care planning the patient for total of 16 minutes.  Full code, DNR CC, DNR CCA, power of  and living will were discussed.  Patient elected to be full code    Disposition:   Current Living situation: Home  Expected Disposition: Home  Estimated D/C: 2 to 3 days    Diet ADULT DIET; Regular   DVT  as reasonably achievable. COMPARISON: 12/09/2024 HISTORY: ORDERING SYSTEM PROVIDED HISTORY: History of esophageal varices, GI bleed, abdominal pain TECHNOLOGIST PROVIDED HISTORY: Reason for exam:->History of esophageal varices, GI bleed, abdominal pain Additional Contrast?->1 Reason for Exam: History of esophageal varices, GI bleed, abdominal pain - pt reports detox of alcohol, last drink 12 hours ago. States blood in her vomit Relevant Medical/Surgical History: none FINDINGS: CTA ABDOMEN: The abdominal aorta is normal in caliber.  The mesenteric arteries are patent.  There are paired renal arteries bilaterally which are patent.  There is no evidence of active arterial gastrointestinal hemorrhage.  There is circumferential wall thickening of the distal esophagus which may represent esophagitis. There is nodularity of the liver suspicious for diffuse hepatocellular disease/cirrhosis.  Otherwise, the solid organs are unremarkable.  There is a calcified gallstone in the dependent portion of the gallbladder.  There is no gallbladder wall thickening CTA PELVIS: The iliac arteries are normal in caliber without flow significant stenosis or occlusion.  There is no free fluid in the pelvis.  The urinary bladder and uterus are unremarkable.  There is degenerative disc disease in vacuum disc phenomenon at L5-S1.     1. No evidence of active arterial gastrointestinal hemorrhage. 2. Circumferential wall thickening of the distal esophagus may represent esophagitis. 3. Nodularity of the liver suspicious for diffuse hepatocellular disease/cirrhosis. 4. Cholelithiasis.     XR CHEST PORTABLE  Result Date: 3/24/2025  EXAMINATION: ONE XRAY VIEW OF THE CHEST 3/24/2025 4:19 pm COMPARISON: 09/19/2024 HISTORY: ORDERING SYSTEM PROVIDED HISTORY: abdominal pain TECHNOLOGIST PROVIDED HISTORY: Reason for exam:->abdominal pain Reason for Exam: abdominal pain FINDINGS: The lungs are without acute focal process.  There is no effusion or

## 2025-03-25 NOTE — PROGRESS NOTES
Pt voided 250 ml of urine when she arrived from ED and has not voided since. Pt states she does not feel like she needs to urinate. Bladder scanned for 280 ml. Dr. Engle notified- no new orders.

## 2025-03-25 NOTE — PROGRESS NOTES
NAME:  Sakina Ballesteros  YOB: 1985  MEDICAL RECORD NUMBER:  [id number]    Shift Summary: Fluids stopped, Gtts changed to PO meds. 6 doses of ativan given. Pt feeling better today after sleeping. EGD tomorrow with Dr. rBaxton.     Family updated: Yes:  patient has been talking to her family    Rhythm:  Sinus bradycardia,     Most recent vitals:   Visit Vitals  BP 96/75   Pulse 65   Temp 98.1 °F (36.7 °C) (Oral)   Resp 12   Ht 1.702 m (5' 7\")   Wt 63.7 kg (140 lb 6.9 oz)   SpO2 98%   BMI 21.99 kg/m²           No data found.    No data found.      Respiratory support needed (if any):  - RA    Admission weight Weight - Scale: 60.5 kg (133 lb 6.1 oz) (03/24/25 1548)    Today's weight    Wt Readings from Last 1 Encounters:   03/25/25 63.7 kg (140 lb 6.9 oz)        Nunez need assessed each shift: N/A - no nunez present  UOP >30ml/hr: NO - pt  has had 800 out total today  Last documented BM (in last 48 hrs):  No data found.             Restraints (in use currently or dc'd in last 12 hrs): No    Order current and documentation up to date? Yes    Lines/Drains reviewed @ bedside.  Peripheral IV 03/25/25 Right Forearm (Active)   Number of days: 0       Peripheral IV 03/25/25 Distal;Left Forearm (Active)   Number of days: 0         Drip rates at handoff:    sodium chloride      sodium chloride         Lab Data:   CBC:   Recent Labs     03/25/25  0432 03/25/25  0635   WBC 5.2 6.0   HGB 14.4 15.6   HCT 43.2 45.9   MCV 93.0 91.8   * 119*     BMP:    Recent Labs     03/25/25  0432 03/25/25  0635   NA NA* 136   K NA* 4.4   CO2 NA* 26   BUN NA* 21*   CREATININE NA* 0.7     ABG: No results for input(s): \"PHART\", \"HFA3SWM\", \"PO2ART\" in the last 72 hours.    Any consults during the shift? No    Any signed and held orders to be released?  No        4 Eyes Skin Assessment       The patient is being assessed for  Shift Handoff    I agree that at least one RN has performed a thorough Head to Toe Skin Assessment on the

## 2025-03-25 NOTE — CONSULTS
GASTROENTEROLOGY INPATIENT CONSULTATION        IDENTIFYING DATA/REASON FOR CONSULTATION   PATIENT:  Sakina Ballesteros  MRN:  [id number]  ADMIT DATE: 3/24/2025  TIME OF EVALUATION: 3/25/2025 6:42 AM  HOSPITAL STAY:   LOS: 1 day     REASON FOR CONSULTATION:  esophagitis    HISTORY OF PRESENT ILLNESS   Sakina Ballesteros is a 39 y.o. female with a PMH of alcohol use disorder, gastroparesis, GERD, hepatic cirrhosis, opiate use disorder, alcohol use disorder, who presented on 3/24/2025 with nausea and vomiting. We have been consulted regarding   esophagitis      Prior Endoscopic Evaluations:  Flexible sigmoidoscopy to 30 cm 12/10/2024 Jose Macario MD     The scope was then withdrawn back through the sigmoid colons and rectum.  Carefull circumferential examination of the mucosa in these areas demonstrated normal colonic mucosa throughout.   The scope was then withdrawn into the rectum and retroflexed.  The retroflexed view of the anal verge and rectum demonstrates internal hemorrhoids. No blood present in the colon. The scope was straightened, the colon was decompressed and the scope was withdrawn from the patient.  The patient tolerated the procedure well and was taken to the PACU in good condition.     Postoperative Diagnosis:  1. Internal hemorrhoids       EGD with variceal band ligation 11/15/2023 Fili Matos MD  Findings:     Esophagus: The esophagus was notable for large varices in the distal esophagus without stigmata of bleeding.  Variceal band ligation x4 was completed with decompression of variceal columns.  The esophagus otherwise appeared normal without evidence of Marshall's esophagus or reflux esophagitis.  The Z-line was located 42 cm from the incisors  Stomach: The stomach was examined on forward and retroflexed views.  A large volume of retained semiliquid food debris was present in the gastric lumen and was lavaged and suctioned as possible.  The mucosa of the gastric body was notable for mild portal  Former    Substances: Nicotine    Devices: Disposable   Substance and Sexual Activity    Alcohol use: Yes     Comment: a fifth of vodka every day    Drug use: Yes     Types: Marijuana (Weed)     Comment: 9/19/24 Marijuana, meth, pcp, benzo    Sexual activity: Not Currently     Social Drivers of Health     Food Insecurity: Food Insecurity Present (3/24/2025)    Hunger Vital Sign     Worried About Running Out of Food in the Last Year: Sometimes true     Ran Out of Food in the Last Year: Sometimes true   Transportation Needs: Unmet Transportation Needs (3/24/2025)    PRAPARE - Transportation     Lack of Transportation (Medical): Yes     Lack of Transportation (Non-Medical): Yes    Received from [facility]    Interpersonal Safety   Housing Stability: High Risk (3/24/2025)    Housing Stability Vital Sign     Unable to Pay for Housing in the Last Year: Yes     Number of Times Moved in the Last Year: 0     Homeless in the Last Year: No       MEDICATIONS   SCHEDULED:  gabapentin, 100 mg, TID  methadone, 85 mg, Daily  lactulose, 20 g, TID  sodium chloride flush, 5-40 mL, 2 times per day  multivitamin, 1 tablet, Daily  folic acid, 1 mg, Daily  sodium chloride flush, 5-40 mL, 2 times per day  thiamine, 100 mg, Daily  enoxaparin, 40 mg, Daily  chlordiazePOXIDE, 25 mg, TID  traZODone, 100 mg, Nightly      FLUIDS/DRIPS:     sodium chloride      pantoprazole 8 mg/hr (03/25/25 0600)    octreotide (SANDOSTATIN) 500 mcg in sodium chloride 0.9 % 100 mL infusion 25 mcg/hr (03/25/25 0600)    sodium chloride      sodium chloride 125 mL/hr at 03/25/25 0600     PRNs: sodium chloride flush, 5-40 mL, PRN  sodium chloride, , PRN  sodium chloride flush, 5-40 mL, PRN  sodium chloride, , PRN  potassium chloride, 40 mEq, PRN   Or  potassium alternative oral replacement, 40 mEq, PRN   Or  potassium chloride, 10 mEq, PRN  magnesium sulfate, 2,000 mg, PRN  polyethylene glycol, 17 g, Daily PRN  acetaminophen, 650 mg, Q6H

## 2025-03-25 NOTE — PROGRESS NOTES
4 Eyes Skin Assessment     NAME:  Sakina Ballesteros  YOB: 1985  MEDICAL RECORD NUMBER:  [id number]    The patient is being assessed for  Admission    I agree that at least one RN has performed a thorough Head to Toe Skin Assessment on the patient. ALL assessment sites listed below have been assessed.      Areas assessed by both nurses:    Head, Face, Ears, Shoulders, Back, Chest, Arms, Elbows, Hands, Sacrum. Buttock, Coccyx, Ischium, Legs. Feet and Heels, and Under Medical Devices         Does the Patient have a Wound? No noted wound(s)       Ortiz Prevention initiated by RN: No  Wound Care Orders initiated by RN: No    Pressure Injury (Stage 3,4, Unstageable, DTI, NWPT, and Complex wounds) if present, place Wound referral order by RN under : No    New Ostomies, if present place, Ostomy referral order under : No     Nurse 1 eSignature: Electronically signed by Rosalinda Contreras RN on 3/24/25 at 11:15 PM EDT    **SHARE this note so that the co-signing nurse can place an eSignature**    Nurse 2 eSignature: Electronically signed by Herberth Garcia RN on 3/24/25 at 11:26 PM EDT

## 2025-03-26 ENCOUNTER — ANESTHESIA EVENT (OUTPATIENT)
Dept: ENDOSCOPY | Age: 40
DRG: 773 | End: 2025-03-26
Payer: COMMERCIAL

## 2025-03-26 ENCOUNTER — ANESTHESIA (OUTPATIENT)
Dept: ENDOSCOPY | Age: 40
DRG: 773 | End: 2025-03-26
Payer: COMMERCIAL

## 2025-03-26 LAB
ANION GAP SERPL CALCULATED.3IONS-SCNC: 8 MMOL/L (ref 3–16)
BASOPHILS # BLD: 0 K/UL (ref 0–0.2)
BASOPHILS NFR BLD: 0.2 %
BUN SERPL-MCNC: 13 MG/DL (ref 7–20)
CALCIUM SERPL-MCNC: 8.5 MG/DL (ref 8.3–10.6)
CHLORIDE SERPL-SCNC: 106 MMOL/L (ref 99–110)
CO2 SERPL-SCNC: 25 MMOL/L (ref 21–32)
CREAT SERPL-MCNC: 0.6 MG/DL (ref 0.6–1.1)
DEPRECATED RDW RBC AUTO: 13.7 % (ref 12.4–15.4)
EOSINOPHIL # BLD: 0 K/UL (ref 0–0.6)
EOSINOPHIL NFR BLD: 0.4 %
GFR SERPLBLD CREATININE-BSD FMLA CKD-EPI: >90 ML/MIN/{1.73_M2}
GLUCOSE SERPL-MCNC: 92 MG/DL (ref 70–99)
HCG UR QL: NEGATIVE
HCT VFR BLD AUTO: 43.2 % (ref 36–48)
HGB BLD-MCNC: 14.6 G/DL (ref 12–16)
LYMPHOCYTES # BLD: 1.9 K/UL (ref 1–5.1)
LYMPHOCYTES NFR BLD: 40.2 %
MCH RBC QN AUTO: 31.2 PG (ref 26–34)
MCHC RBC AUTO-ENTMCNC: 33.7 G/DL (ref 31–36)
MCV RBC AUTO: 92.5 FL (ref 80–100)
MONOCYTES # BLD: 0.4 K/UL (ref 0–1.3)
MONOCYTES NFR BLD: 7.8 %
NEUTROPHILS # BLD: 2.4 K/UL (ref 1.7–7.7)
NEUTROPHILS NFR BLD: 51.4 %
PLATELET # BLD AUTO: 96 K/UL (ref 135–450)
PLATELET BLD QL SMEAR: ABNORMAL
PMV BLD AUTO: 8.6 FL (ref 5–10.5)
POTASSIUM SERPL-SCNC: 4.1 MMOL/L (ref 3.5–5.1)
RBC # BLD AUTO: 4.67 M/UL (ref 4–5.2)
SLIDE REVIEW: ABNORMAL
SODIUM SERPL-SCNC: 139 MMOL/L (ref 136–145)
WBC # BLD AUTO: 4.6 K/UL (ref 4–11)

## 2025-03-26 PROCEDURE — 6370000000 HC RX 637 (ALT 250 FOR IP): Performed by: INTERNAL MEDICINE

## 2025-03-26 PROCEDURE — 84703 CHORIONIC GONADOTROPIN ASSAY: CPT

## 2025-03-26 PROCEDURE — 36415 COLL VENOUS BLD VENIPUNCTURE: CPT

## 2025-03-26 PROCEDURE — 2709999900 HC NON-CHARGEABLE SUPPLY: Performed by: INTERNAL MEDICINE

## 2025-03-26 PROCEDURE — 2500000003 HC RX 250 WO HCPCS: Performed by: INTERNAL MEDICINE

## 2025-03-26 PROCEDURE — 6360000002 HC RX W HCPCS: Performed by: INTERNAL MEDICINE

## 2025-03-26 PROCEDURE — 6360000002 HC RX W HCPCS

## 2025-03-26 PROCEDURE — 2500000003 HC RX 250 WO HCPCS

## 2025-03-26 PROCEDURE — 7100000001 HC PACU RECOVERY - ADDTL 15 MIN: Performed by: INTERNAL MEDICINE

## 2025-03-26 PROCEDURE — 2500000003 HC RX 250 WO HCPCS: Performed by: EMERGENCY MEDICINE

## 2025-03-26 PROCEDURE — 6370000000 HC RX 637 (ALT 250 FOR IP): Performed by: EMERGENCY MEDICINE

## 2025-03-26 PROCEDURE — 94761 N-INVAS EAR/PLS OXIMETRY MLT: CPT

## 2025-03-26 PROCEDURE — 80048 BASIC METABOLIC PNL TOTAL CA: CPT

## 2025-03-26 PROCEDURE — 3609017100 HC EGD: Performed by: INTERNAL MEDICINE

## 2025-03-26 PROCEDURE — 2580000003 HC RX 258: Performed by: EMERGENCY MEDICINE

## 2025-03-26 PROCEDURE — 3700000000 HC ANESTHESIA ATTENDED CARE: Performed by: INTERNAL MEDICINE

## 2025-03-26 PROCEDURE — 6360000002 HC RX W HCPCS: Performed by: ANESTHESIOLOGY

## 2025-03-26 PROCEDURE — 6370000000 HC RX 637 (ALT 250 FOR IP)

## 2025-03-26 PROCEDURE — 7100000000 HC PACU RECOVERY - FIRST 15 MIN: Performed by: INTERNAL MEDICINE

## 2025-03-26 PROCEDURE — 6360000002 HC RX W HCPCS: Performed by: NURSE PRACTITIONER

## 2025-03-26 PROCEDURE — 85025 COMPLETE CBC W/AUTO DIFF WBC: CPT

## 2025-03-26 PROCEDURE — 2140000000 HC CCU INTERMEDIATE R&B

## 2025-03-26 RX ORDER — SODIUM CHLORIDE 0.9 % (FLUSH) 0.9 %
5-40 SYRINGE (ML) INJECTION EVERY 12 HOURS SCHEDULED
Status: DISCONTINUED | OUTPATIENT
Start: 2025-03-26 | End: 2025-03-26

## 2025-03-26 RX ORDER — LIDOCAINE HYDROCHLORIDE 20 MG/ML
INJECTION, SOLUTION EPIDURAL; INFILTRATION; INTRACAUDAL; PERINEURAL
Status: DISCONTINUED | OUTPATIENT
Start: 2025-03-26 | End: 2025-03-26 | Stop reason: SDUPTHER

## 2025-03-26 RX ORDER — SODIUM CHLORIDE 9 MG/ML
INJECTION, SOLUTION INTRAVENOUS PRN
Status: DISCONTINUED | OUTPATIENT
Start: 2025-03-26 | End: 2025-03-26

## 2025-03-26 RX ORDER — SODIUM CHLORIDE 0.9 % (FLUSH) 0.9 %
5-40 SYRINGE (ML) INJECTION PRN
Status: DISCONTINUED | OUTPATIENT
Start: 2025-03-26 | End: 2025-03-26

## 2025-03-26 RX ORDER — FENTANYL CITRATE 50 UG/ML
25 INJECTION, SOLUTION INTRAMUSCULAR; INTRAVENOUS EVERY 5 MIN PRN
Status: DISCONTINUED | OUTPATIENT
Start: 2025-03-26 | End: 2025-03-26

## 2025-03-26 RX ORDER — PROPOFOL 10 MG/ML
INJECTION, EMULSION INTRAVENOUS
Status: DISCONTINUED | OUTPATIENT
Start: 2025-03-26 | End: 2025-03-26 | Stop reason: SDUPTHER

## 2025-03-26 RX ORDER — ONDANSETRON 2 MG/ML
4 INJECTION INTRAMUSCULAR; INTRAVENOUS
Status: DISCONTINUED | OUTPATIENT
Start: 2025-03-26 | End: 2025-03-26

## 2025-03-26 RX ORDER — NALOXONE HYDROCHLORIDE 0.4 MG/ML
INJECTION, SOLUTION INTRAMUSCULAR; INTRAVENOUS; SUBCUTANEOUS PRN
Status: DISCONTINUED | OUTPATIENT
Start: 2025-03-26 | End: 2025-03-26

## 2025-03-26 RX ADMIN — MORPHINE SULFATE 4 MG: 4 INJECTION, SOLUTION INTRAMUSCULAR; INTRAVENOUS at 22:00

## 2025-03-26 RX ADMIN — LORAZEPAM 2 MG: 2 INJECTION INTRAMUSCULAR; INTRAVENOUS at 14:34

## 2025-03-26 RX ADMIN — CHLORDIAZEPOXIDE HYDROCHLORIDE 25 MG: 25 CAPSULE ORAL at 08:25

## 2025-03-26 RX ADMIN — TRAZODONE HYDROCHLORIDE 100 MG: 100 TABLET ORAL at 21:59

## 2025-03-26 RX ADMIN — PROPOFOL 100 MG: 10 INJECTION, EMULSION INTRAVENOUS at 13:34

## 2025-03-26 RX ADMIN — SODIUM CHLORIDE, PRESERVATIVE FREE 10 ML: 5 INJECTION INTRAVENOUS at 08:28

## 2025-03-26 RX ADMIN — SODIUM CHLORIDE: 9 INJECTION, SOLUTION INTRAVENOUS at 13:29

## 2025-03-26 RX ADMIN — CHLORDIAZEPOXIDE HYDROCHLORIDE 25 MG: 25 CAPSULE ORAL at 14:34

## 2025-03-26 RX ADMIN — LIDOCAINE HYDROCHLORIDE 100 MG: 20 INJECTION, SOLUTION EPIDURAL; INFILTRATION; INTRACAUDAL; PERINEURAL at 13:34

## 2025-03-26 RX ADMIN — LORAZEPAM 2 MG: 2 INJECTION INTRAMUSCULAR; INTRAVENOUS at 20:13

## 2025-03-26 RX ADMIN — ENOXAPARIN SODIUM 40 MG: 100 INJECTION SUBCUTANEOUS at 08:24

## 2025-03-26 RX ADMIN — LACTULOSE 20 G: 10 SOLUTION ORAL at 08:24

## 2025-03-26 RX ADMIN — SODIUM CHLORIDE, PRESERVATIVE FREE 10 ML: 5 INJECTION INTRAVENOUS at 20:12

## 2025-03-26 RX ADMIN — PANTOPRAZOLE SODIUM 40 MG: 40 TABLET, DELAYED RELEASE ORAL at 15:20

## 2025-03-26 RX ADMIN — Medication 100 MG: at 08:25

## 2025-03-26 RX ADMIN — THERA TABS 1 TABLET: TAB at 08:25

## 2025-03-26 RX ADMIN — GABAPENTIN 100 MG: 100 CAPSULE ORAL at 14:34

## 2025-03-26 RX ADMIN — METHADONE HYDROCHLORIDE 85 MG: 10 TABLET ORAL at 08:23

## 2025-03-26 RX ADMIN — OCTREOTIDE ACETATE 100 MCG: 100 INJECTION, SOLUTION INTRAVENOUS; SUBCUTANEOUS at 08:25

## 2025-03-26 RX ADMIN — GABAPENTIN 100 MG: 100 CAPSULE ORAL at 08:25

## 2025-03-26 RX ADMIN — GABAPENTIN 100 MG: 100 CAPSULE ORAL at 20:13

## 2025-03-26 RX ADMIN — PROPOFOL 20 MG: 10 INJECTION, EMULSION INTRAVENOUS at 13:37

## 2025-03-26 RX ADMIN — LACTULOSE 20 G: 10 SOLUTION ORAL at 20:13

## 2025-03-26 RX ADMIN — CHLORDIAZEPOXIDE HYDROCHLORIDE 25 MG: 25 CAPSULE ORAL at 20:13

## 2025-03-26 RX ADMIN — OCTREOTIDE ACETATE 100 MCG: 100 INJECTION, SOLUTION INTRAVENOUS; SUBCUTANEOUS at 20:41

## 2025-03-26 RX ADMIN — PROCHLORPERAZINE EDISYLATE 10 MG: 5 INJECTION INTRAMUSCULAR; INTRAVENOUS at 04:03

## 2025-03-26 RX ADMIN — LACTULOSE 20 G: 10 SOLUTION ORAL at 14:33

## 2025-03-26 RX ADMIN — HYDROMORPHONE HYDROCHLORIDE 0.5 MG: 1 INJECTION, SOLUTION INTRAMUSCULAR; INTRAVENOUS; SUBCUTANEOUS at 14:06

## 2025-03-26 RX ADMIN — OCTREOTIDE ACETATE 100 MCG: 100 INJECTION, SOLUTION INTRAVENOUS; SUBCUTANEOUS at 14:34

## 2025-03-26 RX ADMIN — PROPOFOL 30 MG: 10 INJECTION, EMULSION INTRAVENOUS at 13:35

## 2025-03-26 RX ADMIN — FOLIC ACID 1 MG: 1 TABLET ORAL at 08:24

## 2025-03-26 RX ADMIN — SODIUM CHLORIDE, PRESERVATIVE FREE 10 ML: 5 INJECTION INTRAVENOUS at 20:13

## 2025-03-26 RX ADMIN — PROPOFOL 20 MG: 10 INJECTION, EMULSION INTRAVENOUS at 13:36

## 2025-03-26 ASSESSMENT — PAIN - FUNCTIONAL ASSESSMENT
PAIN_FUNCTIONAL_ASSESSMENT: PREVENTS OR INTERFERES SOME ACTIVE ACTIVITIES AND ADLS
PAIN_FUNCTIONAL_ASSESSMENT: ADULT NONVERBAL PAIN SCALE (NPVS)
PAIN_FUNCTIONAL_ASSESSMENT: 0-10
PAIN_FUNCTIONAL_ASSESSMENT: ACTIVITIES ARE NOT PREVENTED
PAIN_FUNCTIONAL_ASSESSMENT: PREVENTS OR INTERFERES SOME ACTIVE ACTIVITIES AND ADLS
PAIN_FUNCTIONAL_ASSESSMENT: PREVENTS OR INTERFERES SOME ACTIVE ACTIVITIES AND ADLS

## 2025-03-26 ASSESSMENT — PAIN SCALES - GENERAL
PAINLEVEL_OUTOF10: 6
PAINLEVEL_OUTOF10: 9
PAINLEVEL_OUTOF10: 7
PAINLEVEL_OUTOF10: 9
PAINLEVEL_OUTOF10: 5
PAINLEVEL_OUTOF10: 9
PAINLEVEL_OUTOF10: 6
PAINLEVEL_OUTOF10: 5
PAINLEVEL_OUTOF10: 5
PAINLEVEL_OUTOF10: 6
PAINLEVEL_OUTOF10: 10

## 2025-03-26 ASSESSMENT — PAIN DESCRIPTION - ONSET
ONSET: ON-GOING

## 2025-03-26 ASSESSMENT — PAIN DESCRIPTION - LOCATION
LOCATION: ABDOMEN
LOCATION: BACK;ABDOMEN;THROAT
LOCATION: ABDOMEN
LOCATION: ABDOMEN
LOCATION: GENERALIZED
LOCATION: ABDOMEN

## 2025-03-26 ASSESSMENT — PAIN DESCRIPTION - FREQUENCY
FREQUENCY: CONTINUOUS

## 2025-03-26 ASSESSMENT — PAIN DESCRIPTION - DESCRIPTORS
DESCRIPTORS: DISCOMFORT
DESCRIPTORS: DISCOMFORT
DESCRIPTORS: ACHING
DESCRIPTORS: DISCOMFORT
DESCRIPTORS: DISCOMFORT

## 2025-03-26 ASSESSMENT — PAIN DESCRIPTION - PAIN TYPE
TYPE: CHRONIC PAIN

## 2025-03-26 ASSESSMENT — PAIN DESCRIPTION - ORIENTATION
ORIENTATION: ANTERIOR;POSTERIOR
ORIENTATION: RIGHT;MID

## 2025-03-26 NOTE — PROGRESS NOTES
NAME:  Sakina Ballesteros  YOB: 1985  MEDICAL RECORD NUMBER:  [id number]    Shift Summary: Pt required 1 dose of ativan overnight. Pt vomited small amount of clear emesis and received compazine x 1 with relief. Pt has been NPO since MN for EGD today. Consent obtained and in chart.    Family updated: No    Rhythm: Normal Sinus Rhythm /SB    Most recent vitals:   Visit Vitals  /72   Pulse 61   Temp 98 °F (36.7 °C) (Oral)   Resp 13   Ht 1.702 m (5' 7\")   Wt 63.7 kg (140 lb 6.9 oz)   SpO2 93%   BMI 21.99 kg/m²           No data found.    No data found.      Respiratory support needed (if any):  - RA    Admission weight Weight - Scale: 60.5 kg (133 lb 6.1 oz) (03/24/25 1548)    Today's weight    Wt Readings from Last 1 Encounters:   03/25/25 63.7 kg (140 lb 6.9 oz)        Nunez need assessed each shift: N/A - no nunez present  UOP >30ml/hr: YES  Last documented BM (in last 48 hrs):  Patient Vitals for the past 48 hrs:   Last BM (including prior to admit) Stool Occurrence   03/26/25 0000 03/26/25 1                Restraints (in use currently or dc'd in last 12 hrs): No    Order current and documentation up to date? No    Lines/Drains reviewed @ bedside.  Peripheral IV 03/25/25 Right Forearm (Active)   Number of days: 0       Peripheral IV 03/25/25 Distal;Left Forearm (Active)   Number of days: 0         Drip rates at handoff:    sodium chloride      sodium chloride         Lab Data:   CBC:   Recent Labs     03/25/25  0432 03/25/25  0635   WBC 5.2 6.0   HGB 14.4 15.6   HCT 43.2 45.9   MCV 93.0 91.8   * 119*     BMP:    Recent Labs     03/25/25  0432 03/25/25  0635   NA NA* 136   K NA* 4.4   CO2 NA* 26   BUN NA* 21*   CREATININE NA* 0.7     ABG: No results for input(s): \"PHART\", \"NYT1HVU\", \"PO2ART\" in the last 72 hours.    Any consults during the shift? No    Any signed and held orders to be released?  No        4 Eyes Skin Assessment       The patient is being assessed for  Shift Handoff    I agree

## 2025-03-26 NOTE — PLAN OF CARE
Problem: Pain  Goal: Verbalizes/displays adequate comfort level or baseline comfort level  3/26/2025 0950 by Abigail Martínez RN  Outcome: Progressing  3/25/2025 2234 by Rosalinda Contreras RN  Outcome: Progressing     Problem: Safety - Adult  Goal: Free from fall injury  3/26/2025 0950 by Abigail Martínez RN  Outcome: Progressing  3/25/2025 2234 by Rosalinda Contreras RN  Outcome: Progressing     Problem: ABCDS Injury Assessment  Goal: Absence of physical injury  3/26/2025 0950 by Abigail Martínez RN  Outcome: Progressing  3/25/2025 2234 by Rosalinda Contreras RN  Outcome: Progressing  Flowsheets (Taken 3/25/2025 2230)  Absence of Physical Injury: Implement safety measures based on patient assessment

## 2025-03-26 NOTE — H&P
Patient seen and examined by me prior to the procedure.  The patient is stable for sedation.  There have been no significant changes since my initial consultation note.  Please reference this note for full details    The patient was counseled at length about the risks of chioma Covid-19 during their perioperative period and any recovery window from their procedure.  The patient was made aware that chioma Covid-19  may worsen their prognosis for recovering from their procedure  and lend to a higher morbidity and/or mortality risk.  All material risks, benefits, and reasonable alternatives including postponing the procedure were discussed. The patient does wish to proceed with the procedure at this time.    ASA class-3  Malampati- 2

## 2025-03-26 NOTE — PROGRESS NOTES
Name:  Sakina Ballesteros /Age/Sex: 1985  (39 y.o. female)   MRN & CSN:  [id number] & [id number] Encounter Date/Time: 3/26/2025 11:34 AM EDT   Location:  [place] [address]- PCP: Jarrett Galvin DO     Attending:Ismael Leone MD       Sakina Ballesteros is a 39 y.o. female with  has a past medical history of Alcohol use disorder, Gastroparesis, GERD (gastroesophageal reflux disease), Hepatic cirrhosis (HCC), Mood disorder, Opioid use disorder, Overweight, Scoliosis, and Tobacco use disorder. who presents with Alcohol use with withdrawal without complication (HCC)    Hospital Day: 3    Assessment and Plan     Alcohol withdrawal  Abdominal pain  Alcoholic cirrhosis  Hyperammonemia  Nausea and vomiting   CTA abdomen pelvis showing esophagitis   Protonix switched to PO BID   Started on octreotide and lactulose   Thiamine and folic acid   Scheduled Librium   WA protocol   LFT normal   GI following   Plan for EGD today    Polysubstance abuse   Continue home methadone    Objective:       Intake/Output Summary (Last 24 hours) at 3/26/2025 1307  Last data filed at 3/26/2025 0000  Gross per 24 hour   Intake 1471.37 ml   Output 900 ml   Net 571.37 ml      Vitals:   Vitals:    25 1000 25 1100 25 1200 25 1256   BP:    (!) 107/93   Pulse: 61 61 62 58   Resp: (!) 6 11 10 12   Temp:    98 °F (36.7 °C)   TempSrc:   Oral Temporal   SpO2:    99%   Weight:       Height:           Interval history:     Seen and examined at bedside. Reports improvement in nausea and vomiting    Physical Exam:        General: NAD  Eyes: EOMI  ENT: neck supple  Cardiovascular: Regular rate.  Respiratory: Clear to auscultation  Gastrointestinal: Soft, non tender  Genitourinary: no suprapubic tenderness  Musculoskeletal: No edema  Neuro: Alert and oriented  Psych: Mood appropriate.     Review of Systems:      10 point ROS negative except stated above    Medications:   Medications:    gabapentin  100 mg Oral TID    methadone  85 mg

## 2025-03-26 NOTE — OP NOTE
Endoscopy Note    Patient: Sakina Ballesteros  : 1985  Acct#:     Procedure: Esophagogastroduodenoscopy                          Date:  3/26/2025     Surgeon:   Tae Braxton Jr, DO    Referring Physician:  Jarrett Galvin DO    Indications: This is a 39 y.o. year old female who presents today with scant hematemesis and Etoh cirrhosis.      Postoperative Diagnosis:  1) The esophagus revealed some scarring in the distal esophagus at prior banding sites.  No varices or bleeding stigmata were noted in the esophagus. 2) There was no esophagitis or Marshall's esophagus noted.  There were no features of eosinophilic esophagitis.  There were no strictures noted.  The gastroesophageal junctrion was at 40 cm.   3) A small 1 cm sliding hiatal hernia was noted.  There were no gastric varices noted.  4) There was moderate portal gastropathy noted.  5) There was a large amount of food noted in the body of the stomach consistent with known history of opioid induced gastroparesis.  6) No bile stained liquid was noted in the stomach. 7) The mucosa in the duodenal bulb and second portion of the duodenum was normal appearing.       Anesthesia:  The patient was administered TIVA per anesthesiology team.  Please see their operative records for full details of medications administered.       Consent:  The patient or their legal guardian has signed an informed consent, and is aware of the potential risks, benefits, alternatives, and potential complications of this procedure.  These include, but are not limited to hemorrhage, bleeding, post procedural pain, perforation, phlebitis, aspiration, hypotension, hypoxia, cardiovascular events such as arryhthmia, and possibly death.     Description of Procedure: The patient was then taken to the endoscopy suite, placed in the left lateral decubitus position and the above IV sedation was administrered.    The Olympus video endoscope was placed through the patient's oropharynx

## 2025-03-26 NOTE — PROGRESS NOTES
NAME:  Sakina Ballesteros  YOB: 1985  MEDICAL RECORD NUMBER:  [id number]    Shift Summary: EDG clean. No banding of varices needed. Ativan 2mg given 1x. Up to bathroom x1. No major events.    Family updated: Yes:  mother Carmen    Rhythm: Normal Sinus Rhythm /Sinus Mauro    Most recent vitals:   Visit Vitals  /83   Pulse 56   Temp 98.4 °F (36.9 °C) (Temporal)   Resp 13   Ht 1.702 m (5' 7\")   Wt 63.7 kg (140 lb 6.9 oz)   SpO2 98%   BMI 21.99 kg/m²           No data found.    No data found.      Respiratory support needed (if any):  - RA    Admission weight Weight - Scale: 60.5 kg (133 lb 6.1 oz) (03/24/25 1548)    Today's weight    Wt Readings from Last 1 Encounters:   03/25/25 63.7 kg (140 lb 6.9 oz)        Nunez need assessed each shift: N/A - no nunez present  UOP >30ml/hr: YES  Last documented BM (in last 48 hrs):  Patient Vitals for the past 48 hrs:   Last BM (including prior to admit) Stool Occurrence   03/26/25 0000 03/26/25 1   03/26/25 0800 03/26/25 --                Restraints (in use currently or dc'd in last 12 hrs): No    Order current and documentation up to date? No    Lines/Drains reviewed @ bedside.  Peripheral IV 03/25/25 Right Forearm (Active)   Number of days: 0       Peripheral IV 03/25/25 Distal;Left Forearm (Active)   Number of days: 0         Drip rates at handoff:    sodium chloride      sodium chloride         Lab Data:   CBC:   Recent Labs     03/25/25  0635 03/26/25  0657   WBC 6.0 4.6   HGB 15.6 14.6   HCT 45.9 43.2   MCV 91.8 92.5   * 96*     BMP:    Recent Labs     03/25/25  0635 03/26/25  0657    139   K 4.4 4.1   CO2 26 25   BUN 21* 13   CREATININE 0.7 0.6     ABG: No results for input(s): \"PHART\", \"HPI4GUM\", \"PO2ART\" in the last 72 hours.    Any consults during the shift? No    Any signed and held orders to be released?  No        4 Eyes Skin Assessment       The patient is being assessed for  Shift Handoff    I agree that at least one RN has performed a

## 2025-03-26 NOTE — PROGRESS NOTES
Unable to get in contact with patient's mother Carmen. Left a message. Patient is Aox3, will answer February 2024 and is disoriented to time.

## 2025-03-26 NOTE — ANESTHESIA PRE PROCEDURE
[facility] Department of Anesthesiology  Pre-Anesthesia Evaluation/Consultation       Name:  Sakina Ballesteros  : 1985  Age:  39 y.o.                                           MRN:  [id number]  Date: 3/26/2025           Surgeon: Surgeon(s):  Tae Braxton Jr., DO    Procedure: Procedure(s):  ESOPHAGOGASTRODUODENOSCOPY     Allergies   Allergen Reactions    Iodine Rash     Patient Active Problem List   Diagnosis    Anxiety    Gastroparesis    GERD (gastroesophageal reflux disease)    Leukocytosis    Opiate dependence (HCC)    Alcohol withdrawal seizure with complication (HCC)    Esophageal varices in alcoholic cirrhosis (HCC)    Acute cystitis without hematuria    Elevated LFTs    Mass of perirectal soft tissue    Alcohol use with withdrawal without complication (HCC)     Past Medical History:   Diagnosis Date    Alcohol use disorder     Gastroparesis     GERD (gastroesophageal reflux disease)     Hepatic cirrhosis (HCC)     Mood disorder     Opioid use disorder     Overweight     Scoliosis     Tobacco use disorder      Past Surgical History:   Procedure Laterality Date    DENTAL SURGERY      ESOPHAGOGASTRODUODENOSCOPY      SIGMOIDOSCOPY N/A 12/10/2024    SIGMOIDOSCOPY DIAGNOSTIC FLEXIBLE performed by Jose Macario MD at [facility] ENDOSCOPY    UPPER GASTROINTESTINAL ENDOSCOPY N/A 2023    EGD BIOPSY performed by Lam Templeton MD at [facility] ENDOSCOPY    UPPER GASTROINTESTINAL ENDOSCOPY N/A 11/15/2023    EGD VARICEAL BANDING performed by Shreyas Matos MD at [facility] ENDOSCOPY     Social History     Tobacco Use    Smoking status: Every Day     Current packs/day: 1.00     Types: Cigarettes    Smokeless tobacco: Never   Vaping Use    Vaping status: Former    Substances: Nicotine    Devices: Disposable   Substance Use Topics    Alcohol use: Yes     Comment: a fifth of vodka every day    Drug use: Yes     Types: Marijuana (Weed)     Comment: 26 Marijuana, meth, pcp, benzo     Medications  No

## 2025-03-26 NOTE — PLAN OF CARE
Problem: Pain  Goal: Verbalizes/displays adequate comfort level or baseline comfort level  3/25/2025 2234 by Rosalinda Contreras RN  Outcome: Progressing     Problem: Safety - Adult  Goal: Free from fall injury  3/25/2025 2234 by Rosalinda Contreras RN  Outcome: Progressing     Problem: ABCDS Injury Assessment  Goal: Absence of physical injury  3/25/2025 2234 by Rosalinda Contreras RN  Outcome: Progressing  Flowsheets (Taken 3/25/2025 2230)  Absence of Physical Injury: Implement safety measures based on patient assessment

## 2025-03-26 NOTE — ANESTHESIA POSTPROCEDURE EVALUATION
[facility] Department of Anesthesiology  Post-Anesthesia Note       Name:  Sakina Ballesteros                                  Age:  39 y.o.  MRN:  [id number]     Last Vitals & Oxygen Saturation: /78   Pulse 56   Temp 98.4 °F (36.9 °C) (Temporal)   Resp 13   Ht 1.702 m (5' 7\")   Wt 63.7 kg (140 lb 6.9 oz)   SpO2 98%   BMI 21.99 kg/m²   Patient Vitals for the past 4 hrs:   BP Temp Temp src Pulse Resp SpO2   03/26/25 1415 106/78 -- -- 56 13 98 %   03/26/25 1407 -- -- -- 63 11 94 %   03/26/25 1406 -- -- -- 60 10 95 %   03/26/25 1405 97/74 -- -- 58 13 98 %   03/26/25 1404 97/73 -- -- 62 12 95 %   03/26/25 1402 -- -- -- 61 (!) 9 98 %   03/26/25 1400 (!) 89/63 -- -- 60 11 97 %   03/26/25 1355 (!) 83/56 -- -- 58 11 94 %   03/26/25 1343 (!) 82/56 98.4 °F (36.9 °C) Temporal 59 11 96 %   03/26/25 1256 (!) 107/93 98 °F (36.7 °C) Temporal 58 12 99 %   03/26/25 1200 -- -- Oral 62 10 --   03/26/25 1100 -- -- -- 61 11 --       Level of consciousness:  Awake, alert to preop baseline    Respiratory: Respirations easy, no distress. Stable.    Cardiovascular: Hemodynamically stable.    Hydration: Adequate.    PONV: Adequately managed.    Post-op pain: Adequately controlled.    Post-op assessment: Tolerated anesthetic well without complication.    Complications:  None.    Suhas Cummings MD  March 26, 2025   2:25 PM

## 2025-03-26 NOTE — PROGRESS NOTES
Pt alert and oriented x 3. Confused to time. Mother sahil called for consent # in chart. Mother would like call with update after procedure. Will monitor.

## 2025-03-27 LAB
ALBUMIN SERPL-MCNC: 3.6 G/DL (ref 3.4–5)
ALBUMIN/GLOB SERPL: 1.4 {RATIO} (ref 1.1–2.2)
ALP SERPL-CCNC: 82 U/L (ref 40–129)
ALT SERPL-CCNC: 18 U/L (ref 10–40)
AMMONIA PLAS-SCNC: 38 UMOL/L (ref 11–51)
ANION GAP SERPL CALCULATED.3IONS-SCNC: 8 MMOL/L (ref 3–16)
AST SERPL-CCNC: 22 U/L (ref 15–37)
BASOPHILS # BLD: 0 K/UL (ref 0–0.2)
BASOPHILS NFR BLD: 0.2 %
BILIRUB SERPL-MCNC: 0.4 MG/DL (ref 0–1)
BUN SERPL-MCNC: 11 MG/DL (ref 7–20)
CALCIUM SERPL-MCNC: 8.6 MG/DL (ref 8.3–10.6)
CHLORIDE SERPL-SCNC: 103 MMOL/L (ref 99–110)
CO2 SERPL-SCNC: 28 MMOL/L (ref 21–32)
CREAT SERPL-MCNC: 0.6 MG/DL (ref 0.6–1.1)
DEPRECATED RDW RBC AUTO: 13.5 % (ref 12.4–15.4)
EOSINOPHIL # BLD: 0 K/UL (ref 0–0.6)
EOSINOPHIL NFR BLD: 1 %
GFR SERPLBLD CREATININE-BSD FMLA CKD-EPI: >90 ML/MIN/{1.73_M2}
GLUCOSE SERPL-MCNC: 94 MG/DL (ref 70–99)
HCT VFR BLD AUTO: 43.5 % (ref 36–48)
HGB BLD-MCNC: 14.5 G/DL (ref 12–16)
LYMPHOCYTES # BLD: 1.8 K/UL (ref 1–5.1)
LYMPHOCYTES NFR BLD: 53 %
MCH RBC QN AUTO: 30.9 PG (ref 26–34)
MCHC RBC AUTO-ENTMCNC: 33.3 G/DL (ref 31–36)
MCV RBC AUTO: 92.9 FL (ref 80–100)
MONOCYTES # BLD: 0.3 K/UL (ref 0–1.3)
MONOCYTES NFR BLD: 9.6 %
NEUTROPHILS # BLD: 1.2 K/UL (ref 1.7–7.7)
NEUTROPHILS NFR BLD: 36.2 %
PLATELET # BLD AUTO: 90 K/UL (ref 135–450)
PMV BLD AUTO: 8.3 FL (ref 5–10.5)
POTASSIUM SERPL-SCNC: 3.9 MMOL/L (ref 3.5–5.1)
PROT SERPL-MCNC: 6.2 G/DL (ref 6.4–8.2)
RBC # BLD AUTO: 4.68 M/UL (ref 4–5.2)
SODIUM SERPL-SCNC: 139 MMOL/L (ref 136–145)
WBC # BLD AUTO: 3.3 K/UL (ref 4–11)

## 2025-03-27 PROCEDURE — 80053 COMPREHEN METABOLIC PANEL: CPT

## 2025-03-27 PROCEDURE — 6360000002 HC RX W HCPCS

## 2025-03-27 PROCEDURE — 0DJ08ZZ INSPECTION OF UPPER INTESTINAL TRACT, VIA NATURAL OR ARTIFICIAL OPENING ENDOSCOPIC: ICD-10-PCS | Performed by: INTERNAL MEDICINE

## 2025-03-27 PROCEDURE — 6370000000 HC RX 637 (ALT 250 FOR IP): Performed by: INTERNAL MEDICINE

## 2025-03-27 PROCEDURE — 36415 COLL VENOUS BLD VENIPUNCTURE: CPT

## 2025-03-27 PROCEDURE — 6360000002 HC RX W HCPCS: Performed by: INTERNAL MEDICINE

## 2025-03-27 PROCEDURE — 85025 COMPLETE CBC W/AUTO DIFF WBC: CPT

## 2025-03-27 PROCEDURE — 82140 ASSAY OF AMMONIA: CPT

## 2025-03-27 PROCEDURE — 2500000003 HC RX 250 WO HCPCS: Performed by: INTERNAL MEDICINE

## 2025-03-27 PROCEDURE — 6370000000 HC RX 637 (ALT 250 FOR IP)

## 2025-03-27 PROCEDURE — 2140000000 HC CCU INTERMEDIATE R&B

## 2025-03-27 RX ORDER — CHLORDIAZEPOXIDE HYDROCHLORIDE 25 MG/1
25 CAPSULE, GELATIN COATED ORAL 2 TIMES DAILY
Status: DISCONTINUED | OUTPATIENT
Start: 2025-03-27 | End: 2025-03-28 | Stop reason: HOSPADM

## 2025-03-27 RX ORDER — MORPHINE SULFATE 2 MG/ML
2 INJECTION, SOLUTION INTRAMUSCULAR; INTRAVENOUS EVERY 4 HOURS PRN
Status: DISCONTINUED | OUTPATIENT
Start: 2025-03-27 | End: 2025-03-28 | Stop reason: HOSPADM

## 2025-03-27 RX ADMIN — OCTREOTIDE ACETATE 100 MCG: 100 INJECTION, SOLUTION INTRAVENOUS; SUBCUTANEOUS at 08:49

## 2025-03-27 RX ADMIN — LORAZEPAM 2 MG: 2 INJECTION INTRAMUSCULAR; INTRAVENOUS at 20:35

## 2025-03-27 RX ADMIN — METHADONE HYDROCHLORIDE 85 MG: 10 TABLET ORAL at 08:40

## 2025-03-27 RX ADMIN — CHLORDIAZEPOXIDE HYDROCHLORIDE 25 MG: 25 CAPSULE ORAL at 08:40

## 2025-03-27 RX ADMIN — LORAZEPAM 1 MG: 2 INJECTION INTRAMUSCULAR; INTRAVENOUS at 06:15

## 2025-03-27 RX ADMIN — LORAZEPAM 2 MG: 2 INJECTION INTRAMUSCULAR; INTRAVENOUS at 10:19

## 2025-03-27 RX ADMIN — LACTULOSE 20 G: 10 SOLUTION ORAL at 08:40

## 2025-03-27 RX ADMIN — Medication 100 MG: at 08:40

## 2025-03-27 RX ADMIN — GABAPENTIN 100 MG: 100 CAPSULE ORAL at 14:34

## 2025-03-27 RX ADMIN — MORPHINE SULFATE 2 MG: 2 INJECTION, SOLUTION INTRAMUSCULAR; INTRAVENOUS at 14:37

## 2025-03-27 RX ADMIN — SODIUM CHLORIDE, PRESERVATIVE FREE 10 ML: 5 INJECTION INTRAVENOUS at 08:42

## 2025-03-27 RX ADMIN — ENOXAPARIN SODIUM 40 MG: 100 INJECTION SUBCUTANEOUS at 08:40

## 2025-03-27 RX ADMIN — GABAPENTIN 100 MG: 100 CAPSULE ORAL at 08:40

## 2025-03-27 RX ADMIN — GABAPENTIN 100 MG: 100 CAPSULE ORAL at 20:31

## 2025-03-27 RX ADMIN — CHLORDIAZEPOXIDE HYDROCHLORIDE 25 MG: 25 CAPSULE ORAL at 20:31

## 2025-03-27 RX ADMIN — PANTOPRAZOLE SODIUM 40 MG: 40 TABLET, DELAYED RELEASE ORAL at 06:03

## 2025-03-27 RX ADMIN — SODIUM CHLORIDE, PRESERVATIVE FREE 10 ML: 5 INJECTION INTRAVENOUS at 20:31

## 2025-03-27 RX ADMIN — MORPHINE SULFATE 2 MG: 2 INJECTION, SOLUTION INTRAMUSCULAR; INTRAVENOUS at 21:12

## 2025-03-27 RX ADMIN — LACTULOSE 20 G: 10 SOLUTION ORAL at 20:31

## 2025-03-27 RX ADMIN — FOLIC ACID 1 MG: 1 TABLET ORAL at 08:40

## 2025-03-27 RX ADMIN — PANTOPRAZOLE SODIUM 40 MG: 40 TABLET, DELAYED RELEASE ORAL at 18:33

## 2025-03-27 RX ADMIN — LACTULOSE 20 G: 10 SOLUTION ORAL at 14:34

## 2025-03-27 RX ADMIN — TRAZODONE HYDROCHLORIDE 100 MG: 100 TABLET ORAL at 20:31

## 2025-03-27 RX ADMIN — THERA TABS 1 TABLET: TAB at 08:40

## 2025-03-27 RX ADMIN — MORPHINE SULFATE 2 MG: 2 INJECTION, SOLUTION INTRAMUSCULAR; INTRAVENOUS at 10:19

## 2025-03-27 ASSESSMENT — PAIN SCALES - GENERAL
PAINLEVEL_OUTOF10: 6
PAINLEVEL_OUTOF10: 6
PAINLEVEL_OUTOF10: 7
PAINLEVEL_OUTOF10: 9
PAINLEVEL_OUTOF10: 9
PAINLEVEL_OUTOF10: 8
PAINLEVEL_OUTOF10: 9
PAINLEVEL_OUTOF10: 7
PAINLEVEL_OUTOF10: 9
PAINLEVEL_OUTOF10: 7

## 2025-03-27 ASSESSMENT — PAIN DESCRIPTION - ORIENTATION
ORIENTATION: LOWER
ORIENTATION: LOWER
ORIENTATION: RIGHT;MID

## 2025-03-27 ASSESSMENT — PAIN DESCRIPTION - DESCRIPTORS
DESCRIPTORS: ACHING

## 2025-03-27 ASSESSMENT — PAIN DESCRIPTION - LOCATION
LOCATION: BACK
LOCATION: HEAD
LOCATION: GENERALIZED
LOCATION: BACK;ABDOMEN
LOCATION: GENERALIZED;BACK

## 2025-03-27 ASSESSMENT — PAIN DESCRIPTION - ONSET
ONSET: ON-GOING
ONSET: ON-GOING

## 2025-03-27 ASSESSMENT — PAIN DESCRIPTION - FREQUENCY
FREQUENCY: CONTINUOUS
FREQUENCY: CONTINUOUS

## 2025-03-27 ASSESSMENT — PAIN DESCRIPTION - PAIN TYPE
TYPE: ACUTE PAIN
TYPE: CHRONIC PAIN

## 2025-03-27 NOTE — PROGRESS NOTES
NAME:  Sakina Ballesteros  YOB: 1985  MEDICAL RECORD NUMBER:  [id number]    Shift Summary: Pt remains on CVU for etoh withdrawal. Ativan given x2 this shift. Morphine given x1 for pain with adequate relief. VSS. No other acute events overnight.     Family updated: No    Rhythm: Normal Sinus Rhythm     Most recent vitals:   Visit Vitals  /77   Pulse 59   Temp 98.2 °F (36.8 °C) (Oral)   Resp 16   Ht 1.702 m (5' 7\")   Wt 63.7 kg (140 lb 6.9 oz)   SpO2 97%   BMI 21.99 kg/m²           No data found.    No data found.      Respiratory support needed (if any):  - RA    Admission weight Weight - Scale: 60.5 kg (133 lb 6.1 oz) (03/24/25 1548)    Today's weight    Wt Readings from Last 1 Encounters:   03/25/25 63.7 kg (140 lb 6.9 oz)        Nunez need assessed each shift: N/A - no nunez present  UOP >30ml/hr: YES  Last documented BM (in last 48 hrs):  Patient Vitals for the past 48 hrs:   Last BM (including prior to admit) Stool Occurrence   03/26/25 0000 03/26/25 1   03/26/25 0800 03/26/25 --   03/26/25 2000 03/26/25 --   03/26/25 2252 03/26/25 --                Restraints (in use currently or dc'd in last 12 hrs): No    Order current and documentation up to date? No    Lines/Drains reviewed @ bedside.  Peripheral IV 03/25/25 Right Forearm (Active)   Number of days: 1       Peripheral IV 03/25/25 Distal;Left Forearm (Active)   Number of days: 1         Drip rates at handoff:    sodium chloride      sodium chloride         Lab Data:   CBC:   Recent Labs     03/26/25  0657 03/27/25  0423   WBC 4.6 3.3*   HGB 14.6 14.5   HCT 43.2 43.5   MCV 92.5 92.9   PLT 96* 90*     BMP:    Recent Labs     03/26/25  0657 03/27/25  0423    139   K 4.1 3.9   CO2 25 28   BUN 13 11   CREATININE 0.6 0.6     ABG: No results for input(s): \"PHART\", \"TFL9JAI\", \"PO2ART\" in the last 72 hours.    Any consults during the shift? No    Any signed and held orders to be released?  No        4 Eyes Skin Assessment       The patient is

## 2025-03-27 NOTE — PROGRESS NOTES
NAME:  Sakina Ballesteros  YOB: 1985  MEDICAL RECORD NUMBER:  [id number]    Shift Summary: Morphine x2, up to bathroom, uneventful day. No N/V     Family updated: Yes:  pt talked to family    Rhythm:  sinus rhythm with 1st degree block    Most recent vitals:   Visit Vitals  BP (!) 89/63   Pulse 64   Temp 98 °F (36.7 °C) (Oral)   Resp (!) 8   Ht 1.702 m (5' 7\")   Wt 63.7 kg (140 lb 6.9 oz)   SpO2 98%   BMI 21.99 kg/m²           No data found.    No data found.      Respiratory support needed (if any):  - RA    Admission weight Weight - Scale: 60.5 kg (133 lb 6.1 oz) (03/24/25 1548)    Today's weight    Wt Readings from Last 1 Encounters:   03/25/25 63.7 kg (140 lb 6.9 oz)        Nunez need assessed each shift: N/A - no nunez present  UOP >30ml/hr: YES  Last documented BM (in last 48 hrs):  Patient Vitals for the past 48 hrs:   Last BM (including prior to admit) Stool Occurrence   03/26/25 0000 03/26/25 1   03/26/25 0800 03/26/25 --   03/26/25 2000 03/26/25 --   03/26/25 2252 03/26/25 --                Restraints (in use currently or dc'd in last 12 hrs): No    Order current and documentation up to date? No    Lines/Drains reviewed @ bedside.  Peripheral IV 03/25/25 Right Forearm (Active)   Number of days: 1       Peripheral IV 03/25/25 Distal;Left Forearm (Active)   Number of days: 1         Drip rates at handoff:    sodium chloride      sodium chloride         Lab Data:   CBC:   Recent Labs     03/26/25  0657 03/27/25  0423   WBC 4.6 3.3*   HGB 14.6 14.5   HCT 43.2 43.5   MCV 92.5 92.9   PLT 96* 90*     BMP:    Recent Labs     03/26/25  0657 03/27/25  0423    139   K 4.1 3.9   CO2 25 28   BUN 13 11   CREATININE 0.6 0.6     ABG: No results for input(s): \"PHART\", \"[id number]\", \"PO2ART\" in the last 72 hours.    Any consults during the shift? No    Any signed and held orders to be released?  No        4 Eyes Skin Assessment       The patient is being assessed for  Shift Handoff    I agree that at least

## 2025-03-27 NOTE — PROGRESS NOTES
Name:  Sakina Ballesteros /Age/Sex: 1985  (39 y.o. female)   MRN & CSN:  [id number] & [id number] Encounter Date/Time: 3/27/2025 11:34 AM EDT   Location:  [address]- PCP: Jarrett Galvin DO     Attending:Ismael Leone MD       Sakina Ballesteros is a 39 y.o. female with  has a past medical history of Alcohol use disorder, Gastroparesis, GERD (gastroesophageal reflux disease), Hepatic cirrhosis (HCC), Mood disorder, Opioid use disorder, Overweight, Scoliosis, and Tobacco use disorder. who presents with Alcohol use with withdrawal without complication (HCC)    Hospital Day: 4    Assessment and Plan     Alcohol withdrawal  Abdominal pain  Alcoholic cirrhosis  Hyperammonemia  Nausea and vomiting   CTA abdomen pelvis showing esophagitis   Protonix switched to PO BID   S/p octreotide   On lactulose   Thiamine and folic acid   Weaning off librium   CIWA protocol   LFT normal   GI following   S/p EGD  with portal gastropathy with no varices    Polysubstance abuse   Continue home methadone    Objective:       Intake/Output Summary (Last 24 hours) at 3/27/2025 1135  Last data filed at 3/27/2025 0548  Gross per 24 hour   Intake 620 ml   Output 850 ml   Net -230 ml      Vitals:   Vitals:    25 2230 25 0400   BP: (!) 144/100 115/84  103/77   Pulse: 69 63  59   Resp: 16 10 16 16   Temp: 98.3 °F (36.8 °C)   98.2 °F (36.8 °C)   TempSrc: Oral   Oral   SpO2: 100% 96%  97%   Weight:       Height:           Interval history:     Seen and examined at bedside. No acute events overnight. Reports some nausea this morning    Physical Exam:        General: NAD  Eyes: EOMI  ENT: neck supple  Cardiovascular: Regular rate.  Respiratory: Clear to auscultation  Gastrointestinal: Soft, non tender  Genitourinary: no suprapubic tenderness  Musculoskeletal: No edema  Neuro: Alert and oriented  Psych: Mood appropriate.     Review of Systems:      10 point ROS negative except stated

## 2025-03-27 NOTE — PLAN OF CARE
Problem: Pain  Goal: Verbalizes/displays adequate comfort level or baseline comfort level  3/26/2025 2310 by Herberth Garcia RN  Outcome: Progressing  3/26/2025 0950 by Abigail Martínez RN  Outcome: Progressing     Problem: Safety - Adult  Goal: Free from fall injury  3/26/2025 2310 by Herberth Garcia RN  Outcome: Progressing  3/26/2025 0950 by Abigail Martínez RN  Outcome: Progressing     Problem: ABCDS Injury Assessment  Goal: Absence of physical injury  3/26/2025 2310 by Herberth Garcia RN  Outcome: Progressing  3/26/2025 0950 by Abigail Martínez RN  Outcome: Progressing     Problem: Skin/Tissue Integrity  Goal: Skin integrity remains intact  Description: 1.  Monitor for areas of redness and/or skin breakdown  2.  Assess vascular access sites hourly  3.  Every 4-6 hours minimum:  Change oxygen saturation probe site  4.  Every 4-6 hours:  If on nasal continuous positive airway pressure, respiratory therapy assess nares and determine need for appliance change or resting period  Outcome: Progressing  Flowsheets (Taken 3/26/2025 2000)  Skin Integrity Remains Intact:   Monitor for areas of redness and/or skin breakdown   Assess vascular access sites hourly

## 2025-03-28 VITALS
RESPIRATION RATE: 16 BRPM | DIASTOLIC BLOOD PRESSURE: 85 MMHG | OXYGEN SATURATION: 96 % | SYSTOLIC BLOOD PRESSURE: 112 MMHG | HEIGHT: 67 IN | BODY MASS INDEX: 21.76 KG/M2 | TEMPERATURE: 98 F | WEIGHT: 138.67 LBS | HEART RATE: 70 BPM

## 2025-03-28 LAB
ALBUMIN SERPL-MCNC: 3.6 G/DL (ref 3.4–5)
ALBUMIN/GLOB SERPL: 1.3 {RATIO} (ref 1.1–2.2)
ALP SERPL-CCNC: 93 U/L (ref 40–129)
ALT SERPL-CCNC: 21 U/L (ref 10–40)
ANION GAP SERPL CALCULATED.3IONS-SCNC: 7 MMOL/L (ref 3–16)
AST SERPL-CCNC: 26 U/L (ref 15–37)
BASOPHILS # BLD: 0 K/UL (ref 0–0.2)
BASOPHILS NFR BLD: 0.1 %
BILIRUB SERPL-MCNC: 0.3 MG/DL (ref 0–1)
BUN SERPL-MCNC: 12 MG/DL (ref 7–20)
CALCIUM SERPL-MCNC: 8.2 MG/DL (ref 8.3–10.6)
CHLORIDE SERPL-SCNC: 99 MMOL/L (ref 99–110)
CO2 SERPL-SCNC: 26 MMOL/L (ref 21–32)
CREAT SERPL-MCNC: 0.6 MG/DL (ref 0.6–1.1)
DEPRECATED RDW RBC AUTO: 13.6 % (ref 12.4–15.4)
EOSINOPHIL # BLD: 0.1 K/UL (ref 0–0.6)
EOSINOPHIL NFR BLD: 1.4 %
GFR SERPLBLD CREATININE-BSD FMLA CKD-EPI: >90 ML/MIN/{1.73_M2}
GLUCOSE SERPL-MCNC: 103 MG/DL (ref 70–99)
HCT VFR BLD AUTO: 44.6 % (ref 36–48)
HGB BLD-MCNC: 15 G/DL (ref 12–16)
LYMPHOCYTES # BLD: 2.1 K/UL (ref 1–5.1)
LYMPHOCYTES NFR BLD: 44.3 %
MAGNESIUM SERPL-MCNC: 1.97 MG/DL (ref 1.8–2.4)
MCH RBC QN AUTO: 30.8 PG (ref 26–34)
MCHC RBC AUTO-ENTMCNC: 33.6 G/DL (ref 31–36)
MCV RBC AUTO: 91.7 FL (ref 80–100)
MONOCYTES # BLD: 0.5 K/UL (ref 0–1.3)
MONOCYTES NFR BLD: 11.2 %
NEUTROPHILS # BLD: 2.1 K/UL (ref 1.7–7.7)
NEUTROPHILS NFR BLD: 43 %
PLATELET # BLD AUTO: 96 K/UL (ref 135–450)
PMV BLD AUTO: 9.1 FL (ref 5–10.5)
POTASSIUM SERPL-SCNC: 3.3 MMOL/L (ref 3.5–5.1)
PROT SERPL-MCNC: 6.3 G/DL (ref 6.4–8.2)
RBC # BLD AUTO: 4.87 M/UL (ref 4–5.2)
SODIUM SERPL-SCNC: 132 MMOL/L (ref 136–145)
WBC # BLD AUTO: 4.8 K/UL (ref 4–11)

## 2025-03-28 PROCEDURE — 6360000002 HC RX W HCPCS

## 2025-03-28 PROCEDURE — 94760 N-INVAS EAR/PLS OXIMETRY 1: CPT

## 2025-03-28 PROCEDURE — 6360000002 HC RX W HCPCS: Performed by: INTERNAL MEDICINE

## 2025-03-28 PROCEDURE — 80053 COMPREHEN METABOLIC PANEL: CPT

## 2025-03-28 PROCEDURE — 6370000000 HC RX 637 (ALT 250 FOR IP): Performed by: INTERNAL MEDICINE

## 2025-03-28 PROCEDURE — 85025 COMPLETE CBC W/AUTO DIFF WBC: CPT

## 2025-03-28 PROCEDURE — 6370000000 HC RX 637 (ALT 250 FOR IP)

## 2025-03-28 PROCEDURE — 2500000003 HC RX 250 WO HCPCS: Performed by: INTERNAL MEDICINE

## 2025-03-28 PROCEDURE — 36415 COLL VENOUS BLD VENIPUNCTURE: CPT

## 2025-03-28 PROCEDURE — 83735 ASSAY OF MAGNESIUM: CPT

## 2025-03-28 RX ORDER — METOCLOPRAMIDE 5 MG/1
5 TABLET ORAL 3 TIMES DAILY PRN
Qty: 120 TABLET | Refills: 0 | Status: SHIPPED | OUTPATIENT
Start: 2025-03-28

## 2025-03-28 RX ORDER — FOLIC ACID 1 MG/1
1 TABLET ORAL DAILY
Qty: 30 TABLET | Refills: 3 | Status: SHIPPED | OUTPATIENT
Start: 2025-03-29

## 2025-03-28 RX ADMIN — GABAPENTIN 100 MG: 100 CAPSULE ORAL at 08:32

## 2025-03-28 RX ADMIN — PANTOPRAZOLE SODIUM 40 MG: 40 TABLET, DELAYED RELEASE ORAL at 05:36

## 2025-03-28 RX ADMIN — Medication 100 MG: at 08:32

## 2025-03-28 RX ADMIN — FOLIC ACID 1 MG: 1 TABLET ORAL at 08:32

## 2025-03-28 RX ADMIN — LORAZEPAM 2 MG: 2 INJECTION INTRAMUSCULAR; INTRAVENOUS at 06:41

## 2025-03-28 RX ADMIN — LORAZEPAM 2 MG: 2 INJECTION INTRAMUSCULAR; INTRAVENOUS at 08:42

## 2025-03-28 RX ADMIN — METHADONE HYDROCHLORIDE 85 MG: 10 TABLET ORAL at 08:32

## 2025-03-28 RX ADMIN — SODIUM CHLORIDE, PRESERVATIVE FREE 10 ML: 5 INJECTION INTRAVENOUS at 08:33

## 2025-03-28 RX ADMIN — MORPHINE SULFATE 2 MG: 2 INJECTION, SOLUTION INTRAMUSCULAR; INTRAVENOUS at 10:57

## 2025-03-28 RX ADMIN — LORAZEPAM 2 MG: 2 INJECTION INTRAMUSCULAR; INTRAVENOUS at 11:31

## 2025-03-28 RX ADMIN — CHLORDIAZEPOXIDE HYDROCHLORIDE 25 MG: 25 CAPSULE ORAL at 08:32

## 2025-03-28 RX ADMIN — THERA TABS 1 TABLET: TAB at 08:32

## 2025-03-28 RX ADMIN — ENOXAPARIN SODIUM 40 MG: 100 INJECTION SUBCUTANEOUS at 08:33

## 2025-03-28 RX ADMIN — LACTULOSE 20 G: 10 SOLUTION ORAL at 08:33

## 2025-03-28 RX ADMIN — SODIUM CHLORIDE, PRESERVATIVE FREE 10 ML: 5 INJECTION INTRAVENOUS at 08:34

## 2025-03-28 RX ADMIN — MORPHINE SULFATE 2 MG: 2 INJECTION, SOLUTION INTRAMUSCULAR; INTRAVENOUS at 06:11

## 2025-03-28 RX ADMIN — POTASSIUM CHLORIDE 40 MEQ: 1500 TABLET, EXTENDED RELEASE ORAL at 06:42

## 2025-03-28 ASSESSMENT — PAIN DESCRIPTION - LOCATION
LOCATION: BACK
LOCATION: BACK;HEAD
LOCATION: BACK

## 2025-03-28 ASSESSMENT — PAIN DESCRIPTION - DESCRIPTORS
DESCRIPTORS: ACHING
DESCRIPTORS: ACHING;SORE
DESCRIPTORS: ACHING

## 2025-03-28 ASSESSMENT — PAIN SCALES - GENERAL
PAINLEVEL_OUTOF10: 8
PAINLEVEL_OUTOF10: 9

## 2025-03-28 ASSESSMENT — PAIN DESCRIPTION - PAIN TYPE: TYPE: ACUTE PAIN;CHRONIC PAIN

## 2025-03-28 ASSESSMENT — PAIN DESCRIPTION - ORIENTATION
ORIENTATION: LOWER;ANTERIOR
ORIENTATION: LOWER

## 2025-03-28 ASSESSMENT — PAIN DESCRIPTION - FREQUENCY: FREQUENCY: CONTINUOUS

## 2025-03-28 ASSESSMENT — PAIN - FUNCTIONAL ASSESSMENT: PAIN_FUNCTIONAL_ASSESSMENT: ACTIVITIES ARE NOT PREVENTED

## 2025-03-28 ASSESSMENT — PAIN DESCRIPTION - ONSET: ONSET: ON-GOING

## 2025-03-28 NOTE — PLAN OF CARE
Problem: Pain  Goal: Verbalizes/displays adequate comfort level or baseline comfort level  3/28/2025 0919 by Abigail Martínez, RN  Outcome: Progressing  3/28/2025 0917 by Abigail Martínez RN  Outcome: Progressing     Problem: Safety - Adult  Goal: Free from fall injury  3/28/2025 0919 by Abigail Martínez, RN  Outcome: Progressing  3/28/2025 0917 by Abigail Martínez, RN  Outcome: Progressing     Problem: ABCDS Injury Assessment  Goal: Absence of physical injury  3/28/2025 0919 by Abigail Martínez, RN  Outcome: Progressing  3/28/2025 0917 by Abigail Martínez, RN  Outcome: Progressing

## 2025-03-28 NOTE — DISCHARGE INSTR - COC
Continuity of Care Form    Patient Name: Sakina Ballesteros   :  1985  MRN:  [id number]    Admit date:  3/24/2025  Discharge date:  ***    Code Status Order: Full Code   Advance Directives:    Date/Time Healthcare Directive Type of Healthcare Directive Copy in Chart Healthcare Agent Appointed Healthcare Agent's Name Healthcare Agent's Phone Number    [phone] No, patient does not have an advance directive for healthcare treatment  --  --  --  --  --             Admitting Physician:  Naima Engle MD  PCP: Jarrett Galvin DO    Discharging Nurse: ***  Discharging Hospital Unit/Room#: O9W-7653/1305-01  Discharging Unit Phone Number: ***    Emergency Contact:   Extended Emergency Contact Information  Primary Emergency Contact: Carmen stubbs  Home Phone: [phone]  Relation: Parent   needed? No  Secondary Emergency Contact: Joelle Stubbs  [employer] Phone: [phone]  Relation: Other Relative   needed? No    Past Surgical History:  Past Surgical History:   Procedure Laterality Date    DENTAL SURGERY      ESOPHAGOGASTRODUODENOSCOPY      SIGMOIDOSCOPY N/A 12/10/2024    SIGMOIDOSCOPY DIAGNOSTIC FLEXIBLE performed by Jose Macario MD at [facility] ENDOSCOPY    UPPER GASTROINTESTINAL ENDOSCOPY N/A 2023    EGD BIOPSY performed by Lam Templeton MD at [facility] ENDOSCOPY    UPPER GASTROINTESTINAL ENDOSCOPY N/A 11/15/2023    EGD VARICEAL BANDING performed by Shreyas Matos MD at [facility] ENDOSCOPY    UPPER GASTROINTESTINAL ENDOSCOPY N/A 3/26/2025    ESOPHAGOGASTRODUODENOSCOPY performed by Tae Braxton Jr., DO at [facility] ENDOSCOPY       Immunization History:     There is no immunization history on file for this patient.    Active Problems:  Patient Active Problem List   Diagnosis Code    Anxiety F41.9    Gastroparesis K31.84    GERD (gastroesophageal reflux disease) K21.9    Leukocytosis D72.829    Opiate dependence (HCC) F11.20    Alcohol withdrawal seizure with complication (HCC)

## 2025-03-28 NOTE — PROGRESS NOTES
NAME:  Sakina Ballesteros  YOB: 1985  MEDICAL RECORD NUMBER:  [id number]    Shift Summary: Uneventful shift. C/O headache requiring morphine x2. CIWA max this shift- 12. K- 3.3, replacement given.     Family updated: No    Rhythm: 1st degree AVB    Most recent vitals:   Visit Vitals  BP (!) 88/58   Pulse 64   Temp 98 °F (36.7 °C) (Oral)   Resp 14   Ht 1.702 m (5' 7\")   Wt 62.9 kg (138 lb 10.7 oz)   SpO2 92%   BMI 21.72 kg/m²           No data found.    No data found.      Respiratory support needed (if any):  - RA    Admission weight Weight - Scale: 60.5 kg (133 lb 6.1 oz) (03/24/25 1548)    Today's weight    Wt Readings from Last 1 Encounters:   03/28/25 62.9 kg (138 lb 10.7 oz)        Nunez need assessed each shift: N/A - no nunez present  UOP >30ml/hr: NO - unmeasured occurence  Last documented BM (in last 48 hrs):  Patient Vitals for the past 48 hrs:   Last BM (including prior to admit)   03/26/25 0800 03/26/25 03/26/25 2000 03/26/25 03/26/25 2252 03/26/25 03/27/25 2015 03/27/25                Restraints (in use currently or dc'd in last 12 hrs): No    Order current and documentation up to date? No    Lines/Drains reviewed @ bedside.  Peripheral IV 03/25/25 Right Forearm (Active)   Number of days: 2       Peripheral IV 03/25/25 Distal;Left Forearm (Active)   Number of days: 2         Drip rates at handoff:    sodium chloride      sodium chloride         Lab Data:   CBC:   Recent Labs     03/27/25  0423 03/28/25  0340   WBC 3.3* 4.8   HGB 14.5 15.0   HCT 43.5 44.6   MCV 92.9 91.7   PLT 90* 96*     BMP:    Recent Labs     03/27/25  0423 03/28/25  0340    132*   K 3.9 3.3*   CO2 28 26   BUN 11 12   CREATININE 0.6 0.6     ABG: No results for input(s): \"PHART\", \"[id number]\", \"PO2ART\" in the last 72 hours.    Any consults during the shift? No    Any signed and held orders to be released?  No        4 Eyes Skin Assessment       The patient is being assessed for  Shift Handoff    I agree that at  least one RN has performed a thorough Head to Toe Skin Assessment on the patient. ALL assessment sites listed below have been assessed.      Areas assessed by both nurses: Head, Face, Ears, Shoulders, Back, Chest, Arms, Elbows, Hands, Sacrum. Buttock, Coccyx, Ischium, Legs. Feet and Heels, and Under Medical Devices         Does the Patient have a Wound? No noted wound(s)    Wound Care Orders initiated by RN: No       Ortiz Prevention initiated by RN: Yes    Pressure Injury (Stage 3,4, Unstageable, DTI, NWPT, and Complex wounds) if present, place Wound referral order by RN under : No    New Ostomies, if present place, Ostomy referral order under : No     Nurse 1 eSignature: Electronically signed by FELI PEREZ RN on 3/28/25 at 6:34 AM EDT    **SHARE this note so that the co-signing nurse can place an eSignature**    Nurse 2 eSignature: Electronically signed by Abigail Martínez RN on 3/28/25 at 10:11 AM EDT

## 2025-03-28 NOTE — PROGRESS NOTES
CLINICAL PHARMACY NOTE: MEDS TO BEDS    Total # of Prescriptions Filled: 2   The following medications were delivered to the patient:  Current Discharge Medication List        START taking these medications    Details   folic acid (FOLVITE) 1 MG tablet Take 1 tablet by mouth daily  Qty: 30 tablet, Refills: 3      metoclopramide (REGLAN) 5 MG tablet Take 1 tablet by mouth 3 times daily as needed (Nausea)  Qty: 120 tablet, Refills: 0               Additional Documentation: Tubed to LEIDA Hayes
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
## 2025-03-28 NOTE — DISCHARGE SUMMARY
Name:  Sakina Ballesteros /Age/Sex: 1985 (39 y.o. female)   Admit Date: 3/24/2025  Discharge Date:     MRN & CSN:  [id number] & [id number] Encounter Date :     Attending:  Ismael Leone MD Discharging Provider: Ismael Leone MD     Hospital Course:      Sakina Ballesteros is a 39 y.o. female who presented with     Chief Complaint   Patient presents with    Alcohol Intoxication     Pt into Ed via [facility] for alcohol withdrawal. Pt states last drink was 12 hrs ago, pt normally drinks 1/5 th of vodka. Pt having dizziness and vomiting with some blood. Pt complains of throat pain has hx of varices.         The patient was being managed in the hospital for    Alcohol withdrawal  Abdominal pain  Alcoholic cirrhosis  Hyperammonemia  Nausea and vomiting              CTA abdomen pelvis showing esophagitis              Initially received IV protonix and octreotide and was switched to PO protonix on discharge.    Alcohol withdrawal per [facility] protocol   Patient underwent EGD  which showed no esophagitis or weber's esophagus or gastric varices.   Nausea and vomiting well controlled and was discharged on Reglan PRN for nausea with close follow up with PCP and GI in 1 week         On the basis of discharge, patient reported feeling stable. The patient was found to not be in any acute distress. Further, the patient expressed appropriate understanding of, and agreement with, the discharge recommendations, medications and plan.     Follow up appointments :  Primary care physician: Jarrett Galvin DO within 1 week,     Consults this admission:  IP CONSULT TO SOCIAL WORK  IP CONSULT TO SOCIAL WORK  IP CONSULT TO GI  IP CONSULT TO HOME CARE NEEDS    Discharge Diagnosis:   Alcohol use with withdrawal without complication (HCC)    Discharge Instruction:     Activity: activity as tolerated  Condition on discharge: Stable     Discharge Medications:        Medication List        START taking these medications      folic acid 1 MG

## 2025-06-12 NOTE — PROGRESS NOTES
Physical Therapy  Facility/Department: [address] MED SURG  Physical Therapy Daily Note  If patient discharges prior to next session this note will serve as a discharge summary.  Please see below for the latest assessment towards goals.    Name: Sakina Ballesteros  : 1985  MRN: [id number]  Date of Service: 2024    Discharge Recommendations:  24 hour supervision or assist, Outpatient PT, Patient would benefit from continued therapy after discharge   Sakina Ballesteros scored a 20/24 on the AM-PAC short mobility form. Current research shows that an AM-PAC score of 18 or greater is typically associated with a discharge to the patient's home setting. Based on the patient's AM-PAC score and their current functional mobility deficits, it is recommended that the patient have 2-3 sessions per week of Physical Therapy at d/c to increase the patient's independence.  At this time, this patient demonstrates the endurance and safety to discharge home with 24/7 assist and OP PT (home vs OP services) and a follow up treatment frequency of 2-3x/wk.  Please see assessment section for further patient specific details.  PT Equipment Recommendations  Equipment Needed: No      Patient Diagnosis(es): The primary encounter diagnosis was Leg numbness. Diagnoses of Neuroforaminal stenosis of lumbar spine and Rectal bleeding were also pertinent to this visit.  Past Medical History:  has a past medical history of Alcohol use disorder, Gastroparesis, GERD (gastroesophageal reflux disease), Hepatic cirrhosis (HCC), Mood disorder (HCC), Opioid use disorder, Overweight, Scoliosis, and Tobacco use disorder.  Past Surgical History:  has a past surgical history that includes Esophagogastroduodenoscopy (); Dental surgery; Upper gastrointestinal endoscopy (N/A, 2023); Upper gastrointestinal endoscopy (N/A, 11/15/2023); and sigmoidoscopy (N/A, 12/10/2024).    Assessment  Assessment: Today, the pt is demonstrating that she con't to function  Goals  Time Frame for Short Term Goals: upon d/c  Short Term Goal 1: Transfer sit <> stand with CGA. (met)  Short Term Goal 2: Ambulate with RW 25 feet with CGA. (met)  Short Term Goal 3: Static stand for functional task with SBA. (not met)  Short Term Goal 4: Bed <> chair with walker with CGA. (met)  Patient Goals   Patient Goals : to be able to walk her dog again       Education  Patient Education  Education Given To: Patient  Education Provided: Role of Therapy;Plan of Care;Transfer Training;Equipment;Fall Prevention Strategies;Precautions  Education Provided Comments: to request Kristi for OP PT  Education Method: Verbal;Demonstration  Barriers to Learning: None  Education Outcome: Continued education needed;Verbalized understanding;Demonstrated understanding      Therapy Time   Individual Concurrent Group Co-treatment   Time In 1110         Time Out 1145         Minutes 35                 Electronically signed by Shelley Moser, PT [id number] on 12/20/2024 at 11:47 AM       [id number] OBS or IP - high complexity OR 50-79 mins

## (undated) DEVICE — ENDOSCOPY KIT: Brand: MEDLINE INDUSTRIES, INC.

## (undated) DEVICE — SIX SHOOTER SAEED MULTI-BAND LIGATOR: Brand: SAEED

## (undated) DEVICE — BITE BLOCK ENDOSCP AD 60 FR W/ ADJ STRP PLAS GRN BLOX